# Patient Record
Sex: FEMALE | Race: WHITE | NOT HISPANIC OR LATINO | Employment: OTHER | URBAN - METROPOLITAN AREA
[De-identification: names, ages, dates, MRNs, and addresses within clinical notes are randomized per-mention and may not be internally consistent; named-entity substitution may affect disease eponyms.]

---

## 2019-01-24 ENCOUNTER — TELEPHONE (OUTPATIENT)
Dept: PULMONOLOGY | Facility: CLINIC | Age: 69
End: 2019-01-24

## 2019-01-24 DIAGNOSIS — J44.9 CHRONIC OBSTRUCTIVE PULMONARY DISEASE, UNSPECIFIED COPD TYPE: Primary | ICD-10-CM

## 2019-01-24 NOTE — PROGRESS NOTES
Subjective:      Patient ID: Radha Lamas is a 69 y.o. female.    Patient Active Problem List   Diagnosis    Brow ptosis    Dermatochalasis of right eyelid    Ptosis of both eyelids    Dermatochalasia     she has been referred by Ab Fletcher MD for evaluation and management for   Chief Complaint   Patient presents with    COPD     Chief Complaint: COPD    HPI:  Radha Lamas is a 69 y.o. female presents to pulmonary clinic for evaluation related to diagnosis of COPD.   She was last seen in pulmonary clinic by Dr. Quintero 5/14/2013 when diagnosis of COPD was established with compliant of shortness of breath.   Mild obstructive airflow defect seen on pft in 2013.   She was seen prior by Dr. Agosto 2/22/2013 related to compliant of shortness of breath.  She was found to have mild obstruction on CPFT 2013.   She presents back to pulmonary clinic related to her diagnosis of COPD in 2013 with advice primary care provider, Dr. PURNIMA Fletcher recommended she have a follow up with pulmonary.   No new problems, breathing compliant of shortness of breath with prolonged exertion or working in the yard.   Smoking history former cigarette smoker quit in 2009 with 67.5 pack year smoking history. Smoked 1.5 packs x 45 years.   Current medication Symbicort 160 mcg 2 puffs once daily in morning, she feels she is controlled with only once a day use and cost of inhalers is somewhat factor and use once a day is all she feels she needs. Had albuterol inhaler in past, never used so did not refill.    CAT score: 8    Previous Report Reviewed: historical medical records, office notes and radiology reports     Past Medical History: The following portions of the patient's history were reviewed and updated as appropriate:   She  has a past surgical history that includes Knee cartilage surgery (Right, 01/2011).  Her family history includes Cancer in her brother, father, and sister; Heart failure in her mother; Hypertension in  "her father and mother.  She  reports that she quit smoking about 10 years ago. Her smoking use included cigarettes. She started smoking about 55 years ago. She has a 67.50 pack-year smoking history. she has never used smokeless tobacco. She reports that she does not drink alcohol or use drugs.  She has a current medication list which includes the following prescription(s): alprazolam, aspirin, budesonide-formoterol 160-4.5 mcg, calcium carbonate, levothyroxine, omeprazole, potassium phosphate (monobasic), and tolterodine.  She has No Known Allergies.    Review of Systems   Constitutional: Negative for fever, chills, weight loss, weight gain, activity change, appetite change, fatigue and night sweats.   HENT: Negative for postnasal drip, rhinorrhea, sinus pressure, voice change and congestion.    Eyes: Negative for redness and itching.   Respiratory: Negative for snoring, cough, sputum production, chest tightness, shortness of breath, wheezing, orthopnea, asthma nighttime symptoms, dyspnea on extertion, use of rescue inhaler and somnolence.    Cardiovascular: Negative.  Negative for chest pain, palpitations and leg swelling.   Genitourinary: Negative for difficulty urinating and hematuria.   Endocrine: Negative for cold intolerance and heat intolerance.    Musculoskeletal: Negative for arthralgias, gait problem, joint swelling and myalgias.   Skin: Negative.    Gastrointestinal: Negative for nausea, vomiting, abdominal pain and acid reflux.   Neurological: Negative for dizziness, weakness, light-headedness and headaches.   Hematological: Negative for adenopathy. No excessive bruising.   All other systems reviewed and are negative.     Objective:   /76   Pulse 65   Resp 16   Ht 5' 5" (1.651 m)   Wt 75.5 kg (166 lb 7.2 oz)   SpO2 98%   BMI 27.70 kg/m²   Physical Exam   Constitutional: She is oriented to person, place, and time. She appears well-developed and well-nourished. She is active and cooperative.  " Non-toxic appearance. She does not appear ill. No distress.   HENT:   Head: Normocephalic.   Right Ear: External ear normal.   Left Ear: External ear normal.   Nose: Nose normal.   Mouth/Throat: Oropharynx is clear and moist. No oropharyngeal exudate.   Eyes: Conjunctivae are normal.   Neck: Normal range of motion. Neck supple.   Cardiovascular: Normal rate, regular rhythm, normal heart sounds and intact distal pulses.   Pulmonary/Chest: Effort normal and breath sounds normal. No stridor.   Abdominal: Soft.   Musculoskeletal: Normal range of motion.   Lymphadenopathy:     She has no cervical adenopathy.   Neurological: She is alert and oriented to person, place, and time.   Skin: Skin is warm and dry.   Psychiatric: She has a normal mood and affect. Her behavior is normal. Judgment and thought content normal.   Vitals reviewed.    Personal Diagnostic Review  X-Ray Chest PA And Lateral  Narrative: EXAMINATION:  XR CHEST PA AND LATERAL    CLINICAL HISTORY:  Chronic obstructive pulmonary disease, unspecified    TECHNIQUE:  PA and lateral views of the chest were performed.    COMPARISON:  05/14/2013    FINDINGS:  Cardiac silhouette and mediastinal contours are normal.  Lungs are clear.  Osseous structures are intact.  Impression: No acute cardiopulmonary process.    Electronically signed by: Leo Acevedo MD  Date:    01/25/2019  Time:    11:26    6/24/2013 CPFT  Mild obstruction. Airflow is not clearly improved following bronchodilation. Clinical improvement following bronchodilator  therapy may occur in the absence of spirometric improvement.    Assessment:     1. Chronic obstructive pulmonary disease, unspecified COPD type    2. Screening for cancer    3. Former heavy tobacco smoker      Orders Placed This Encounter   Procedures    CT Chest Lung Screening Low Dose     Standing Status:   Future     Standing Expiration Date:   1/25/2020     Order Specific Question:   Are you a current or former smoker who quit  within the past 15 years?     Answer:   Yes     Order Specific Question:   Are you between age 55-77 years old?     Answer:   Yes     Order Specific Question:   Has the patient ever had lung cancer or an abnormal Chest CT?     Answer:   No     Order Specific Question:   Has the patient smoked 30 or more packs of cigarettes/year?     Answer:   Yes     Order Specific Question:   May the Radiologist modify the order per protocol to meet the clinical needs of the patient?     Answer:   Yes    Complete PFT with bronchodilator     Standing Status:   Future     Standing Expiration Date:   1/25/2020    Stress test, pulmonary     Standing Status:   Future     Standing Expiration Date:   1/25/2020     Plan:   Discussed diagnosis, its evaluation, treatment and usual course. All questions answered.  Problem List Items Addressed This Visit     None      Visit Diagnoses     Chronic obstructive pulmonary disease, unspecified COPD type    -  Primary    Relevant Orders    Complete PFT with bronchodilator    Stress test, pulmonary    CT Chest Lung Screening Low Dose    Screening for cancer        Relevant Orders    CT Chest Lung Screening Low Dose    Former heavy tobacco smoker        Relevant Orders    CT Chest Lung Screening Low Dose      Plan summary  Stable on current Symbicort 160 mcg, encouraged as ordered every 12 hrs, she finds well controlled on 2 puffs in morning only.  Proceed with CPFT to reevaluate lung function, last cpft done 2013.   Continue current regimen, consideration for 24 hr LABA/LAMA depending on cpft results  Advised have rescue Albuterol inhaler on hand. She declines obtaining rescue inhaler, obtained in 2013 never used, does not want added cost of Albuterol inhaler at this time.   Weight loss, regular exercise program.     Follow-up in about 4 weeks (around 2/22/2019) for COPD w/review cpft/pul stress.     Thank you for the opportunity to participate in the care of this patient.

## 2019-01-25 ENCOUNTER — HOSPITAL ENCOUNTER (OUTPATIENT)
Dept: RADIOLOGY | Facility: HOSPITAL | Age: 69
Discharge: HOME | End: 2019-01-25
Attending: NURSE PRACTITIONER
Payer: MEDICARE

## 2019-01-25 ENCOUNTER — OFFICE VISIT (OUTPATIENT)
Dept: PULMONOLOGY | Facility: CLINIC | Age: 69
End: 2019-01-25
Payer: MEDICARE

## 2019-01-25 VITALS
RESPIRATION RATE: 16 BRPM | HEART RATE: 65 BPM | BODY MASS INDEX: 27.73 KG/M2 | OXYGEN SATURATION: 98 % | DIASTOLIC BLOOD PRESSURE: 76 MMHG | WEIGHT: 166.44 LBS | HEIGHT: 65 IN | SYSTOLIC BLOOD PRESSURE: 128 MMHG

## 2019-01-25 DIAGNOSIS — Z87.891 FORMER HEAVY TOBACCO SMOKER: ICD-10-CM

## 2019-01-25 DIAGNOSIS — J44.9 CHRONIC OBSTRUCTIVE PULMONARY DISEASE, UNSPECIFIED COPD TYPE: ICD-10-CM

## 2019-01-25 DIAGNOSIS — J44.9 CHRONIC OBSTRUCTIVE PULMONARY DISEASE, UNSPECIFIED COPD TYPE: Primary | ICD-10-CM

## 2019-01-25 DIAGNOSIS — Z12.9 SCREENING FOR CANCER: ICD-10-CM

## 2019-01-25 PROCEDURE — 71046 XR CHEST PA AND LATERAL: ICD-10-PCS | Mod: 26,,, | Performed by: RADIOLOGY

## 2019-01-25 PROCEDURE — 99213 OFFICE O/P EST LOW 20 MIN: CPT | Mod: PBBFAC,25 | Performed by: NURSE PRACTITIONER

## 2019-01-25 PROCEDURE — 99204 PR OFFICE/OUTPT VISIT, NEW, LEVL IV, 45-59 MIN: ICD-10-PCS | Mod: S$PBB,,, | Performed by: NURSE PRACTITIONER

## 2019-01-25 PROCEDURE — 99999 PR PBB SHADOW E&M-EST. PATIENT-LVL III: CPT | Mod: PBBFAC,,, | Performed by: NURSE PRACTITIONER

## 2019-01-25 PROCEDURE — 71046 X-RAY EXAM CHEST 2 VIEWS: CPT | Mod: 26,,, | Performed by: RADIOLOGY

## 2019-01-25 PROCEDURE — 99204 OFFICE O/P NEW MOD 45 MIN: CPT | Mod: S$PBB,,, | Performed by: NURSE PRACTITIONER

## 2019-01-25 PROCEDURE — 99999 PR PBB SHADOW E&M-EST. PATIENT-LVL III: ICD-10-PCS | Mod: PBBFAC,,, | Performed by: NURSE PRACTITIONER

## 2019-01-25 PROCEDURE — 71046 X-RAY EXAM CHEST 2 VIEWS: CPT | Mod: TC

## 2019-01-25 RX ORDER — ASPIRIN 81 MG/1
81 TABLET ORAL
COMMUNITY
End: 2020-10-25

## 2019-01-25 RX ORDER — CALCIUM CARBONATE 600 MG
600 TABLET ORAL
COMMUNITY

## 2019-01-25 RX ORDER — ALPRAZOLAM 0.25 MG/1
TABLET ORAL
COMMUNITY
Start: 2018-12-05 | End: 2020-10-25

## 2019-01-25 NOTE — LETTER
January 25, 2019      Ab Fletcher MD  [address] [facility] [place] [address]           [facility]  [address] [address] [address]  Phone: [phone]  Fax: [phone]          Patient: Radha Lamas   MR Number: [id number]   YOB: 1950   Date of Visit: 1/25/2019       Dear Dr. Ab Fletcher:    Thank you for referring Radha Lamas to me for evaluation. Attached you will find relevant portions of my assessment and plan of care.    If you have questions, please do not hesitate to call me. I look forward to following Radha Lamas along with you.    Sincerely,    Cornelia Almonte, NP    Enclosure  CC:  No Recipients    If you would like to receive this communication electronically, please contact [email or web address] or [phone] to request more information on [email or web address] Link access.    For providers and/or their staff who would like to refer a patient to [facility], please contact us through our one-stop-shop provider referral line, [facility] , at [phone].    If you feel you have received this communication in error or would no longer like to receive these types of communications, please e-mail [email or web address]

## 2019-01-28 ENCOUNTER — HOSPITAL ENCOUNTER (OUTPATIENT)
Dept: RADIOLOGY | Facility: HOSPITAL | Age: 69
Discharge: HOME | End: 2019-01-28
Attending: NURSE PRACTITIONER
Payer: MEDICARE

## 2019-01-28 DIAGNOSIS — Z12.9 SCREENING FOR CANCER: ICD-10-CM

## 2019-01-28 DIAGNOSIS — Z87.891 FORMER HEAVY TOBACCO SMOKER: ICD-10-CM

## 2019-01-28 DIAGNOSIS — J44.9 CHRONIC OBSTRUCTIVE PULMONARY DISEASE, UNSPECIFIED COPD TYPE: ICD-10-CM

## 2019-01-28 PROCEDURE — G0297 LDCT FOR LUNG CA SCREEN: HCPCS | Mod: 26,,, | Performed by: RADIOLOGY

## 2019-01-28 PROCEDURE — G0297 LDCT FOR LUNG CA SCREEN: HCPCS | Mod: TC

## 2019-01-28 PROCEDURE — G0297 CT CHEST LUNG SCREENING LOW DOSE: ICD-10-PCS | Mod: 26,,, | Performed by: RADIOLOGY

## 2019-02-11 ENCOUNTER — CLINICAL SUPPORT (OUTPATIENT)
Dept: PULMONOLOGY | Facility: CLINIC | Age: 69
End: 2019-02-11
Payer: MEDICARE

## 2019-02-11 VITALS — HEIGHT: 65 IN | WEIGHT: 162.5 LBS | BODY MASS INDEX: 27.07 KG/M2

## 2019-02-11 DIAGNOSIS — J44.9 CHRONIC OBSTRUCTIVE PULMONARY DISEASE, UNSPECIFIED COPD TYPE: ICD-10-CM

## 2019-02-11 LAB
BRPFT: ABNORMAL
DLCO ADJ PRE: 15.35 ML/(MIN*MMHG) (ref 16–27.47)
DLCO SINGLE BREATH LLN: 16
DLCO SINGLE BREATH PRE REF: 70.6 %
DLCO SINGLE BREATH REF: 21.73
DLCOC SBVA LLN: 2.86
DLCOC SBVA PRE REF: 114.9 %
DLCOC SBVA REF: 4.26
DLCOC SINGLE BREATH LLN: 16
DLCOC SINGLE BREATH PRE REF: 70.6 %
DLCOC SINGLE BREATH REF: 21.73
DLCOVA LLN: 2.86
DLCOVA PRE REF: 114.9 %
DLCOVA PRE: 4.9 ML/(MIN*MMHG*L) (ref 2.86–5.66)
DLCOVA REF: 4.26
DLVAADJ PRE: 4.9 ML/(MIN*MMHG*L) (ref 2.86–5.66)
ERV LLN: 0.64
ERV PRE REF: 87.7 %
ERV REF: 0.64
FEF 25 75 CHG: -11.2 %
FEF 25 75 LLN: 0.86
FEF 25 75 POST REF: 30.8 %
FEF 25 75 PRE REF: 34.7 %
FEF 25 75 REF: 1.87
FET100 CHG: -2.2 %
FEV1 CHG: -0.4 %
FEV1 FVC CHG: -7.2 %
FEV1 FVC LLN: 64
FEV1 FVC POST REF: 74.4 %
FEV1 FVC PRE REF: 80.2 %
FEV1 FVC REF: 78
FEV1 LLN: 1.63
FEV1 POST REF: 69.5 %
FEV1 PRE REF: 69.8 %
FEV1 REF: 2.25
FEV6 CHG: 5.6 %
FEV6 LLN: 2.24
FEV6 POST REF: 80.9 %
FEV6 POST: 2.38 L (ref 2.24–3.64)
FEV6 PRE REF: 76.6 %
FEV6 PRE: 2.25 L (ref 2.24–3.64)
FEV6 REF: 2.94
FRCPLETH LLN: 1.94
FRCPLETH PREREF: 161.5 %
FRCPLETH REF: 2.77
FVC CHG: 7.3 %
FVC LLN: 2.12
FVC POST REF: 92.6 %
FVC PRE REF: 86.3 %
FVC REF: 2.91
IVC PRE: 2.24 L (ref 2.12–3.71)
IVC SINGLE BREATH LLN: 2.12
IVC SINGLE BREATH PRE REF: 77 %
IVC SINGLE BREATH REF: 2.91
MVV LLN: 73
MVV PRE REF: 52.5 %
MVV REF: 86
PEF CHG: -0.6 %
PEF LLN: 3.96
PEF POST REF: 70.2 %
PEF PRE REF: 70.6 %
PEF REF: 5.72
POST FEF 25 75: 0.58 L/S (ref 0.86–2.89)
POST FET 100: 12.95 SEC
POST FEV1 FVC: 57.94 % (ref 64.48–91.27)
POST FEV1: 1.56 L (ref 1.63–2.86)
POST FVC: 2.7 L (ref 2.12–3.71)
POST PEF: 4.01 L/S (ref 3.96–7.48)
PRE DLCO: 15.35 ML/(MIN*MMHG) (ref 16–27.47)
PRE ERV: 0.56 L (ref 0.64–0.64)
PRE FEF 25 75: 0.65 L/S (ref 0.86–2.89)
PRE FET 100: 13.24 SEC
PRE FEV1 FVC: 62.42 % (ref 64.48–91.27)
PRE FEV1: 1.57 L (ref 1.63–2.86)
PRE FRC PL: 4.47 L
PRE FVC: 2.51 L (ref 2.12–3.71)
PRE MVV: 45 L/MIN (ref 72.84–98.56)
PRE PEF: 4.04 L/S (ref 3.96–7.48)
PRE RV: 3.61 L (ref 1.55–2.7)
PRE TLC: 6.12 L (ref 4.11–6.09)
RAW LLN: 3.06
RAW PRE REF: 135.2 %
RAW PRE: 4.13 CMH2O*S/L (ref 3.06–3.06)
RAW REF: 3.06
RV LLN: 1.55
RV PRE REF: 170 %
RV REF: 2.12
RVTLC LLN: 34
RVTLC PRE REF: 136.8 %
RVTLC PRE: 58.95 % (ref 33.51–52.69)
RVTLC REF: 43
TLC LLN: 4.11
TLC PRE REF: 120 %
TLC REF: 5.1
VA PRE: 3.14 L (ref 4.95–4.95)
VA SINGLE BREATH LLN: 4.95
VA SINGLE BREATH PRE REF: 63.5 %
VA SINGLE BREATH REF: 4.95
VC LLN: 2.12
VC PRE REF: 86.3 %
VC PRE: 2.51 L (ref 2.12–3.71)
VC REF: 2.91
VTGRAWPRE: 4.57 L

## 2019-02-11 PROCEDURE — 94729 DIFFUSING CAPACITY: CPT | Mod: PBBFAC

## 2019-02-11 PROCEDURE — 94726 PLETHYSMOGRAPHY LUNG VOLUMES: CPT | Mod: 26,S$PBB,, | Performed by: INTERNAL MEDICINE

## 2019-02-11 PROCEDURE — 94726 PULM FUNCT TST PLETHYSMOGRAP: ICD-10-PCS | Mod: 26,S$PBB,, | Performed by: INTERNAL MEDICINE

## 2019-02-11 PROCEDURE — 94729 PR C02/MEMBANE DIFFUSE CAPACITY: ICD-10-PCS | Mod: 26,S$PBB,, | Performed by: INTERNAL MEDICINE

## 2019-02-11 PROCEDURE — 94726 PLETHYSMOGRAPHY LUNG VOLUMES: CPT | Mod: PBBFAC

## 2019-02-11 PROCEDURE — 94618 PULMONARY STRESS TESTING: CPT | Mod: PBBFAC

## 2019-02-11 PROCEDURE — 94060 PR EVAL OF BRONCHOSPASM: ICD-10-PCS | Mod: 26,59,S$PBB, | Performed by: INTERNAL MEDICINE

## 2019-02-11 PROCEDURE — 94729 DIFFUSING CAPACITY: CPT | Mod: 26,S$PBB,, | Performed by: INTERNAL MEDICINE

## 2019-02-11 PROCEDURE — 94060 EVALUATION OF WHEEZING: CPT | Mod: 26,59,S$PBB, | Performed by: INTERNAL MEDICINE

## 2019-02-11 PROCEDURE — 94060 EVALUATION OF WHEEZING: CPT | Mod: PBBFAC

## 2019-02-11 PROCEDURE — 94618 PULMONARY STRESS TESTING: CPT | Mod: 26,S$PBB,, | Performed by: INTERNAL MEDICINE

## 2019-02-11 PROCEDURE — 94618 PULMONARY STRESS TESTING: ICD-10-PCS | Mod: 26,S$PBB,, | Performed by: INTERNAL MEDICINE

## 2019-02-11 NOTE — PROCEDURES
"[facility]  Six Minute Walk     SUMMARY     Ordering Provider: GINNA Almonte   Interpreting Provider: Dr. Ma  Performing nurse/tech/RT: DORY Flores RRT  Diagnosis: COPD  Height: 5' 5" (165.1 cm)  Weight: 73.7 kg (162 lb 7.7 oz)  BMI (Calculated): 27.1   Patient Race:             Phase Oxygen Assessment Supplemental O2 Heart   Rate Blood Pressure Edmundo Dyspnea Scale Rating   Resting 95 % Room Air 60 bpm 109/70 0   Exercise        Minute        1 94 % Room Air 97 bpm     2 96 % Room Air 101 bpm     3 95 % Room Air 103 bpm     4 95 % Room Air 105 bpm     5 95 % Room Air 104 bpm     6  97 % Room Air 106 bpm 138/64 0   Recovery        Minute        1 97 % Room Air 73 bpm     2 98 % Room Air 75 bpm     3 98 % Room Air 74 bpm     4 97 % Room Air 70 bpm 122/73 0     Six Minute Walk Summary  6MWT Status: completed without stopping  Patient Reported: No complaints     Interpretation:  Did the patient stop or pause?: No                Total Time Walked (Calculated): 360 seconds  Final Partial Lap Distance (feet): 35 feet  Total Distance Meters (Calculated): 437.39 meters  Predicted Distance Meters (Calculated): 436.21 meters  Percentage of Predicted (Calculated): 100.27  Peak VO2 (Calculated): 17.1  Mets: 4.89  Has The Patient Had a Previous Six Minute Walk Test?: No       Previous 6MWT Results  Has The Patient Had a Previous Six Minute Walk Test?: No    "

## 2019-02-18 ENCOUNTER — OFFICE VISIT (OUTPATIENT)
Dept: PULMONOLOGY | Facility: CLINIC | Age: 69
End: 2019-02-18
Payer: MEDICARE

## 2019-02-18 VITALS
RESPIRATION RATE: 16 BRPM | OXYGEN SATURATION: 98 % | SYSTOLIC BLOOD PRESSURE: 128 MMHG | HEART RATE: 66 BPM | DIASTOLIC BLOOD PRESSURE: 78 MMHG | WEIGHT: 169.06 LBS | HEIGHT: 65 IN | BODY MASS INDEX: 28.17 KG/M2

## 2019-02-18 DIAGNOSIS — Z87.891 PERSONAL HISTORY OF NICOTINE DEPENDENCE: ICD-10-CM

## 2019-02-18 DIAGNOSIS — F17.201 TOBACCO USE DISORDER, SEVERE, IN SUSTAINED REMISSION: ICD-10-CM

## 2019-02-18 DIAGNOSIS — Z12.9 SCREENING FOR CANCER: ICD-10-CM

## 2019-02-18 DIAGNOSIS — J44.9 COPD, MODERATE: Primary | ICD-10-CM

## 2019-02-18 PROCEDURE — 99999 PR PBB SHADOW E&M-EST. PATIENT-LVL IV: ICD-10-PCS | Mod: PBBFAC,,, | Performed by: NURSE PRACTITIONER

## 2019-02-18 PROCEDURE — 99214 PR OFFICE/OUTPT VISIT, EST, LEVL IV, 30-39 MIN: ICD-10-PCS | Mod: S$PBB,,, | Performed by: NURSE PRACTITIONER

## 2019-02-18 PROCEDURE — 99214 OFFICE O/P EST MOD 30 MIN: CPT | Mod: PBBFAC | Performed by: NURSE PRACTITIONER

## 2019-02-18 PROCEDURE — 99999 PR PBB SHADOW E&M-EST. PATIENT-LVL IV: CPT | Mod: PBBFAC,,, | Performed by: NURSE PRACTITIONER

## 2019-02-18 PROCEDURE — 99214 OFFICE O/P EST MOD 30 MIN: CPT | Mod: S$PBB,,, | Performed by: NURSE PRACTITIONER

## 2019-02-18 NOTE — PROGRESS NOTES
Subjective:      Patient ID: Radha Lamas is a 69 y.o. female.    Patient Active Problem List   Diagnosis    Brow ptosis    Dermatochalasis of right eyelid    Ptosis of both eyelids    Dermatochalasia     she has been referred by No ref. provider found for evaluation and management for   Chief Complaint   Patient presents with    COPD     Chief Complaint: COPD    HPI:  Radha Lamas is a 69 y.o. female presents to pulmonary clinic for re-evaluation related to diagnosis of COPD with review of cpft and CT screening. No opacities seen on 1/28/2019 LDCT.   Mild obstructive airflow defect seen on pft in 2013.   Moderate obstructive airflow defect seen on cpft   She was found to have mild obstruction on CPFT 2013.   No new problems, breathing compliant of shortness of breath with prolonged exertion or working in the yard.   Smoking history former cigarette smoker quit in 2009 with 67.5 pack year smoking history. Smoked 1.5 packs x 45 years.   Current medication Symbicort 160 mcg 2 puffs once daily in morning, she feels she is controlled with only once a day use and cost of inhalers is somewhat factor and use once a day is all she feels she needs. Had albuterol inhaler in past, never used so did not refill.     CAT score: 9    Previous Report Reviewed: historical medical records, office notes and radiology reports      Past Medical History: The following portions of the patient's history were reviewed and updated as appropriate:   She  has a past surgical history that includes Knee cartilage surgery (Right, 01/2011).  Her family history includes Cancer in her brother, father, and sister; Heart failure in her mother; Hypertension in her father and mother.  She  reports that she quit smoking about 10 years ago. Her smoking use included cigarettes. She started smoking about 55 years ago. She has a 67.50 pack-year smoking history. she has never used smokeless tobacco. She reports that she does not drink alcohol  "or use drugs.  She has a current medication list which includes the following prescription(s): alprazolam, aspirin, calcium carbonate, levothyroxine, omeprazole, potassium phosphate (monobasic), tolterodine, and umeclidinium-vilanterol.  She has No Known Allergies.    Review of Systems   Constitutional: Negative for fever, chills, weight loss, weight gain, activity change, appetite change, fatigue and night sweats.   HENT: Negative for postnasal drip, rhinorrhea, sinus pressure, voice change and congestion.    Eyes: Negative for redness and itching.   Respiratory: Negative for snoring, cough, sputum production, chest tightness, shortness of breath, wheezing, orthopnea, asthma nighttime symptoms, dyspnea on extertion, use of rescue inhaler and somnolence.    Cardiovascular: Negative.  Negative for chest pain, palpitations and leg swelling.   Genitourinary: Negative for difficulty urinating and hematuria.   Endocrine: Negative for cold intolerance and heat intolerance.    Musculoskeletal: Negative for arthralgias, gait problem, joint swelling and myalgias.   Skin: Negative.    Gastrointestinal: Negative for nausea, vomiting, abdominal pain and acid reflux.   Neurological: Negative for dizziness, weakness, light-headedness and headaches.   Hematological: Negative for adenopathy. No excessive bruising.   All other systems reviewed and are negative.     Objective:   /78   Pulse 66   Resp 16   Ht 5' 5" (1.651 m)   Wt 76.7 kg (169 lb 1.5 oz)   SpO2 98%   BMI 28.14 kg/m²   Physical Exam   Constitutional: She is oriented to person, place, and time. She appears well-developed and well-nourished. She is active and cooperative.  Non-toxic appearance. She does not appear ill. No distress.   HENT:   Head: Normocephalic.   Right Ear: External ear normal.   Left Ear: External ear normal.   Nose: Nose normal.   Mouth/Throat: Oropharynx is clear and moist. No oropharyngeal exudate.   Eyes: Conjunctivae are normal.   Neck: " Normal range of motion. Neck supple.   Cardiovascular: Normal rate, regular rhythm, normal heart sounds and intact distal pulses.   Pulmonary/Chest: Effort normal and breath sounds normal. No stridor.   Abdominal: Soft.   Musculoskeletal: Normal range of motion.   Lymphadenopathy:     She has no cervical adenopathy.   Neurological: She is alert and oriented to person, place, and time.   Skin: Skin is warm and dry.   Psychiatric: She has a normal mood and affect. Her behavior is normal. Judgment and thought content normal.   Vitals reviewed.    Personal Diagnostic Review  CT Chest Lung Screening Low Dose  Narrative: EXAMINATION:  CT CHEST LUNG SCREENING LOW DOSE    CLINICAL HISTORY:  Lung cancer annual screening, asymptomatic, smoker hx w/in last 15 yrs (min. 30 pack-yrs); Encounter for screening for malignant neoplasm, site unspecified    TECHNIQUE:  CT of the thorax was performed with low dose, lung screening protocol.  No contrast was administered.  Sagittal and coronal reconstructions were obtained.    COMPARISON:  None.    FINDINGS:  Lungs: There are no abnormal opacities that require further evaluation.  Areas of pleuroparenchymal scarring or atelectasis present in the lungs bilaterally.  The lungs show no findings consistent with emphysema.    Pleura:   No effusion..    Heart and pericardium: Normal size without effusion.    Aorta and vasculature: Atherosclerosis including coronary arteries.    Chest wall and skeletal structures: Unremarkable except age-appropriate degenerative changes.    Upper abdomen: Several cysts in the liver measuring up to 2 cm in the posterior right hepatic lobe  Impression: Lung-RADS Category:  1 - Negative - Continue annual screening with LDCT in 12 months.    Clinically or potentially clinically significant non lung cancer finding:  None.    Prior Lung Cancer Modifier:  No history of prior lung cancer.    Electronically signed by: ZURDO Abbott  MD  Date:    01/28/2019  Time:    14:37    2/11/2019   CPFT   Acceptable and reproducible spirometry data.     FEV1/ FVC decreased to 62% indicating obstruction.     FEV1 reduced to 69% indicating moderate obstruction.     FVC normal at 86%.     No significant bronchodilation noted after bronchodilator administration.     FEF 25-75% decreased to 34% indicating small airways flow obstruction.     Flow volume curve shows obstructive pattern.     Spirometry showing moderate obstruction.     Lung volume shows increased TLC to 120% indicating hyper inflation, increased RV and RV/ TLC indicating the presence of air trapping.     DLCO mildly reduced to 70%.     Moderate obstruction, air trapping, mild DLCO reduction.  Clinical correlation recommended.     6/24/2013 CPFT  Mild obstruction. Airflow is not clearly improved following bronchodilation. Clinical improvement following bronchodilator  therapy may occur in the absence of spirometric improvement.    Assessment:     1. COPD, moderate    2. Screening for cancer    3. Tobacco use disorder, severe, in sustained remission    4. Personal history of nicotine dependence       Orders Placed This Encounter   Procedures    CT Chest Lung Screening Low Dose     Standing Status:   Future     Standing Expiration Date:   2/18/2021     Order Specific Question:   Are you a current or former smoker who quit within the past 15 years?     Answer:   Yes     Order Specific Question:   Are you between age 55-77 years old?     Answer:   Yes     Order Specific Question:   Has the patient ever had lung cancer or an abnormal Chest CT?     Answer:   No     Order Specific Question:   Has the patient smoked 30 or more packs of cigarettes/year?     Answer:   Yes     Order Specific Question:   May the Radiologist modify the order per protocol to meet the clinical needs of the patient?     Answer:   Yes     Plan:   Discussed diagnosis, its evaluation, treatment and usual course. All questions  answered.  Problem List Items Addressed This Visit     None      Visit Diagnoses     COPD, moderate    -  Primary    Relevant Medications    umeclidinium-vilanterol (ANORO ELLIPTA) 62.5-25 mcg/actuation DsDv    Other Relevant Orders    CT Chest Lung Screening Low Dose    Screening for cancer        Relevant Orders    CT Chest Lung Screening Low Dose    Tobacco use disorder, severe, in sustained remission        Relevant Orders    CT Chest Lung Screening Low Dose    Personal history of nicotine dependence         Relevant Orders    CT Chest Lung Screening Low Dose      Plan summary  D/C Symbicort 160 mcg, patient only taking once daily, determine if can stop steroid and provide control of shortness of breath with beginning controller Anoro LABA/LAMA, free one month coupon.   CPFT revealed moderate COPD changes, in 2013 pft revealed mild COPD changes.   Recommended rescue Albuterol inhaler on hand. She declines obtaining rescue inhaler, obtained in 2013 never used, does not want added cost of Albuterol inhaler at this time.   67.5 pk/year smoker quit in 2009. No opacity seen on 2019 LDCT. Repeat LDCT in Feb 2020.     Follow-up in about 3 months (around 5/18/2019) for COPD after beginning controller Anoro..

## 2019-03-08 ENCOUNTER — OFFICE VISIT (OUTPATIENT)
Dept: URGENT CARE | Facility: CLINIC | Age: 69
End: 2019-03-08
Payer: MEDICARE

## 2019-03-08 VITALS
DIASTOLIC BLOOD PRESSURE: 76 MMHG | OXYGEN SATURATION: 97 % | RESPIRATION RATE: 20 BRPM | HEART RATE: 79 BPM | BODY MASS INDEX: 28.32 KG/M2 | TEMPERATURE: 98 F | SYSTOLIC BLOOD PRESSURE: 124 MMHG | WEIGHT: 170.19 LBS

## 2019-03-08 DIAGNOSIS — N39.0 URINARY TRACT INFECTION WITH HEMATURIA, SITE UNSPECIFIED: Primary | ICD-10-CM

## 2019-03-08 DIAGNOSIS — R31.9 URINARY TRACT INFECTION WITH HEMATURIA, SITE UNSPECIFIED: Primary | ICD-10-CM

## 2019-03-08 DIAGNOSIS — R39.9 URINARY TRACT INFECTION SYMPTOMS: ICD-10-CM

## 2019-03-08 LAB
BILIRUB SERPL-MCNC: ABNORMAL MG/DL
BLOOD URINE, POC: ABNORMAL
COLOR, POC UA: ABNORMAL
GLUCOSE UR QL STRIP: NORMAL
KETONES UR QL STRIP: ABNORMAL
LEUKOCYTE ESTERASE URINE, POC: ABNORMAL
NITRITE, POC UA: ABNORMAL
PH, POC UA: 8
PROTEIN, POC: ABNORMAL
SPECIFIC GRAVITY, POC UA: 1
UROBILINOGEN, POC UA: ABNORMAL

## 2019-03-08 PROCEDURE — 99213 OFFICE O/P EST LOW 20 MIN: CPT | Mod: PBBFAC,PO | Performed by: NURSE PRACTITIONER

## 2019-03-08 PROCEDURE — 87086 URINE CULTURE/COLONY COUNT: CPT

## 2019-03-08 PROCEDURE — 81001 URINALYSIS AUTO W/SCOPE: CPT | Mod: PBBFAC,PO | Performed by: NURSE PRACTITIONER

## 2019-03-08 PROCEDURE — 99999 PR PBB SHADOW E&M-EST. PATIENT-LVL III: CPT | Mod: PBBFAC,,, | Performed by: NURSE PRACTITIONER

## 2019-03-08 PROCEDURE — 99999 PR PBB SHADOW E&M-EST. PATIENT-LVL III: ICD-10-PCS | Mod: PBBFAC,,, | Performed by: NURSE PRACTITIONER

## 2019-03-08 PROCEDURE — 87088 URINE BACTERIA CULTURE: CPT

## 2019-03-08 PROCEDURE — 99214 OFFICE O/P EST MOD 30 MIN: CPT | Mod: S$PBB,,, | Performed by: NURSE PRACTITIONER

## 2019-03-08 PROCEDURE — 87077 CULTURE AEROBIC IDENTIFY: CPT

## 2019-03-08 PROCEDURE — 99214 PR OFFICE/OUTPT VISIT, EST, LEVL IV, 30-39 MIN: ICD-10-PCS | Mod: S$PBB,,, | Performed by: NURSE PRACTITIONER

## 2019-03-08 PROCEDURE — 87186 SC STD MICRODIL/AGAR DIL: CPT

## 2019-03-08 RX ORDER — NITROFURANTOIN (MACROCRYSTALS) 100 MG/1
100 CAPSULE ORAL EVERY 12 HOURS
Qty: 14 CAPSULE | Refills: 0 | Status: SHIPPED | OUTPATIENT
Start: 2019-03-08 | End: 2019-03-15

## 2019-03-08 NOTE — PROGRESS NOTES
Subjective:      Patient ID: Radha Lamas is a 69 y.o. female.    Chief Complaint: Urinary Tract Infection      Urinary Tract Infection    This is a new problem. The current episode started in the past 7 days. The problem occurs every urination. The problem has been unchanged. The quality of the pain is described as burning (stings). There has been no fever. She is not sexually active. There is no history of pyelonephritis. Associated symptoms include frequency. Pertinent negatives include no behavior changes, chills, discharge, flank pain, hematuria, hesitancy, nausea, possible pregnancy, sweats, urgency, vomiting, weight loss, bubble bath use, constipation, rash or withholding. She has tried nothing for the symptoms. Her past medical history is significant for catheterization and recurrent UTIs. There is no history of diabetes insipidus, diabetes mellitus, genitourinary reflux, hypertension, kidney stones, a single kidney, STD, urinary stasis or a urological procedure.     Review of Systems   Constitutional: Negative for activity change, appetite change, chills, diaphoresis, fatigue, fever, unexpected weight change and weight loss.   HENT: Negative for congestion, ear discharge, ear pain, postnasal drip, rhinorrhea, sinus pressure, sinus pain, sneezing and sore throat.    Eyes: Negative.    Respiratory: Negative for cough, chest tightness, shortness of breath and wheezing.    Cardiovascular: Negative for chest pain and palpitations.   Gastrointestinal: Negative for abdominal pain, constipation, diarrhea, nausea and vomiting.   Endocrine: Negative.  Negative for cold intolerance and heat intolerance.   Genitourinary: Positive for dysuria and frequency. Negative for difficulty urinating, flank pain, hematuria, hesitancy and urgency.   Musculoskeletal: Negative for arthralgias and myalgias.   Skin: Negative for rash.   Allergic/Immunologic: Negative for environmental allergies and food allergies.   Neurological:  Negative for dizziness, weakness, light-headedness and headaches.   Hematological: Negative for adenopathy.   Psychiatric/Behavioral: Negative for agitation.        Objective:     Vitals:    03/08/19 0915   BP: 124/76   Pulse: 79   Resp: 20   Temp: 98.3 °F (36.8 °C)     Physical Exam   Constitutional: She is oriented to person, place, and time. Vital signs are normal. She appears well-developed and well-nourished. She is cooperative. No distress.   HENT:   Head: Normocephalic.   Right Ear: Hearing and external ear normal.   Left Ear: Hearing and external ear normal.   Nose: Nose normal.   Eyes: Conjunctivae, EOM and lids are normal. Pupils are equal, round, and reactive to light. Right eye exhibits no discharge. Left eye exhibits no discharge.   Neck: Normal range of motion and full passive range of motion without pain. Neck supple.   Cardiovascular: Normal rate, regular rhythm and normal heart sounds.   Pulmonary/Chest: Effort normal and breath sounds normal. No accessory muscle usage. No tachypnea and no bradypnea. No respiratory distress.   Abdominal: Soft. Bowel sounds are normal. She exhibits no distension, no ascites and no mass. There is no hepatosplenomegaly. There is no tenderness. There is no rigidity, no rebound, no guarding, no CVA tenderness, no tenderness at McBurney's point and negative Vásquez's sign. No hernia.   Musculoskeletal: Normal range of motion.   Neurological: She is alert and oriented to person, place, and time.   Skin: Skin is warm, dry and intact. Capillary refill takes less than 2 seconds. No bruising, no ecchymosis, no lesion, no petechiae and no rash noted. She is not diaphoretic. No erythema. No pallor.   Nursing note and vitals reviewed.      Assessment:     1. Urinary tract infection with hematuria, site unspecified    2. Urinary tract infection symptoms        Plan:   Radha was seen today for urinary tract infection.    Diagnoses and all orders for this visit:    Urinary tract  infection with hematuria, site unspecified    Urinary tract infection symptoms  -     POCT URINE DIPSTICK WITH MICROSCOPE, AUTOMATED  -     Urine culture    Other orders  -     nitrofurantoin (MACRODANTIN) 100 MG capsule; Take 1 capsule (100 mg total) by mouth every 12 (twelve) hours. for 7 days    · Increase fluids (avoid caffeine or sugary beverages as these will irritate the bladder).   · Take your antibiotics exactly as prescribed and make sure to complete entire course even if you begin to feel better. This will prevent recurrence of infection and antibiotic resistance.   · We have prescribed an antibiotic that covers most bacteria that cause urinary tract infections, however, if your urine culture shows that you have any bacterial resistance to the antibiotic you were prescribed or an unusual bacterial strain, we will notify you and make any necessary changes. Urine cultures usually result in about three days.   · Please follow up with your primary care provider if your symptoms do not improve within 2-3 days or sooner for any new or worsening symptoms.  · For any severe pain, bright red blood in urine, difficulty urinating, fever that does not improve with Tylenol or Ibuprofen, inability to urinate, incontinence of urine or bowels, or for any other urgent concerns, please go to the ER for immediate evaluation.         ELAINE Palafox, FNP-C

## 2019-03-11 LAB — BACTERIA UR CULT: NORMAL

## 2019-03-14 ENCOUNTER — PATIENT MESSAGE (OUTPATIENT)
Dept: PULMONOLOGY | Facility: CLINIC | Age: 69
End: 2019-03-14

## 2019-07-15 ENCOUNTER — PATIENT MESSAGE (OUTPATIENT)
Dept: PULMONOLOGY | Facility: CLINIC | Age: 69
End: 2019-07-15

## 2019-07-15 DIAGNOSIS — J44.9 COPD, MODERATE: ICD-10-CM

## 2020-03-09 ENCOUNTER — TELEPHONE (OUTPATIENT)
Dept: PULMONOLOGY | Facility: CLINIC | Age: 70
End: 2020-03-09

## 2020-03-10 ENCOUNTER — OFFICE VISIT (OUTPATIENT)
Dept: PULMONOLOGY | Facility: CLINIC | Age: 70
End: 2020-03-10
Payer: MEDICARE

## 2020-03-10 ENCOUNTER — HOSPITAL ENCOUNTER (OUTPATIENT)
Dept: RADIOLOGY | Facility: HOSPITAL | Age: 70
Discharge: HOME | End: 2020-03-10
Attending: NURSE PRACTITIONER
Payer: MEDICARE

## 2020-03-10 VITALS
DIASTOLIC BLOOD PRESSURE: 60 MMHG | OXYGEN SATURATION: 97 % | RESPIRATION RATE: 18 BRPM | SYSTOLIC BLOOD PRESSURE: 122 MMHG | HEART RATE: 62 BPM | WEIGHT: 166.44 LBS | BODY MASS INDEX: 27.73 KG/M2 | HEIGHT: 65 IN

## 2020-03-10 DIAGNOSIS — Z12.9 SCREENING FOR CANCER: ICD-10-CM

## 2020-03-10 DIAGNOSIS — Z87.891 FORMER HEAVY CIGARETTE SMOKER (20-39 PER DAY): ICD-10-CM

## 2020-03-10 DIAGNOSIS — Z87.891 PERSONAL HISTORY OF NICOTINE DEPENDENCE: ICD-10-CM

## 2020-03-10 DIAGNOSIS — J44.9 COPD, MODERATE: Primary | ICD-10-CM

## 2020-03-10 DIAGNOSIS — F17.201 TOBACCO USE DISORDER, SEVERE, IN SUSTAINED REMISSION: ICD-10-CM

## 2020-03-10 DIAGNOSIS — J44.9 COPD, MODERATE: ICD-10-CM

## 2020-03-10 PROCEDURE — G0297 LDCT FOR LUNG CA SCREEN: HCPCS | Mod: TC

## 2020-03-10 PROCEDURE — G0297 CT CHEST LUNG SCREENING LOW DOSE: ICD-10-PCS | Mod: 26,,, | Performed by: RADIOLOGY

## 2020-03-10 PROCEDURE — 99999 PR PBB SHADOW E&M-EST. PATIENT-LVL IV: CPT | Mod: PBBFAC,,, | Performed by: NURSE PRACTITIONER

## 2020-03-10 PROCEDURE — 99214 OFFICE O/P EST MOD 30 MIN: CPT | Mod: 25,S$PBB,, | Performed by: NURSE PRACTITIONER

## 2020-03-10 PROCEDURE — 99214 PR OFFICE/OUTPT VISIT, EST, LEVL IV, 30-39 MIN: ICD-10-PCS | Mod: 25,S$PBB,, | Performed by: NURSE PRACTITIONER

## 2020-03-10 PROCEDURE — G0297 LDCT FOR LUNG CA SCREEN: HCPCS | Mod: 26,,, | Performed by: RADIOLOGY

## 2020-03-10 PROCEDURE — 99214 OFFICE O/P EST MOD 30 MIN: CPT | Mod: PBBFAC,25 | Performed by: NURSE PRACTITIONER

## 2020-03-10 PROCEDURE — 99999 PR PBB SHADOW E&M-EST. PATIENT-LVL IV: ICD-10-PCS | Mod: PBBFAC,,, | Performed by: NURSE PRACTITIONER

## 2020-03-10 NOTE — PROGRESS NOTES
Subjective:      Patient ID: Radha Lamas is a 70 y.o. female.    Patient Active Problem List   Diagnosis    Brow ptosis    Dermatochalasis of right eyelid    Ptosis of both eyelids    Dermatochalasia    Pulmonary nodule less than 6 cm determined by computed tomography of lung    Former heavy cigarette smoker (20-39 per day)    COPD, moderate     she has been referred by No ref. provider found for evaluation and management for   Chief Complaint   Patient presents with    COPD     Chief Complaint: COPD    HPI:  Radha Lamas is a 70 y.o. female presents to pulmonary clinic for follow up re-evaluation related to diagnosis of COPD after change to ANORO.  Patient reports her COPD symptom of shortness of breath is significantly improved with Anoro.    1 year follow up CT chest low dose screening 3/10/2020 revealed under 5mm nodule that may even be associated with scarring.   No opacities seen on 1/28/2019 LDCT.   Mild obstructive airflow defect seen on pft in 2013.   Moderate obstructive airflow defect seen on cpft   She was found to have mild obstruction on CPFT 2013.   No new problems, breathing compliant of shortness of breath with prolonged exertion or working in the yard.   Smoking history former cigarette smoker quit in 2009 with 67.5 pack year smoking history. Smoked 1.5 packs x 45 years.   Current medication Symbicort 160 mcg 2 puffs once daily in morning, she feels she is controlled with only once a day use and cost of inhalers is somewhat factor and use once a day is all she feels she needs. Had albuterol inhaler in past, never used so did not refill.     CAT score: 9    Previous Report Reviewed: historical medical records, office notes and radiology reports      Past Medical History: The following portions of the patient's history were reviewed and updated as appropriate:   She  has a past surgical history that includes Knee cartilage surgery (Right, 01/2011).  Her family history includes  "Cancer in her brother, father, and sister; Heart failure in her mother; Hypertension in her father and mother.  She  reports that she quit smoking about 11 years ago. Her smoking use included cigarettes. She started smoking about 56 years ago. She has a 67.50 pack-year smoking history. She has never used smokeless tobacco. She reports that she does not drink alcohol or use drugs.  She has a current medication list which includes the following prescription(s): alprazolam, calcium carbonate, levothyroxine, omeprazole, potassium phosphate (monobasic), tolterodine, umeclidinium-vilanterol, and aspirin.  She has No Known Allergies.    Review of Systems   Constitutional: Negative for fever, chills, weight loss, weight gain, activity change, appetite change, fatigue and night sweats.   HENT: Negative for postnasal drip, rhinorrhea, sinus pressure, voice change and congestion.    Eyes: Negative for redness and itching.   Respiratory: Negative for snoring, cough, sputum production, chest tightness, shortness of breath, wheezing, orthopnea, asthma nighttime symptoms, dyspnea on extertion, use of rescue inhaler and somnolence.    Cardiovascular: Negative.  Negative for chest pain, palpitations and leg swelling.   Genitourinary: Negative for difficulty urinating and hematuria.   Endocrine: Negative for cold intolerance and heat intolerance.    Musculoskeletal: Negative for arthralgias, gait problem, joint swelling and myalgias.   Skin: Negative.    Gastrointestinal: Negative for nausea, vomiting, abdominal pain and acid reflux.   Neurological: Negative for dizziness, weakness, light-headedness and headaches.   Hematological: Negative for adenopathy. No excessive bruising.   All other systems reviewed and are negative.     Objective:   /60   Pulse 62   Resp 18   Ht 5' 5" (1.651 m)   Wt 75.5 kg (166 lb 7.2 oz)   SpO2 97%   BMI 27.70 kg/m²   Physical Exam   Constitutional: She is oriented to person, place, and time. " She appears well-developed and well-nourished. She is active and cooperative.  Non-toxic appearance. She does not appear ill. No distress.   HENT:   Head: Normocephalic.   Right Ear: External ear normal.   Left Ear: External ear normal.   Nose: Nose normal.   Mouth/Throat: Oropharynx is clear and moist. No oropharyngeal exudate.   Eyes: Conjunctivae are normal.   Neck: Normal range of motion. Neck supple.   Cardiovascular: Normal rate, regular rhythm, normal heart sounds and intact distal pulses.   Pulmonary/Chest: Effort normal and breath sounds normal. No stridor.   Abdominal: Soft.   Musculoskeletal: Normal range of motion.   Lymphadenopathy:     She has no cervical adenopathy.   Neurological: She is alert and oriented to person, place, and time.   Skin: Skin is warm and dry.   Psychiatric: She has a normal mood and affect. Her behavior is normal. Judgment and thought content normal.   Vitals reviewed.    Personal Diagnostic Review  CT Chest Lung Screening Low Dose  Narrative: EXAMINATION:  CT CHEST LUNG SCREENING LOW DOSE    CLINICAL HISTORY:  Lung cancer annual screening, asymptomatic, current smoker (min. 30 pack-yrs); Personal history of nicotine dependence    TECHNIQUE:  CT of the thorax was performed with low dose, lung screening protocol.  No contrast was administered.  Sagittal and coronal reconstructions were obtained.    COMPARISON:  None.    FINDINGS:  Lungs: There is a noncalcified pulmonary nodule within the lingula medially series 4, image 239 which measures under 5 mm and may even be associated with some scarring.  There appears to be some chronic appearing atelectasis/scarring within the medial right middle lobe.  The lungs show no findings consistent with emphysema.    Pleura:   No effusion..    Heart and pericardium: Normal size without effusion.    Aorta and vasculature: No significant atherosclerotic vascular disease noted    Chest wall and skeletal structures: Unremarkable except  age-appropriate degenerative changes.    Upper abdomen: Incidental note made of a cyst with noted within the posterior segment of the right hepatic lobe.  Impression: Lung-RADS Category:  2 - Benign Appearance or Behavior - continue annual screening with LDCT in 12 months.    Clinically or potentially clinically significant non lung cancer finding:  None.    Prior Lung Cancer Modifier:  No history of prior lung cancer.    Electronically signed by: Lonnie Robin DO  Date:    03/10/2020  Time:    10:12    2/11/2019   CPFT   Acceptable and reproducible spirometry data.     FEV1/ FVC decreased to 62% indicating obstruction.     FEV1 reduced to 69% indicating moderate obstruction.     FVC normal at 86%.     No significant bronchodilation noted after bronchodilator administration.     FEF 25-75% decreased to 34% indicating small airways flow obstruction.     Flow volume curve shows obstructive pattern.     Spirometry showing moderate obstruction.     Lung volume shows increased TLC to 120% indicating hyper inflation, increased RV and RV/ TLC indicating the presence of air trapping.     DLCO mildly reduced to 70%.     Moderate obstruction, air trapping, mild DLCO reduction.  Clinical correlation recommended.     6/24/2013 CPFT  Mild obstruction. Airflow is not clearly improved following bronchodilation. Clinical improvement following bronchodilator  therapy may occur in the absence of spirometric improvement.    Assessment:     1. COPD, moderate    2. Pulmonary nodule less than 6 cm determined by computed tomography of lung    3. Former heavy cigarette smoker (20-39 per day)      Orders Placed This Encounter   Procedures    CT Chest Without Contrast     Standing Status:   Future     Standing Expiration Date:   3/10/2021     Order Specific Question:   May the Radiologist modify the order per protocol to meet the clinical needs of the patient?     Answer:   Yes     Order Specific Question:   Access port per protocol?      Answer:   No    X-Ray Chest PA And Lateral     Standing Status:   Future     Standing Expiration Date:   3/10/2021     Order Specific Question:   May the Radiologist modify the order per protocol to meet the clinical needs of the patient?     Answer:   Yes    Spirometry with/without bronchodilator     Standing Status:   Future     Standing Expiration Date:   3/10/2021     Plan:   Discussed diagnosis, its evaluation, treatment and usual course. All questions answered.  Problem List Items Addressed This Visit     Pulmonary nodule less than 6 cm determined by computed tomography of lung     1/28/2019 LDCT No opacity seen.  3/10/2020 1 year follow up CT screening: There is a noncalcified pulmonary nodule within the lingula under 5 mm and may even be   associated with some scarring.  There appears to be some chronic appearing atelectasis/scarring within the medial right middle lobe.  The lungs show no findings consistent with emphysema.    Consideration for follow up 1-2 years, then no follow up if stable,  request follow up in 1 year since patient has family history of cancer and patient prior heavy smoker. 67.5 pk/year smoker quit in 2009.  Follow up CT chest without  March 2021                       SOLID PULMONARY NODULES                      SUBSOLID PULMONARY NODULES             Relevant Orders    CT Chest Without Contrast    Former heavy cigarette smoker (20-39 per day)     67.5 pk/year smoker quit in 2009         Relevant Orders    CT Chest Without Contrast    COPD, moderate - Primary     Controlled on Anoro  Declines having rescue on hand significantly improved on Anoro controlled.  2/11/2019 CPFT  Moderate obstruction, air trapping, mild DLCO reduction.  Continue Anoro daily           Relevant Orders    Spirometry with/without bronchodilator    X-Ray Chest PA And Lateral      Plan summary  Controlled on Anoro LABA/LAMA.  CPFT revealed moderate COPD changes, in 2013 pft revealed mild COPD changes.    Recommended rescue Albuterol inhaler on hand. She continues to decline obtaining rescue inhaler, obtained in 2013 never used, does not want added cost of Albuterol inhaler since controlled with anoro.   67.5 pk/year smoker quit in 2009. No opacity seen on 2019 LDCT.  LDCT March 10, 2020 less than 5mm nodule lingula.  Repeat CT chest 1 year March 2021.     Follow up in about 1 year (around 3/10/2021) for COPD review mo/cxr. pul nodule review CT chest .

## 2020-03-10 NOTE — ASSESSMENT & PLAN NOTE
Controlled on Anoro  Declines having rescue on hand significantly improved on Anoro controlled.  2/11/2019 CPFT  Moderate obstruction, air trapping, mild DLCO reduction.  Continue Anoro daily

## 2020-03-10 NOTE — ASSESSMENT & PLAN NOTE
1/28/2019 LDCT No opacity seen.  3/10/2020 1 year follow up CT screening: There is a noncalcified pulmonary nodule within the lingula under 5 mm and may even be   associated with some scarring.  There appears to be some chronic appearing atelectasis/scarring within the medial right middle lobe.  The lungs show no findings consistent with emphysema.    Consideration for follow up 1-2 years, then no follow up if stable,  request follow up in 1 year since patient has family history of cancer and patient prior heavy smoker. 67.5 pk/year smoker quit in 2009.  Follow up CT chest without  March 2021                       SOLID PULMONARY NODULES                      SUBSOLID PULMONARY NODULES

## 2020-08-18 ENCOUNTER — PATIENT MESSAGE (OUTPATIENT)
Dept: PULMONOLOGY | Facility: CLINIC | Age: 70
End: 2020-08-18

## 2020-08-18 DIAGNOSIS — J44.9 COPD, MODERATE: ICD-10-CM

## 2020-08-19 RX ORDER — UMECLIDINIUM BROMIDE AND VILANTEROL TRIFENATATE 62.5; 25 UG/1; UG/1
62.5 POWDER RESPIRATORY (INHALATION) DAILY
Qty: 180 EACH | Refills: 3 | Status: SHIPPED | OUTPATIENT
Start: 2020-08-19 | End: 2021-09-03

## 2020-10-25 ENCOUNTER — OFFICE VISIT (OUTPATIENT)
Dept: URGENT CARE | Facility: CLINIC | Age: 70
End: 2020-10-25
Payer: MEDICARE

## 2020-10-25 VITALS
WEIGHT: 165.69 LBS | OXYGEN SATURATION: 96 % | SYSTOLIC BLOOD PRESSURE: 137 MMHG | DIASTOLIC BLOOD PRESSURE: 83 MMHG | HEART RATE: 65 BPM | TEMPERATURE: 98 F | RESPIRATION RATE: 16 BRPM | BODY MASS INDEX: 27.57 KG/M2

## 2020-10-25 DIAGNOSIS — L25.5 DERMATITIS DUE TO PLANTS, INCLUDING POISON IVY, SUMAC, AND OAK: Primary | ICD-10-CM

## 2020-10-25 PROCEDURE — 99214 OFFICE O/P EST MOD 30 MIN: CPT | Mod: S$PBB,,, | Performed by: PHYSICIAN ASSISTANT

## 2020-10-25 PROCEDURE — 99214 PR OFFICE/OUTPT VISIT, EST, LEVL IV, 30-39 MIN: ICD-10-PCS | Mod: S$PBB,,, | Performed by: PHYSICIAN ASSISTANT

## 2020-10-25 PROCEDURE — 99999 PR PBB SHADOW E&M-EST. PATIENT-LVL IV: ICD-10-PCS | Mod: PBBFAC,,, | Performed by: PHYSICIAN ASSISTANT

## 2020-10-25 PROCEDURE — 99999 PR PBB SHADOW E&M-EST. PATIENT-LVL IV: CPT | Mod: PBBFAC,,, | Performed by: PHYSICIAN ASSISTANT

## 2020-10-25 PROCEDURE — 99214 OFFICE O/P EST MOD 30 MIN: CPT | Mod: PBBFAC,PO | Performed by: PHYSICIAN ASSISTANT

## 2020-10-25 RX ORDER — METHYLPREDNISOLONE 4 MG/1
TABLET ORAL
Qty: 1 PACKAGE | Refills: 0 | Status: SHIPPED | OUTPATIENT
Start: 2020-10-25 | End: 2021-03-10

## 2020-10-25 NOTE — PROGRESS NOTES
Radha Lamas is a 70 year old female who presents today with a rash to her right forearm and left neck for about 1 week now.  She states she was working outside when the rash developed.  She is having mild discomfort and pruritis to the area.  No drainage.  Rash started to right forearm and spread to left neck.  Patient states she sleeps with arm by neck and believes that's how it spread.  She has tried Calamine lotion for the rash with no resolution.    All areas of patients chart reviewed including past medical history, past surgical history, medications, allergies, family history, and social history.    Review of Systems   Constitutional: Negative for fever.   HENT: Negative for sore throat.    Respiratory: Negative for shortness of breath.    Cardiovascular: Negative for chest pain.   Gastrointestinal: Negative for abdominal pain and nausea.   Genitourinary: Negative for dysuria and frequency.   Musculoskeletal: Negative for back pain.   Skin: Positive for itching and rash.   Neurological: Negative for headaches.   All other systems reviewed and are negative.    Physical Exam:  /83 (BP Location: Right arm, Patient Position: Sitting, BP Method: Small (Automatic))   Pulse 65   Temp 98 °F (36.7 °C) (Oral)   Resp 16   Wt 75.1 kg (165 lb 10.8 oz)   SpO2 96%   BMI 27.57 kg/m²   Physical Exam   Constitutional: She is oriented to person, place, and time and well-developed, well-nourished, and in no distress.   HENT:   Head: Normocephalic.   Right Ear: External ear normal.   Left Ear: External ear normal.   Mouth/Throat: No oropharyngeal exudate.   Neck: Normal range of motion.   Cardiovascular: Normal rate and normal heart sounds.   Pulmonary/Chest: Effort normal and breath sounds normal. No respiratory distress.   Neurological: She is alert and oriented to person, place, and time.   Skin: Skin is warm.        Red mildly raised rash to right forearm and left neck.  Consistent with contact dermatitis due  to poison ivy/poison sumac.   Psychiatric: Affect normal.   Vitals reviewed.    Assessment:  1. Dermatitis due to plants, including poison ivy, sumac, and oak  - methylPREDNISolone (MEDROL DOSEPACK) 4 mg tablet; use as directed  Dispense: 1 Package; Refill: 0    Plan:  Medrol dose Evangelista sent to pharmacy.  Antihistamine as needed for itching.  Cool compresses to the area as needed.  Follow up in 1 week if no resolution.  ER with new or worsening symptoms.

## 2020-10-25 NOTE — PATIENT INSTRUCTIONS
Understanding Contact Dermatitis     A cool, moist compress can help reduce itching.     Contact dermatitis is a common type of skin rash. Its caused by something that touches the skin and makes it irritated and inflamed. It can occur on skin on any part of the body, such as the face, neck, hands, arms, and legs. Contact dermatitis is not spread from person to person.  Often, the reaction of contact dermatitis occurs 1 to 2 days after contact with the offending agent.  How to say it  PHILOMENA-tact [id number]   What causes contact dermatitis?  Its caused by something that irritates the skin, or that creates an allergic reaction on the skin. People can get contact dermatitis from many kinds of things. These include:  · Plant oils in poison ivy, oak, and sumac  · Chemicals in household , solvents, and glue  · Chemicals in makeup, soap, laundry detergent, perfume, acne cream, and hair products  · Certain medicines, such as neomycin, bacitracin, benzocaine, and thimerosal  · Metals such as nickel, found in some jewelry and watch bands   · The sticky material on the back of bandages and tape (adhesive)  · Things that can cause tiny breaks in the skin, such as wood, fiberglass, metal tools, and plant thorns  · Rubber latex in surgical gloves and other medical supplies  Dermatitis can also be caused by the skin being damp for long periods of time. This can happen from washing your hands too often, or working with wet materials.  Symptoms of contact dermatitis  Symptoms can include skin that is:  · Blistered  · Burning  · Cracked  · Dry  · Itchy  · Painful  · Red  · Rough, thickened, and leathery  · Swollen  · Warm  The blisters may ooze fluid and form crusts.  Treatment for contact dermatitis  Treatment is done to help relieve itching and reduce inflammation. The rash should go away in a few days to a few weeks. Treatments include:  · Cool, moist compress. Use a clean damp cloth. Put it on the area for 20 to 30  minutes, 5 to 6 times a day for the first 3 days.  · Steroid cream or ointment. You can apply this medicine several times a day on clean skin.  · Oral corticosteroid. Your healthcare provider may prescribe this medicine if you have severe skin symptoms on a large part of your body.  Your healthcare provider may give you a steroid injection instead of pills.  · Oral antihistamine. This medicine can help reduce itching.  · Colloidal oatmeal bath. Soaking in water with colloidal oatmeal can help soothe skin.  · Plain cream, lotion, or ointment. Cream, lotion, or ointment without medicine can help to soothe and protect your skin.  Living with contact dermatitis  Talk with your healthcare provider about what may have caused your contact dermatitis. Patch testing may help you figure out what caused the rash so you can avoid further contact with it. Once you learn what caused your rash, make sure to avoid that substance. If your skin comes into contact with it again, make sure to wash your skin right away. If you cant avoid the substance, wear gloves or other protective clothing before you touch it. Or use a cream, lotion, or ointment to protect your skin.  When to call your healthcare provider  Call your healthcare provider right away if you have any of these:  · Fever of 100.4°F (38°C) or higher, or as directed  · Symptoms that dont get better, or get worse  · New symptoms   Date Last Reviewed: 5/1/2016  © 9768-6395 [employer]. [address] [place] [address], [place] [address]. All rights reserved. This information is not intended as a substitute for professional medical care. Always follow your healthcare professional's instructions.

## 2021-01-06 ENCOUNTER — IMMUNIZATION (OUTPATIENT)
Dept: INTERNAL MEDICINE | Facility: CLINIC | Age: 71
End: 2021-01-06
Payer: MEDICARE

## 2021-01-06 DIAGNOSIS — Z23 NEED FOR VACCINATION: ICD-10-CM

## 2021-01-06 PROCEDURE — 91300 COVID-19, MRNA, LNP-S, PF, 30 MCG/0.3 ML DOSE VACCINE: CPT | Mod: PBBFAC | Performed by: FAMILY MEDICINE

## 2021-01-27 ENCOUNTER — IMMUNIZATION (OUTPATIENT)
Dept: INTERNAL MEDICINE | Facility: CLINIC | Age: 71
End: 2021-01-27
Payer: MEDICARE

## 2021-01-27 DIAGNOSIS — Z23 NEED FOR VACCINATION: Primary | ICD-10-CM

## 2021-01-27 PROCEDURE — 91300 COVID-19, MRNA, LNP-S, PF, 30 MCG/0.3 ML DOSE VACCINE: CPT | Mod: PBBFAC | Performed by: FAMILY MEDICINE

## 2021-01-27 PROCEDURE — 0002A COVID-19, MRNA, LNP-S, PF, 30 MCG/0.3 ML DOSE VACCINE: CPT | Mod: PBBFAC | Performed by: FAMILY MEDICINE

## 2021-02-23 DIAGNOSIS — J44.9 COPD, MODERATE: Primary | ICD-10-CM

## 2021-03-01 DIAGNOSIS — J44.9 COPD, MODERATE: Primary | ICD-10-CM

## 2021-03-02 ENCOUNTER — PATIENT MESSAGE (OUTPATIENT)
Dept: PULMONOLOGY | Facility: CLINIC | Age: 71
End: 2021-03-02

## 2021-03-10 ENCOUNTER — HOSPITAL ENCOUNTER (OUTPATIENT)
Dept: RADIOLOGY | Facility: HOSPITAL | Age: 71
Discharge: HOME | End: 2021-03-10
Attending: NURSE PRACTITIONER
Payer: MEDICARE

## 2021-03-10 ENCOUNTER — OFFICE VISIT (OUTPATIENT)
Dept: PULMONOLOGY | Facility: CLINIC | Age: 71
End: 2021-03-10
Attending: NURSE PRACTITIONER
Payer: MEDICARE

## 2021-03-10 VITALS
DIASTOLIC BLOOD PRESSURE: 78 MMHG | HEART RATE: 70 BPM | WEIGHT: 165.13 LBS | BODY MASS INDEX: 27.51 KG/M2 | SYSTOLIC BLOOD PRESSURE: 122 MMHG | HEIGHT: 65 IN | OXYGEN SATURATION: 97 % | RESPIRATION RATE: 16 BRPM

## 2021-03-10 DIAGNOSIS — Z87.891 FORMER HEAVY CIGARETTE SMOKER (20-39 PER DAY): ICD-10-CM

## 2021-03-10 DIAGNOSIS — J44.9 COPD, MODERATE: Primary | ICD-10-CM

## 2021-03-10 DIAGNOSIS — R91.1 PULMONARY NODULE 1 CM OR GREATER IN DIAMETER: ICD-10-CM

## 2021-03-10 DIAGNOSIS — J44.9 COPD, MODERATE: ICD-10-CM

## 2021-03-10 PROCEDURE — 71250 CT THORAX DX C-: CPT | Mod: TC

## 2021-03-10 PROCEDURE — 99999 PR PBB SHADOW E&M-EST. PATIENT-LVL IV: CPT | Mod: PBBFAC,,, | Performed by: NURSE PRACTITIONER

## 2021-03-10 PROCEDURE — 99214 OFFICE O/P EST MOD 30 MIN: CPT | Mod: S$GLB,,, | Performed by: NURSE PRACTITIONER

## 2021-03-10 PROCEDURE — 99999 PR PBB SHADOW E&M-EST. PATIENT-LVL IV: ICD-10-PCS | Mod: PBBFAC,,, | Performed by: NURSE PRACTITIONER

## 2021-03-10 PROCEDURE — 99214 PR OFFICE/OUTPT VISIT, EST, LEVL IV, 30-39 MIN: ICD-10-PCS | Mod: S$GLB,,, | Performed by: NURSE PRACTITIONER

## 2021-03-10 PROCEDURE — 71046 X-RAY EXAM CHEST 2 VIEWS: CPT | Mod: TC

## 2021-03-10 RX ORDER — ALBUTEROL SULFATE 90 UG/1
2 AEROSOL, METERED RESPIRATORY (INHALATION) EVERY 4 HOURS PRN
Qty: 54 G | Refills: 4 | Status: SHIPPED | OUTPATIENT
Start: 2021-03-10 | End: 2021-03-17 | Stop reason: SDUPTHER

## 2021-03-17 DIAGNOSIS — J44.9 COPD, MODERATE: Primary | ICD-10-CM

## 2021-03-17 RX ORDER — ALBUTEROL SULFATE 90 UG/1
2 AEROSOL, METERED RESPIRATORY (INHALATION) EVERY 4 HOURS PRN
Qty: 54 G | Refills: 4 | Status: SHIPPED | OUTPATIENT
Start: 2021-03-17 | End: 2021-03-17 | Stop reason: CLARIF

## 2021-03-17 RX ORDER — ALBUTEROL SULFATE 90 UG/1
2 AEROSOL, METERED RESPIRATORY (INHALATION) EVERY 4 HOURS PRN
Qty: 54 G | Refills: 4 | Status: SHIPPED | OUTPATIENT
Start: 2021-03-17 | End: 2023-11-08

## 2021-03-26 ENCOUNTER — TELEPHONE (OUTPATIENT)
Dept: RADIOLOGY | Facility: HOSPITAL | Age: 71
End: 2021-03-26

## 2021-03-29 ENCOUNTER — TELEPHONE (OUTPATIENT)
Dept: PULMONOLOGY | Facility: CLINIC | Age: 71
End: 2021-03-29

## 2021-03-29 ENCOUNTER — HOSPITAL ENCOUNTER (OUTPATIENT)
Dept: RADIOLOGY | Facility: HOSPITAL | Age: 71
Discharge: HOME | End: 2021-03-29
Attending: NURSE PRACTITIONER
Payer: MEDICARE

## 2021-03-29 DIAGNOSIS — R91.1 PULMONARY NODULE 1 CM OR GREATER IN DIAMETER: ICD-10-CM

## 2021-03-29 DIAGNOSIS — R94.2 ABNORMAL PET OF LEFT LUNG: Primary | ICD-10-CM

## 2021-03-29 DIAGNOSIS — Z01.812 PRE-PROCEDURE LAB EXAM: ICD-10-CM

## 2021-03-29 PROCEDURE — 78815 NM PET CT ROUTINE: ICD-10-PCS | Mod: 26,PI,, | Performed by: RADIOLOGY

## 2021-03-29 PROCEDURE — 78815 PET IMAGE W/CT SKULL-THIGH: CPT | Mod: TC

## 2021-03-29 PROCEDURE — 78815 PET IMAGE W/CT SKULL-THIGH: CPT | Mod: 26,PI,, | Performed by: RADIOLOGY

## 2021-04-09 ENCOUNTER — TELEPHONE (OUTPATIENT)
Dept: RADIOLOGY | Facility: HOSPITAL | Age: 71
End: 2021-04-09

## 2021-04-09 ENCOUNTER — LAB VISIT (OUTPATIENT)
Dept: LAB | Facility: HOSPITAL | Age: 71
End: 2021-04-09
Attending: NURSE PRACTITIONER
Payer: MEDICARE

## 2021-04-09 DIAGNOSIS — R91.1 PULMONARY NODULE 1 CM OR GREATER IN DIAMETER: ICD-10-CM

## 2021-04-09 DIAGNOSIS — R94.2 ABNORMAL PET OF LEFT LUNG: ICD-10-CM

## 2021-04-09 DIAGNOSIS — H02.839 DERMATOCHALASIS, UNSPECIFIED LATERALITY: ICD-10-CM

## 2021-04-09 DIAGNOSIS — J44.9 COPD, MODERATE: ICD-10-CM

## 2021-04-09 DIAGNOSIS — Z01.812 PRE-PROCEDURE LAB EXAM: ICD-10-CM

## 2021-04-09 DIAGNOSIS — D68.9 COAGULATION DEFECT, UNSPECIFIED: Primary | ICD-10-CM

## 2021-04-09 LAB
CREAT SERPL-MCNC: 0.8 MG/DL (ref 0.5–1.4)
EST. GFR  (AFRICAN AMERICAN): >60 ML/MIN/1.73 M^2
EST. GFR  (NON AFRICAN AMERICAN): >60 ML/MIN/1.73 M^2

## 2021-04-09 PROCEDURE — 82565 ASSAY OF CREATININE: CPT | Performed by: NURSE PRACTITIONER

## 2021-04-09 PROCEDURE — 36415 COLL VENOUS BLD VENIPUNCTURE: CPT | Performed by: NURSE PRACTITIONER

## 2021-04-12 ENCOUNTER — HOSPITAL ENCOUNTER (OUTPATIENT)
Dept: RADIOLOGY | Facility: HOSPITAL | Age: 71
Discharge: HOME | End: 2021-04-12
Attending: RADIOLOGY
Payer: MEDICARE

## 2021-04-12 ENCOUNTER — HOSPITAL ENCOUNTER (OUTPATIENT)
Dept: RADIOLOGY | Facility: HOSPITAL | Age: 71
Discharge: HOME | End: 2021-04-12
Attending: PHYSICIAN ASSISTANT
Payer: MEDICARE

## 2021-04-12 ENCOUNTER — HOSPITAL ENCOUNTER (OUTPATIENT)
Dept: RADIOLOGY | Facility: HOSPITAL | Age: 71
Discharge: HOME | End: 2021-04-12
Attending: NURSE PRACTITIONER
Payer: MEDICARE

## 2021-04-12 DIAGNOSIS — R91.1 PULMONARY NODULE 1 CM OR GREATER IN DIAMETER: ICD-10-CM

## 2021-04-12 DIAGNOSIS — R94.2 ABNORMAL PET OF LEFT LUNG: ICD-10-CM

## 2021-04-12 LAB — SARS-COV-2 RDRP RESP QL NAA+PROBE: NEGATIVE

## 2021-04-12 PROCEDURE — 88342 IMHCHEM/IMCYTCHM 1ST ANTB: CPT | Mod: 59 | Performed by: PATHOLOGY

## 2021-04-12 PROCEDURE — 88305 TISSUE EXAM BY PATHOLOGIST: CPT | Performed by: PATHOLOGY

## 2021-04-12 PROCEDURE — 88360 TUMOR IMMUNOHISTOCHEM/MANUAL: CPT | Performed by: PATHOLOGY

## 2021-04-12 PROCEDURE — 71045 X-RAY EXAM CHEST 1 VIEW: CPT | Mod: TC

## 2021-04-12 PROCEDURE — U0002 COVID-19 LAB TEST NON-CDC: HCPCS | Performed by: RADIOLOGY

## 2021-04-12 PROCEDURE — 88381 MICRODISSECTION MANUAL: CPT | Performed by: PATHOLOGY

## 2021-04-12 PROCEDURE — 88305 TISSUE EXAM BY PATHOLOGIST: ICD-10-PCS | Mod: 26,,, | Performed by: PATHOLOGY

## 2021-04-12 PROCEDURE — 88342 IMHCHEM/IMCYTCHM 1ST ANTB: CPT | Mod: 26,,, | Performed by: PATHOLOGY

## 2021-04-12 PROCEDURE — 88341 IMHCHEM/IMCYTCHM EA ADD ANTB: CPT | Mod: 26,,, | Performed by: PATHOLOGY

## 2021-04-12 PROCEDURE — 88305 TISSUE EXAM BY PATHOLOGIST: CPT | Mod: 26,,, | Performed by: PATHOLOGY

## 2021-04-12 PROCEDURE — 88342 CHG IMMUNOCYTOCHEMISTRY: ICD-10-PCS | Mod: 26,,, | Performed by: PATHOLOGY

## 2021-04-12 PROCEDURE — 63600175 PHARM REV CODE 636 W HCPCS: Performed by: RADIOLOGY

## 2021-04-12 PROCEDURE — 81445 SO NEO GSAP 5-50DNA/DNA&RNA: CPT | Performed by: PATHOLOGY

## 2021-04-12 PROCEDURE — 88341 IMHCHEM/IMCYTCHM EA ADD ANTB: CPT | Mod: 59 | Performed by: PATHOLOGY

## 2021-04-12 PROCEDURE — 88341 PR IHC OR ICC EACH ADD'L SINGLE ANTIBODY  STAINPR: ICD-10-PCS | Mod: 26,,, | Performed by: PATHOLOGY

## 2021-04-12 PROCEDURE — A4215 STERILE NEEDLE: HCPCS

## 2021-04-12 RX ORDER — MIDAZOLAM HYDROCHLORIDE 1 MG/ML
INJECTION INTRAMUSCULAR; INTRAVENOUS CODE/TRAUMA/SEDATION MEDICATION
Status: DISCONTINUED | OUTPATIENT
Start: 2021-04-12 | End: 2021-04-13 | Stop reason: HOSPADM

## 2021-04-12 RX ORDER — FENTANYL CITRATE 50 UG/ML
INJECTION, SOLUTION INTRAMUSCULAR; INTRAVENOUS CODE/TRAUMA/SEDATION MEDICATION
Status: DISCONTINUED | OUTPATIENT
Start: 2021-04-12 | End: 2021-04-13 | Stop reason: HOSPADM

## 2021-04-12 RX ADMIN — MIDAZOLAM HYDROCHLORIDE 0.5 MG: 1 INJECTION INTRAMUSCULAR; INTRAVENOUS at 09:04

## 2021-04-12 RX ADMIN — FENTANYL CITRATE 25 MCG: 50 INJECTION, SOLUTION INTRAMUSCULAR; INTRAVENOUS at 09:04

## 2021-04-14 ENCOUNTER — TELEPHONE (OUTPATIENT)
Dept: PULMONOLOGY | Facility: CLINIC | Age: 71
End: 2021-04-14

## 2021-04-14 ENCOUNTER — DOCUMENTATION ONLY (OUTPATIENT)
Dept: HEMATOLOGY/ONCOLOGY | Facility: CLINIC | Age: 71
End: 2021-04-14

## 2021-04-14 VITALS
HEART RATE: 60 BPM | BODY MASS INDEX: 27.49 KG/M2 | RESPIRATION RATE: 14 BRPM | HEIGHT: 65 IN | WEIGHT: 165 LBS | DIASTOLIC BLOOD PRESSURE: 53 MMHG | SYSTOLIC BLOOD PRESSURE: 114 MMHG | OXYGEN SATURATION: 99 %

## 2021-04-14 DIAGNOSIS — C34.92 SQUAMOUS CELL LUNG CANCER, LEFT: Primary | ICD-10-CM

## 2021-04-14 DIAGNOSIS — Z01.818 PRE-OP TESTING: ICD-10-CM

## 2021-04-15 ENCOUNTER — CLINICAL SUPPORT (OUTPATIENT)
Dept: PULMONOLOGY | Facility: CLINIC | Age: 71
End: 2021-04-15
Payer: MEDICARE

## 2021-04-15 ENCOUNTER — OFFICE VISIT (OUTPATIENT)
Dept: PULMONOLOGY | Facility: CLINIC | Age: 71
End: 2021-04-15
Payer: MEDICARE

## 2021-04-15 VITALS — HEIGHT: 64 IN | BODY MASS INDEX: 28.04 KG/M2 | WEIGHT: 164.25 LBS

## 2021-04-15 VITALS
WEIGHT: 165.81 LBS | SYSTOLIC BLOOD PRESSURE: 126 MMHG | DIASTOLIC BLOOD PRESSURE: 76 MMHG | HEART RATE: 79 BPM | BODY MASS INDEX: 28.31 KG/M2 | OXYGEN SATURATION: 98 % | HEIGHT: 64 IN | RESPIRATION RATE: 17 BRPM

## 2021-04-15 DIAGNOSIS — J44.9 COPD, MODERATE: ICD-10-CM

## 2021-04-15 DIAGNOSIS — Z01.811 PRE-OPERATIVE RESPIRATORY EXAMINATION: ICD-10-CM

## 2021-04-15 DIAGNOSIS — Z01.818 PRE-OP TESTING: Primary | ICD-10-CM

## 2021-04-15 DIAGNOSIS — C34.32 MALIGNANT NEOPLASM OF LOWER LOBE OF LEFT LUNG: Primary | ICD-10-CM

## 2021-04-15 DIAGNOSIS — C34.92 SQUAMOUS CELL LUNG CANCER, LEFT: ICD-10-CM

## 2021-04-15 DIAGNOSIS — R06.02 SHORTNESS OF BREATH: ICD-10-CM

## 2021-04-15 DIAGNOSIS — R09.02 EXERCISE HYPOXEMIA: ICD-10-CM

## 2021-04-15 DIAGNOSIS — Z87.891 FORMER HEAVY CIGARETTE SMOKER (20-39 PER DAY): ICD-10-CM

## 2021-04-15 DIAGNOSIS — R91.1 PULMONARY NODULE 1 CM OR GREATER IN DIAMETER: ICD-10-CM

## 2021-04-15 LAB
ALLENS TEST: ABNORMAL
BRPFT: ABNORMAL
DELSYS: ABNORMAL
DLCO ADJ PRE: 14.35 ML/(MIN*MMHG) (ref 14.97–26.44)
DLCO SINGLE BREATH LLN: 14.97
DLCO SINGLE BREATH PRE REF: 68.9 %
DLCO SINGLE BREATH REF: 20.71
DLCOC SBVA LLN: 2.78
DLCOC SBVA PRE REF: 77.5 %
DLCOC SBVA REF: 4.22
DLCOC SINGLE BREATH LLN: 14.97
DLCOC SINGLE BREATH PRE REF: 69.3 %
DLCOC SINGLE BREATH REF: 20.71
DLCOVA LLN: 2.78
DLCOVA PRE REF: 77 %
DLCOVA PRE: 3.25 ML/(MIN*MMHG*L) (ref 2.78–5.67)
DLCOVA REF: 4.22
DLVAADJ PRE: 3.27 ML/(MIN*MMHG*L) (ref 2.78–5.67)
ERV LLN: -16449.4
ERV PRE REF: 51 %
ERV REF: 0.6
FEF 25 75 LLN: 0.78
FEF 25 75 PRE REF: 32.4 %
FEF 25 75 REF: 1.75
FEV1 FVC LLN: 64
FEV1 FVC PRE REF: 74.4 %
FEV1 FVC REF: 78
FEV1 LLN: 1.51
FEV1 PRE REF: 71.6 %
FEV1 REF: 2.1
FIO2: 0.21
FRCPLETH LLN: 1.88
FRCPLETH PREREF: 132.5 %
FRCPLETH REF: 2.7
FVC LLN: 1.96
FVC PRE REF: 95.4 %
FVC REF: 2.72
HCO3 UR-SCNC: 24.7 MMOL/L (ref 24–28)
IVC PRE: 2.24 L (ref 1.96–3.49)
IVC SINGLE BREATH LLN: 1.96
IVC SINGLE BREATH PRE REF: 82.3 %
IVC SINGLE BREATH REF: 2.72
MODE: ABNORMAL
MVV LLN: 67
MVV PRE REF: 77.3 %
MVV REF: 79
PCO2 BLDA: 34.6 MMHG (ref 35–45)
PEF LLN: 3.68
PEF PRE REF: 64 %
PEF REF: 5.38
PH SMN: 7.46 [PH] (ref 7.35–7.45)
PO2 BLDA: 89 MMHG (ref 80–100)
POC BE: 1 MMOL/L
POC SATURATED O2: 97 % (ref 95–100)
PRE DLCO: 14.26 ML/(MIN*MMHG) (ref 14.97–26.44)
PRE ERV: 0.31 L (ref -16449.4–16450.6)
PRE FEF 25 75: 0.57 L/S (ref 0.78–2.72)
PRE FET 100: 12.06 SEC
PRE FEV1 FVC: 57.85 % (ref 64.05–91.49)
PRE FEV1: 1.5 L (ref 1.51–2.69)
PRE FRC PL: 3.58 L
PRE FVC: 2.6 L (ref 1.96–3.49)
PRE MVV: 61 L/MIN (ref 67.12–90.8)
PRE PEF: 3.44 L/S (ref 3.68–7.08)
PRE RV: 2.88 L (ref 1.52–2.68)
PRE TLC: 5.48 L (ref 3.92–5.89)
RAW LLN: 3.06
RAW PRE REF: 122.1 %
RAW PRE: 3.73 CMH2O*S/L (ref 3.06–3.06)
RAW REF: 3.06
RV LLN: 1.52
RV PRE REF: 137.1 %
RV REF: 2.1
RVTLC LLN: 34
RVTLC PRE REF: 120 %
RVTLC PRE: 52.55 % (ref 34.19–53.37)
RVTLC REF: 44
SAMPLE: ABNORMAL
SITE: ABNORMAL
TLC LLN: 3.92
TLC PRE REF: 111.8 %
TLC REF: 4.9
VA PRE: 4.39 L (ref 4.75–4.75)
VA SINGLE BREATH LLN: 4.75
VA SINGLE BREATH PRE REF: 92.3 %
VA SINGLE BREATH REF: 4.75
VC LLN: 1.96
VC PRE REF: 95.4 %
VC PRE: 2.6 L (ref 1.96–3.49)
VC REF: 2.72
VTGRAWPRE: 3.96 L

## 2021-04-15 PROCEDURE — 94010 BREATHING CAPACITY TEST: CPT | Mod: S$GLB,,, | Performed by: INTERNAL MEDICINE

## 2021-04-15 PROCEDURE — 82803 PR  BLOOD GASES: PH, PO2 & PCO2: ICD-10-PCS | Mod: S$GLB,,, | Performed by: INTERNAL MEDICINE

## 2021-04-15 PROCEDURE — 94010 BREATHING CAPACITY TEST: ICD-10-PCS | Mod: S$GLB,,, | Performed by: INTERNAL MEDICINE

## 2021-04-15 PROCEDURE — 94729 DIFFUSING CAPACITY: CPT | Mod: S$GLB,,, | Performed by: INTERNAL MEDICINE

## 2021-04-15 PROCEDURE — 94726 PLETHYSMOGRAPHY LUNG VOLUMES: CPT | Mod: S$GLB,,, | Performed by: INTERNAL MEDICINE

## 2021-04-15 PROCEDURE — 99999 PR PBB SHADOW E&M-EST. PATIENT-LVL III: CPT | Mod: PBBFAC,,, | Performed by: INTERNAL MEDICINE

## 2021-04-15 PROCEDURE — 99215 PR OFFICE/OUTPT VISIT, EST, LEVL V, 40-54 MIN: ICD-10-PCS | Mod: 25,S$GLB,, | Performed by: INTERNAL MEDICINE

## 2021-04-15 PROCEDURE — 94618 PULMONARY STRESS TESTING: ICD-10-PCS | Mod: S$GLB,,, | Performed by: INTERNAL MEDICINE

## 2021-04-15 PROCEDURE — 94726 PULM FUNCT TST PLETHYSMOGRAP: ICD-10-PCS | Mod: S$GLB,,, | Performed by: INTERNAL MEDICINE

## 2021-04-15 PROCEDURE — 36600 PR WITHDRAWAL OF ARTERIAL BLOOD: ICD-10-PCS | Mod: S$GLB,,, | Performed by: INTERNAL MEDICINE

## 2021-04-15 PROCEDURE — 99999 PR PBB SHADOW E&M-EST. PATIENT-LVL I: CPT | Mod: PBBFAC,,,

## 2021-04-15 PROCEDURE — 36600 WITHDRAWAL OF ARTERIAL BLOOD: CPT | Mod: S$GLB,,, | Performed by: INTERNAL MEDICINE

## 2021-04-15 PROCEDURE — 94618 PULMONARY STRESS TESTING: CPT | Mod: S$GLB,,, | Performed by: INTERNAL MEDICINE

## 2021-04-15 PROCEDURE — 99999 PR PBB SHADOW E&M-EST. PATIENT-LVL III: ICD-10-PCS | Mod: PBBFAC,,, | Performed by: INTERNAL MEDICINE

## 2021-04-15 PROCEDURE — 82803 BLOOD GASES ANY COMBINATION: CPT | Mod: S$GLB,,, | Performed by: INTERNAL MEDICINE

## 2021-04-15 PROCEDURE — 99215 OFFICE O/P EST HI 40 MIN: CPT | Mod: 25,S$GLB,, | Performed by: INTERNAL MEDICINE

## 2021-04-15 PROCEDURE — 94729 PR C02/MEMBANE DIFFUSE CAPACITY: ICD-10-PCS | Mod: S$GLB,,, | Performed by: INTERNAL MEDICINE

## 2021-04-15 PROCEDURE — 99999 PR PBB SHADOW E&M-EST. PATIENT-LVL I: ICD-10-PCS | Mod: PBBFAC,,,

## 2021-04-15 RX ORDER — ALBUTEROL SULFATE 0.83 MG/ML
2.5 SOLUTION RESPIRATORY (INHALATION)
Qty: 270 ML | Refills: 11 | Status: SHIPPED | OUTPATIENT
Start: 2021-04-15 | End: 2022-04-15

## 2021-04-15 RX ORDER — APREMILAST 30 MG/1
1 TABLET, FILM COATED ORAL 2 TIMES DAILY
COMMUNITY
Start: 2021-04-06 | End: 2023-02-08

## 2021-04-20 ENCOUNTER — DOCUMENTATION ONLY (OUTPATIENT)
Dept: HEMATOLOGY/ONCOLOGY | Facility: CLINIC | Age: 71
End: 2021-04-20

## 2021-04-20 ENCOUNTER — OFFICE VISIT (OUTPATIENT)
Dept: CARDIOTHORACIC SURGERY | Facility: CLINIC | Age: 71
End: 2021-04-20
Payer: MEDICARE

## 2021-04-20 VITALS
HEART RATE: 65 BPM | BODY MASS INDEX: 29.23 KG/M2 | TEMPERATURE: 98 F | SYSTOLIC BLOOD PRESSURE: 140 MMHG | DIASTOLIC BLOOD PRESSURE: 83 MMHG | HEIGHT: 63 IN | WEIGHT: 165 LBS

## 2021-04-20 DIAGNOSIS — R06.09 DOE (DYSPNEA ON EXERTION): Primary | ICD-10-CM

## 2021-04-20 DIAGNOSIS — C34.92 SQUAMOUS CELL LUNG CANCER, LEFT: ICD-10-CM

## 2021-04-20 LAB
FINAL PATHOLOGIC DIAGNOSIS: NORMAL
GROSS: NORMAL
Lab: NORMAL
MICROSCOPIC EXAM: NORMAL
SUPPLEMENTAL DIAGNOSIS: NORMAL

## 2021-04-20 PROCEDURE — 99999 PR PBB SHADOW E&M-EST. PATIENT-LVL V: ICD-10-PCS | Mod: PBBFAC,,, | Performed by: THORACIC SURGERY (CARDIOTHORACIC VASCULAR SURGERY)

## 2021-04-20 PROCEDURE — 99999 PR PBB SHADOW E&M-EST. PATIENT-LVL V: CPT | Mod: PBBFAC,,, | Performed by: THORACIC SURGERY (CARDIOTHORACIC VASCULAR SURGERY)

## 2021-04-20 PROCEDURE — 99205 OFFICE O/P NEW HI 60 MIN: CPT | Mod: S$GLB,,, | Performed by: THORACIC SURGERY (CARDIOTHORACIC VASCULAR SURGERY)

## 2021-04-20 PROCEDURE — 99205 PR OFFICE/OUTPT VISIT, NEW, LEVL V, 60-74 MIN: ICD-10-PCS | Mod: S$GLB,,, | Performed by: THORACIC SURGERY (CARDIOTHORACIC VASCULAR SURGERY)

## 2021-04-21 ENCOUNTER — OFFICE VISIT (OUTPATIENT)
Dept: HEMATOLOGY/ONCOLOGY | Facility: CLINIC | Age: 71
End: 2021-04-21
Payer: MEDICARE

## 2021-04-21 ENCOUNTER — TELEPHONE (OUTPATIENT)
Dept: HEMATOLOGY/ONCOLOGY | Facility: CLINIC | Age: 71
End: 2021-04-21

## 2021-04-21 ENCOUNTER — DOCUMENTATION ONLY (OUTPATIENT)
Dept: HEMATOLOGY/ONCOLOGY | Facility: CLINIC | Age: 71
End: 2021-04-21

## 2021-04-21 VITALS
TEMPERATURE: 98 F | DIASTOLIC BLOOD PRESSURE: 69 MMHG | SYSTOLIC BLOOD PRESSURE: 116 MMHG | OXYGEN SATURATION: 98 % | WEIGHT: 165.56 LBS | HEIGHT: 65 IN | HEART RATE: 68 BPM | BODY MASS INDEX: 27.58 KG/M2

## 2021-04-21 DIAGNOSIS — C34.92 SQUAMOUS CELL LUNG CANCER, LEFT: Primary | ICD-10-CM

## 2021-04-21 PROCEDURE — 99204 PR OFFICE/OUTPT VISIT, NEW, LEVL IV, 45-59 MIN: ICD-10-PCS | Mod: S$GLB,,, | Performed by: INTERNAL MEDICINE

## 2021-04-21 PROCEDURE — 99204 OFFICE O/P NEW MOD 45 MIN: CPT | Mod: S$GLB,,, | Performed by: INTERNAL MEDICINE

## 2021-04-21 PROCEDURE — 99999 PR PBB SHADOW E&M-EST. PATIENT-LVL IV: ICD-10-PCS | Mod: PBBFAC,,, | Performed by: INTERNAL MEDICINE

## 2021-04-21 PROCEDURE — 99999 PR PBB SHADOW E&M-EST. PATIENT-LVL IV: CPT | Mod: PBBFAC,,, | Performed by: INTERNAL MEDICINE

## 2021-04-22 ENCOUNTER — ANESTHESIA EVENT (OUTPATIENT)
Dept: SURGERY | Facility: HOSPITAL | Age: 71
DRG: 165 | End: 2021-04-22
Payer: MEDICARE

## 2021-04-23 ENCOUNTER — HOSPITAL ENCOUNTER (OUTPATIENT)
Dept: CARDIOLOGY | Facility: HOSPITAL | Age: 71
Discharge: HOME | End: 2021-04-23
Attending: PHYSICIAN ASSISTANT
Payer: MEDICARE

## 2021-04-23 VITALS
BODY MASS INDEX: 25.48 KG/M2 | WEIGHT: 182 LBS | HEIGHT: 71 IN | SYSTOLIC BLOOD PRESSURE: 116 MMHG | DIASTOLIC BLOOD PRESSURE: 69 MMHG

## 2021-04-23 DIAGNOSIS — C34.92 SQUAMOUS CELL LUNG CANCER, LEFT: ICD-10-CM

## 2021-04-23 DIAGNOSIS — R06.09 DOE (DYSPNEA ON EXERTION): ICD-10-CM

## 2021-04-23 PROCEDURE — 93306 ECHO (CUPID ONLY): ICD-10-PCS | Mod: 26,,, | Performed by: INTERNAL MEDICINE

## 2021-04-23 PROCEDURE — 93306 TTE W/DOPPLER COMPLETE: CPT

## 2021-04-23 PROCEDURE — 93306 TTE W/DOPPLER COMPLETE: CPT | Mod: 26,,, | Performed by: INTERNAL MEDICINE

## 2021-04-26 ENCOUNTER — LAB VISIT (OUTPATIENT)
Dept: OTOLARYNGOLOGY | Facility: CLINIC | Age: 71
End: 2021-04-26
Payer: MEDICARE

## 2021-04-26 DIAGNOSIS — Z01.818 PRE-OP TESTING: ICD-10-CM

## 2021-04-26 LAB
AORTIC ROOT ANNULUS: 3 CM
ASCENDING AORTA: 2.83 CM
AV INDEX (PROSTH): 0.87
AV MEAN GRADIENT: 3 MMHG
AV PEAK GRADIENT: 6 MMHG
AV VALVE AREA: 2.82 CM2
AV VELOCITY RATIO: 0.91
BSA FOR ECHO PROCEDURE: 2.03 M2
CV ECHO LV RWT: 0.42 CM
DOP CALC AO PEAK VEL: 1.25 M/S
DOP CALC AO VTI: 21.29 CM
DOP CALC LVOT AREA: 3.2 CM2
DOP CALC LVOT DIAMETER: 2.03 CM
DOP CALC LVOT PEAK VEL: 1.14 M/S
DOP CALC LVOT STROKE VOLUME: 60.01 CM3
DOP CALCLVOT PEAK VEL VTI: 18.55 CM
ECHO LV POSTERIOR WALL: 0.72 CM (ref 0.6–1.1)
EJECTION FRACTION: 60 %
FRACTIONAL SHORTENING: 43 % (ref 28–44)
INTERVENTRICULAR SEPTUM: 0.69 CM (ref 0.6–1.1)
IVRT: 91.34 MSEC
LA MAJOR: 4.64 CM
LA MINOR: 4.43 CM
LA WIDTH: 3.13 CM
LEFT ATRIUM SIZE: 2.19 CM
LEFT ATRIUM VOLUME INDEX: 13 ML/M2
LEFT ATRIUM VOLUME: 26.41 CM3
LEFT INTERNAL DIMENSION IN SYSTOLE: 1.94 CM (ref 2.1–4)
LEFT VENTRICLE DIASTOLIC VOLUME INDEX: 23.73 ML/M2
LEFT VENTRICLE DIASTOLIC VOLUME: 48.18 ML
LEFT VENTRICLE MASS INDEX: 30 G/M2
LEFT VENTRICLE SYSTOLIC VOLUME INDEX: 5.8 ML/M2
LEFT VENTRICLE SYSTOLIC VOLUME: 11.74 ML
LEFT VENTRICULAR INTERNAL DIMENSION IN DIASTOLE: 3.42 CM (ref 3.5–6)
LEFT VENTRICULAR MASS: 61.07 G
PISA TR MAX VEL: 1.56 M/S
PULM VEIN S/D RATIO: 1.37
PV PEAK D VEL: 0.43 M/S
PV PEAK S VEL: 0.59 M/S
RA MAJOR: 4.37 CM
RA WIDTH: 4.04 CM
RV TISSUE DOPPLER FREE WALL SYSTOLIC VELOCITY 1 (APICAL 4 CHAMBER VIEW): 13.1 CM/S
SINUS: 3.07 CM
STJ: 2.35 CM
TDI LATERAL: 0.08 M/S
TDI SEPTAL: 0.06 M/S
TDI: 0.07 M/S
TR MAX PG: 10 MMHG
TRICUSPID ANNULAR PLANE SYSTOLIC EXCURSION: 1.38 CM

## 2021-04-26 PROCEDURE — U0003 INFECTIOUS AGENT DETECTION BY NUCLEIC ACID (DNA OR RNA); SEVERE ACUTE RESPIRATORY SYNDROME CORONAVIRUS 2 (SARS-COV-2) (CORONAVIRUS DISEASE [COVID-19]), AMPLIFIED PROBE TECHNIQUE, MAKING USE OF HIGH THROUGHPUT TECHNOLOGIES AS DESCRIBED BY CMS-2020-01-R: HCPCS | Performed by: NURSE PRACTITIONER

## 2021-04-26 PROCEDURE — U0005 INFEC AGEN DETEC AMPLI PROBE: HCPCS | Performed by: NURSE PRACTITIONER

## 2021-04-27 ENCOUNTER — PATIENT MESSAGE (OUTPATIENT)
Dept: SURGERY | Facility: HOSPITAL | Age: 71
End: 2021-04-27

## 2021-04-28 ENCOUNTER — ANESTHESIA (OUTPATIENT)
Dept: SURGERY | Facility: HOSPITAL | Age: 71
DRG: 165 | End: 2021-04-28
Payer: MEDICARE

## 2021-04-28 ENCOUNTER — HOSPITAL ENCOUNTER (INPATIENT)
Facility: HOSPITAL | Age: 71
LOS: 1 days | Discharge: HOME | DRG: 165 | End: 2021-04-29
Attending: THORACIC SURGERY (CARDIOTHORACIC VASCULAR SURGERY) | Admitting: THORACIC SURGERY (CARDIOTHORACIC VASCULAR SURGERY)
Payer: MEDICARE

## 2021-04-28 DIAGNOSIS — C34.92 NSCLC OF LEFT LUNG: ICD-10-CM

## 2021-04-28 LAB
ABO + RH BLD: NORMAL
BLD GP AB SCN CELLS X3 SERPL QL: NORMAL
SARS-COV-2 RNA RESP QL NAA+PROBE: NOT DETECTED

## 2021-04-28 PROCEDURE — 25000242 PHARM REV CODE 250 ALT 637 W/ HCPCS: Performed by: SURGERY

## 2021-04-28 PROCEDURE — 86900 BLOOD TYPING SEROLOGIC ABO: CPT | Performed by: PHYSICIAN ASSISTANT

## 2021-04-28 PROCEDURE — 25000003 PHARM REV CODE 250: Performed by: STUDENT IN AN ORGANIZED HEALTH CARE EDUCATION/TRAINING PROGRAM

## 2021-04-28 PROCEDURE — 32666 THORACOSCOPY W/WEDGE RESECT: CPT | Mod: LT,,, | Performed by: THORACIC SURGERY (CARDIOTHORACIC VASCULAR SURGERY)

## 2021-04-28 PROCEDURE — 37000008 HC ANESTHESIA 1ST 15 MINUTES: Performed by: THORACIC SURGERY (CARDIOTHORACIC VASCULAR SURGERY)

## 2021-04-28 PROCEDURE — 94799 UNLISTED PULMONARY SVC/PX: CPT

## 2021-04-28 PROCEDURE — 36000710: Performed by: THORACIC SURGERY (CARDIOTHORACIC VASCULAR SURGERY)

## 2021-04-28 PROCEDURE — 63600175 PHARM REV CODE 636 W HCPCS: Performed by: STUDENT IN AN ORGANIZED HEALTH CARE EDUCATION/TRAINING PROGRAM

## 2021-04-28 PROCEDURE — C1729 CATH, DRAINAGE: HCPCS | Performed by: THORACIC SURGERY (CARDIOTHORACIC VASCULAR SURGERY)

## 2021-04-28 PROCEDURE — 94640 AIRWAY INHALATION TREATMENT: CPT

## 2021-04-28 PROCEDURE — 88305 TISSUE EXAM BY PATHOLOGIST: CPT | Mod: 26,,, | Performed by: PATHOLOGY

## 2021-04-28 PROCEDURE — 88307 TISSUE EXAM BY PATHOLOGIST: CPT | Performed by: PATHOLOGY

## 2021-04-28 PROCEDURE — 71000015 HC POSTOP RECOV 1ST HR: Performed by: THORACIC SURGERY (CARDIOTHORACIC VASCULAR SURGERY)

## 2021-04-28 PROCEDURE — 27000221 HC OXYGEN, UP TO 24 HOURS

## 2021-04-28 PROCEDURE — 63600175 PHARM REV CODE 636 W HCPCS: Performed by: SURGERY

## 2021-04-28 PROCEDURE — 88305 TISSUE EXAM BY PATHOLOGIST: CPT | Mod: 59 | Performed by: PATHOLOGY

## 2021-04-28 PROCEDURE — D9220A PRA ANESTHESIA: Mod: ,,, | Performed by: ANESTHESIOLOGY

## 2021-04-28 PROCEDURE — 32666 PR THORACOSCOPY W/WEDGE RESECT: ICD-10-PCS | Mod: LT,,, | Performed by: THORACIC SURGERY (CARDIOTHORACIC VASCULAR SURGERY)

## 2021-04-28 PROCEDURE — 20600001 HC STEP DOWN PRIVATE ROOM

## 2021-04-28 PROCEDURE — 71000033 HC RECOVERY, INTIAL HOUR: Performed by: THORACIC SURGERY (CARDIOTHORACIC VASCULAR SURGERY)

## 2021-04-28 PROCEDURE — 71000016 HC POSTOP RECOV ADDL HR: Performed by: THORACIC SURGERY (CARDIOTHORACIC VASCULAR SURGERY)

## 2021-04-28 PROCEDURE — 88305 TISSUE EXAM BY PATHOLOGIST: ICD-10-PCS | Mod: 26,,, | Performed by: PATHOLOGY

## 2021-04-28 PROCEDURE — 88307 PR  SURG PATH,LEVEL V: ICD-10-PCS | Mod: 26,,, | Performed by: PATHOLOGY

## 2021-04-28 PROCEDURE — 27201423 OPTIME MED/SURG SUP & DEVICES STERILE SUPPLY: Performed by: THORACIC SURGERY (CARDIOTHORACIC VASCULAR SURGERY)

## 2021-04-28 PROCEDURE — 36000711: Performed by: THORACIC SURGERY (CARDIOTHORACIC VASCULAR SURGERY)

## 2021-04-28 PROCEDURE — 63600175 PHARM REV CODE 636 W HCPCS: Performed by: ANESTHESIOLOGY

## 2021-04-28 PROCEDURE — 36415 COLL VENOUS BLD VENIPUNCTURE: CPT | Performed by: THORACIC SURGERY (CARDIOTHORACIC VASCULAR SURGERY)

## 2021-04-28 PROCEDURE — 25000003 PHARM REV CODE 250: Performed by: SURGERY

## 2021-04-28 PROCEDURE — 37000009 HC ANESTHESIA EA ADD 15 MINS: Performed by: THORACIC SURGERY (CARDIOTHORACIC VASCULAR SURGERY)

## 2021-04-28 PROCEDURE — 32674 PR THORACOSCOPY LYMPH NODE EXC: ICD-10-PCS | Mod: ,,, | Performed by: THORACIC SURGERY (CARDIOTHORACIC VASCULAR SURGERY)

## 2021-04-28 PROCEDURE — 63600175 PHARM REV CODE 636 W HCPCS: Performed by: PHYSICIAN ASSISTANT

## 2021-04-28 PROCEDURE — 88307 TISSUE EXAM BY PATHOLOGIST: CPT | Mod: 26,,, | Performed by: PATHOLOGY

## 2021-04-28 PROCEDURE — D9220A PRA ANESTHESIA: ICD-10-PCS | Mod: ,,, | Performed by: ANESTHESIOLOGY

## 2021-04-28 PROCEDURE — 25000003 PHARM REV CODE 250: Performed by: THORACIC SURGERY (CARDIOTHORACIC VASCULAR SURGERY)

## 2021-04-28 PROCEDURE — 32674 THORACOSCOPY LYMPH NODE EXC: CPT | Mod: ,,, | Performed by: THORACIC SURGERY (CARDIOTHORACIC VASCULAR SURGERY)

## 2021-04-28 PROCEDURE — 99900035 HC TECH TIME PER 15 MIN (STAT)

## 2021-04-28 PROCEDURE — 94761 N-INVAS EAR/PLS OXIMETRY MLT: CPT

## 2021-04-28 RX ORDER — LEVOTHYROXINE SODIUM 75 UG/1
75 TABLET ORAL DAILY
Status: DISCONTINUED | OUTPATIENT
Start: 2021-04-28 | End: 2021-04-29 | Stop reason: HOSPADM

## 2021-04-28 RX ORDER — POLYETHYLENE GLYCOL 3350 17 G/17G
17 POWDER, FOR SOLUTION ORAL DAILY
Status: DISCONTINUED | OUTPATIENT
Start: 2021-04-28 | End: 2021-04-29 | Stop reason: HOSPADM

## 2021-04-28 RX ORDER — OXYBUTYNIN CHLORIDE 5 MG/1
10 TABLET, EXTENDED RELEASE ORAL DAILY
Status: DISCONTINUED | OUTPATIENT
Start: 2021-04-28 | End: 2021-04-29 | Stop reason: HOSPADM

## 2021-04-28 RX ORDER — OXYCODONE HYDROCHLORIDE 10 MG/1
10 TABLET ORAL EVERY 4 HOURS PRN
Status: DISCONTINUED | OUTPATIENT
Start: 2021-04-28 | End: 2021-04-29 | Stop reason: HOSPADM

## 2021-04-28 RX ORDER — ROCURONIUM BROMIDE 10 MG/ML
INJECTION, SOLUTION INTRAVENOUS
Status: DISCONTINUED | OUTPATIENT
Start: 2021-04-28 | End: 2021-04-28

## 2021-04-28 RX ORDER — MIDAZOLAM HYDROCHLORIDE 1 MG/ML
INJECTION, SOLUTION INTRAMUSCULAR; INTRAVENOUS
Status: DISCONTINUED | OUTPATIENT
Start: 2021-04-28 | End: 2021-04-28

## 2021-04-28 RX ORDER — PANTOPRAZOLE SODIUM 40 MG/1
40 TABLET, DELAYED RELEASE ORAL DAILY
Status: DISCONTINUED | OUTPATIENT
Start: 2021-04-29 | End: 2021-04-29 | Stop reason: HOSPADM

## 2021-04-28 RX ORDER — CEFAZOLIN SODIUM 1 G/3ML
2 INJECTION, POWDER, FOR SOLUTION INTRAMUSCULAR; INTRAVENOUS
Status: COMPLETED | OUTPATIENT
Start: 2021-04-28 | End: 2021-04-28

## 2021-04-28 RX ORDER — AMOXICILLIN 250 MG
1 CAPSULE ORAL 2 TIMES DAILY
Status: DISCONTINUED | OUTPATIENT
Start: 2021-04-28 | End: 2021-04-29 | Stop reason: HOSPADM

## 2021-04-28 RX ORDER — IPRATROPIUM BROMIDE AND ALBUTEROL SULFATE 2.5; .5 MG/3ML; MG/3ML
3 SOLUTION RESPIRATORY (INHALATION) EVERY 6 HOURS
Status: DISCONTINUED | OUTPATIENT
Start: 2021-04-28 | End: 2021-04-29 | Stop reason: HOSPADM

## 2021-04-28 RX ORDER — SODIUM CHLORIDE 0.9 % (FLUSH) 0.9 %
10 SYRINGE (ML) INJECTION
Status: DISCONTINUED | OUTPATIENT
Start: 2021-04-28 | End: 2021-04-28 | Stop reason: HOSPADM

## 2021-04-28 RX ORDER — BISACODYL 10 MG
10 SUPPOSITORY, RECTAL RECTAL DAILY PRN
Status: DISCONTINUED | OUTPATIENT
Start: 2021-04-28 | End: 2021-04-29 | Stop reason: HOSPADM

## 2021-04-28 RX ORDER — NEOSTIGMINE METHYLSULFATE 0.5 MG/ML
INJECTION, SOLUTION INTRAVENOUS
Status: DISCONTINUED | OUTPATIENT
Start: 2021-04-28 | End: 2021-04-28

## 2021-04-28 RX ORDER — PHENYLEPHRINE HYDROCHLORIDE 10 MG/ML
INJECTION INTRAVENOUS
Status: DISCONTINUED | OUTPATIENT
Start: 2021-04-28 | End: 2021-04-28

## 2021-04-28 RX ORDER — HALOPERIDOL 5 MG/ML
0.5 INJECTION INTRAMUSCULAR EVERY 10 MIN PRN
Status: DISCONTINUED | OUTPATIENT
Start: 2021-04-28 | End: 2021-04-28 | Stop reason: HOSPADM

## 2021-04-28 RX ORDER — ACETAMINOPHEN 500 MG
1000 TABLET ORAL EVERY 8 HOURS
Status: DISCONTINUED | OUTPATIENT
Start: 2021-04-28 | End: 2021-04-29 | Stop reason: HOSPADM

## 2021-04-28 RX ORDER — FENTANYL CITRATE 50 UG/ML
25 INJECTION, SOLUTION INTRAMUSCULAR; INTRAVENOUS EVERY 5 MIN PRN
Status: DISCONTINUED | OUTPATIENT
Start: 2021-04-28 | End: 2021-04-28 | Stop reason: HOSPADM

## 2021-04-28 RX ORDER — MUPIROCIN 20 MG/G
1 OINTMENT TOPICAL 2 TIMES DAILY
Status: DISCONTINUED | OUTPATIENT
Start: 2021-04-28 | End: 2021-04-29 | Stop reason: HOSPADM

## 2021-04-28 RX ORDER — CEFAZOLIN SODIUM 1 G/3ML
2 INJECTION, POWDER, FOR SOLUTION INTRAMUSCULAR; INTRAVENOUS
Status: COMPLETED | OUTPATIENT
Start: 2021-04-28 | End: 2021-04-29

## 2021-04-28 RX ORDER — POLYETHYLENE GLYCOL 3350 17 G/17G
17 POWDER, FOR SOLUTION ORAL DAILY
Status: DISCONTINUED | OUTPATIENT
Start: 2021-04-28 | End: 2021-04-28

## 2021-04-28 RX ORDER — ONDANSETRON 2 MG/ML
INJECTION INTRAMUSCULAR; INTRAVENOUS
Status: DISCONTINUED | OUTPATIENT
Start: 2021-04-28 | End: 2021-04-28

## 2021-04-28 RX ORDER — FENTANYL CITRATE 50 UG/ML
INJECTION, SOLUTION INTRAMUSCULAR; INTRAVENOUS
Status: DISCONTINUED | OUTPATIENT
Start: 2021-04-28 | End: 2021-04-28

## 2021-04-28 RX ORDER — OXYCODONE HYDROCHLORIDE 5 MG/1
5 TABLET ORAL EVERY 4 HOURS PRN
Status: DISCONTINUED | OUTPATIENT
Start: 2021-04-28 | End: 2021-04-29 | Stop reason: HOSPADM

## 2021-04-28 RX ORDER — DEXAMETHASONE SODIUM PHOSPHATE 4 MG/ML
INJECTION, SOLUTION INTRA-ARTICULAR; INTRALESIONAL; INTRAMUSCULAR; INTRAVENOUS; SOFT TISSUE
Status: DISCONTINUED | OUTPATIENT
Start: 2021-04-28 | End: 2021-04-28

## 2021-04-28 RX ORDER — KETAMINE HCL IN 0.9 % NACL 50 MG/5 ML
SYRINGE (ML) INTRAVENOUS
Status: DISCONTINUED | OUTPATIENT
Start: 2021-04-28 | End: 2021-04-28

## 2021-04-28 RX ORDER — LIDOCAINE HYDROCHLORIDE 20 MG/ML
INJECTION, SOLUTION EPIDURAL; INFILTRATION; INTRACAUDAL; PERINEURAL
Status: DISCONTINUED | OUTPATIENT
Start: 2021-04-28 | End: 2021-04-28

## 2021-04-28 RX ORDER — ONDANSETRON 8 MG/1
8 TABLET, ORALLY DISINTEGRATING ORAL EVERY 8 HOURS PRN
Status: DISCONTINUED | OUTPATIENT
Start: 2021-04-28 | End: 2021-04-29 | Stop reason: HOSPADM

## 2021-04-28 RX ORDER — METOCLOPRAMIDE HYDROCHLORIDE 5 MG/ML
5 INJECTION INTRAMUSCULAR; INTRAVENOUS EVERY 6 HOURS PRN
Status: DISCONTINUED | OUTPATIENT
Start: 2021-04-28 | End: 2021-04-29 | Stop reason: HOSPADM

## 2021-04-28 RX ORDER — HYDROMORPHONE HYDROCHLORIDE 1 MG/ML
0.2 INJECTION, SOLUTION INTRAMUSCULAR; INTRAVENOUS; SUBCUTANEOUS EVERY 5 MIN PRN
Status: DISCONTINUED | OUTPATIENT
Start: 2021-04-28 | End: 2021-04-28 | Stop reason: HOSPADM

## 2021-04-28 RX ORDER — ENOXAPARIN SODIUM 100 MG/ML
40 INJECTION SUBCUTANEOUS EVERY 24 HOURS
Status: DISCONTINUED | OUTPATIENT
Start: 2021-04-28 | End: 2021-04-29 | Stop reason: HOSPADM

## 2021-04-28 RX ORDER — PROPOFOL 10 MG/ML
VIAL (ML) INTRAVENOUS
Status: DISCONTINUED | OUTPATIENT
Start: 2021-04-28 | End: 2021-04-28

## 2021-04-28 RX ORDER — BUPIVACAINE HYDROCHLORIDE 5 MG/ML
INJECTION, SOLUTION EPIDURAL; INTRACAUDAL
Status: DISCONTINUED | OUTPATIENT
Start: 2021-04-28 | End: 2021-04-28 | Stop reason: HOSPADM

## 2021-04-28 RX ADMIN — POLYETHYLENE GLYCOL 3350 17 G: 17 POWDER, FOR SOLUTION ORAL at 01:04

## 2021-04-28 RX ADMIN — DEXAMETHASONE SODIUM PHOSPHATE 8 MG: 4 INJECTION INTRA-ARTICULAR; INTRALESIONAL; INTRAMUSCULAR; INTRAVENOUS; SOFT TISSUE at 11:04

## 2021-04-28 RX ADMIN — GLYCOPYRROLATE 0.6 MG: 0.2 INJECTION, SOLUTION INTRAMUSCULAR; INTRAVITREAL at 12:04

## 2021-04-28 RX ADMIN — CEFAZOLIN 2 G: 330 INJECTION, POWDER, FOR SOLUTION INTRAMUSCULAR; INTRAVENOUS at 06:04

## 2021-04-28 RX ADMIN — MIDAZOLAM 1 MG: 1 INJECTION INTRAMUSCULAR; INTRAVENOUS at 10:04

## 2021-04-28 RX ADMIN — IPRATROPIUM BROMIDE AND ALBUTEROL SULFATE 3 ML: .5; 2.5 SOLUTION RESPIRATORY (INHALATION) at 02:04

## 2021-04-28 RX ADMIN — SODIUM CHLORIDE: 0.9 INJECTION, SOLUTION INTRAVENOUS at 10:04

## 2021-04-28 RX ADMIN — FENTANYL CITRATE 25 MCG: 50 INJECTION INTRAMUSCULAR; INTRAVENOUS at 01:04

## 2021-04-28 RX ADMIN — IPRATROPIUM BROMIDE AND ALBUTEROL SULFATE 3 ML: .5; 2.5 SOLUTION RESPIRATORY (INHALATION) at 08:04

## 2021-04-28 RX ADMIN — METOCLOPRAMIDE 5 MG: 5 INJECTION, SOLUTION INTRAMUSCULAR; INTRAVENOUS at 05:04

## 2021-04-28 RX ADMIN — LIDOCAINE HYDROCHLORIDE 80 MG: 20 INJECTION, SOLUTION EPIDURAL; INFILTRATION; INTRACAUDAL at 10:04

## 2021-04-28 RX ADMIN — FENTANYL CITRATE 25 MCG: 50 INJECTION INTRAMUSCULAR; INTRAVENOUS at 12:04

## 2021-04-28 RX ADMIN — ONDANSETRON 4 MG: 2 INJECTION, SOLUTION INTRAMUSCULAR; INTRAVENOUS at 12:04

## 2021-04-28 RX ADMIN — Medication 20 MG: at 11:04

## 2021-04-28 RX ADMIN — FENTANYL CITRATE 50 MCG: 50 INJECTION INTRAMUSCULAR; INTRAVENOUS at 12:04

## 2021-04-28 RX ADMIN — FENTANYL CITRATE 100 MCG: 50 INJECTION INTRAMUSCULAR; INTRAVENOUS at 10:04

## 2021-04-28 RX ADMIN — MUPIROCIN 1 G: 20 OINTMENT TOPICAL at 01:04

## 2021-04-28 RX ADMIN — CEFAZOLIN 2 G: 330 INJECTION, POWDER, FOR SOLUTION INTRAMUSCULAR; INTRAVENOUS at 10:04

## 2021-04-28 RX ADMIN — ACETAMINOPHEN 1000 MG: 500 TABLET, FILM COATED ORAL at 01:04

## 2021-04-28 RX ADMIN — ENOXAPARIN SODIUM 40 MG: 40 INJECTION SUBCUTANEOUS at 05:04

## 2021-04-28 RX ADMIN — ACETAMINOPHEN 1000 MG: 500 TABLET, FILM COATED ORAL at 08:04

## 2021-04-28 RX ADMIN — NEOSTIGMINE METHYLSULFATE 4 MG: 0.5 INJECTION INTRAVENOUS at 12:04

## 2021-04-28 RX ADMIN — ROCURONIUM BROMIDE 50 MG: 10 INJECTION, SOLUTION INTRAVENOUS at 10:04

## 2021-04-28 RX ADMIN — OXYBUTYNIN CHLORIDE 10 MG: 10 TABLET, EXTENDED RELEASE ORAL at 02:04

## 2021-04-28 RX ADMIN — PHENYLEPHRINE HYDROCHLORIDE 100 MCG: 10 INJECTION INTRAVENOUS at 11:04

## 2021-04-28 RX ADMIN — Medication 10 MG: at 11:04

## 2021-04-28 RX ADMIN — MUPIROCIN 1 G: 20 OINTMENT TOPICAL at 08:04

## 2021-04-28 RX ADMIN — OXYCODONE HYDROCHLORIDE 10 MG: 10 TABLET ORAL at 01:04

## 2021-04-28 RX ADMIN — HALOPERIDOL LACTATE 0.5 MG: 5 INJECTION, SOLUTION INTRAMUSCULAR at 01:04

## 2021-04-28 RX ADMIN — PROPOFOL 130 MG: 10 INJECTION, EMULSION INTRAVENOUS at 10:04

## 2021-04-29 VITALS
DIASTOLIC BLOOD PRESSURE: 61 MMHG | SYSTOLIC BLOOD PRESSURE: 119 MMHG | WEIGHT: 175.94 LBS | OXYGEN SATURATION: 94 % | HEIGHT: 65 IN | HEART RATE: 82 BPM | RESPIRATION RATE: 14 BRPM | BODY MASS INDEX: 29.31 KG/M2 | TEMPERATURE: 99 F

## 2021-04-29 PROCEDURE — 97116 GAIT TRAINING THERAPY: CPT

## 2021-04-29 PROCEDURE — 94640 AIRWAY INHALATION TREATMENT: CPT

## 2021-04-29 PROCEDURE — 25000003 PHARM REV CODE 250: Performed by: SURGERY

## 2021-04-29 PROCEDURE — 97161 PT EVAL LOW COMPLEX 20 MIN: CPT

## 2021-04-29 PROCEDURE — 25000242 PHARM REV CODE 250 ALT 637 W/ HCPCS: Performed by: SURGERY

## 2021-04-29 PROCEDURE — 27000221 HC OXYGEN, UP TO 24 HOURS

## 2021-04-29 PROCEDURE — 63600175 PHARM REV CODE 636 W HCPCS: Performed by: SURGERY

## 2021-04-29 PROCEDURE — 99900035 HC TECH TIME PER 15 MIN (STAT)

## 2021-04-29 PROCEDURE — 94761 N-INVAS EAR/PLS OXIMETRY MLT: CPT

## 2021-04-29 PROCEDURE — 97535 SELF CARE MNGMENT TRAINING: CPT

## 2021-04-29 PROCEDURE — 97165 OT EVAL LOW COMPLEX 30 MIN: CPT

## 2021-04-29 RX ORDER — OXYCODONE HYDROCHLORIDE 5 MG/1
5 TABLET ORAL EVERY 4 HOURS PRN
Qty: 40 TABLET | Refills: 0 | Status: SHIPPED | OUTPATIENT
Start: 2021-04-29 | End: 2021-08-03

## 2021-04-29 RX ADMIN — ACETAMINOPHEN 1000 MG: 500 TABLET, FILM COATED ORAL at 06:04

## 2021-04-29 RX ADMIN — MUPIROCIN 1 G: 20 OINTMENT TOPICAL at 08:04

## 2021-04-29 RX ADMIN — OXYCODONE HYDROCHLORIDE 10 MG: 10 TABLET ORAL at 08:04

## 2021-04-29 RX ADMIN — IPRATROPIUM BROMIDE AND ALBUTEROL SULFATE 3 ML: .5; 2.5 SOLUTION RESPIRATORY (INHALATION) at 12:04

## 2021-04-29 RX ADMIN — CEFAZOLIN 2 G: 330 INJECTION, POWDER, FOR SOLUTION INTRAMUSCULAR; INTRAVENOUS at 03:04

## 2021-04-29 RX ADMIN — IPRATROPIUM BROMIDE AND ALBUTEROL SULFATE 3 ML: .5; 2.5 SOLUTION RESPIRATORY (INHALATION) at 08:04

## 2021-04-29 RX ADMIN — LEVOTHYROXINE SODIUM 75 MCG: 75 TABLET ORAL at 08:04

## 2021-04-29 RX ADMIN — OXYCODONE HYDROCHLORIDE 10 MG: 10 TABLET ORAL at 04:04

## 2021-04-29 RX ADMIN — OXYCODONE 5 MG: 5 TABLET ORAL at 12:04

## 2021-04-29 RX ADMIN — OXYBUTYNIN CHLORIDE 10 MG: 10 TABLET, EXTENDED RELEASE ORAL at 08:04

## 2021-04-29 RX ADMIN — PANTOPRAZOLE SODIUM 40 MG: 40 TABLET, DELAYED RELEASE ORAL at 08:04

## 2021-04-30 ENCOUNTER — PATIENT OUTREACH (OUTPATIENT)
Dept: ADMINISTRATIVE | Facility: CLINIC | Age: 71
End: 2021-04-30

## 2021-05-04 LAB
FINAL PATHOLOGIC DIAGNOSIS: NORMAL
GROSS: NORMAL
Lab: NORMAL
MICROSCOPIC EXAM: NORMAL

## 2021-05-06 ENCOUNTER — PATIENT MESSAGE (OUTPATIENT)
Dept: CARDIOTHORACIC SURGERY | Facility: CLINIC | Age: 71
End: 2021-05-06

## 2021-05-10 ENCOUNTER — NURSE TRIAGE (OUTPATIENT)
Dept: ADMINISTRATIVE | Facility: CLINIC | Age: 71
End: 2021-05-10

## 2021-05-10 ENCOUNTER — PATIENT MESSAGE (OUTPATIENT)
Dept: CARDIOTHORACIC SURGERY | Facility: CLINIC | Age: 71
End: 2021-05-10

## 2021-05-18 ENCOUNTER — OFFICE VISIT (OUTPATIENT)
Dept: CARDIOTHORACIC SURGERY | Facility: CLINIC | Age: 71
End: 2021-05-18
Payer: MEDICARE

## 2021-05-18 VITALS
HEIGHT: 65 IN | BODY MASS INDEX: 29.16 KG/M2 | TEMPERATURE: 98 F | DIASTOLIC BLOOD PRESSURE: 68 MMHG | SYSTOLIC BLOOD PRESSURE: 123 MMHG | HEART RATE: 79 BPM | WEIGHT: 175 LBS

## 2021-05-18 DIAGNOSIS — Z09 S/P LUNG SURGERY, FOLLOW-UP EXAM: ICD-10-CM

## 2021-05-18 DIAGNOSIS — C34.92 NSCLC OF LEFT LUNG: Primary | ICD-10-CM

## 2021-05-18 PROCEDURE — 99024 POSTOP FOLLOW-UP VISIT: CPT | Mod: S$GLB,,, | Performed by: THORACIC SURGERY (CARDIOTHORACIC VASCULAR SURGERY)

## 2021-05-18 PROCEDURE — 99999 PR PBB SHADOW E&M-EST. PATIENT-LVL III: ICD-10-PCS | Mod: PBBFAC,,, | Performed by: THORACIC SURGERY (CARDIOTHORACIC VASCULAR SURGERY)

## 2021-05-18 PROCEDURE — 99024 PR POST-OP FOLLOW-UP VISIT: ICD-10-PCS | Mod: S$GLB,,, | Performed by: THORACIC SURGERY (CARDIOTHORACIC VASCULAR SURGERY)

## 2021-05-18 PROCEDURE — 99999 PR PBB SHADOW E&M-EST. PATIENT-LVL III: CPT | Mod: PBBFAC,,, | Performed by: THORACIC SURGERY (CARDIOTHORACIC VASCULAR SURGERY)

## 2021-05-20 PROBLEM — C34.92 NSCLC OF LEFT LUNG: Status: RESOLVED | Noted: 2021-04-28 | Resolved: 2021-05-20

## 2021-06-10 ENCOUNTER — DOCUMENTATION ONLY (OUTPATIENT)
Dept: HEMATOLOGY/ONCOLOGY | Facility: CLINIC | Age: 71
End: 2021-06-10

## 2021-06-30 ENCOUNTER — PATIENT MESSAGE (OUTPATIENT)
Dept: ADMINISTRATIVE | Facility: OTHER | Age: 71
End: 2021-06-30

## 2021-07-18 ENCOUNTER — PATIENT MESSAGE (OUTPATIENT)
Dept: CARDIOTHORACIC SURGERY | Facility: CLINIC | Age: 71
End: 2021-07-18

## 2021-08-03 ENCOUNTER — HOSPITAL ENCOUNTER (OUTPATIENT)
Dept: RADIOLOGY | Facility: HOSPITAL | Age: 71
Discharge: HOME | End: 2021-08-03
Attending: THORACIC SURGERY (CARDIOTHORACIC VASCULAR SURGERY)
Payer: MEDICARE

## 2021-08-03 ENCOUNTER — OFFICE VISIT (OUTPATIENT)
Dept: CARDIOTHORACIC SURGERY | Facility: CLINIC | Age: 71
End: 2021-08-03
Payer: MEDICARE

## 2021-08-03 VITALS
SYSTOLIC BLOOD PRESSURE: 114 MMHG | WEIGHT: 164.25 LBS | HEART RATE: 70 BPM | BODY MASS INDEX: 27.33 KG/M2 | TEMPERATURE: 97 F | DIASTOLIC BLOOD PRESSURE: 73 MMHG

## 2021-08-03 DIAGNOSIS — Z85.118 HISTORY OF LUNG CANCER: Primary | ICD-10-CM

## 2021-08-03 DIAGNOSIS — C34.92 NSCLC OF LEFT LUNG: ICD-10-CM

## 2021-08-03 PROCEDURE — 99999 PR PBB SHADOW E&M-EST. PATIENT-LVL III: ICD-10-PCS | Mod: PBBFAC,,, | Performed by: THORACIC SURGERY (CARDIOTHORACIC VASCULAR SURGERY)

## 2021-08-03 PROCEDURE — 71250 CT THORAX DX C-: CPT | Mod: TC

## 2021-08-03 PROCEDURE — 99213 OFFICE O/P EST LOW 20 MIN: CPT | Mod: S$GLB,,, | Performed by: THORACIC SURGERY (CARDIOTHORACIC VASCULAR SURGERY)

## 2021-08-03 PROCEDURE — 99999 PR PBB SHADOW E&M-EST. PATIENT-LVL III: CPT | Mod: PBBFAC,,, | Performed by: THORACIC SURGERY (CARDIOTHORACIC VASCULAR SURGERY)

## 2021-08-03 PROCEDURE — 99213 PR OFFICE/OUTPT VISIT, EST, LEVL III, 20-29 MIN: ICD-10-PCS | Mod: S$GLB,,, | Performed by: THORACIC SURGERY (CARDIOTHORACIC VASCULAR SURGERY)

## 2021-08-11 ENCOUNTER — OFFICE VISIT (OUTPATIENT)
Dept: OPHTHALMOLOGY | Facility: CLINIC | Age: 71
End: 2021-08-11
Payer: MEDICARE

## 2021-08-11 ENCOUNTER — TELEPHONE (OUTPATIENT)
Dept: PULMONOLOGY | Facility: CLINIC | Age: 71
End: 2021-08-11

## 2021-08-11 DIAGNOSIS — H52.7 REFRACTION DISORDER: ICD-10-CM

## 2021-08-11 DIAGNOSIS — Z83.511 FAMILY HISTORY OF OPEN-ANGLE GLAUCOMA: ICD-10-CM

## 2021-08-11 DIAGNOSIS — H25.12 SENILE NUCLEAR CATARACT, LEFT: ICD-10-CM

## 2021-08-11 DIAGNOSIS — H25.11 SENILE NUCLEAR CATARACT, RIGHT: Primary | ICD-10-CM

## 2021-08-11 PROCEDURE — 92015 DETERMINE REFRACTIVE STATE: CPT | Mod: S$GLB,,, | Performed by: OPHTHALMOLOGY

## 2021-08-11 PROCEDURE — 99999 PR PBB SHADOW E&M-EST. PATIENT-LVL III: ICD-10-PCS | Mod: PBBFAC,,, | Performed by: OPHTHALMOLOGY

## 2021-08-11 PROCEDURE — 92004 PR EYE EXAM, NEW PATIENT,COMPREHESV: ICD-10-PCS | Mod: S$GLB,,, | Performed by: OPHTHALMOLOGY

## 2021-08-11 PROCEDURE — 92015 PR REFRACTION: ICD-10-PCS | Mod: S$GLB,,, | Performed by: OPHTHALMOLOGY

## 2021-08-11 PROCEDURE — 99999 PR PBB SHADOW E&M-EST. PATIENT-LVL III: CPT | Mod: PBBFAC,,, | Performed by: OPHTHALMOLOGY

## 2021-08-11 PROCEDURE — 92004 COMPRE OPH EXAM NEW PT 1/>: CPT | Mod: S$GLB,,, | Performed by: OPHTHALMOLOGY

## 2021-08-12 ENCOUNTER — OFFICE VISIT (OUTPATIENT)
Dept: PULMONOLOGY | Facility: CLINIC | Age: 71
End: 2021-08-12
Payer: MEDICARE

## 2021-08-12 VITALS
HEART RATE: 76 BPM | HEIGHT: 65 IN | SYSTOLIC BLOOD PRESSURE: 120 MMHG | DIASTOLIC BLOOD PRESSURE: 60 MMHG | RESPIRATION RATE: 20 BRPM | BODY MASS INDEX: 27.27 KG/M2 | WEIGHT: 163.69 LBS | OXYGEN SATURATION: 97 %

## 2021-08-12 DIAGNOSIS — J44.9 COPD, MODERATE: Primary | ICD-10-CM

## 2021-08-12 DIAGNOSIS — C34.32 MALIGNANT NEOPLASM OF LOWER LOBE OF LEFT LUNG: ICD-10-CM

## 2021-08-12 DIAGNOSIS — Z87.891 FORMER HEAVY CIGARETTE SMOKER (20-39 PER DAY): ICD-10-CM

## 2021-08-12 DIAGNOSIS — R91.1 INCIDENTAL PULMONARY NODULE, > 3MM AND < 8MM: ICD-10-CM

## 2021-08-12 PROCEDURE — 99215 PR OFFICE/OUTPT VISIT, EST, LEVL V, 40-54 MIN: ICD-10-PCS | Mod: S$GLB,,, | Performed by: INTERNAL MEDICINE

## 2021-08-12 PROCEDURE — 99215 OFFICE O/P EST HI 40 MIN: CPT | Mod: S$GLB,,, | Performed by: INTERNAL MEDICINE

## 2021-08-12 PROCEDURE — 99999 PR PBB SHADOW E&M-EST. PATIENT-LVL III: CPT | Mod: PBBFAC,,, | Performed by: INTERNAL MEDICINE

## 2021-08-12 PROCEDURE — 99999 PR PBB SHADOW E&M-EST. PATIENT-LVL III: ICD-10-PCS | Mod: PBBFAC,,, | Performed by: INTERNAL MEDICINE

## 2021-08-25 ENCOUNTER — IMMUNIZATION (OUTPATIENT)
Dept: PRIMARY CARE CLINIC | Facility: CLINIC | Age: 71
End: 2021-08-25
Payer: MEDICARE

## 2021-08-25 DIAGNOSIS — Z23 NEED FOR VACCINATION: Primary | ICD-10-CM

## 2021-08-25 PROCEDURE — 91300 COVID-19, MRNA, LNP-S, PF, 30 MCG/0.3 ML DOSE VACCINE: ICD-10-PCS | Mod: S$GLB,,, | Performed by: FAMILY MEDICINE

## 2021-08-25 PROCEDURE — 0003A COVID-19, MRNA, LNP-S, PF, 30 MCG/0.3 ML DOSE VACCINE: ICD-10-PCS | Mod: CV19,S$GLB,, | Performed by: FAMILY MEDICINE

## 2021-08-25 PROCEDURE — 0003A COVID-19, MRNA, LNP-S, PF, 30 MCG/0.3 ML DOSE VACCINE: CPT | Mod: CV19,S$GLB,, | Performed by: FAMILY MEDICINE

## 2021-08-25 PROCEDURE — 91300 COVID-19, MRNA, LNP-S, PF, 30 MCG/0.3 ML DOSE VACCINE: CPT | Mod: S$GLB,,, | Performed by: FAMILY MEDICINE

## 2021-10-13 ENCOUNTER — OFFICE VISIT (OUTPATIENT)
Dept: OBSTETRICS AND GYNECOLOGY | Facility: CLINIC | Age: 71
End: 2021-10-13
Payer: MEDICARE

## 2021-10-13 VITALS
HEIGHT: 65 IN | DIASTOLIC BLOOD PRESSURE: 82 MMHG | SYSTOLIC BLOOD PRESSURE: 138 MMHG | WEIGHT: 164.69 LBS | BODY MASS INDEX: 27.44 KG/M2

## 2021-10-13 DIAGNOSIS — N32.81 OVERACTIVE BLADDER: Primary | ICD-10-CM

## 2021-10-13 DIAGNOSIS — Z12.31 BREAST CANCER SCREENING BY MAMMOGRAM: ICD-10-CM

## 2021-10-13 DIAGNOSIS — N39.490 OVERFLOW INCONTINENCE: ICD-10-CM

## 2021-10-13 PROCEDURE — 99203 PR OFFICE/OUTPT VISIT, NEW, LEVL III, 30-44 MIN: ICD-10-PCS | Mod: S$GLB,,, | Performed by: NURSE PRACTITIONER

## 2021-10-13 PROCEDURE — 99999 PR PBB SHADOW E&M-EST. PATIENT-LVL III: CPT | Mod: PBBFAC,,, | Performed by: NURSE PRACTITIONER

## 2021-10-13 PROCEDURE — 99203 OFFICE O/P NEW LOW 30 MIN: CPT | Mod: S$GLB,,, | Performed by: NURSE PRACTITIONER

## 2021-10-13 PROCEDURE — 99999 PR PBB SHADOW E&M-EST. PATIENT-LVL III: ICD-10-PCS | Mod: PBBFAC,,, | Performed by: NURSE PRACTITIONER

## 2021-10-13 PROCEDURE — 87086 URINE CULTURE/COLONY COUNT: CPT | Performed by: NURSE PRACTITIONER

## 2021-10-13 RX ORDER — TOLTERODINE 4 MG/1
4 CAPSULE, EXTENDED RELEASE ORAL DAILY
Qty: 90 CAPSULE | Refills: 4 | Status: SHIPPED | OUTPATIENT
Start: 2021-10-13 | End: 2023-03-17

## 2021-10-15 LAB — BACTERIA UR CULT: NO GROWTH

## 2021-11-01 ENCOUNTER — OFFICE VISIT (OUTPATIENT)
Dept: UROLOGY | Facility: CLINIC | Age: 71
End: 2021-11-01
Payer: MEDICARE

## 2021-11-01 VITALS
BODY MASS INDEX: 27.33 KG/M2 | SYSTOLIC BLOOD PRESSURE: 110 MMHG | DIASTOLIC BLOOD PRESSURE: 70 MMHG | WEIGHT: 164.25 LBS

## 2021-11-01 DIAGNOSIS — N39.41 URGE INCONTINENCE: ICD-10-CM

## 2021-11-01 DIAGNOSIS — N32.81 OAB (OVERACTIVE BLADDER): Primary | ICD-10-CM

## 2021-11-01 PROCEDURE — 99999 PR PBB SHADOW E&M-EST. PATIENT-LVL III: ICD-10-PCS | Mod: PBBFAC,,, | Performed by: UROLOGY

## 2021-11-01 PROCEDURE — 99204 OFFICE O/P NEW MOD 45 MIN: CPT | Mod: ,,, | Performed by: UROLOGY

## 2021-11-01 PROCEDURE — 51798 PR MEAS,POST-VOID RES,US,NON-IMAGING: ICD-10-PCS | Mod: ,,, | Performed by: UROLOGY

## 2021-11-01 PROCEDURE — 99999 PR PBB SHADOW E&M-EST. PATIENT-LVL III: CPT | Mod: PBBFAC,,, | Performed by: UROLOGY

## 2021-11-01 PROCEDURE — 99204 PR OFFICE/OUTPT VISIT, NEW, LEVL IV, 45-59 MIN: ICD-10-PCS | Mod: ,,, | Performed by: UROLOGY

## 2021-11-01 PROCEDURE — 51798 US URINE CAPACITY MEASURE: CPT | Mod: ,,, | Performed by: UROLOGY

## 2021-11-23 ENCOUNTER — HOSPITAL ENCOUNTER (OUTPATIENT)
Dept: RADIOLOGY | Facility: HOSPITAL | Age: 71
Discharge: HOME | End: 2021-11-23
Attending: NURSE PRACTITIONER
Payer: MEDICARE

## 2021-11-23 DIAGNOSIS — Z12.31 BREAST CANCER SCREENING BY MAMMOGRAM: ICD-10-CM

## 2021-11-23 PROCEDURE — 77063 BREAST TOMOSYNTHESIS BI: CPT | Mod: 26,,, | Performed by: RADIOLOGY

## 2021-11-23 PROCEDURE — 77063 MAMMO DIGITAL SCREENING BILAT WITH TOMO: ICD-10-PCS | Mod: 26,,, | Performed by: RADIOLOGY

## 2021-11-23 PROCEDURE — 77067 SCR MAMMO BI INCL CAD: CPT | Mod: TC

## 2021-11-23 PROCEDURE — 77067 SCR MAMMO BI INCL CAD: CPT | Mod: 26,,, | Performed by: RADIOLOGY

## 2021-11-23 PROCEDURE — 77067 MAMMO DIGITAL SCREENING BILAT WITH TOMO: ICD-10-PCS | Mod: 26,,, | Performed by: RADIOLOGY

## 2021-11-29 ENCOUNTER — PATIENT MESSAGE (OUTPATIENT)
Dept: CARDIOTHORACIC SURGERY | Facility: CLINIC | Age: 71
End: 2021-11-29
Payer: MEDICARE

## 2021-12-02 DIAGNOSIS — C34.92 NSCLC OF LEFT LUNG: Primary | ICD-10-CM

## 2021-12-06 ENCOUNTER — TELEPHONE (OUTPATIENT)
Dept: OBSTETRICS AND GYNECOLOGY | Facility: CLINIC | Age: 71
End: 2021-12-06
Payer: MEDICARE

## 2021-12-26 ENCOUNTER — PATIENT MESSAGE (OUTPATIENT)
Dept: CARDIOTHORACIC SURGERY | Facility: CLINIC | Age: 71
End: 2021-12-26
Payer: MEDICARE

## 2022-01-26 ENCOUNTER — PATIENT MESSAGE (OUTPATIENT)
Dept: CARDIOTHORACIC SURGERY | Facility: CLINIC | Age: 72
End: 2022-01-26
Payer: MEDICARE

## 2022-01-27 ENCOUNTER — HOSPITAL ENCOUNTER (OUTPATIENT)
Dept: RADIOLOGY | Facility: HOSPITAL | Age: 72
Discharge: HOME | End: 2022-01-27
Attending: THORACIC SURGERY (CARDIOTHORACIC VASCULAR SURGERY)
Payer: MEDICARE

## 2022-01-27 DIAGNOSIS — C34.92 NSCLC OF LEFT LUNG: ICD-10-CM

## 2022-01-27 PROCEDURE — 71250 CT THORAX DX C-: CPT | Mod: TC

## 2022-02-02 ENCOUNTER — TELEPHONE (OUTPATIENT)
Dept: CARDIOTHORACIC SURGERY | Facility: CLINIC | Age: 72
End: 2022-02-02
Payer: MEDICARE

## 2022-02-02 DIAGNOSIS — C34.92 NSCLC OF LEFT LUNG: Primary | ICD-10-CM

## 2022-02-02 NOTE — TELEPHONE ENCOUNTER
A telephone call was used instead of a virtual visit due to patient difficulty accessing the virtual visit application.  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  The patient has symptomatic COPD characterized by exertional dyspnea.  She was offered anatomic segmentectomy versus wedge resection and elected the latter approach for management of the early stage lung cancer.    She denies having any exacerbation of her exertional dyspnea and denies having dyspnea at rest.  She denies any loss of appetite or weight loss.  I reviewed the most recent chest CT images which showed no evidence of disease.    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  No evidence of disease  Advised the patient that we will repeat Chest CT in 6 months for lung cancer surveillance

## 2022-02-24 DIAGNOSIS — U07.1 COVID: ICD-10-CM

## 2022-04-25 ENCOUNTER — LAB VISIT (OUTPATIENT)
Dept: PRIMARY CARE CLINIC | Facility: CLINIC | Age: 72
End: 2022-04-25
Payer: MEDICARE

## 2022-04-25 DIAGNOSIS — U07.1 COVID: ICD-10-CM

## 2022-04-25 PROCEDURE — U0005 INFEC AGEN DETEC AMPLI PROBE: HCPCS | Performed by: NURSE PRACTITIONER

## 2022-04-25 PROCEDURE — U0003 INFECTIOUS AGENT DETECTION BY NUCLEIC ACID (DNA OR RNA); SEVERE ACUTE RESPIRATORY SYNDROME CORONAVIRUS 2 (SARS-COV-2) (CORONAVIRUS DISEASE [COVID-19]), AMPLIFIED PROBE TECHNIQUE, MAKING USE OF HIGH THROUGHPUT TECHNOLOGIES AS DESCRIBED BY CMS-2020-01-R: HCPCS | Performed by: NURSE PRACTITIONER

## 2022-04-26 LAB
SARS-COV-2 RNA RESP QL NAA+PROBE: NOT DETECTED
SARS-COV-2- CYCLE NUMBER: NORMAL

## 2022-04-28 ENCOUNTER — OFFICE VISIT (OUTPATIENT)
Dept: PULMONOLOGY | Facility: CLINIC | Age: 72
End: 2022-04-28
Payer: MEDICARE

## 2022-04-28 ENCOUNTER — CLINICAL SUPPORT (OUTPATIENT)
Dept: PULMONOLOGY | Facility: CLINIC | Age: 72
End: 2022-04-28
Payer: MEDICARE

## 2022-04-28 VITALS
OXYGEN SATURATION: 95 % | BODY MASS INDEX: 26.48 KG/M2 | HEIGHT: 65 IN | RESPIRATION RATE: 14 BRPM | DIASTOLIC BLOOD PRESSURE: 61 MMHG | WEIGHT: 158.94 LBS | WEIGHT: 158.94 LBS | SYSTOLIC BLOOD PRESSURE: 115 MMHG | BODY MASS INDEX: 26.48 KG/M2 | HEART RATE: 75 BPM | HEIGHT: 65 IN

## 2022-04-28 DIAGNOSIS — R91.1 PULMONARY NODULE 1 CM OR GREATER IN DIAMETER: ICD-10-CM

## 2022-04-28 DIAGNOSIS — J44.9 COPD, MODERATE: ICD-10-CM

## 2022-04-28 DIAGNOSIS — C34.32 MALIGNANT NEOPLASM OF LOWER LOBE OF LEFT LUNG: ICD-10-CM

## 2022-04-28 DIAGNOSIS — Z87.891 FORMER HEAVY CIGARETTE SMOKER (20-39 PER DAY): ICD-10-CM

## 2022-04-28 DIAGNOSIS — J44.9 COPD, MODERATE: Primary | ICD-10-CM

## 2022-04-28 DIAGNOSIS — Z01.818 PRE-OP TESTING: ICD-10-CM

## 2022-04-28 DIAGNOSIS — R09.02 EXERCISE HYPOXEMIA: ICD-10-CM

## 2022-04-28 LAB
BRPFT: NORMAL
DLCO ADJ PRE: 13.8 ML/(MIN*MMHG)
DLCO SINGLE BREATH LLN: 15.56
DLCO SINGLE BREATH PRE REF: 64.8 %
DLCO SINGLE BREATH REF: 21.29
DLCOC SBVA LLN: 2.79
DLCOC SBVA PRE REF: 81.5 %
DLCOC SBVA REF: 4.18
DLCOC SINGLE BREATH LLN: 15.56
DLCOC SINGLE BREATH PRE REF: 64.8 %
DLCOC SINGLE BREATH REF: 21.29
DLCOVA LLN: 2.79
DLCOVA PRE REF: 81.5 %
DLCOVA PRE: 3.4 ML/(MIN*MMHG*L)
DLCOVA REF: 4.18
DLVAADJ PRE: 3.4 ML/(MIN*MMHG*L)
ERV LLN: -16449.4
ERV PRE REF: 28.4 %
ERV REF: 0.6
FEF 25 75 LLN: 0.78
FEF 25 75 PRE REF: 25 %
FEF 25 75 REF: 1.76
FEV1 FVC LLN: 64
FEV1 FVC PRE REF: 70.6 %
FEV1 FVC REF: 77
FEV1 LLN: 1.54
FEV1 PRE REF: 69.2 %
FEV1 REF: 2.15
FRCPLETH LLN: 1.95
FRCPLETH PREREF: 108.1 %
FRCPLETH REF: 2.77
FVC LLN: 2.01
FVC PRE REF: 97.1 %
FVC REF: 2.81
IVC PRE: 2.22 L
IVC SINGLE BREATH LLN: 2.01
IVC SINGLE BREATH PRE REF: 79.1 %
IVC SINGLE BREATH REF: 2.81
MVV LLN: 70
MVV PRE REF: 54.4 %
MVV REF: 83
PEF LLN: 3.72
PEF PRE REF: 67.7 %
PEF REF: 5.48
PRE DLCO: 13.8 ML/(MIN*MMHG)
PRE ERV: 0.17 L
PRE FEF 25 75: 0.44 L/S
PRE FET 100: 16.7 SEC
PRE FEV1 FVC: 54.74 %
PRE FEV1: 1.49 L
PRE FRC PL: 2.99 L
PRE FVC: 2.72 L
PRE MVV: 45 L/MIN
PRE PEF: 3.71 L/S
PRE RV: 2.22 L
PRE TLC: 4.94 L
RAW LLN: 3.06
RAW PRE REF: 294.2 %
RAW PRE: 9 CMH2O*S/L
RAW REF: 3.06
RV LLN: 1.59
RV PRE REF: 102.2 %
RV REF: 2.17
RVTLC LLN: 35
RVTLC PRE REF: 101.7 %
RVTLC PRE: 44.89 %
RVTLC REF: 44
TLC LLN: 4.11
TLC PRE REF: 96.9 %
TLC REF: 5.1
VA PRE: 4.06 L
VA SINGLE BREATH LLN: 4.95
VA SINGLE BREATH PRE REF: 82.1 %
VA SINGLE BREATH REF: 4.95
VC LLN: 2.01
VC PRE REF: 97.1 %
VC PRE: 2.72 L
VC REF: 2.81
VTGRAWPRE: 3.03 L

## 2022-04-28 PROCEDURE — 94618 PULMONARY STRESS TESTING: CPT | Mod: S$GLB,,, | Performed by: INTERNAL MEDICINE

## 2022-04-28 PROCEDURE — 1159F MED LIST DOCD IN RCRD: CPT | Mod: CPTII,S$GLB,, | Performed by: NURSE PRACTITIONER

## 2022-04-28 PROCEDURE — 1159F PR MEDICATION LIST DOCUMENTED IN MEDICAL RECORD: ICD-10-PCS | Mod: CPTII,S$GLB,, | Performed by: NURSE PRACTITIONER

## 2022-04-28 PROCEDURE — 94010 BREATHING CAPACITY TEST: CPT | Mod: S$GLB,,, | Performed by: INTERNAL MEDICINE

## 2022-04-28 PROCEDURE — 3288F FALL RISK ASSESSMENT DOCD: CPT | Mod: CPTII,S$GLB,, | Performed by: NURSE PRACTITIONER

## 2022-04-28 PROCEDURE — 3078F PR MOST RECENT DIASTOLIC BLOOD PRESSURE < 80 MM HG: ICD-10-PCS | Mod: CPTII,S$GLB,, | Performed by: NURSE PRACTITIONER

## 2022-04-28 PROCEDURE — 3008F PR BODY MASS INDEX (BMI) DOCUMENTED: ICD-10-PCS | Mod: CPTII,S$GLB,, | Performed by: NURSE PRACTITIONER

## 2022-04-28 PROCEDURE — 3008F BODY MASS INDEX DOCD: CPT | Mod: CPTII,S$GLB,, | Performed by: NURSE PRACTITIONER

## 2022-04-28 PROCEDURE — 1160F PR REVIEW ALL MEDS BY PRESCRIBER/CLIN PHARMACIST DOCUMENTED: ICD-10-PCS | Mod: CPTII,S$GLB,, | Performed by: NURSE PRACTITIONER

## 2022-04-28 PROCEDURE — 3074F SYST BP LT 130 MM HG: CPT | Mod: CPTII,S$GLB,, | Performed by: NURSE PRACTITIONER

## 2022-04-28 PROCEDURE — 99214 PR OFFICE/OUTPT VISIT, EST, LEVL IV, 30-39 MIN: ICD-10-PCS | Mod: 25,S$GLB,, | Performed by: NURSE PRACTITIONER

## 2022-04-28 PROCEDURE — 94618 PULMONARY STRESS TESTING: ICD-10-PCS | Mod: S$GLB,,, | Performed by: INTERNAL MEDICINE

## 2022-04-28 PROCEDURE — 94726 PLETHYSMOGRAPHY LUNG VOLUMES: CPT | Mod: S$GLB,,, | Performed by: INTERNAL MEDICINE

## 2022-04-28 PROCEDURE — 99214 OFFICE O/P EST MOD 30 MIN: CPT | Mod: 25,S$GLB,, | Performed by: NURSE PRACTITIONER

## 2022-04-28 PROCEDURE — 1101F PR PT FALLS ASSESS DOC 0-1 FALLS W/OUT INJ PAST YR: ICD-10-PCS | Mod: CPTII,S$GLB,, | Performed by: NURSE PRACTITIONER

## 2022-04-28 PROCEDURE — 3288F PR FALLS RISK ASSESSMENT DOCUMENTED: ICD-10-PCS | Mod: CPTII,S$GLB,, | Performed by: NURSE PRACTITIONER

## 2022-04-28 PROCEDURE — 94010 BREATHING CAPACITY TEST: ICD-10-PCS | Mod: S$GLB,,, | Performed by: INTERNAL MEDICINE

## 2022-04-28 PROCEDURE — 94729 PR C02/MEMBANE DIFFUSE CAPACITY: ICD-10-PCS | Mod: S$GLB,,, | Performed by: INTERNAL MEDICINE

## 2022-04-28 PROCEDURE — 1160F RVW MEDS BY RX/DR IN RCRD: CPT | Mod: CPTII,S$GLB,, | Performed by: NURSE PRACTITIONER

## 2022-04-28 PROCEDURE — 94726 PULM FUNCT TST PLETHYSMOGRAP: ICD-10-PCS | Mod: S$GLB,,, | Performed by: INTERNAL MEDICINE

## 2022-04-28 PROCEDURE — 99999 PR PBB SHADOW E&M-EST. PATIENT-LVL I: ICD-10-PCS | Mod: PBBFAC,,,

## 2022-04-28 PROCEDURE — 94729 DIFFUSING CAPACITY: CPT | Mod: S$GLB,,, | Performed by: INTERNAL MEDICINE

## 2022-04-28 PROCEDURE — 1101F PT FALLS ASSESS-DOCD LE1/YR: CPT | Mod: CPTII,S$GLB,, | Performed by: NURSE PRACTITIONER

## 2022-04-28 PROCEDURE — 99999 PR PBB SHADOW E&M-EST. PATIENT-LVL III: CPT | Mod: PBBFAC,,, | Performed by: NURSE PRACTITIONER

## 2022-04-28 PROCEDURE — 99999 PR PBB SHADOW E&M-EST. PATIENT-LVL III: ICD-10-PCS | Mod: PBBFAC,,, | Performed by: NURSE PRACTITIONER

## 2022-04-28 PROCEDURE — 99999 PR PBB SHADOW E&M-EST. PATIENT-LVL I: CPT | Mod: PBBFAC,,,

## 2022-04-28 PROCEDURE — 3078F DIAST BP <80 MM HG: CPT | Mod: CPTII,S$GLB,, | Performed by: NURSE PRACTITIONER

## 2022-04-28 PROCEDURE — 3074F PR MOST RECENT SYSTOLIC BLOOD PRESSURE < 130 MM HG: ICD-10-PCS | Mod: CPTII,S$GLB,, | Performed by: NURSE PRACTITIONER

## 2022-04-28 RX ORDER — MELOXICAM 15 MG/1
TABLET ORAL
COMMUNITY
Start: 2022-01-03 | End: 2023-02-08

## 2022-04-28 NOTE — PROCEDURES
"[facility] - Pulmonary Function  Six Minute Walk     SUMMARY     Ordering Provider: Dr. Quintero   Interpreting Provider: Dr. Rojas  Performing nurse/tech/RT: ELZA Levi RRT  Diagnosis: COPD (moderate)  Height: 5' 5" (165.1 cm)  Weight: 72.1 kg (158 lb 15.2 oz)  BMI (Calculated): 26.5   Patient Race:             Phase Oxygen Assessment Supplemental O2 Heart   Rate Blood Pressure Edmundo Dyspnea Scale Rating   Resting 95 % Room Air 75 bpm 115/61 0   Exercise        Minute        1 96 % Room Air 105 bpm     2 95 % Room Air 105 bpm     3 95 % Room Air 104 bpm     4 96 % Room Air 104 bpm     5 96 % Room Air 102 bpm     6  96 % Room Air 105 bpm 131/61 2   Recovery        Minute        1 98 % Room Air 78 bpm     2 99 % Room Air 74 bpm     3 98 % Room Air 77 bpm     4 98 % Room Air 77 bpm 121/74 0     Six Minute Walk Summary  6MWT Status: completed without stopping  Patient Reported: Dyspnea     Interpretation:  Did the patient stop or pause?: No         Total Time Walked (Calculated): 360 seconds  Final Partial Lap Distance (feet): 0 feet  Total Distance Meters (Calculated): 365.76 meters  Predicted Distance Meters (Calculated): 422.53 meters  Percentage of Predicted (Calculated): 86.56  Peak VO2 (Calculated): 14.95  Mets: 4.27  Has The Patient Had a Previous Six Minute Walk Test?: Yes       Previous 6MWT Results  Has The Patient Had a Previous Six Minute Walk Test?: Yes  Date of Previous Test: 04/15/21  Total Time Walked: 360 seconds  Total Distance (meters): 441.96  Predicted Distance (meters): 416.28 meters  Percentage of Predicted: 106.17  Percent Change (Calculated): 0.17           CLINICAL INTERPRETATION:  Six minute walk distance is 365.76m (86.56 % predicted) with very light dyspnea.  During exercise, there was no significant desaturation while breathing room air.  Blood pressure remained stable and Heart rate remained stable with walking.  The patient did not report non-pulmonary symptoms during exercise.  The " patient did complete the study, walking 360 seconds of the 360 second test.  Since the previous study in 04/15/2021, exercise capacity may be somewhat worse.  Based upon age and body mass index, exercise capacity is normal.      [] Mild exercise-induced hypoxemia described as an arterial oxygen saturation of 93-95% (or 3-4% less than at rest)  []  Moderate exercise-induced hypoxemia as 89-93%  []  Severe exercise induced hypoxemia as < 89% O2 saturation.  Medicare Criteria for oxygen prescription comments:   []  When arterial oxygen saturation is at or below 88% during exercise (severe exercise induced hypoxemia) then the patient falls under Medicare Group 1 criteria for supplemental oxygen

## 2022-04-28 NOTE — PROGRESS NOTES
Subjective:      Patient ID: Radha Lamas is a 72 y.o. female.    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 provider found for evaluation and management for   Chief Complaint   Patient presents with    COPD     Chief Complaint: COPD    HPI:  Radha Lamas is a 72 y.o. female presents to pulmonary clinic for follow up related to diagnosis of COPD review CPFT, 6MWD.    Patient presents stable. Well controlled on ANORO.      Current treatment regimen:  ANORO. Albuterol inhaler    No new problems, chronic breathing compliant of shortness of breath with prolonged exertion or working in the yard, resolves with albuterol inhaler before exertion.    Smoking history former cigarette smoker quit in  with 68 pack year smoking history. Smoked 1.5 packs x 45 years.     2021 History of resected early stage left lower lobe squamous cell cancer.    2021 CT chest 6 months follow up for lung cancer surveillance. No evidence of disease     Patient has family history of cancer in sister lung cancer diagnosis age 55 years with metastasis brain,  2018.   Father with lung cancer at 67 yrs,  age 68 years. Sister and father both smokers.   Brother 69 year old with new diagnosis of rectal cancer, finished chemo and radiation.    Current medication ANORO once daily in morning. Had declined Albuterol inhaler at previous visit, agreeable now to have on hand to use before gardening and house work.     COPD Questionnaire  How often do you cough?: A little of the time  How often do you have phlegm (mucus) in your chest?: Almost never  How often does your chest feel tight?: Never  When you  walk up a hill or one flight of stairs, how often are you breathless?: A little of the time  How often are you limited doing any activities at home?: Never  How often are you confident leaving the house despite your lung condition?: All of the time  How often do you sleep soundly?: All of the time  How often do you have energy?: All of the time  Total score: 5     Previous Report Reviewed: historical medical records, office notes and radiology reports      Past Medical History: The following portions of the patient's history were reviewed and updated as appropriate:   She  has a past surgical history that includes Knee cartilage surgery (Right, 01/2011); Thoracoscopic wedge resection of lung (Left, 4/28/2021); and Tubal ligation (1976).  Her family history includes Cancer in her brother, father, and sister; Heart failure in her mother; Hypertension in her father and mother.  She  reports that she quit smoking about 13 years ago. Her smoking use included cigarettes. She started smoking about 58 years ago. She has a 67.50 pack-year smoking history. She has never used smokeless tobacco. She reports that she does not drink alcohol and does not use drugs.  She has a current medication list which includes the following prescription(s): anoro ellipta, calcium carbonate, levothyroxine, omeprazole, otezla, tolterodine, albuterol, and meloxicam.  She has No Known Allergies.    Review of Systems   Constitutional: Negative for fever, chills, weight loss, weight gain, activity change, appetite change, fatigue and night sweats.   HENT: Negative for postnasal drip, rhinorrhea, sinus pressure, voice change and congestion.    Eyes: Negative for redness and itching.   Respiratory: Negative for snoring, cough, sputum production, chest tightness, shortness of breath, wheezing, orthopnea, asthma nighttime symptoms, dyspnea on extertion, use of rescue inhaler and somnolence.    Cardiovascular: Negative.  Negative for chest pain,  "palpitations and leg swelling.   Genitourinary: Negative for difficulty urinating and hematuria.   Endocrine: Negative for cold intolerance and heat intolerance.    Musculoskeletal: Negative for arthralgias, gait problem, joint swelling and myalgias.   Skin: Negative.    Gastrointestinal: Negative for nausea, vomiting, abdominal pain and acid reflux.   Neurological: Negative for dizziness, weakness, light-headedness and headaches.   Hematological: Negative for adenopathy. No excessive bruising.   All other systems reviewed and are negative.     Objective:   /61   Pulse 75   Resp 14   Ht 5' 5" (1.651 m)   Wt 72.1 kg (158 lb 15.2 oz)   SpO2 95%   BMI 26.45 kg/m²   Physical Exam  Vitals reviewed.   Constitutional:       General: She is not in acute distress.     Appearance: She is well-developed. She is not ill-appearing or toxic-appearing.   HENT:      Head: Normocephalic.      Right Ear: External ear normal.      Left Ear: External ear normal.      Nose: Nose normal.      Mouth/Throat:      Pharynx: No oropharyngeal exudate.   Eyes:      Conjunctiva/sclera: Conjunctivae normal.   Cardiovascular:      Rate and Rhythm: Normal rate and regular rhythm.      Heart sounds: Normal heart sounds.   Pulmonary:      Effort: Pulmonary effort is normal.      Breath sounds: Normal breath sounds. No stridor.   Abdominal:      Palpations: Abdomen is soft.   Musculoskeletal:         General: Normal range of motion.      Cervical back: Normal range of motion and neck supple.   Lymphadenopathy:      Cervical: No cervical adenopathy.   Skin:     General: Skin is warm and dry.   Neurological:      Mental Status: She is alert and oriented to person, place, and time.   Psychiatric:         Behavior: Behavior normal. Behavior is cooperative.         Thought Content: Thought content normal.         Judgment: Judgment normal.       Personal Diagnostic Review  CT Chest Without Contrast  Narrative: EXAMINATION:  CT CHEST WITHOUT " CONTRAST    CLINICAL HISTORY:  Malignant neoplasm of unspecified part of left bronchus or lungnsclc; evaluate treatment response for subsequent treatment planning.    TECHNIQUE:  Axial CT images performed through the chest without the administration of intravenous contrast.    COMPARISON:  08/03/2021    FINDINGS:  The heart, great vessels, and mediastinal structures are within normal limits. No thoracic adenopathy.    Bandlike parenchymal scarring in the left lower lobe at the site of wedge resection.  No recurrent mass.  No suspicious pulmonary nodules.  Bandlike scarring in the right middle lobe.  No pleural effusions.    The upper abdominal visualized organs are within normal limits.  Scattered benign right hepatic lobe cyst.  Stable right adrenal adenoma.    No suspicious osseous lesions.  Multilevel thoracic spondylosis.  Impression: No CT evidence of recurrent disease.    All CT scans at this facility are performed  using dose modulation techniques as appropriate to performed exam including the following:  automated exposure control; adjustment of mA and/or kV according to the patients size (this includes techniques or standardized protocols for targeted exams where dose is matched to indication/reason for exam: i.e. extremities or head);  iterative reconstruction technique.    Electronically signed by: Genaro Falk MD  Date:    01/27/2022  Time:    11:46      4/28/2022 6MWD No desaturations requiring supplemental oxygen at rest or exertion.  Oxygen Assessment Supplemental O2 Heart   Rate Blood Pressure Edmundo Dyspnea Scale Rating    Resting 95 % Room Air 75 bpm 115/61 0   Exercise             Minute             1 96 % Room Air 105 bpm       2 95 % Room Air 105 bpm       3 95 % Room Air 104 bpm       4 96 % Room Air 104 bpm       5 96 % Room Air 102 bpm       6  96 % Room Air 105 bpm 131/61 2   Recovery             Minute             1 98 % Room Air 78 bpm       2 99 % Room Air 74 bpm       3 98 % Room Air 77 bpm        4 98 % Room Air 77 bpm 121/74 0      Six Minute Walk Summary  6MWT Status: completed without stopping  Patient Reported: Dyspnea         Interpretation:  Did the patient stop or pause?: No  Total Time Walked (Calculated): 360 seconds  Final Partial Lap Distance (feet): 0 feet  Total Distance Meters (Calculated): 365.76 meters  Predicted Distance Meters (Calculated): 422.53 meters  Percentage of Predicted (Calculated): 86.56  Peak VO2 (Calculated): 14.95  Mets: 4.27  Has The Patient Had a Previous Six Minute Walk Test?: Yes     Previous 6MWT Results  Has The Patient Had a Previous Six Minute Walk Test?: Yes  Date of Previous Test: 04/15/21  Total Time Walked: 360 seconds  Total Distance (meters): 441.96  Predicted Distance (meters): 416.28 meters  Percentage of Predicted: 106.17  Percent Change (Calculated): 0.17    4/28/2022  CPFT  Spirometry shows moderate obstruction. Lung volume determination is normal. Airway mechanics are abnormal showing increased airway resistance and decreased conductance. DLCO is mildly decreased. MVV is moderately decreased.     3/10/2021 1 year follow up CT chest low dose screening revealed 4B - Suspicious - Left lower lobe nodule has increased in size from 4 mm to 11 mm when compared to the prior study dated 03/10/2020.  - possible next steps  Chest CT, tissue sampling and-or PET/CT.        2/11/2019   CPFT   Acceptable and reproducible spirometry data.     FEV1/ FVC decreased to 62% indicating obstruction.     FEV1 reduced to 69% indicating moderate obstruction.     FVC normal at 86%.     No significant bronchodilation noted after bronchodilator administration.     FEF 25-75% decreased to 34% indicating small airways flow obstruction.     Flow volume curve shows obstructive pattern.     Spirometry showing moderate obstruction.     Lung volume shows increased TLC to 120% indicating hyper inflation, increased RV and RV/ TLC indicating the presence of air trapping.     DLCO mildly  reduced to 70%.     Moderate obstruction, air trapping, mild DLCO reduction.  Clinical correlation recommended.     6/24/2013 CPFT  Mild obstruction. Airflow is not clearly improved following bronchodilation. Clinical improvement following bronchodilator  therapy may occur in the absence of spirometric improvement.    Assessment:     1. COPD, moderate    2. 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 Former heavy cigarette smoker (20-39 per day)    4. Pulmonary nodule 1 cm or greater in diameter      Orders Placed This Encounter   Procedures    X-Ray Chest PA And Lateral     Standing Status:   Future     Standing Expiration Date:   4/28/2023     Order Specific Question:   May the Radiologist modify the order per protocol to meet the clinical needs of the patient?     Answer:   Yes     Order Specific Question:   Release to patient     Answer:   Immediate    Complete PFT with bronchodilator     Standing Status:   Future     Standing Expiration Date:   4/28/2023     Order Specific Question:   Release to patient     Answer:   Immediate     Plan:   Discussed diagnosis, its evaluation, treatment and usual course. All questions answered.  Problem List Items Addressed This Visit     RESOLVED: Pulmonary nodule 1 cm or greater in diameter     4/28/2021 History of resected early stage left lower lobe squamous cell cancer.    1/22/2021 CT chest 6 months follow up for lung cancer surveillance. 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    1/22/2021 CT chest 6 months follow up for lung cancer surveillance. No evidence of disease   Continue 6 mo follow up as planned with cardiothoracic surgery           Former heavy cigarette smoker (20-39 per day)     67.5 pk/year smoker quit in 2009 4/28/2021 History of  resected early stage left lower lobe squamous cell cancer.    1/22/2021 CT chest 6 months follow up for lung cancer surveillance. No evidence of disease   Continue 6 mo follow up as planned with cardiothoracic surgery               COPD, moderate - Primary     Controlled on Anoro LABA/LAMA.  Continue Anoro daily and  Albuterol inhaler before exertional activity.    Checked with patient pharmacy program [facility] and Anoro same cost.  68 pk/year smoker quit in 2009.              Relevant Orders    Complete PFT with bronchodilator    X-Ray Chest PA And Lateral        I spent a total of 35 minutes on the day of the visit.  This includes face to face time and non-face to face time preparing to see the patient (eg, review of tests), obtaining and/or reviewing separately obtained history, documenting clinical information in the electronic or other health record, independently interpreting results and communicating results to the patient/family/caregiver, or care coordinator.    Follow up in about 1 year (around 4/28/2023) for COPD w/review cpft/cxr.

## 2022-04-28 NOTE — ASSESSMENT & PLAN NOTE
67.5 pk/year smoker quit in 2009 4/28/2021 History of resected early stage left lower lobe squamous cell cancer.    1/22/2021 CT chest 6 months follow up for lung cancer surveillance. No evidence of disease   Continue 6 mo follow up as planned with cardiothoracic surgery

## 2022-04-28 NOTE — PROCEDURES
"  [facility] - Pulmonary Function  Six Minute Walk     SUMMARY     Ordering Provider: Dr. Quintero   Interpreting Provider: ELZA Levi RRT  Performing nurse/tech/RT: ELZA Levi RRT  Diagnosis: COPD (moderate)  Height: 5' 5" (165.1 cm)  Weight: 72.1 kg (158 lb 15.2 oz)  BMI (Calculated): 26.5   Patient Race:             Phase Oxygen Assessment Supplemental O2 Heart   Rate Blood Pressure Edmundo Dyspnea Scale Rating   Resting 95 % Room Air 75 bpm 115/61 0   Exercise        Minute        1 96 % Room Air 105 bpm     2 95 % Room Air 105 bpm     3 95 % Room Air 104 bpm     4 96 % Room Air 104 bpm     5 96 % Room Air 102 bpm     6  96 % Room Air 105 bpm 131/61 2   Recovery        Minute        1 98 % Room Air 78 bpm     2 99 % Room Air 74 bpm     3 98 % Room Air 77 bpm     4 98 % Room Air 77 bpm 121/74 0     Six Minute Walk Summary  6MWT Status: completed without stopping  Patient Reported: Dyspnea     Interpretation:  Did the patient stop or pause?: No                                         Total Time Walked (Calculated): 360 seconds  Final Partial Lap Distance (feet): 0 feet  Total Distance Meters (Calculated): 365.76 meters  Predicted Distance Meters (Calculated): 422.53 meters  Percentage of Predicted (Calculated): 86.56  Peak VO2 (Calculated): 14.95  Mets: 4.27  Has The Patient Had a Previous Six Minute Walk Test?: Yes       Previous 6MWT Results  Has The Patient Had a Previous Six Minute Walk Test?: Yes  Date of Previous Test: 04/15/21  Total Time Walked: 360 seconds  Total Distance (meters): 441.96  Predicted Distance (meters): 416.28 meters  Percentage of Predicted: 106.17  Percent Change (Calculated): 0.17    "

## 2022-04-28 NOTE — ASSESSMENT & PLAN NOTE
4/28/2021 History of resected early stage left lower lobe squamous cell cancer.    1/22/2021 CT chest 6 months follow up for lung cancer surveillance. No evidence of disease   Continue 6 mo follow up as planned with cardiothoracic surgery

## 2022-04-28 NOTE — ASSESSMENT & PLAN NOTE
Controlled on Anoro LABA/LAMA.  Continue Anoro daily and  Albuterol inhaler before exertional activity.    Checked with patient pharmacy program [facility] and Anoro same cost.  68 pk/year smoker quit in 2009.

## 2022-06-24 ENCOUNTER — CLINICAL SUPPORT (OUTPATIENT)
Dept: AUDIOLOGY | Facility: CLINIC | Age: 72
End: 2022-06-24
Payer: MEDICARE

## 2022-06-24 ENCOUNTER — OFFICE VISIT (OUTPATIENT)
Dept: OTOLARYNGOLOGY | Facility: CLINIC | Age: 72
End: 2022-06-24
Payer: MEDICARE

## 2022-06-24 VITALS — HEART RATE: 71 BPM | SYSTOLIC BLOOD PRESSURE: 129 MMHG | DIASTOLIC BLOOD PRESSURE: 77 MMHG | TEMPERATURE: 98 F

## 2022-06-24 DIAGNOSIS — R42 DIZZINESS: Primary | ICD-10-CM

## 2022-06-24 DIAGNOSIS — H93.8X3 EAR PRESSURE, BILATERAL: ICD-10-CM

## 2022-06-24 PROCEDURE — 1159F MED LIST DOCD IN RCRD: CPT | Mod: CPTII,S$GLB,, | Performed by: PHYSICIAN ASSISTANT

## 2022-06-24 PROCEDURE — 1101F PT FALLS ASSESS-DOCD LE1/YR: CPT | Mod: CPTII,S$GLB,, | Performed by: PHYSICIAN ASSISTANT

## 2022-06-24 PROCEDURE — 3288F FALL RISK ASSESSMENT DOCD: CPT | Mod: CPTII,S$GLB,, | Performed by: PHYSICIAN ASSISTANT

## 2022-06-24 PROCEDURE — 3074F PR MOST RECENT SYSTOLIC BLOOD PRESSURE < 130 MM HG: ICD-10-PCS | Mod: CPTII,S$GLB,, | Performed by: PHYSICIAN ASSISTANT

## 2022-06-24 PROCEDURE — 99203 OFFICE O/P NEW LOW 30 MIN: CPT | Mod: S$GLB,,, | Performed by: PHYSICIAN ASSISTANT

## 2022-06-24 PROCEDURE — 99999 PR PBB SHADOW E&M-EST. PATIENT-LVL III: CPT | Mod: PBBFAC,,, | Performed by: PHYSICIAN ASSISTANT

## 2022-06-24 PROCEDURE — 92542 POSITIONAL NYSTAGMUS TEST: CPT | Mod: S$GLB,,, | Performed by: AUDIOLOGIST-HEARING AID FITTER

## 2022-06-24 PROCEDURE — 1126F PR PAIN SEVERITY QUANTIFIED, NO PAIN PRESENT: ICD-10-PCS | Mod: CPTII,S$GLB,, | Performed by: PHYSICIAN ASSISTANT

## 2022-06-24 PROCEDURE — 1159F PR MEDICATION LIST DOCUMENTED IN MEDICAL RECORD: ICD-10-PCS | Mod: CPTII,S$GLB,, | Performed by: PHYSICIAN ASSISTANT

## 2022-06-24 PROCEDURE — 3074F SYST BP LT 130 MM HG: CPT | Mod: CPTII,S$GLB,, | Performed by: PHYSICIAN ASSISTANT

## 2022-06-24 PROCEDURE — 1101F PR PT FALLS ASSESS DOC 0-1 FALLS W/OUT INJ PAST YR: ICD-10-PCS | Mod: CPTII,S$GLB,, | Performed by: PHYSICIAN ASSISTANT

## 2022-06-24 PROCEDURE — 3078F PR MOST RECENT DIASTOLIC BLOOD PRESSURE < 80 MM HG: ICD-10-PCS | Mod: CPTII,S$GLB,, | Performed by: PHYSICIAN ASSISTANT

## 2022-06-24 PROCEDURE — 3288F PR FALLS RISK ASSESSMENT DOCUMENTED: ICD-10-PCS | Mod: CPTII,S$GLB,, | Performed by: PHYSICIAN ASSISTANT

## 2022-06-24 PROCEDURE — 99203 PR OFFICE/OUTPT VISIT, NEW, LEVL III, 30-44 MIN: ICD-10-PCS | Mod: S$GLB,,, | Performed by: PHYSICIAN ASSISTANT

## 2022-06-24 PROCEDURE — 99999 PR PBB SHADOW E&M-EST. PATIENT-LVL III: ICD-10-PCS | Mod: PBBFAC,,, | Performed by: PHYSICIAN ASSISTANT

## 2022-06-24 PROCEDURE — 1126F AMNT PAIN NOTED NONE PRSNT: CPT | Mod: CPTII,S$GLB,, | Performed by: PHYSICIAN ASSISTANT

## 2022-06-24 PROCEDURE — 3078F DIAST BP <80 MM HG: CPT | Mod: CPTII,S$GLB,, | Performed by: PHYSICIAN ASSISTANT

## 2022-06-24 PROCEDURE — 92542 PR POSITIONAL NYSTAGMUS TEST: ICD-10-PCS | Mod: S$GLB,,, | Performed by: AUDIOLOGIST-HEARING AID FITTER

## 2022-06-24 RX ORDER — MECLIZINE HCL 12.5 MG 12.5 MG/1
TABLET ORAL
COMMUNITY
Start: 2022-06-21 | End: 2022-08-03

## 2022-06-24 NOTE — PROGRESS NOTES
Subjective:       Patient ID: Radha Lamas is a 72 y.o. female.    Chief Complaint: Dizziness (Woke up 6/11 and felt dizzy. Never happened before. Tried washing ears out with warm water, made her more dizzy.Head feels full and heavy between her ears)    Patient is a very pleasant 72 y.o. female here to see me today for the first time for evaluation of dizziness.   She reports that the symptoms have been present for the last 2 weeks.  She describes waking with severe dizziness on 6/11/22 that lasted all day.  She had associated nausea and stayed in bed for few days.  It gradually improved after several days.  She mentioned to a family member who suggested she flush her ears in case wax was the cause.  She has been doing that with no relief.  She reports dizziness started again this week.  Feels it when she lays down in bed (right side) or with sudden head movements.   She describes the dizziness as a sensation of unsteadiness and spinning and says that it lasts minutes.  She denies aural pressure, otalgia, otorrhea, tinnitus and hearing loss.  She has started Meclizine for dizziness, but has not had any recent dietary changes.  Denies any head trauma.  She mowed grass for about 2 hours the day prior to symptom onset.      Review of Systems   Constitutional: Negative for activity change, appetite change, fever and unexpected weight change.   HENT: Negative for nasal congestion, ear discharge, ear pain (aural pressure AU - she's unsure), hearing loss, nosebleeds, rhinorrhea, sinus pressure/congestion, sore throat and tinnitus.    Eyes: Negative for discharge and visual disturbance.   Respiratory: Negative for cough and shortness of breath.    Cardiovascular: Negative for chest pain and palpitations.   Gastrointestinal: Positive for nausea. Negative for diarrhea and vomiting.   Musculoskeletal: Negative for neck pain.   Allergic/Immunologic: Negative for food allergies.   Neurological: Positive for dizziness.  Negative for speech difficulty, weakness, light-headedness and headaches.   Hematological: Negative for adenopathy.   Psychiatric/Behavioral: Negative for confusion.         Objective:      Physical Exam  Vitals reviewed.   Constitutional:       General: She is not in acute distress.     Appearance: She is well-developed.   HENT:      Head: Normocephalic and atraumatic.      Right Ear: Tympanic membrane, ear canal and external ear normal. No drainage. No middle ear effusion. There is no impacted cerumen. Tympanic membrane is not injected or erythematous.      Left Ear: Tympanic membrane, ear canal and external ear normal. No drainage.  No middle ear effusion. There is no impacted cerumen. Tympanic membrane is not injected or erythematous.      Nose: Nose normal. No mucosal edema or rhinorrhea.      Right Sinus: No maxillary sinus tenderness or frontal sinus tenderness.      Left Sinus: No maxillary sinus tenderness or frontal sinus tenderness.      Mouth/Throat:      Mouth: Mucous membranes are moist. Mucous membranes are not pale and not dry.      Dentition: Normal dentition.      Pharynx: Uvula midline. No oropharyngeal exudate or posterior oropharyngeal erythema.   Eyes:      General: Lids are normal. No scleral icterus.     Extraocular Movements:      Right eye: Normal extraocular motion and no nystagmus.      Left eye: Normal extraocular motion and no nystagmus.      Conjunctiva/sclera: Conjunctivae normal.      Right eye: Right conjunctiva is not injected. No chemosis.     Left eye: Left conjunctiva is not injected. No chemosis.     Pupils: Pupils are equal, round, and reactive to light.   Neck:      Trachea: Trachea and phonation normal. No tracheal tenderness or tracheal deviation.   Pulmonary:      Effort: Pulmonary effort is normal. No respiratory distress.      Breath sounds: No stridor.   Abdominal:      General: There is no distension.   Lymphadenopathy:      Head:      Right side of head: No submental,  submandibular, preauricular or posterior auricular adenopathy.      Left side of head: No submental, submandibular, preauricular or posterior auricular adenopathy.      Cervical: No cervical adenopathy.   Skin:     General: Skin is warm and dry.      Findings: No erythema or rash.   Neurological:      Mental Status: She is alert and oriented to person, place, and time.      Cranial Nerves: No cranial nerve deficit.   Psychiatric:         Behavior: Behavior normal. Behavior is cooperative.           Sade-Hallpike:  NEGATIVE for dizziness AU    Assessment:       Problem List Items Addressed This Visit    None     Visit Diagnoses     Dizziness    -  Primary          Plan:         She took Meclizine this AM.  Negative for BPPV today; had vestibular screen with [facility] as well.  Recommend formal VNG next week once off of Meclizine.   I had a long discussion with the patient regarding their symptoms.  Dizziness, vertigo and disequilibrium are common symptoms reported by adults during visits to their doctors. They are all symptoms that can result from a peripheral vestibular disorder (a dysfunction of the balance organs of the inner ear) or central vestibular disorder (a dysfunction of one or more parts of the central nervous system that help process balance and spatial information).  There are also non-vestibular causes of dizziness, and dizziness can be linked to a wide array of problems such as blood-flow irregularities from cardiovascular problems and blood pressure fluctuations.  I would recommend a VNG with audiogram for further diagnostic testing, and will contact the patient with further recommendations when that is complete.

## 2022-06-24 NOTE — PROGRESS NOTES
Referring provider: Malia Charles PA-C    Radha Lamas was seen 06/24/2022 for a vestibular screen for suspected BPPV.  Patient complains of vertigo upon arising from bed on 06/11/22. Duration for hours and dizziness persisted for three days. She described bed spinning any time she laid down, spinning and falling when bending forward and dizzy/off-balance with rapid horizontal head movements. Dizziness is now less severe. She has been taking meclizine daily (two in AM) for the past week. She denies aural pressure, otalgia, otorrhea, tinnitus and hearing loss.  No recent dietary changes. No prior URI. Has persisted sinus problems Denies any head trauma.  She mowed grass for about 2 hours the day prior to symptom onset.    VNG Screen:  Spontaneous test: Mild LB nystagmus; normal fixation suppression  Static Positional test: Absent for nystagmus  Sade-Hallpike Right: Absent for nystagmus. Dizziness denied.   Sade-Hallpike Left: 3 d/s left-beating non-rotary nystagmus with normal fixation suppression. Dizziness denied.     Summary: Non-localizing positional nystagmus. Negative for BPPV. Of note, patient took meclizine this morning that may suppress a vestibular response.     Recommendations:  1. Diagnostic A/VNG, suspect for (resolving) vestibular neuritis but will also reassess for BPPV. Patient is advised to only take meclizine for as needed, and to discontinue completely for 48 hours prior to exam.

## 2022-06-29 ENCOUNTER — PATIENT MESSAGE (OUTPATIENT)
Dept: OTOLARYNGOLOGY | Facility: CLINIC | Age: 72
End: 2022-06-29
Payer: MEDICARE

## 2022-06-30 ENCOUNTER — TELEPHONE (OUTPATIENT)
Dept: AUDIOLOGY | Facility: CLINIC | Age: 72
End: 2022-06-30
Payer: MEDICARE

## 2022-06-30 NOTE — TELEPHONE ENCOUNTER
Spoke with Mrs. Lamas about her VNG testing. She was told by kayode that she needed a pre authorization in order to have her testing covered. Therefore, she cancelled her test this am.  I informed her that we do not put in orders for VNG testing. Claude was able to put in the cpt codes to check and received a message stating that those codes did not require pre auth. I relayed that information to Mrs. Lamas as well. I gave patient the 2 cpt codes (70586 and 98322) and she will call [facility] to verify and if a pre auth is actually needed then we can see about Malia putting in an order for it, so that the pre cert department can work with her insurance prior to testing. Patient has [facility] direct number to call us back and let us know. Patient understands and the call ended well.

## 2022-08-02 ENCOUNTER — TELEPHONE (OUTPATIENT)
Dept: CARDIOTHORACIC SURGERY | Facility: CLINIC | Age: 72
End: 2022-08-02
Payer: MEDICARE

## 2022-08-02 ENCOUNTER — HOSPITAL ENCOUNTER (OUTPATIENT)
Dept: RADIOLOGY | Facility: HOSPITAL | Age: 72
Discharge: HOME | End: 2022-08-02
Attending: THORACIC SURGERY (CARDIOTHORACIC VASCULAR SURGERY)
Payer: MEDICARE

## 2022-08-02 DIAGNOSIS — C34.92 NSCLC OF LEFT LUNG: ICD-10-CM

## 2022-08-02 PROCEDURE — 71250 CT THORAX DX C-: CPT | Mod: TC

## 2022-08-03 ENCOUNTER — OFFICE VISIT (OUTPATIENT)
Dept: CARDIOTHORACIC SURGERY | Facility: CLINIC | Age: 72
End: 2022-08-03
Payer: MEDICARE

## 2022-08-03 DIAGNOSIS — C34.92 NSCLC OF LEFT LUNG: Primary | ICD-10-CM

## 2022-08-03 PROCEDURE — 99213 OFFICE O/P EST LOW 20 MIN: CPT | Mod: 95,,, | Performed by: THORACIC SURGERY (CARDIOTHORACIC VASCULAR SURGERY)

## 2022-08-03 PROCEDURE — 99213 PR OFFICE/OUTPT VISIT, EST, LEVL III, 20-29 MIN: ICD-10-PCS | Mod: 95,,, | Performed by: THORACIC SURGERY (CARDIOTHORACIC VASCULAR SURGERY)

## 2022-08-03 NOTE — PROGRESS NOTES
Visit type:  Audiovisual  Patient location:Home  Visit duration: 10 minutes  Total visit time 20 minutes which includes audiovisual component and review of pertinent patient medical records including past medical history and imaging.  The patient was informed of (1) the relationship between the physician and the patient as well as the fact that (2) the patient could choose to cancel  telemedicince treatment at any time.    Past surgery: 04/28/2021: Left VATS Lower Lobe Wedge Resection     Pathology: 9mm non-keratinizing squamous cell carcinoma, no VPI, no LVI, margin at least 8mm.  Levels 9 and 11 = negative.  T1aN0    The patient was initially followed at a 4 month interval due to the sub lobar nature of the surgery and concerns about a higher local regional recurrence rate.  Her follow-up intervals have since then extended to every 6 months.  She has no complaints.    Chest CT 8/2/22:  I reviewed the images    No evidence of recurrent or metastatic disease.    Assessment:    H/o resected lung cancer  No evidence of disease    Plan:    Repeat chest CT in 6 months

## 2022-11-17 ENCOUNTER — OFFICE VISIT (OUTPATIENT)
Dept: OTOLARYNGOLOGY | Facility: CLINIC | Age: 72
End: 2022-11-17
Payer: MEDICARE

## 2022-11-17 ENCOUNTER — TELEPHONE (OUTPATIENT)
Dept: OTOLARYNGOLOGY | Facility: CLINIC | Age: 72
End: 2022-11-17

## 2022-11-17 ENCOUNTER — CLINICAL SUPPORT (OUTPATIENT)
Dept: AUDIOLOGY | Facility: CLINIC | Age: 72
End: 2022-11-17
Payer: MEDICARE

## 2022-11-17 VITALS — TEMPERATURE: 96 F

## 2022-11-17 DIAGNOSIS — H81.11 BENIGN PAROXYSMAL POSITIONAL VERTIGO OF RIGHT EAR: ICD-10-CM

## 2022-11-17 DIAGNOSIS — H81.11 BENIGN PAROXYSMAL POSITIONAL VERTIGO OF RIGHT EAR: Primary | ICD-10-CM

## 2022-11-17 PROCEDURE — 1101F PT FALLS ASSESS-DOCD LE1/YR: CPT | Mod: CPTII,S$GLB,, | Performed by: PHYSICIAN ASSISTANT

## 2022-11-17 PROCEDURE — 3288F FALL RISK ASSESSMENT DOCD: CPT | Mod: CPTII,S$GLB,, | Performed by: PHYSICIAN ASSISTANT

## 2022-11-17 PROCEDURE — 92541 PR SPONTANEOUS NYSTAGMUS TEST: ICD-10-PCS | Mod: XU,S$GLB,,

## 2022-11-17 PROCEDURE — 1101F PR PT FALLS ASSESS DOC 0-1 FALLS W/OUT INJ PAST YR: ICD-10-PCS | Mod: CPTII,S$GLB,, | Performed by: PHYSICIAN ASSISTANT

## 2022-11-17 PROCEDURE — 99213 PR OFFICE/OUTPT VISIT, EST, LEVL III, 20-29 MIN: ICD-10-PCS | Mod: S$GLB,,, | Performed by: PHYSICIAN ASSISTANT

## 2022-11-17 PROCEDURE — 99999 PR PBB SHADOW E&M-EST. PATIENT-LVL I: ICD-10-PCS | Mod: PBBFAC,,,

## 2022-11-17 PROCEDURE — 92542 PR POSITIONAL NYSTAGMUS TEST: ICD-10-PCS | Mod: S$GLB,,,

## 2022-11-17 PROCEDURE — 99213 OFFICE O/P EST LOW 20 MIN: CPT | Mod: S$GLB,,, | Performed by: PHYSICIAN ASSISTANT

## 2022-11-17 PROCEDURE — 99999 PR PBB SHADOW E&M-EST. PATIENT-LVL II: CPT | Mod: PBBFAC,,, | Performed by: PHYSICIAN ASSISTANT

## 2022-11-17 PROCEDURE — 3288F PR FALLS RISK ASSESSMENT DOCUMENTED: ICD-10-PCS | Mod: CPTII,S$GLB,, | Performed by: PHYSICIAN ASSISTANT

## 2022-11-17 PROCEDURE — 99999 PR PBB SHADOW E&M-EST. PATIENT-LVL I: CPT | Mod: PBBFAC,,,

## 2022-11-17 PROCEDURE — 92542 POSITIONAL NYSTAGMUS TEST: CPT | Mod: S$GLB,,,

## 2022-11-17 PROCEDURE — 1159F MED LIST DOCD IN RCRD: CPT | Mod: CPTII,S$GLB,, | Performed by: PHYSICIAN ASSISTANT

## 2022-11-17 PROCEDURE — 1159F PR MEDICATION LIST DOCUMENTED IN MEDICAL RECORD: ICD-10-PCS | Mod: CPTII,S$GLB,, | Performed by: PHYSICIAN ASSISTANT

## 2022-11-17 PROCEDURE — 92541 SPONTANEOUS NYSTAGMUS TEST: CPT | Mod: XU,S$GLB,,

## 2022-11-17 PROCEDURE — 99999 PR PBB SHADOW E&M-EST. PATIENT-LVL II: ICD-10-PCS | Mod: PBBFAC,,, | Performed by: PHYSICIAN ASSISTANT

## 2022-11-17 RX ORDER — DUPILUMAB 300 MG/2ML
INJECTION, SOLUTION SUBCUTANEOUS
COMMUNITY
Start: 2022-11-16

## 2022-11-17 NOTE — PROGRESS NOTES
Subjective:       Patient ID: Radha Lamas is a 72 y.o. female.    Chief Complaint: Dizziness    Patient is a very pleasant 72 y.o. female here to see me today for evaluation of dizziness.   She reports that the symptoms have been present for the last 1 week; has had two distinct episodes of spinning that lasts few minutes.  She has noted that turing head to the right acts as a trigger.  She denies otalgia, otorrhea, tinnitus, and hearing loss.  She has had mild pressure or shifting sensation in her left ear intermittent over past week; like she has water in the ear.  She has not started any new medications, and has not had any recent dietary changes.  She did hit her head on soap dispenser drawer of her washing machine the day prior to onset of dizziness.        Review of Systems   Constitutional: Negative.  Negative for fever.   HENT:  Positive for sinus pressure/congestion. Negative for ear discharge, ear pain, hearing loss and tinnitus.    Eyes: Negative.    Respiratory:  Positive for cough and shortness of breath.    Cardiovascular: Negative.    Endocrine: Negative.    Musculoskeletal: Negative.    Integumentary:  Negative.   Allergic/Immunologic: Negative.    Neurological:  Positive for dizziness.   Hematological:  Bruises/bleeds easily.   Psychiatric/Behavioral: Negative.         Objective:      Physical Exam  Constitutional:       Appearance: Normal appearance.   HENT:      Head: Normocephalic and atraumatic.      Right Ear: Ear canal and external ear normal. No no drainage. No middle ear effusion. There is no impacted cerumen. Tympanic membrane is retracted. Tympanic membrane is not injected or erythematous.      Left Ear: Ear canal and external ear normal. No no drainage.  No middle ear effusion. There is no impacted cerumen. Tympanic membrane is retracted. Tympanic membrane is not injected or erythematous.      Nose: Nose normal.      Mouth/Throat:      Mouth: Mucous membranes are moist.   Pulmonary:       Effort: Pulmonary effort is normal.   Neurological:      General: No focal deficit present.      Mental Status: She is alert.         Sade-Hallpike:  POSITIVE on RIGHT side today        Radha Lamas was seen 11/17/2022 for Epley maneuver for BPPV in the right ear.      A 5-position Epley maneuver was performed for the right.  Patient tolerated the maneuver well and was asymptomatic upon discharge.  No nystagmus seen on post-procedure examination.  Post-Epley home instructions were reviewed and given to the patient.  Understanding was voiced.        Tympanograms today:  AD  0.4 mL @ -120 daPa, ECV 1.8 mL  AS  0.5 mL @ -105 daPa, ECV 1.9 mL      Assessment:       Problem List Items Addressed This Visit    None  Visit Diagnoses       Benign paroxysmal positional vertigo of right ear    -  Primary              Plan:         We had a long discussion regarding the relevant anatomy and pathology relevant to BPPV.  We discussed that in the ear there are three semicircular canals that detect rotational movement.  BPPV occurs as a result of otoconia, tiny crystals of calcium carbonate that are a normal part of the inner ears anatomy, detaching from their normal anatomic position and collecting in one of the semicircular canals.  When the head moves, the otoconia shift. This stimulates the cupula to send false signals to the brain, producing vertigo and triggering nystagmus (involuntary eye movements).  She had RIGHT Epley maneuver performed today with audiologist and she was given the necessary post-procedure instructions at that time.  Plan to recheck in one week.  Reassured patient that I see no cerumen or water in the left ear canal on exam today and she has mild ETD AU on tympanograms but no IRINEO.  We had a long discussion regarding the anatomy and function of the eustachian tube.  We discussed that the eustachian tube acts as a pump to keep the appropriate amount of pressure behind the ear drum.  Discussed that  OTC Flonase can be used daily for ETD.

## 2022-11-17 NOTE — PROGRESS NOTES
Referring provider: FOUZIA Charles    Radha Lamas was seen 11/17/2022 for a vestibular screening. Patient complained of episodes of room-spinning dizziness starting last week. She endorsed nausea associated with episodes. Episodes typically occur when she wakes up in the morning after sleeping on her right side. She recently switched to her left side and no longer experiences the dizziness. Patient also reported it feels like she has had water in her right ear for the past few months. She does not believe the water is affecting her hearing.    Videonystagmography (VNG) Screen:  Spontaneous test was absent for nystagmus.  Gaze test was absent for nystagmus.  Head-shake test was revealed 4 d/s right-beating nystagmus after head-shake.  Static Positional test was absent for nystagmus except for 1-2 d/s right beating nystagmus in head right that suppressed with fixation.  [place]-Hallpike Right was positive for BPPV.  Sade-Hallpike Left was negative for BPPV.    Summary: Abnormal VNG screen- right BPPV. A 5-position Epley maneuver was completed to the right.  Patient tolerated the maneuver well and was asymptomatic upon discharge.  Post Epley instructions were given and reviewed with the patient.  Understanding was voiced.      Patient was counseled on the above findings.    Recommendations:  Follow-up with ENT, as scheduled  Recheck for right BPPV in one week, as scheduled.     Tracings are to be scanned.

## 2022-11-21 ENCOUNTER — PATIENT MESSAGE (OUTPATIENT)
Dept: OPHTHALMOLOGY | Facility: CLINIC | Age: 72
End: 2022-11-21
Payer: MEDICARE

## 2022-11-21 ENCOUNTER — OFFICE VISIT (OUTPATIENT)
Dept: OPHTHALMOLOGY | Facility: CLINIC | Age: 72
End: 2022-11-21
Payer: MEDICARE

## 2022-11-21 DIAGNOSIS — H52.7 REFRACTION DISORDER: ICD-10-CM

## 2022-11-21 DIAGNOSIS — H25.12 SENILE NUCLEAR CATARACT, LEFT: ICD-10-CM

## 2022-11-21 DIAGNOSIS — H25.11 SENILE NUCLEAR CATARACT, RIGHT: Primary | ICD-10-CM

## 2022-11-21 DIAGNOSIS — Z83.511 FAMILY HISTORY OF OPEN-ANGLE GLAUCOMA: ICD-10-CM

## 2022-11-21 PROCEDURE — 92004 PR EYE EXAM, NEW PATIENT,COMPREHESV: ICD-10-PCS | Mod: S$GLB,,, | Performed by: OPTOMETRIST

## 2022-11-21 PROCEDURE — 92004 COMPRE OPH EXAM NEW PT 1/>: CPT | Mod: S$GLB,,, | Performed by: OPTOMETRIST

## 2022-11-21 PROCEDURE — 99999 PR PBB SHADOW E&M-EST. PATIENT-LVL II: CPT | Mod: PBBFAC,,, | Performed by: OPTOMETRIST

## 2022-11-21 PROCEDURE — 1159F PR MEDICATION LIST DOCUMENTED IN MEDICAL RECORD: ICD-10-PCS | Mod: CPTII,S$GLB,, | Performed by: OPTOMETRIST

## 2022-11-21 PROCEDURE — 92015 DETERMINE REFRACTIVE STATE: CPT | Mod: S$GLB,,, | Performed by: OPTOMETRIST

## 2022-11-21 PROCEDURE — 1159F MED LIST DOCD IN RCRD: CPT | Mod: CPTII,S$GLB,, | Performed by: OPTOMETRIST

## 2022-11-21 PROCEDURE — 92015 PR REFRACTION: ICD-10-PCS | Mod: S$GLB,,, | Performed by: OPTOMETRIST

## 2022-11-21 PROCEDURE — 99999 PR PBB SHADOW E&M-EST. PATIENT-LVL II: ICD-10-PCS | Mod: PBBFAC,,, | Performed by: OPTOMETRIST

## 2022-11-21 NOTE — PROGRESS NOTES
HPI    Pt states she sees well with current glasses.  Last edited by Tena Lyles MA on 11/21/2022  8:48 AM.            Assessment /Plan     For exam results, see Encounter Report.    Senile nuclear cataract, right    Senile nuclear cataract, left    Family history of open-angle glaucoma    Refraction disorder      Mild cataracts OU, not surgical.    No evidence of POAG    Dispense Final Rx for glasses.  RTC 1 year  Discussed above and answered questions.

## 2022-11-25 ENCOUNTER — CLINICAL SUPPORT (OUTPATIENT)
Dept: AUDIOLOGY | Facility: CLINIC | Age: 72
End: 2022-11-25
Payer: MEDICARE

## 2022-11-25 ENCOUNTER — HOSPITAL ENCOUNTER (OUTPATIENT)
Dept: RADIOLOGY | Facility: HOSPITAL | Age: 72
Discharge: HOME | End: 2022-11-25
Payer: MEDICARE

## 2022-11-25 DIAGNOSIS — Z12.31 VISIT FOR SCREENING MAMMOGRAM: ICD-10-CM

## 2022-11-25 DIAGNOSIS — H81.11 BPPV (BENIGN PAROXYSMAL POSITIONAL VERTIGO), RIGHT: Primary | ICD-10-CM

## 2022-11-25 PROCEDURE — 77063 BREAST TOMOSYNTHESIS BI: CPT | Mod: 26,,, | Performed by: RADIOLOGY

## 2022-11-25 PROCEDURE — 77063 BREAST TOMOSYNTHESIS BI: CPT | Mod: TC

## 2022-11-25 PROCEDURE — 77067 SCR MAMMO BI INCL CAD: CPT | Mod: 26,,, | Performed by: RADIOLOGY

## 2022-11-25 PROCEDURE — 77067 MAMMO DIGITAL SCREENING BILAT WITH TOMO: ICD-10-PCS | Mod: 26,,, | Performed by: RADIOLOGY

## 2022-11-25 PROCEDURE — 92542 PR POSITIONAL NYSTAGMUS TEST: ICD-10-PCS | Mod: S$GLB,,, | Performed by: AUDIOLOGIST-HEARING AID FITTER

## 2022-11-25 PROCEDURE — 77063 MAMMO DIGITAL SCREENING BILAT WITH TOMO: ICD-10-PCS | Mod: 26,,, | Performed by: RADIOLOGY

## 2022-11-25 PROCEDURE — 92542 POSITIONAL NYSTAGMUS TEST: CPT | Mod: S$GLB,,, | Performed by: AUDIOLOGIST-HEARING AID FITTER

## 2022-11-25 NOTE — PROGRESS NOTES
Referring provider: Malia Charles PA-C    Radha Lamas was seen 11/25/2022 for re-check BPPV testing.  She had Epley maneuver in office on 11/17/22 for BPPV in the RIGHT ear. Patient reports dizziness is resolved.     Spontaneous Test: Absent  Static Positional Test: Absent  [place]-Hallpike Head-hanging Right: Positive for BPPV. Fatiguing 9 d/s upward and rightward nystagmus. Dizziness denied.  Sade-Hallpike Head-hanging Left: Negative for BPPV. There was <clinically insignificant> 2 d/s LB nystagmus to head handing. Dizziness denied.     Summary: BPPV is improved but not fully resolved (subjectively asymptomatic). A 5-position Epley maneuver was performed for the right ear.  Patient tolerated the maneuver well with no nystagmus present at completion of maneuver. Patent is advised to sleep semi-recumbent and restrict head movements for the next two days. Understanding was voiced.    Recommendations: Return to clinic as needed.     Tracings are to be scanned.

## 2023-01-10 ENCOUNTER — CLINICAL SUPPORT (OUTPATIENT)
Dept: AUDIOLOGY | Facility: CLINIC | Age: 73
End: 2023-01-10
Payer: MEDICARE

## 2023-01-10 ENCOUNTER — PATIENT MESSAGE (OUTPATIENT)
Dept: AUDIOLOGY | Facility: CLINIC | Age: 73
End: 2023-01-10

## 2023-01-10 DIAGNOSIS — H81.12 BPPV (BENIGN PAROXYSMAL POSITIONAL VERTIGO), LEFT: Primary | ICD-10-CM

## 2023-01-10 PROCEDURE — 92542 POSITIONAL NYSTAGMUS TEST: CPT | Mod: S$GLB,,, | Performed by: AUDIOLOGIST-HEARING AID FITTER

## 2023-01-10 PROCEDURE — 92542 PR POSITIONAL NYSTAGMUS TEST: ICD-10-PCS | Mod: S$GLB,,, | Performed by: AUDIOLOGIST-HEARING AID FITTER

## 2023-01-10 NOTE — PROGRESS NOTES
Referring provider: Malia Charles PA-C    Radha Lamas was seen 01/10/2023 for a vestibular screen for suspected BPPV.  Patient was last seen in the clinic in November 2022 for office Epley maneuver for BPPV in the right posterior canal. She responded well to canalith repositioning until recently. She complains of return of vertigo this past Saturday (3 days ago). She awoke from sleep at 4:30 AM and experienced sudden intense vertigo upon arising. Everything was spinning with nausea and vomiting for a duration of 5-10 minutes. Every since, any head movement (horizontal or vertical) will elicit dizziness with nausea. She is unable to bend forward or lie back. Symptoms are alleviated by leaving her head straight, upright and steady. She has associated neck pain. No changes in hearing.     VNG Screen:  Spontaneous test: Absent for nystagmus  Static Positional test: Positive for HC-BPPV  Head Center: Absent for nystagmus  Head Right: 17 d/s RB, fatiguing non-rotary nystagmus  Head Left: 28 d/s LB, fatiguing non-rotatory nystagmus  [place]-Hallpike Right: 9 d/s Right-beating fatiguing horizontal nystagmus  [place]-Hallpike Left: 3 d/s Left-beating fatiguing horizontal nystagmus    Lateral Roll test was retested without the video goggles: Positive for Horizontal canal BPPV, demonstrated by geotropic fatiguing non-torsional nystagmus. Greater intensity of vertigo was reported to left positioning.     Summary: BPPV, horizontal canal canalithiasis of at least the left ear.     Recommendations:  1. ENT review  2. Formal VRT for horizontal canal BPPV canalith repositioning.  Patient is advised to restrict rapid head movements and driving until after VRT.

## 2023-01-11 ENCOUNTER — TELEPHONE (OUTPATIENT)
Dept: OTOLARYNGOLOGY | Facility: CLINIC | Age: 73
End: 2023-01-11
Payer: MEDICARE

## 2023-01-11 DIAGNOSIS — H81.12 BPPV (BENIGN PAROXYSMAL POSITIONAL VERTIGO), LEFT: Primary | ICD-10-CM

## 2023-01-11 NOTE — TELEPHONE ENCOUNTER
Referral to VRT placed as requested    ----- Message from Inge Willett, CCC-A sent at 1/10/2023  3:40 PM CST -----  Can you please place referral to [facility] for VRT/Horizontal canal BPPV repositioning? Thanks!

## 2023-02-03 ENCOUNTER — HOSPITAL ENCOUNTER (OUTPATIENT)
Dept: RADIOLOGY | Facility: HOSPITAL | Age: 73
Discharge: HOME | End: 2023-02-03
Attending: THORACIC SURGERY (CARDIOTHORACIC VASCULAR SURGERY)
Payer: MEDICARE

## 2023-02-03 DIAGNOSIS — C34.92 NSCLC OF LEFT LUNG: ICD-10-CM

## 2023-02-03 PROCEDURE — 71250 CT CHEST WITHOUT CONTRAST: ICD-10-PCS | Mod: 26,,, | Performed by: RADIOLOGY

## 2023-02-03 PROCEDURE — 71250 CT THORAX DX C-: CPT | Mod: TC

## 2023-02-03 PROCEDURE — 71250 CT THORAX DX C-: CPT | Mod: 26,,, | Performed by: RADIOLOGY

## 2023-02-03 NOTE — PROGRESS NOTES
Visit type:  Audiovisual  Patient location:Home  Visit duration: 10 minutes  Total visit time 20 minutes which includes audiovisual component and review of pertinent patient medical records including past medical history and imaging.  The patient was informed of (1) the relationship between the physician and the patient as well as the fact that (2) the patient could choose to cancel  telemedicince treatment at any time.    Past surgery: 04/28/2021: Left VATS Lower Lobe Wedge Resection     Pathology: 9mm non-keratinizing squamous cell carcinoma, no VPI, no LVI, margin at least 8mm.  Levels 9 and 11 = negative.  T1aN0    The patient was initially followed at a 4 month interval due to the sub lobar nature of the surgery and concerns about a higher local regional recurrence rate.  Her follow-up intervals have since then extended to every 6 months.  She has no complaints.    Chest CT 8/2/22:  I reviewed the images    No evidence of recurrent or metastatic disease.    Chest CT 02/08/23: I reviewed the images and compared to the 8/2/22 exam:  . There is an irregular pulmonary nodule in the anterior aspect of the left upper lobe.  On the current examination it measures 8 mm in craniocaudal dimension by 8 mm in AP dimension by 8 mm in medial-lateral dimension. On the prior examination it measured 6 mm in craniocaudal dimension by 5 mm in AP dimension by 8 mm in medial-lateral dimension.  This is consistent with the patient's history and characteristic of metastatic disease.  2. There is an ill-defined area of increased density adjacent to the anterior right side of the mediastinum. On the current examination this area measures 27 mm in craniocaudal dimension by 19 mm in AP dimension by 13 mm in medial-lateral dimension.  On the prior examination it measured 28 mm in craniocaudal dimension by 19 mm in AP dimension by 14 mm in medial-lateral dimension.  3. There are hypodense areas scattered throughout the liver. One of the  larger ones measures 16 mm and is located in the left lobe of the liver.  It has a Hounsfield measurement of -5.  This is characteristic of a cyst.  4. There is an 8 mm oval shaped area of hypodensity in the posterolateral aspect of the midpole of the left kidney. This area has a Hounsfield measurement of -1.  This is characteristic of a cyst.  5. There are moderate degenerative changes in the thoracic spine.  All CT scans at this facility use dose modulation, iterative reconstruction, and/or weight base dosing when appropriate to reduce radiation dose when appropriate to reduce radiation dose to as low as reasonably achievable.    Assessment:    H/o resected lung cancer  NAIN nodule shows minimal size increase  RML density like c/w atelectasis    Plan:  IR referral for CT guided biopsy of NANI nodule.  If not technically feasible or non diagnostic, I will perform L VATS wedge resection.  If nodule is negative for malignancy, recommend repeat chest CT in 6 months.  I NANI nodule is positive for malignancy, obtain whole body PET scan

## 2023-02-03 NOTE — H&P (VIEW-ONLY)
Visit type:  Audiovisual  Patient location:Home  Visit duration: 10 minutes  Total visit time 20 minutes which includes audiovisual component and review of pertinent patient medical records including past medical history and imaging.  The patient was informed of (1) the relationship between the physician and the patient as well as the fact that (2) the patient could choose to cancel  telemedicince treatment at any time.    Past surgery: 04/28/2021: Left VATS Lower Lobe Wedge Resection     Pathology: 9mm non-keratinizing squamous cell carcinoma, no VPI, no LVI, margin at least 8mm.  Levels 9 and 11 = negative.  T1aN0    The patient was initially followed at a 4 month interval due to the sub lobar nature of the surgery and concerns about a higher local regional recurrence rate.  Her follow-up intervals have since then extended to every 6 months.  She has no complaints.    Chest CT 8/2/22:  I reviewed the images    No evidence of recurrent or metastatic disease.    Chest CT 02/08/23: I reviewed the images and compared to the 8/2/22 exam:  . There is an irregular pulmonary nodule in the anterior aspect of the left upper lobe.  On the current examination it measures 8 mm in craniocaudal dimension by 8 mm in AP dimension by 8 mm in medial-lateral dimension. On the prior examination it measured 6 mm in craniocaudal dimension by 5 mm in AP dimension by 8 mm in medial-lateral dimension.  This is consistent with the patient's history and characteristic of metastatic disease.  2. There is an ill-defined area of increased density adjacent to the anterior right side of the mediastinum. On the current examination this area measures 27 mm in craniocaudal dimension by 19 mm in AP dimension by 13 mm in medial-lateral dimension.  On the prior examination it measured 28 mm in craniocaudal dimension by 19 mm in AP dimension by 14 mm in medial-lateral dimension.  3. There are hypodense areas scattered throughout the liver. One of the  larger ones measures 16 mm and is located in the left lobe of the liver.  It has a Hounsfield measurement of -5.  This is characteristic of a cyst.  4. There is an 8 mm oval shaped area of hypodensity in the posterolateral aspect of the midpole of the left kidney. This area has a Hounsfield measurement of -1.  This is characteristic of a cyst.  5. There are moderate degenerative changes in the thoracic spine.  All CT scans at this facility use dose modulation, iterative reconstruction, and/or weight base dosing when appropriate to reduce radiation dose when appropriate to reduce radiation dose to as low as reasonably achievable.    Assessment:    H/o resected lung cancer  NANI nodule shows minimal size increase  RML density like c/w atelectasis    Plan:  IR referral for CT guided biopsy of NANI nodule.  If not technically feasible or non diagnostic, I will perform L VATS wedge resection.  If nodule is negative for malignancy, recommend repeat chest CT in 6 months.  I NANI nodule is positive for malignancy, obtain whole body PET scan

## 2023-02-08 ENCOUNTER — OFFICE VISIT (OUTPATIENT)
Dept: CARDIOTHORACIC SURGERY | Facility: CLINIC | Age: 73
End: 2023-02-08
Payer: MEDICARE

## 2023-02-08 DIAGNOSIS — R91.1 PULMONARY NODULE, RIGHT: ICD-10-CM

## 2023-02-08 DIAGNOSIS — C34.92 NSCLC OF LEFT LUNG: Primary | ICD-10-CM

## 2023-02-08 DIAGNOSIS — R91.1 PULMONARY NODULE, LEFT: ICD-10-CM

## 2023-02-08 DIAGNOSIS — R91.1 PULMONARY NODULE, LEFT: Primary | ICD-10-CM

## 2023-02-08 DIAGNOSIS — Z85.118 HISTORY OF LUNG CANCER: ICD-10-CM

## 2023-02-08 PROCEDURE — 99213 OFFICE O/P EST LOW 20 MIN: CPT | Mod: 95,,, | Performed by: THORACIC SURGERY (CARDIOTHORACIC VASCULAR SURGERY)

## 2023-02-08 PROCEDURE — 99213 PR OFFICE/OUTPT VISIT, EST, LEVL III, 20-29 MIN: ICD-10-PCS | Mod: 95,,, | Performed by: THORACIC SURGERY (CARDIOTHORACIC VASCULAR SURGERY)

## 2023-02-09 DIAGNOSIS — R91.1 PULMONARY NODULE, RIGHT: ICD-10-CM

## 2023-02-09 DIAGNOSIS — Z85.118 HISTORY OF LUNG CANCER: Primary | ICD-10-CM

## 2023-02-09 DIAGNOSIS — R91.1 PULMONARY NODULE, LEFT: ICD-10-CM

## 2023-02-17 ENCOUNTER — TELEPHONE (OUTPATIENT)
Dept: RADIOLOGY | Facility: HOSPITAL | Age: 73
End: 2023-02-17
Payer: MEDICARE

## 2023-02-17 DIAGNOSIS — D68.9 COAGULATION DEFECT, UNSPECIFIED: ICD-10-CM

## 2023-02-17 DIAGNOSIS — C34.92 NSCLC OF LEFT LUNG: Primary | ICD-10-CM

## 2023-02-17 NOTE — TELEPHONE ENCOUNTER
Called patient and confirmed radiology procedure appointment for 2/20/23 at 0830. Instructed pt to be NPO after midnight Sunday night, take thyroid medicine Monday morning with a few sips of water only, arrive to [facility] on [address] at 0730, and have a ride home post procedure. Pt verbalized understanding and had all questions answered. Pt confirmed no blood thinners are being taken

## 2023-02-20 ENCOUNTER — HOSPITAL ENCOUNTER (OUTPATIENT)
Dept: RADIOLOGY | Facility: HOSPITAL | Age: 73
Discharge: HOME | End: 2023-02-20
Attending: PHYSICIAN ASSISTANT
Payer: MEDICARE

## 2023-02-20 ENCOUNTER — HOSPITAL ENCOUNTER (OUTPATIENT)
Dept: RADIOLOGY | Facility: HOSPITAL | Age: 73
Discharge: HOME | End: 2023-02-20
Attending: RADIOLOGY
Payer: MEDICARE

## 2023-02-20 VITALS
SYSTOLIC BLOOD PRESSURE: 126 MMHG | OXYGEN SATURATION: 95 % | DIASTOLIC BLOOD PRESSURE: 59 MMHG | HEIGHT: 65 IN | WEIGHT: 163 LBS | HEART RATE: 59 BPM | BODY MASS INDEX: 27.16 KG/M2 | RESPIRATION RATE: 16 BRPM

## 2023-02-20 DIAGNOSIS — R91.1 PULMONARY NODULE, LEFT: ICD-10-CM

## 2023-02-20 DIAGNOSIS — Z85.118 HISTORY OF LUNG CANCER: ICD-10-CM

## 2023-02-20 PROCEDURE — 71045 XR CHEST 1 VIEW: ICD-10-PCS | Mod: 26,76,, | Performed by: RADIOLOGY

## 2023-02-20 PROCEDURE — 63600175 PHARM REV CODE 636 W HCPCS: Performed by: RADIOLOGY

## 2023-02-20 PROCEDURE — 88305 TISSUE EXAM BY PATHOLOGIST: CPT | Performed by: PATHOLOGY

## 2023-02-20 PROCEDURE — 71045 X-RAY EXAM CHEST 1 VIEW: CPT | Mod: TC

## 2023-02-20 PROCEDURE — 88305 TISSUE EXAM BY PATHOLOGIST: ICD-10-PCS | Mod: 26,,, | Performed by: PATHOLOGY

## 2023-02-20 PROCEDURE — 32408 CORE NDL BX LNG/MED PERQ: CPT

## 2023-02-20 PROCEDURE — 32408 CORE NDL BX LNG/MED PERQ: CPT | Mod: ,,, | Performed by: RADIOLOGY

## 2023-02-20 PROCEDURE — 71045 X-RAY EXAM CHEST 1 VIEW: CPT | Mod: 26,76,, | Performed by: STUDENT IN AN ORGANIZED HEALTH CARE EDUCATION/TRAINING PROGRAM

## 2023-02-20 PROCEDURE — 88305 TISSUE EXAM BY PATHOLOGIST: CPT | Mod: 26,,, | Performed by: PATHOLOGY

## 2023-02-20 PROCEDURE — 32408 CT BIOPSY LUNG W/ GUIDANCE: ICD-10-PCS | Mod: ,,, | Performed by: RADIOLOGY

## 2023-02-20 PROCEDURE — 71045 X-RAY EXAM CHEST 1 VIEW: CPT | Mod: 26,,, | Performed by: RADIOLOGY

## 2023-02-20 PROCEDURE — 71045 XR CHEST 1 VIEW: ICD-10-PCS | Mod: 26,76,, | Performed by: STUDENT IN AN ORGANIZED HEALTH CARE EDUCATION/TRAINING PROGRAM

## 2023-02-20 PROCEDURE — 71045 X-RAY EXAM CHEST 1 VIEW: CPT | Mod: 26,76,, | Performed by: RADIOLOGY

## 2023-02-20 PROCEDURE — 71045 XR CHEST 1 VIEW: ICD-10-PCS | Mod: 26,,, | Performed by: RADIOLOGY

## 2023-02-20 RX ORDER — LORATADINE 10 MG/1
10 TABLET ORAL DAILY
COMMUNITY

## 2023-02-20 RX ORDER — FENTANYL CITRATE 50 UG/ML
INJECTION, SOLUTION INTRAMUSCULAR; INTRAVENOUS CODE/TRAUMA/SEDATION MEDICATION
Status: COMPLETED | OUTPATIENT
Start: 2023-02-20 | End: 2023-02-20

## 2023-02-20 RX ORDER — MIDAZOLAM HYDROCHLORIDE 1 MG/ML
INJECTION INTRAMUSCULAR; INTRAVENOUS CODE/TRAUMA/SEDATION MEDICATION
Status: COMPLETED | OUTPATIENT
Start: 2023-02-20 | End: 2023-02-20

## 2023-02-20 RX ADMIN — MIDAZOLAM HYDROCHLORIDE 0.5 MG: 1 INJECTION INTRAMUSCULAR; INTRAVENOUS at 09:02

## 2023-02-20 RX ADMIN — FENTANYL CITRATE 25 MCG: 50 INJECTION, SOLUTION INTRAMUSCULAR; INTRAVENOUS at 09:02

## 2023-02-20 NOTE — DISCHARGE INSTRUCTIONS
Please return to ER if any of these symptoms occur:  Fever over 101 degrees,  Any purulent drainage from site (pus, yellow or has foul odor), or any redness or swelling to site  Bleeding from the puncture site not controlled, If bleeding occurs at site hold pressure for 5 mins.  If bleeding continues go to ER  Pain not controlled with Aleve or Tylenol,    No driving for 24 hours after procedure due to sedation given during procedure.     Do not submerge in standing water for 2 days after biopsy but you may shower.    May change bandage if it becomes soiled and bandage may be removed in 2 days.    Do not lift anything heavier than a gallon and take it easy for next couple of day.    Resume home medications and diet    Biopsy results will be with Dr. Grossman in 5-7 days, please follow up with him for results and any other questions or concerns that you may have.

## 2023-02-20 NOTE — PLAN OF CARE
Band aid to left lateral chest C/D/I with no bleeding/redness/swelling noted. VSS, NADN, and pt meets criteria for discharge. Discharge instructions given to and reviewed with pt, and pt verbalized understanding of all. Pt discharged to home, taken out via wheelchair and driven home by Onel ().

## 2023-02-20 NOTE — DISCHARGE SUMMARY
[facility] (Hospital)  Discharge Note  Short Stay    CT Biopsy Lung w/ guidance      OUTCOME: Patient tolerated treatment/procedure well without complication and is now ready for discharge.    DISPOSITION: Home or Self Care    FINAL DIAGNOSIS:  <principal problem not specified>    FOLLOWUP: In clinic    DISCHARGE INSTRUCTIONS:  No discharge procedures on file.      Clinical Reference Documents Added to Patient Instructions         Document    NEEDLE BIOPSY OF THE LUNG AND PLEURA (ENGLISH)    PROCEDURAL SEDATION, ADULT ED (ENGLISH)            TIME SPENT ON DISCHARGE: 15 minutes    Date:  02/20/2023    Pre Op Diagnosis: NANI lung nodule     Post Op Diagnosis: same     Procedure:  image guided NANI lung nodule biopsy     Procedure performed by: Aquiles ANDRADE, Pallavi FRAGOSO     Written Informed Consent Obtained: Yes     Specimen Removed:  yes     Estimated Blood Loss:  minimal     Findings: Local anesthesia and moderate sedation used     The patient tolerated the procedure well and there were no complications.      Disposition: F/U in clinic or with ordering physician    Discharge instructions: Light activity for 24 hours.  No driving for 24 hours.  Remove bandage in 24 hours.  No baths for 24 hours; showers are appropriate.    Sterile technique was performed in the left thorax, lidocaine was used as a local anesthetic.  Multiple samples taken from NANI lung nodule.  Patient tolerated the procedure well without immediate complications.  Please see radiologist report for details. F/u with PCP and/or ordering physician.     Time spent on discharge:  15 minutes

## 2023-02-20 NOTE — INTERVAL H&P NOTE
The patient has been examined and the H&P has been reviewed:    I concur with the findings and no changes have occurred since H&P was written.    Plan for image guided lung nodule biopsy    There are no hospital problems to display for this patient.

## 2023-02-20 NOTE — PLAN OF CARE
PT ARRIVED TO RECOVERY VIA STRETCHER IN LEFT SIDE LYING POSITION. DRESSING TO LEFT UPPER LATERAL CHEST CDI.  NADN.  VSS.  PT DENIES PAIN, NAUSEA AND HEADACHE.   no

## 2023-02-23 LAB
FINAL PATHOLOGIC DIAGNOSIS: NORMAL
GROSS: NORMAL
Lab: NORMAL
MICROSCOPIC EXAM: NORMAL

## 2023-03-01 ENCOUNTER — TELEPHONE (OUTPATIENT)
Dept: CARDIOTHORACIC SURGERY | Facility: HOSPITAL | Age: 73
End: 2023-03-01
Payer: MEDICARE

## 2023-03-01 ENCOUNTER — PATIENT MESSAGE (OUTPATIENT)
Dept: CARDIOTHORACIC SURGERY | Facility: CLINIC | Age: 73
End: 2023-03-01
Payer: MEDICARE

## 2023-03-01 DIAGNOSIS — C34.92 NSCLC OF LEFT LUNG: Primary | ICD-10-CM

## 2023-03-12 ENCOUNTER — PATIENT MESSAGE (OUTPATIENT)
Dept: CARDIOTHORACIC SURGERY | Facility: CLINIC | Age: 73
End: 2023-03-12
Payer: MEDICARE

## 2023-03-13 ENCOUNTER — TELEPHONE (OUTPATIENT)
Dept: HEMATOLOGY/ONCOLOGY | Facility: CLINIC | Age: 73
End: 2023-03-13
Payer: MEDICARE

## 2023-03-13 ENCOUNTER — TELEPHONE (OUTPATIENT)
Dept: CARDIOLOGY | Facility: HOSPITAL | Age: 73
End: 2023-03-13
Payer: MEDICARE

## 2023-03-13 DIAGNOSIS — Z01.810 PRE-OPERATIVE CARDIOVASCULAR EXAMINATION: Primary | ICD-10-CM

## 2023-03-13 DIAGNOSIS — C34.92 NSCLC OF LEFT LUNG: Primary | ICD-10-CM

## 2023-03-13 NOTE — TELEPHONE ENCOUNTER
----- Message from Karlee Wade LPN sent at 3/13/2023  3:35 PM CDT -----  Contact: kala/ [facility]    ----- Message -----  From: Samaria Angelo  Sent: 3/13/2023   2:02 PM CDT  To: [facility] Cardio Clinical Staff    Kala is calling to speak with the nurse regarding appointment. Reports needing to schedule a stress test before surgery on Monday 03/20. Please give kala a call back at [phone]  Thanks a.aMary Alice

## 2023-03-13 NOTE — NURSING
Patient notified of the scheduled VV with Dr. Medley for 03/15/23 at 2pm.  Agreeableo the date and time.  Oncology Navigation   Intake  Internal / External Referral: Internal  Date Worked: 03/13/23     Treatment  Current Status: Active  Date Presented to Tumor Board: 03/08/23  Presentation Notes: Discuss left VATS anatomic resection for growing NANI nodule    Surgery: Planned  Surgical Oncologist: Jasmyne  Consult Date: 03/15/23                      Support Systems: Spouse/significant other  Barriers of Care: Transportation  Transportation Barriers: Patient lives in [facility]      Follow Up  No follow-ups on file.      t

## 2023-03-14 ENCOUNTER — OFFICE VISIT (OUTPATIENT)
Dept: URGENT CARE | Facility: CLINIC | Age: 73
End: 2023-03-14
Payer: MEDICARE

## 2023-03-14 ENCOUNTER — HOSPITAL ENCOUNTER (OUTPATIENT)
Dept: RADIOLOGY | Facility: CLINIC | Age: 73
Discharge: HOME | End: 2023-03-14
Attending: NURSE PRACTITIONER
Payer: MEDICARE

## 2023-03-14 VITALS
TEMPERATURE: 98 F | DIASTOLIC BLOOD PRESSURE: 67 MMHG | SYSTOLIC BLOOD PRESSURE: 149 MMHG | HEART RATE: 64 BPM | RESPIRATION RATE: 16 BRPM | BODY MASS INDEX: 27.16 KG/M2 | OXYGEN SATURATION: 98 % | HEIGHT: 65 IN | WEIGHT: 163 LBS

## 2023-03-14 DIAGNOSIS — S29.9XXA RIB INJURY: Primary | ICD-10-CM
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
## 2023-03-14 DIAGNOSIS — S29.9XXA RIB INJURY: ICD-10-CM

## 2023-03-14 PROCEDURE — 71101 XR RIBS MIN 3 VIEWS W/ PA CHEST LEFT: ICD-10-PCS | Mod: LT,S$GLB,, | Performed by: RADIOLOGY

## 2023-03-14 PROCEDURE — 99214 OFFICE O/P EST MOD 30 MIN: CPT | Mod: S$GLB,,, | Performed by: NURSE PRACTITIONER

## 2023-03-14 PROCEDURE — 71101 X-RAY EXAM UNILAT RIBS/CHEST: CPT | Mod: LT,S$GLB,, | Performed by: RADIOLOGY

## 2023-03-14 PROCEDURE — 99214 PR OFFICE/OUTPT VISIT, EST, LEVL IV, 30-39 MIN: ICD-10-PCS | Mod: S$GLB,,, | Performed by: NURSE PRACTITIONER

## 2023-03-14 NOTE — PATIENT INSTRUCTIONS
PLAN:  Left ribs with chest x-ray  Advise increase p.o. fluids- water/juice & rest  Cool mist humidifier/vaporizer.  Practice good handwashing.  Tylenol or Ibuprofen for fever, headache and body aches.  Advise follow up with PCP in 2-3 days for recheck  Advise go to ER if nausea, vomiting, fever, increased pain, or fail to improve with treatment.  AVS provided and reviewed with patient including supportive care, follow up, and red flag symptoms.   Patient verbalizes understanding and agrees with treatment plan. Discharged from Urgent Care in stable condition.

## 2023-03-14 NOTE — PROGRESS NOTES
"Subjective:       Patient ID: Radha Lamas is a 73 y.o. female.    Vitals:  height is 5' 5" (1.651 m) and weight is 73.9 kg (163 lb). Her tympanic temperature is 98.1 °F (36.7 °C). Her blood pressure is 149/67 (abnormal) and her pulse is 64. Her respiration is 16 and oxygen saturation is 98%.     Chief Complaint: Rib Injury    Patient presents with left rib pain.  Patient states that she fell forward onto bricks 5 days ago.    Fall  The accident occurred 3 to 5 days ago (5). Impact surface: Brick. There was no blood loss. Pain location: Left ribs. The pain is at a severity of 6/10. Pertinent negatives include no abdominal pain, bowel incontinence, fever, headaches, hearing loss, hematuria, loss of consciousness, nausea, numbness, tingling, visual change or vomiting. She has tried nothing for the symptoms.     Constitution: Negative for fever.   Gastrointestinal:  Negative for abdominal pain, nausea, vomiting and bowel incontinence.   Genitourinary:  Negative for hematuria.   Neurological:  Negative for headaches, loss of consciousness and numbness.       CHIEF COMPLAINT/REASON FOR VISIT:  left rib cage pain, trip/fall    HISTORY OF PRESENT ILLNESS:  73-year-old female complains of trip/fall over garden hose onto left ribcage pain region 1 week ago.  Patient denies seek emergency room treatment. Admits filling in swimming pool, tripped over garden hose.  Patient admits tried over-the-counter medication with the relief.  Patient concerned regarding possible fracture rib and requesting x-rays. Patient denies  shortness of breath, congestion, cough, dizziness, blurred vision, nausea, vomiting, diarrhea, " aching all over", fatigue, loss of appetite & fever.      Past Medical History:   Diagnosis Date    COPD (chronic obstructive pulmonary disease) 2013    Eczema     GERD (gastroesophageal reflux disease)     Hiatal hernia     History of torn meniscus of right knee 01/2011    Hypothyroid     Lung cancer     Urinary " incontinence in female     Mild , only takes mediciane with travel          Social History     Socioeconomic History    Marital status:    Tobacco Use    Smoking status: Former     Packs/day: 1.50     Years: 45.00     Pack years: 67.50     Types: Cigarettes     Start date:      Quit date:      Years since quittin.2    Smokeless tobacco: Never   Substance and Sexual Activity    Alcohol use: No     Alcohol/week: 0.0 standard drinks    Drug use: No    Sexual activity: Not Currently     Partners: Male     Birth control/protection: Post-menopausal          Family History   Problem Relation Age of Onset    Heart failure Mother     Hypertension Mother     Cancer Father     Hypertension Father     Cancer Sister     Cancer Brother        ROS:  GENERAL: trip/ fall  SKIN: No rashes, itching or changes in color or texture of skin.  HEENT: No headaches or recent head trauma. Visual acuity fine. No photophobia, ocular pain or diplopia. Denies ear pain, discharge or vertigo. No loss of smell, no epistaxis or postnasal drip. No hoarseness or change in voice.   NODES: No masses or lesions. Denies swollen glands.  CHEST: Denies cyanosis, wheezing, cough and sputum production.  CARDIOVASCULAR: Denies chest pain, PND, orthopnea or reduced exercise tolerance.  ABDOMEN: Appetite fine. No weight loss. Denies diarrhea, abdominal pain, hematemesis or blood in stool.  MUSCULOSKELETAL: left rib cage pain  NEUROLOGIC: No history of seizures, paralysis, alteration of gait or coordination.  PSYCHIATRIC: Denies mood swings, depression or suicidal thoughts.    PE:   APPEARANCE: Well nourished, well developed, in mild distress.   V/S: Reviewed.  SKIN:  Left elbow, knee with fading ecchymosis, scab lesions, left sided chest, ribcage with no abrasions, lacerations, redness and no ecchymosis  HEENT:  turbinates pink, pink pharynx, TMs clear bilateral.  CHEST: Lungs clear to auscultation. No wheezing  CARDIOVASCULAR: Regular rate  and rhythm   MUSCULOSKELETAL: Motor: 5/5 strength major flexors/extensors.  NEUROLOGIC: No sensory deficits. Gait & Posture: Normal gait and fine motion. No cerebellar signs.  MENTAL STATUS: Patient alert, oriented x 3 & conversant.    PLAN:  Left ribs with chest x-ray  Advise increase p.o. fluids- water/juice & rest  Cool mist humidifier/vaporizer.  Practice good handwashing.  Tylenol or Ibuprofen for fever, headache and body aches.  Advise follow up with PCP in 2-3 days for recheck  Advise go to ER if nausea, vomiting, fever, increased pain, or fail to improve with treatment.  AVS provided and reviewed with patient including supportive care, follow up, and red flag symptoms.   Patient verbalizes understanding and agrees with treatment plan. Discharged from Urgent Care in stable condition.      Advise  [facility] radiologist will officially read x-rays, staff will telephone results    DIAGNOSIS:   Fall/trip  Left rib cage pain

## 2023-03-15 ENCOUNTER — TELEPHONE (OUTPATIENT)
Dept: CARDIOTHORACIC SURGERY | Facility: CLINIC | Age: 73
End: 2023-03-15
Payer: MEDICARE

## 2023-03-15 ENCOUNTER — HOSPITAL ENCOUNTER (OUTPATIENT)
Dept: CARDIOLOGY | Facility: HOSPITAL | Age: 73
Discharge: HOME | End: 2023-03-15
Attending: THORACIC SURGERY (CARDIOTHORACIC VASCULAR SURGERY)
Payer: MEDICARE

## 2023-03-15 VITALS
WEIGHT: 162.94 LBS | DIASTOLIC BLOOD PRESSURE: 85 MMHG | HEIGHT: 65 IN | SYSTOLIC BLOOD PRESSURE: 145 MMHG | BODY MASS INDEX: 27.15 KG/M2

## 2023-03-15 DIAGNOSIS — Z01.810 PRE-OPERATIVE CARDIOVASCULAR EXAMINATION: ICD-10-CM

## 2023-03-15 LAB
AORTIC ROOT ANNULUS: 2.7 CM
ASCENDING AORTA: 2.64 CM
AV INDEX (PROSTH): 0.78
AV MEAN GRADIENT: 3 MMHG
AV PEAK GRADIENT: 5 MMHG
AV VALVE AREA: 2.14 CM2
AV VELOCITY RATIO: 0.82
BSA FOR ECHO PROCEDURE: 1.84 M2
CV ECHO LV RWT: 0.6 CM
CV STRESS BASE HR: 68 BPM
DIASTOLIC BLOOD PRESSURE: 85 MMHG
DOP CALC AO PEAK VEL: 1.16 M/S
DOP CALC AO VTI: 29.8 CM
DOP CALC LVOT AREA: 2.7 CM2
DOP CALC LVOT DIAMETER: 1.87 CM
DOP CALC LVOT PEAK VEL: 0.95 M/S
DOP CALC LVOT STROKE VOLUME: 63.69 CM3
DOP CALC MV VTI: 37.1 CM
DOP CALC RVOT PEAK VEL: 0.56 M/S
DOP CALC RVOT VTI: 16.8 CM
DOP CALCLVOT PEAK VEL VTI: 23.2 CM
E WAVE DECELERATION TIME: 171.77 MSEC
E/A RATIO: 1.12
E/E' RATIO: 8 M/S
ECHO LV POSTERIOR WALL: 1.18 CM (ref 0.6–1.1)
EJECTION FRACTION: 60 %
FRACTIONAL SHORTENING: 33 % (ref 28–44)
INTERVENTRICULAR SEPTUM: 0.97 CM (ref 0.6–1.1)
IVC DIAMETER: 16 CM
IVRT: 77.07 MSEC
LA MAJOR: 4.37 CM
LA MINOR: 4.49 CM
LA WIDTH: 3.1 CM
LEFT ATRIUM SIZE: 3.54 CM
LEFT ATRIUM VOLUME INDEX MOD: 19 ML/M2
LEFT ATRIUM VOLUME INDEX: 22.8 ML/M2
LEFT ATRIUM VOLUME MOD: 34.47 CM3
LEFT ATRIUM VOLUME: 41.32 CM3
LEFT INTERNAL DIMENSION IN SYSTOLE: 2.63 CM (ref 2.1–4)
LEFT VENTRICLE DIASTOLIC VOLUME INDEX: 37.24 ML/M2
LEFT VENTRICLE DIASTOLIC VOLUME: 67.41 ML
LEFT VENTRICLE MASS INDEX: 76 G/M2
LEFT VENTRICLE SYSTOLIC VOLUME INDEX: 13.9 ML/M2
LEFT VENTRICLE SYSTOLIC VOLUME: 25.21 ML
LEFT VENTRICULAR INTERNAL DIMENSION IN DIASTOLE: 3.94 CM (ref 3.5–6)
LEFT VENTRICULAR MASS: 137.63 G
LV LATERAL E/E' RATIO: 7.6 M/S
LV SEPTAL E/E' RATIO: 8.44 M/S
LVOT MG: 1.86 MMHG
LVOT MV: 0.64 CM/S
MV MEAN GRADIENT: 1 MMHG
MV PEAK A VEL: 0.68 M/S
MV PEAK E VEL: 0.76 M/S
MV PEAK GRADIENT: 2 MMHG
MV VALVE AREA BY CONTINUITY EQUATION: 1.72 CM2
OHS CV CPX 1 MINUTE RECOVERY HEART RATE: 93 BPM
OHS CV CPX 85 PERCENT MAX PREDICTED HEART RATE MALE: 119
OHS CV CPX ESTIMATED METS: 1
OHS CV CPX MAX PREDICTED HEART RATE: 140
OHS CV CPX PATIENT IS FEMALE: 1
OHS CV CPX PATIENT IS MALE: 0
OHS CV CPX PEAK DIASTOLIC BLOOD PRESSURE: 72 MMHG
OHS CV CPX PEAK HEAR RATE: 122 BPM
OHS CV CPX PEAK RATE PRESSURE PRODUCT: ABNORMAL
OHS CV CPX PEAK SYSTOLIC BLOOD PRESSURE: 170 MMHG
OHS CV CPX PERCENT MAX PREDICTED HEART RATE ACHIEVED: 87
OHS CV CPX RATE PRESSURE PRODUCT PRESENTING: 9860
PISA TR MAX VEL: 2.71 M/S
PV MEAN GRADIENT: 0.73 MMHG
RA MAJOR: 3.88 CM
RA WIDTH: 2.82 CM
RIGHT VENTRICULAR END-DIASTOLIC DIMENSION: 2.17 CM
SINUS: 2.77 CM
STJ: 2.17 CM
STRESS ECHO POST EXERCISE DUR MIN: 8 MINUTES
STRESS ECHO POST EXERCISE DUR SEC: 44 SECONDS
SYSTOLIC BLOOD PRESSURE: 145 MMHG
TDI LATERAL: 0.1 M/S
TDI SEPTAL: 0.09 M/S
TDI: 0.1 M/S
TR MAX PG: 29 MMHG
TRICUSPID ANNULAR PLANE SYSTOLIC EXCURSION: 2.01 CM

## 2023-03-15 PROCEDURE — 93320 STRESS ECHO (CUPID ONLY): ICD-10-PCS | Mod: 26,,, | Performed by: INTERNAL MEDICINE

## 2023-03-15 PROCEDURE — 93325 DOPPLER ECHO COLOR FLOW MAPG: CPT | Mod: 26,,, | Performed by: INTERNAL MEDICINE

## 2023-03-15 PROCEDURE — 93320 DOPPLER ECHO COMPLETE: CPT | Mod: 26,,, | Performed by: INTERNAL MEDICINE

## 2023-03-15 PROCEDURE — 93325 STRESS ECHO (CUPID ONLY): ICD-10-PCS | Mod: 26,,, | Performed by: INTERNAL MEDICINE

## 2023-03-15 PROCEDURE — 63600175 PHARM REV CODE 636 W HCPCS: Performed by: THORACIC SURGERY (CARDIOTHORACIC VASCULAR SURGERY)

## 2023-03-15 PROCEDURE — 93351 STRESS ECHO (CUPID ONLY): ICD-10-PCS | Mod: 26,,, | Performed by: INTERNAL MEDICINE

## 2023-03-15 PROCEDURE — 93325 DOPPLER ECHO COLOR FLOW MAPG: CPT

## 2023-03-15 PROCEDURE — 93351 STRESS TTE COMPLETE: CPT | Mod: 26,,, | Performed by: INTERNAL MEDICINE

## 2023-03-15 RX ORDER — DOBUTAMINE HYDROCHLORIDE 400 MG/100ML
10 INJECTION INTRAVENOUS CONTINUOUS
Status: DISCONTINUED | OUTPATIENT
Start: 2023-03-15 | End: 2023-03-22 | Stop reason: HOSPADM

## 2023-03-15 RX ADMIN — DOBUTAMINE IN DEXTROSE 10 MCG/KG/MIN: 400 INJECTION, SOLUTION INTRAVENOUS at 01:03

## 2023-03-15 NOTE — TELEPHONE ENCOUNTER
I had a virtual visit scheduled with the patient.  The patient was unable to connect the audio portion.    I called the patient.  I discussed the technical aspects risks and benefits of the impending surgery with the patient.  I informed her that she is scheduled to undergo a robotic left upper lobe wedge resection.  A frozen section analysis will be obtained.  If there is evidence of malignancy, a formal left upper lobe lingulectomy with mediastinal lymph node dissection will be performed.  If there is no evidence of malignancy within the wedge biopsy specimen, the surgery will be concluded.  Regardless of the extent of surgery, a chest drain will be placed and the patient will be admitted to the hospital overnight.  The patient is aware of this plan and is in agreement.  She is ready to proceed as scheduled.

## 2023-03-17 ENCOUNTER — TELEPHONE (OUTPATIENT)
Dept: URGENT CARE | Facility: CLINIC | Age: 73
End: 2023-03-17
Payer: MEDICARE

## 2023-03-17 ENCOUNTER — ANESTHESIA EVENT (OUTPATIENT)
Dept: SURGERY | Facility: HOSPITAL | Age: 73
DRG: 164 | End: 2023-03-17
Payer: MEDICARE

## 2023-03-17 ENCOUNTER — TELEPHONE (OUTPATIENT)
Dept: CARDIOTHORACIC SURGERY | Facility: CLINIC | Age: 73
End: 2023-03-17
Payer: MEDICARE

## 2023-03-17 NOTE — PRE-PROCEDURE INSTRUCTIONS
PRE-OP INSTRUCTIONS:  Instructed to have nothing by mouth past midnight.  It is ok to take AM medications with a few sips of water.  Instructed to shower the night before surgery as well as the morning of surgery with an antibacterial soap like dial or hibiclens from the neck down.  Encouraged to wear loose fitting, comfortable clothing .  Medication instructions for pm prior to and am of surgery reviewed.  Instructed to avoid taking vitamins,supplements,aspirin or ibuprofen the am of surgery.    Patient denies any side effects or issues with anesthesia or sedation.

## 2023-03-17 NOTE — ANESTHESIA PREPROCEDURE EVALUATION
[facility]  Anesthesia Pre-Operative Evaluation         Patient Name: Radha Lamas  YOB: 1950  MRN: [id number]    SUBJECTIVE:     Pre-operative evaluation for Procedure(s) (LRB):  XI ROBOTIC WEDGE RESECTION, LUNG (RATS) w/possible segmentectomy (Left)     03/17/2023    Radha Lamas is a 73 y.o. female w/ a significant PMHx of COPD, GERD, NANI caner who presents for the above procedure.      LDA: None documented.    Prev airway:   Placement Date: 04/28/21; Placement Time: 1048 (created via procedure documentation); Method of Intubation: Fiberoptic Intubation, Video Laryngoscopy; Mask Ventilation: Easy - oral; Intubated: Postinduction; Blade: Thompson #3; Cuff Inflation: Minimal occlusive pressure; Placement Verified By: Capnometry; Complicating Factors: None; Intubation Findings: Bilateral breath sounds, Atraumatic/Condition of teeth unchanged; Complications: None; Removal Date: 04/29/21;  Removal Time: 1232    Drips: None documented.    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     Current Outpatient Medications on File Prior to Encounter   Medication Sig Dispense Refill    albuterol (PROAIR HFA) 90 mcg/actuation inhaler Inhale 2 puffs into the lungs every 4 (four) hours as needed for Wheezing or Shortness of Breath. Rescue 54 g 4    calcium carbonate (OS-CYDNEY) 600 mg calcium (1,500 mg) Tab Take 600 mg by mouth.      DUPIXENT  mg/2 mL PnIj Inject into the skin.      levothyroxine (SYNTHROID) 75 MCG  tablet Take 75 mcg by mouth once daily.      loratadine (CLARITIN) 10 mg tablet Take 10 mg by mouth once daily.      omeprazole (PRILOSEC) 20 MG capsule Take 20 mg by mouth once daily.      tolterodine (DETROL LA) 4 MG 24 hr capsule Take 1 capsule (4 mg total) by mouth once daily. 90 capsule 4    umeclidinium-vilanteroL (ANORO ELLIPTA) 62.5-25 mcg/actuation DsDv USE 1 INHALATION DAILY (CONTROLLER) 180 each 3       Past Surgical History:   Procedure Laterality Date    KNEE CARTILAGE SURGERY Right 2011    THORACOSCOPIC WEDGE RESECTION OF LUNG Left 2021    Procedure: VATS, WITH WEDGE RESECTION, LUNG;  Surgeon: Clarke Sauceda MD;  Location: [facility] OR [address] [place] [address];  Service: Thoracic;  Laterality: Left;  lymph node dissection     TUBAL LIGATION         Social History     Socioeconomic History    Marital status:    Tobacco Use    Smoking status: Former     Packs/day: 1.50     Years: 45.00     Pack years: 67.50     Types: Cigarettes     Start date:      Quit date:      Years since quittin.2    Smokeless tobacco: Never   Substance and Sexual Activity    Alcohol use: No     Alcohol/week: 0.0 standard drinks    Drug use: No    Sexual activity: Not Currently     Partners: Male     Birth control/protection: Post-menopausal       OBJECTIVE:     Vital Signs Range (Last 24H):         CBC:   No results for input(s): WBC, RBC, HGB, HCT, PLT, MCV, MCH, MCHC in the last 72 hours.    CMP: No results for input(s): NA, K, CL, CO2, BUN, CREATININE, GLU, MG, PHOS, CALCIUM, ALBUMIN, PROT, ALKPHOS, ALT, AST, BILITOT in the last 72 hours.    INR:  No results for input(s): PT, INR, PROTIME, APTT in the last 72 hours.    Diagnostic Studies: No relevant studies.    EKG:   No results found for this or any previous visit.     2D ECHO:   Results for orders placed during the hospital encounter of 21    Echo Color Flow Doppler? Yes    Interpretation Summary  · Normal systolic function.  · The estimated  ejection fraction is 60%.  · Normal left ventricular diastolic function.  · With normal right ventricular systolic function.         ASSESSMENT/PLAN:         Pre-op Assessment    I have reviewed the Patient Summary Reports.     I have reviewed the Nursing Notes.    I have reviewed the Medications.     Review of Systems  Anesthesia Hx:  No problems with previous Anesthesia  History of prior surgery of interest to airway management or planning: Denies Family Hx of Anesthesia complications.   Denies Personal Hx of Anesthesia complications.   Social:  Former Smoker    Hematology/Oncology:     Oncology Normal     Cardiovascular:   Denies Hypertension.   Denies Angina. ECG has been reviewed.    Pulmonary:   COPD Denies Shortness of breath.  Denies Recent URI.    Renal/:  Renal/ Normal     Hepatic/GI:   Hiatal Hernia, GERD Denies Liver Disease.    Neurological:   Denies CVA. Denies Seizures.    Endocrine:   Denies Diabetes. Hypothyroidism        Physical Exam  General: Well nourished, Cooperative and Alert    Airway:  Mallampati: II   Mouth Opening: Normal  TM Distance: Normal  Tongue: Normal  Neck ROM: Normal ROM    Dental:  Intact        Anesthesia Plan  Type of Anesthesia, risks & benefits discussed:    Anesthesia Type: Gen ETT  Intra-op Monitoring Plan: Standard ASA Monitors and Art Line  Post Op Pain Control Plan: multimodal analgesia  Induction:  IV  Airway Plan: Direct, Post-Induction  Informed Consent: Informed consent signed with the Patient and all parties understand the risks and agree with anesthesia plan.  All questions answered.   ASA Score: 3  Day of Surgery Review of History & Physical: H&P Update referred to the surgeon/provider.  Anesthesia Plan Notes: Discussed anesthetic plan including one lung ventilation with double lumen ETT or bronchial blocker, intraoperative planning, and possibility of post operative ventilation    Ready For Surgery From Anesthesia Perspective.     .

## 2023-03-20 ENCOUNTER — HOSPITAL ENCOUNTER (INPATIENT)
Facility: HOSPITAL | Age: 73
LOS: 2 days | Discharge: HOME | DRG: 164 | End: 2023-03-22
Attending: THORACIC SURGERY (CARDIOTHORACIC VASCULAR SURGERY) | Admitting: THORACIC SURGERY (CARDIOTHORACIC VASCULAR SURGERY)
Payer: MEDICARE

## 2023-03-20 ENCOUNTER — ANESTHESIA (OUTPATIENT)
Dept: SURGERY | Facility: HOSPITAL | Age: 73
DRG: 164 | End: 2023-03-20
Payer: MEDICARE

## 2023-03-20 DIAGNOSIS — R91.1 LUNG NODULE: ICD-10-CM

## 2023-03-20 DIAGNOSIS — C34.32 MALIGNANT NEOPLASM OF LOWER LOBE OF LEFT LUNG: Primary | ICD-10-CM

## 2023-03-20 DIAGNOSIS — R91.1 INCIDENTAL PULMONARY NODULE, > 3MM AND < 8MM: ICD-10-CM

## 2023-03-20 LAB
ABO + RH BLD: NORMAL
BLD GP AB SCN CELLS X3 SERPL QL: NORMAL
CREAT SERPL-MCNC: 0.8 MG/DL (ref 0.5–1.4)
EST. GFR  (NO RACE VARIABLE): >60 ML/MIN/1.73 M^2

## 2023-03-20 PROCEDURE — 71000033 HC RECOVERY, INTIAL HOUR: Performed by: THORACIC SURGERY (CARDIOTHORACIC VASCULAR SURGERY)

## 2023-03-20 PROCEDURE — 88342 IMHCHEM/IMCYTCHM 1ST ANTB: CPT | Mod: 59 | Performed by: PATHOLOGY

## 2023-03-20 PROCEDURE — 88341 IMHCHEM/IMCYTCHM EA ADD ANTB: CPT | Mod: 59 | Performed by: PATHOLOGY

## 2023-03-20 PROCEDURE — 25000003 PHARM REV CODE 250: Performed by: STUDENT IN AN ORGANIZED HEALTH CARE EDUCATION/TRAINING PROGRAM

## 2023-03-20 PROCEDURE — 88307 TISSUE EXAM BY PATHOLOGIST: CPT | Mod: 26,,, | Performed by: PATHOLOGY

## 2023-03-20 PROCEDURE — 88341 PR IHC OR ICC EACH ADD'L SINGLE ANTIBODY  STAINPR: ICD-10-PCS | Mod: 26,,, | Performed by: PATHOLOGY

## 2023-03-20 PROCEDURE — 82565 ASSAY OF CREATININE: CPT | Performed by: THORACIC SURGERY (CARDIOTHORACIC VASCULAR SURGERY)

## 2023-03-20 PROCEDURE — D9220A PRA ANESTHESIA: ICD-10-PCS | Mod: ,,, | Performed by: ANESTHESIOLOGY

## 2023-03-20 PROCEDURE — 25000003 PHARM REV CODE 250

## 2023-03-20 PROCEDURE — 37000008 HC ANESTHESIA 1ST 15 MINUTES: Performed by: THORACIC SURGERY (CARDIOTHORACIC VASCULAR SURGERY)

## 2023-03-20 PROCEDURE — 88342 IMHCHEM/IMCYTCHM 1ST ANTB: CPT | Mod: 26,,, | Performed by: PATHOLOGY

## 2023-03-20 PROCEDURE — C1729 CATH, DRAINAGE: HCPCS | Performed by: THORACIC SURGERY (CARDIOTHORACIC VASCULAR SURGERY)

## 2023-03-20 PROCEDURE — 25000003 PHARM REV CODE 250: Performed by: THORACIC SURGERY (CARDIOTHORACIC VASCULAR SURGERY)

## 2023-03-20 PROCEDURE — 88331 PR  PATH CONSULT IN SURG,W FRZ SEC: ICD-10-PCS | Mod: 26,,, | Performed by: PATHOLOGY

## 2023-03-20 PROCEDURE — 63600175 PHARM REV CODE 636 W HCPCS: Performed by: THORACIC SURGERY (CARDIOTHORACIC VASCULAR SURGERY)

## 2023-03-20 PROCEDURE — 32674 THORACOSCOPY LYMPH NODE EXC: CPT | Mod: ,,, | Performed by: THORACIC SURGERY (CARDIOTHORACIC VASCULAR SURGERY)

## 2023-03-20 PROCEDURE — 88331 PATH CONSLTJ SURG 1 BLK 1SPC: CPT | Mod: 26,,, | Performed by: PATHOLOGY

## 2023-03-20 PROCEDURE — 88307 TISSUE EXAM BY PATHOLOGIST: CPT | Performed by: PATHOLOGY

## 2023-03-20 PROCEDURE — D9220A PRA ANESTHESIA: Mod: ,,, | Performed by: ANESTHESIOLOGY

## 2023-03-20 PROCEDURE — 88309 PR  SURG PATH,LEVEL VI: ICD-10-PCS | Mod: 26,,, | Performed by: PATHOLOGY

## 2023-03-20 PROCEDURE — 99900035 HC TECH TIME PER 15 MIN (STAT)

## 2023-03-20 PROCEDURE — 32669 THORACOSCOPY REMOVE SEGMENT: CPT | Mod: 22,AS,LT,

## 2023-03-20 PROCEDURE — 88360 TUMOR IMMUNOHISTOCHEM/MANUAL: CPT | Mod: 59 | Performed by: PATHOLOGY

## 2023-03-20 PROCEDURE — 20600001 HC STEP DOWN PRIVATE ROOM

## 2023-03-20 PROCEDURE — 71000016 HC POSTOP RECOV ADDL HR: Performed by: THORACIC SURGERY (CARDIOTHORACIC VASCULAR SURGERY)

## 2023-03-20 PROCEDURE — 88342 CHG IMMUNOCYTOCHEMISTRY: ICD-10-PCS | Mod: 26,,, | Performed by: PATHOLOGY

## 2023-03-20 PROCEDURE — 88331 PATH CONSLTJ SURG 1 BLK 1SPC: CPT | Mod: 59 | Performed by: PATHOLOGY

## 2023-03-20 PROCEDURE — 36000713 HC OR TIME LEV V EA ADD 15 MIN: Performed by: THORACIC SURGERY (CARDIOTHORACIC VASCULAR SURGERY)

## 2023-03-20 PROCEDURE — 63600175 PHARM REV CODE 636 W HCPCS: Performed by: STUDENT IN AN ORGANIZED HEALTH CARE EDUCATION/TRAINING PROGRAM

## 2023-03-20 PROCEDURE — 88381 MICRODISSECTION MANUAL: CPT | Performed by: PATHOLOGY

## 2023-03-20 PROCEDURE — 94761 N-INVAS EAR/PLS OXIMETRY MLT: CPT

## 2023-03-20 PROCEDURE — C9290 INJ, BUPIVACAINE LIPOSOME: HCPCS | Performed by: THORACIC SURGERY (CARDIOTHORACIC VASCULAR SURGERY)

## 2023-03-20 PROCEDURE — 63600175 PHARM REV CODE 636 W HCPCS

## 2023-03-20 PROCEDURE — 27000221 HC OXYGEN, UP TO 24 HOURS

## 2023-03-20 PROCEDURE — 32674 PR THORACOSCOPY LYMPH NODE EXC: ICD-10-PCS | Mod: ,,, | Performed by: THORACIC SURGERY (CARDIOTHORACIC VASCULAR SURGERY)

## 2023-03-20 PROCEDURE — 37000009 HC ANESTHESIA EA ADD 15 MINS: Performed by: THORACIC SURGERY (CARDIOTHORACIC VASCULAR SURGERY)

## 2023-03-20 PROCEDURE — 36620 ARTERIAL: ICD-10-PCS | Mod: 59,,, | Performed by: ANESTHESIOLOGY

## 2023-03-20 PROCEDURE — 36000712 HC OR TIME LEV V 1ST 15 MIN: Performed by: THORACIC SURGERY (CARDIOTHORACIC VASCULAR SURGERY)

## 2023-03-20 PROCEDURE — 71000015 HC POSTOP RECOV 1ST HR: Performed by: THORACIC SURGERY (CARDIOTHORACIC VASCULAR SURGERY)

## 2023-03-20 PROCEDURE — 88309 TISSUE EXAM BY PATHOLOGIST: CPT | Performed by: PATHOLOGY

## 2023-03-20 PROCEDURE — 88307 PR  SURG PATH,LEVEL V: ICD-10-PCS | Mod: 26,,, | Performed by: PATHOLOGY

## 2023-03-20 PROCEDURE — 27201423 OPTIME MED/SURG SUP & DEVICES STERILE SUPPLY: Performed by: THORACIC SURGERY (CARDIOTHORACIC VASCULAR SURGERY)

## 2023-03-20 PROCEDURE — 27201037 HC PRESSURE MONITORING SET UP

## 2023-03-20 PROCEDURE — 32669 PR THORACOSCOPY REMOVE SEGMENT: ICD-10-PCS | Mod: 22,AS,LT,

## 2023-03-20 PROCEDURE — 36620 INSERTION CATHETER ARTERY: CPT | Mod: 59,,, | Performed by: ANESTHESIOLOGY

## 2023-03-20 PROCEDURE — 86900 BLOOD TYPING SEROLOGIC ABO: CPT | Performed by: PHYSICIAN ASSISTANT

## 2023-03-20 PROCEDURE — 88341 IMHCHEM/IMCYTCHM EA ADD ANTB: CPT | Mod: 26,,, | Performed by: PATHOLOGY

## 2023-03-20 PROCEDURE — 32669 THORACOSCOPY REMOVE SEGMENT: CPT | Mod: 22,LT,, | Performed by: THORACIC SURGERY (CARDIOTHORACIC VASCULAR SURGERY)

## 2023-03-20 PROCEDURE — 32669 PR THORACOSCOPY REMOVE SEGMENT: ICD-10-PCS | Mod: 22,LT,, | Performed by: THORACIC SURGERY (CARDIOTHORACIC VASCULAR SURGERY)

## 2023-03-20 PROCEDURE — 32674 THORACOSCOPY LYMPH NODE EXC: CPT | Mod: AS,,,

## 2023-03-20 PROCEDURE — 25000003 PHARM REV CODE 250: Performed by: PHYSICIAN ASSISTANT

## 2023-03-20 PROCEDURE — 81445 SO NEO GSAP 5-50DNA/DNA&RNA: CPT | Performed by: PATHOLOGY

## 2023-03-20 PROCEDURE — 32674 PR THORACOSCOPY LYMPH NODE EXC: ICD-10-PCS | Mod: AS,,,

## 2023-03-20 PROCEDURE — 88309 TISSUE EXAM BY PATHOLOGIST: CPT | Mod: 26,,, | Performed by: PATHOLOGY

## 2023-03-20 PROCEDURE — 36415 COLL VENOUS BLD VENIPUNCTURE: CPT | Performed by: THORACIC SURGERY (CARDIOTHORACIC VASCULAR SURGERY)

## 2023-03-20 RX ORDER — ACETAMINOPHEN 500 MG
1000 TABLET ORAL ONCE
Status: COMPLETED | OUTPATIENT
Start: 2023-03-20 | End: 2023-03-20

## 2023-03-20 RX ORDER — METOCLOPRAMIDE HYDROCHLORIDE 5 MG/ML
5 INJECTION INTRAMUSCULAR; INTRAVENOUS EVERY 6 HOURS PRN
Status: DISCONTINUED | OUTPATIENT
Start: 2023-03-20 | End: 2023-03-22 | Stop reason: HOSPADM

## 2023-03-20 RX ORDER — DEXAMETHASONE SODIUM PHOSPHATE 4 MG/ML
INJECTION, SOLUTION INTRA-ARTICULAR; INTRALESIONAL; INTRAMUSCULAR; INTRAVENOUS; SOFT TISSUE
Status: DISCONTINUED | OUTPATIENT
Start: 2023-03-20 | End: 2023-03-20

## 2023-03-20 RX ORDER — AMOXICILLIN 250 MG
1 CAPSULE ORAL DAILY
Status: DISCONTINUED | OUTPATIENT
Start: 2023-03-20 | End: 2023-03-22 | Stop reason: HOSPADM

## 2023-03-20 RX ORDER — ENOXAPARIN SODIUM 100 MG/ML
40 INJECTION SUBCUTANEOUS EVERY 24 HOURS
Status: DISCONTINUED | OUTPATIENT
Start: 2023-03-21 | End: 2023-03-22 | Stop reason: HOSPADM

## 2023-03-20 RX ORDER — GABAPENTIN 300 MG/1
300 CAPSULE ORAL 3 TIMES DAILY
Status: DISCONTINUED | OUTPATIENT
Start: 2023-03-20 | End: 2023-03-20

## 2023-03-20 RX ORDER — OXYCODONE HYDROCHLORIDE 10 MG/1
10 TABLET ORAL EVERY 4 HOURS PRN
Status: DISCONTINUED | OUTPATIENT
Start: 2023-03-20 | End: 2023-03-22 | Stop reason: HOSPADM

## 2023-03-20 RX ORDER — POLYETHYLENE GLYCOL 3350 17 G/17G
17 POWDER, FOR SOLUTION ORAL DAILY
Status: DISCONTINUED | OUTPATIENT
Start: 2023-03-20 | End: 2023-03-22 | Stop reason: HOSPADM

## 2023-03-20 RX ORDER — OXYCODONE HYDROCHLORIDE 5 MG/1
5 TABLET ORAL EVERY 4 HOURS PRN
Status: DISCONTINUED | OUTPATIENT
Start: 2023-03-20 | End: 2023-03-22 | Stop reason: HOSPADM

## 2023-03-20 RX ORDER — ONDANSETRON 8 MG/1
8 TABLET, ORALLY DISINTEGRATING ORAL EVERY 8 HOURS PRN
Status: DISCONTINUED | OUTPATIENT
Start: 2023-03-20 | End: 2023-03-22 | Stop reason: HOSPADM

## 2023-03-20 RX ORDER — KETAMINE HCL IN 0.9 % NACL 50 MG/5 ML
SYRINGE (ML) INTRAVENOUS
Status: DISCONTINUED | OUTPATIENT
Start: 2023-03-20 | End: 2023-03-20

## 2023-03-20 RX ORDER — ONDANSETRON 2 MG/ML
INJECTION INTRAMUSCULAR; INTRAVENOUS
Status: DISCONTINUED | OUTPATIENT
Start: 2023-03-20 | End: 2023-03-20

## 2023-03-20 RX ORDER — LIDOCAINE HYDROCHLORIDE 20 MG/ML
INJECTION, SOLUTION EPIDURAL; INFILTRATION; INTRACAUDAL; PERINEURAL
Status: DISCONTINUED | OUTPATIENT
Start: 2023-03-20 | End: 2023-03-20

## 2023-03-20 RX ORDER — ROCURONIUM BROMIDE 10 MG/ML
INJECTION, SOLUTION INTRAVENOUS
Status: DISCONTINUED | OUTPATIENT
Start: 2023-03-20 | End: 2023-03-20

## 2023-03-20 RX ORDER — ACETAMINOPHEN 500 MG
1000 TABLET ORAL
Status: DISCONTINUED | OUTPATIENT
Start: 2023-03-20 | End: 2023-03-20 | Stop reason: HOSPADM

## 2023-03-20 RX ORDER — ACETAMINOPHEN 500 MG
1000 TABLET ORAL EVERY 8 HOURS
Status: DISCONTINUED | OUTPATIENT
Start: 2023-03-20 | End: 2023-03-22 | Stop reason: HOSPADM

## 2023-03-20 RX ORDER — FENTANYL CITRATE 50 UG/ML
25 INJECTION, SOLUTION INTRAMUSCULAR; INTRAVENOUS EVERY 5 MIN PRN
Status: DISCONTINUED | OUTPATIENT
Start: 2023-03-20 | End: 2023-03-21

## 2023-03-20 RX ORDER — CELECOXIB 200 MG/1
400 CAPSULE ORAL
Status: COMPLETED | OUTPATIENT
Start: 2023-03-20 | End: 2023-03-20

## 2023-03-20 RX ORDER — PHENYLEPHRINE HCL IN 0.9% NACL 1 MG/10 ML
SYRINGE (ML) INTRAVENOUS
Status: DISCONTINUED | OUTPATIENT
Start: 2023-03-20 | End: 2023-03-20

## 2023-03-20 RX ORDER — INDOCYANINE GREEN AND WATER 25 MG
KIT INJECTION
Status: DISCONTINUED | OUTPATIENT
Start: 2023-03-20 | End: 2023-03-20

## 2023-03-20 RX ORDER — GABAPENTIN 300 MG/1
300 CAPSULE ORAL NIGHTLY
Status: DISCONTINUED | OUTPATIENT
Start: 2023-03-21 | End: 2023-03-22 | Stop reason: HOSPADM

## 2023-03-20 RX ORDER — FENTANYL CITRATE 50 UG/ML
INJECTION, SOLUTION INTRAMUSCULAR; INTRAVENOUS
Status: DISCONTINUED | OUTPATIENT
Start: 2023-03-20 | End: 2023-03-20

## 2023-03-20 RX ORDER — ENOXAPARIN SODIUM 100 MG/ML
40 INJECTION SUBCUTANEOUS EVERY 24 HOURS
Status: DISCONTINUED | OUTPATIENT
Start: 2023-03-20 | End: 2023-03-20

## 2023-03-20 RX ORDER — LEVOTHYROXINE SODIUM 75 UG/1
75 TABLET ORAL DAILY
Status: DISCONTINUED | OUTPATIENT
Start: 2023-03-21 | End: 2023-03-22 | Stop reason: HOSPADM

## 2023-03-20 RX ORDER — BUPIVACAINE HYDROCHLORIDE 5 MG/ML
INJECTION, SOLUTION EPIDURAL; INTRACAUDAL
Status: DISCONTINUED | OUTPATIENT
Start: 2023-03-20 | End: 2023-03-20 | Stop reason: HOSPADM

## 2023-03-20 RX ORDER — DEXMEDETOMIDINE HYDROCHLORIDE 100 UG/ML
INJECTION, SOLUTION INTRAVENOUS
Status: DISCONTINUED | OUTPATIENT
Start: 2023-03-20 | End: 2023-03-20

## 2023-03-20 RX ORDER — BISACODYL 10 MG
10 SUPPOSITORY, RECTAL RECTAL DAILY PRN
Status: DISCONTINUED | OUTPATIENT
Start: 2023-03-20 | End: 2023-03-22 | Stop reason: HOSPADM

## 2023-03-20 RX ORDER — FAMOTIDINE 20 MG/1
20 TABLET, FILM COATED ORAL 2 TIMES DAILY
Status: DISCONTINUED | OUTPATIENT
Start: 2023-03-20 | End: 2023-03-22 | Stop reason: HOSPADM

## 2023-03-20 RX ORDER — PROPOFOL 10 MG/ML
VIAL (ML) INTRAVENOUS
Status: DISCONTINUED | OUTPATIENT
Start: 2023-03-20 | End: 2023-03-20

## 2023-03-20 RX ORDER — CEFAZOLIN SODIUM 1 G/3ML
INJECTION, POWDER, FOR SOLUTION INTRAMUSCULAR; INTRAVENOUS
Status: DISCONTINUED | OUTPATIENT
Start: 2023-03-20 | End: 2023-03-20

## 2023-03-20 RX ORDER — PROCHLORPERAZINE EDISYLATE 5 MG/ML
5 INJECTION INTRAMUSCULAR; INTRAVENOUS EVERY 30 MIN PRN
Status: ACTIVE | OUTPATIENT
Start: 2023-03-20

## 2023-03-20 RX ORDER — METHOCARBAMOL 500 MG/1
500 TABLET, FILM COATED ORAL 4 TIMES DAILY
Status: DISCONTINUED | OUTPATIENT
Start: 2023-03-20 | End: 2023-03-22 | Stop reason: HOSPADM

## 2023-03-20 RX ADMIN — Medication 100 MCG: at 09:03

## 2023-03-20 RX ADMIN — ACETAMINOPHEN 1000 MG: 500 TABLET ORAL at 01:03

## 2023-03-20 RX ADMIN — CEFAZOLIN 2 G: 2 INJECTION, POWDER, FOR SOLUTION INTRAMUSCULAR; INTRAVENOUS at 09:03

## 2023-03-20 RX ADMIN — METHOCARBAMOL 500 MG: 500 TABLET ORAL at 04:03

## 2023-03-20 RX ADMIN — ROCURONIUM BROMIDE 20 MG: 10 INJECTION INTRAVENOUS at 07:03

## 2023-03-20 RX ADMIN — ROCURONIUM BROMIDE 10 MG: 10 INJECTION INTRAVENOUS at 09:03

## 2023-03-20 RX ADMIN — Medication 50 MCG: at 10:03

## 2023-03-20 RX ADMIN — ROCURONIUM BROMIDE 10 MG: 10 INJECTION INTRAVENOUS at 08:03

## 2023-03-20 RX ADMIN — Medication 20 MG: at 07:03

## 2023-03-20 RX ADMIN — Medication 100 MCG: at 08:03

## 2023-03-20 RX ADMIN — GABAPENTIN 300 MG: 300 CAPSULE ORAL at 02:03

## 2023-03-20 RX ADMIN — CELECOXIB 400 MG: 200 CAPSULE ORAL at 06:03

## 2023-03-20 RX ADMIN — SENNOSIDES AND DOCUSATE SODIUM 1 TABLET: 50; 8.6 TABLET ORAL at 01:03

## 2023-03-20 RX ADMIN — DEXMEDETOMIDINE 8 MCG: 100 INJECTION, SOLUTION INTRAVENOUS at 08:03

## 2023-03-20 RX ADMIN — INDOCYANINE GREEN AND WATER 10 MG: KIT at 11:03

## 2023-03-20 RX ADMIN — Medication 10 MG: at 10:03

## 2023-03-20 RX ADMIN — CEFAZOLIN 2 G: 330 INJECTION, POWDER, FOR SOLUTION INTRAMUSCULAR; INTRAVENOUS at 07:03

## 2023-03-20 RX ADMIN — PROPOFOL 100 MG: 10 INJECTION, EMULSION INTRAVENOUS at 07:03

## 2023-03-20 RX ADMIN — Medication 50 MCG: at 09:03

## 2023-03-20 RX ADMIN — PROPOFOL 50 MG: 10 INJECTION, EMULSION INTRAVENOUS at 07:03

## 2023-03-20 RX ADMIN — Medication 100 MCG: at 11:03

## 2023-03-20 RX ADMIN — ROCURONIUM BROMIDE 20 MG: 10 INJECTION INTRAVENOUS at 11:03

## 2023-03-20 RX ADMIN — SUGAMMADEX 200 MG: 100 INJECTION, SOLUTION INTRAVENOUS at 12:03

## 2023-03-20 RX ADMIN — POLYETHYLENE GLYCOL 3350 17 G: 17 POWDER, FOR SOLUTION ORAL at 01:03

## 2023-03-20 RX ADMIN — DEXMEDETOMIDINE 8 MCG: 100 INJECTION, SOLUTION INTRAVENOUS at 09:03

## 2023-03-20 RX ADMIN — DEXAMETHASONE SODIUM PHOSPHATE 4 MG: 4 INJECTION INTRA-ARTICULAR; INTRALESIONAL; INTRAMUSCULAR; INTRAVENOUS; SOFT TISSUE at 07:03

## 2023-03-20 RX ADMIN — GLYCOPYRROLATE 0.2 MG: 0.2 INJECTION, SOLUTION INTRAMUSCULAR; INTRAVENOUS at 07:03

## 2023-03-20 RX ADMIN — FENTANYL CITRATE 25 MCG: 50 INJECTION INTRAMUSCULAR; INTRAVENOUS at 01:03

## 2023-03-20 RX ADMIN — GABAPENTIN 400 MG: 300 CAPSULE ORAL at 06:03

## 2023-03-20 RX ADMIN — SODIUM CHLORIDE: 0.9 INJECTION, SOLUTION INTRAVENOUS at 06:03

## 2023-03-20 RX ADMIN — ACETAMINOPHEN 1000 MG: 500 TABLET ORAL at 09:03

## 2023-03-20 RX ADMIN — ONDANSETRON 4 MG: 2 INJECTION INTRAMUSCULAR; INTRAVENOUS at 12:03

## 2023-03-20 RX ADMIN — OXYCODONE HYDROCHLORIDE 10 MG: 10 TABLET ORAL at 01:03

## 2023-03-20 RX ADMIN — CEFAZOLIN 2 G: 330 INJECTION, POWDER, FOR SOLUTION INTRAMUSCULAR; INTRAVENOUS at 11:03

## 2023-03-20 RX ADMIN — Medication 10 MG: at 09:03

## 2023-03-20 RX ADMIN — ACETAMINOPHEN 1000 MG: 500 TABLET ORAL at 06:03

## 2023-03-20 RX ADMIN — DEXMEDETOMIDINE 8 MCG: 100 INJECTION, SOLUTION INTRAVENOUS at 12:03

## 2023-03-20 RX ADMIN — Medication 10 MG: at 08:03

## 2023-03-20 RX ADMIN — FENTANYL CITRATE 100 MCG: 50 INJECTION INTRAMUSCULAR; INTRAVENOUS at 07:03

## 2023-03-20 RX ADMIN — ROCURONIUM BROMIDE 40 MG: 10 INJECTION INTRAVENOUS at 07:03

## 2023-03-20 RX ADMIN — METHOCARBAMOL 500 MG: 500 TABLET ORAL at 09:03

## 2023-03-20 RX ADMIN — LIDOCAINE HYDROCHLORIDE 100 MG: 20 INJECTION, SOLUTION EPIDURAL; INFILTRATION; INTRACAUDAL at 07:03

## 2023-03-20 RX ADMIN — FAMOTIDINE 20 MG: 20 TABLET ORAL at 09:03

## 2023-03-20 RX ADMIN — ROCURONIUM BROMIDE 10 MG: 10 INJECTION INTRAVENOUS at 10:03

## 2023-03-20 RX ADMIN — PROPOFOL 50 MG: 10 INJECTION, EMULSION INTRAVENOUS at 11:03

## 2023-03-20 NOTE — ANESTHESIA PROCEDURE NOTES
Intubation    Date/Time: 3/20/2023 7:21 AM  Performed by: Adrien Adam MD  Authorized by: Lizandro Degroot MD     Intubation:     Induction:  Intravenous    Intubated:  Postinduction    Mask Ventilation:  Easy mask    Attempts:  1    Attempted By:  Resident anesthesiologist    Method of Intubation:  Direct and video laryngoscopy    Blade:  Thompson 3    Laryngeal View Grade: Grade I - full view of cords      Difficult Airway Encountered?: No      Complications:  None    Airway Device:  Oral endotracheal tube    Airway Device Size:  35F    Style/Cuff Inflation:  Cuffed (inflated to minimal occlusive pressure)    Tube secured:  27    Secured at:  The lips    Placement Verified By:  Capnometry and Fiber optic visualization    Complicating Factors:  None    Findings Post-Intubation:  BS equal bilateral and atraumatic/condition of teeth unchanged

## 2023-03-20 NOTE — ANESTHESIA PROCEDURE NOTES
Arterial    Diagnosis: Lung Cancer    Patient location during procedure: done in OR  Procedure start time: 3/20/2023 7:20 AM  Timeout: 3/20/2023 7:20 AM  Procedure end time: 3/20/2023 7:22 AM    Staffing  Authorizing Provider: Lizandro Degroot MD  Performing Provider: Adrien Adam MD    Anesthesiologist was present at the time of the procedure.    Preanesthetic Checklist  Completed: patient identified, IV checked, site marked, risks and benefits discussed, surgical consent, monitors and equipment checked, pre-op evaluation, timeout performed and anesthesia consent givenArterial  Skin Prep: chlorhexidine gluconate  Local Infiltration: none  Orientation: left  Location: radial    Catheter Size: 20 G  Catheter placement by Anatomical landmarks. Heme positive aspiration all ports. Insertion Attempts: 1  Assessment  Dressing: secured with tape and tegaderm  Patient: Tolerated well

## 2023-03-20 NOTE — OP NOTE
Date of Surgery: 3/20/2023  Preoperative Diagnosis:  Left upper lobe lung nodule, history of left lower lobe NSCLC  Postoperative Diagnosis: Same  Procedure:  Left VATS, left upper lobe wedge, robotic lysis of adhesions, anatomic lingulectomy, mediastinal lymph node dissection, intercostal nerve block 2 or more intercostal levels  Surgeon: Refugio Medley MD  First Assistant: Malia Naranjo MD  Anesthesia: GETA  EBL: 20 mL    Surgery in Detail:     The patient has a history of left lower lobe non-small cell lung cancer, status post left VATS nonanatomic left lower lobe wedge resection.  She now has a spiculated left upper lobe lingular nodule.  Attempted CT-guided biopsy was nondiagnostic.  Excisional biopsy and further indicated therapy is indicated.      The patient was taken to the operating room placed supine and identified.  General anesthesia was established with double-lumen tube placement.  Tube positioning was confirmed fiberoptically.  The patient was positioned in a right lateral decubitus position all pressure points were padded.  A time-out was performed.  The left chest was prepped and draped.  An 8 mm port was placed in the 8th interspace posterior axillary line.  A robotic scope was inserted.  There were moderate adhesions throughout the left chest cavity.  Additional ports were placed along the 8th interspace anteriorly and posteriorly.  The target nodule was identified within the lingular segment and resected using a linear Endo stapler with blue load.  Frozen section analysis was consistent with malignancy.  Additional robotic port sites were placed along the 8th and 10th interspaces and a 7 level intercostal nerve block was performed.  Carbon dioxide insufflation was administered.  The robot was docked.  An extensive adhesiolysis was performed which required an hour tedious dissection.  A bifurcating branch of the lingular artery was mobilized and divided using a robotic white load.  The  inferior aspect of the oblique fissure was completed after harvesting a level 11 yana packet.  The lingular vein was divided after harvesting a firm left level 10 node.  Frozen section analysis of the node was positive for malignancy.  A level 12 node was harvested and the lingular bronchus was mobilized and divided using a robotic green load.  Intravenous ICG was given and the parenchymal margin was identified using the Xi firefly setting.  The parenchyma resection was performed using several fires of robotic blue loads.  The posterior hilar pleura was opened and subcarinal node harvested.  The aortopulmonary window was explored and a large level 5 yana packet harvested.  The operative field was hemostatic.  The lingulectomy specimen was placed in a large endobag and extracted through an enlarged anterior port.  A 24 Pashto chest drain was inserted and secured using silk suture.  All robotic port sites were hemostatic.  Left lung ventilation was restored with complete lung re-expansion.  All port sites and the extraction site were closed in multiple layers using absorbable suture.  A sterile dressing was applied.  The patient was extubated and transported to the PACU in stable condition.      Bedside assistant attestation:  Lizandro Martinez PA-C functioned as a bedside 1st assistant.  His duties included robotic docking, instrument exchange, and specimen retrieval throughout the entire surgery.  There was no qualified resident available to function as a bedside first assistant given the case complexity and extent of duties described above.     Attending attestation: I was present for and either directly assisted with or performed the critical and key portions of the procedure.     Thoracic (Pulmonary) Cancer - Synoptic Operative Report Summary    Side of surgery Left   Surgical approach Robotic   Tumor type Other - NSCLC   Which lymph nodes were submitted as separate specimens? 5 - Subaortic, 7 - Subcarinal, 10L -  Hilar (left), 11L - Interlobar (left), and 12L - Lobar (left)   What pre-operative therapies did the patient receive? None

## 2023-03-20 NOTE — H&P
Patient seen and examined. H&P has been reviewed and is included below.    I agree with the findings and no changes have occurred since H&P was written.    Anesthesia/Surgery risks, benefits, and alternative options discussed and understood by the patient who wishes to proceed.    To OR for planned procedure      Malia Naranjo MD  General Surgery Resident, PGY-IV  Pager: [phone]  3/20/2023 6:35 AM      Past surgery: 04/28/2021: Left VATS Lower Lobe Wedge Resection     Pathology: 9mm non-keratinizing squamous cell carcinoma, no VPI, no LVI, margin at least 8mm.  Levels 9 and 11 = negative.  T1aN0     The patient was initially followed at a 4 month interval due to the sub lobar nature of the surgery and concerns about a higher local regional recurrence rate.  Her follow-up intervals have since then extended to every 6 months.  She has no complaints.     Chest CT 8/2/22:  I reviewed the images     No evidence of recurrent or metastatic disease.     Chest CT 02/08/23: I reviewed the images and compared to the 8/2/22 exam:  . There is an irregular pulmonary nodule in the anterior aspect of the left upper lobe.  On the current examination it measures 8 mm in craniocaudal dimension by 8 mm in AP dimension by 8 mm in medial-lateral dimension. On the prior examination it measured 6 mm in craniocaudal dimension by 5 mm in AP dimension by 8 mm in medial-lateral dimension.  This is consistent with the patient's history and characteristic of metastatic disease.  2. There is an ill-defined area of increased density adjacent to the anterior right side of the mediastinum. On the current examination this area measures 27 mm in craniocaudal dimension by 19 mm in AP dimension by 13 mm in medial-lateral dimension.  On the prior examination it measured 28 mm in craniocaudal dimension by 19 mm in AP dimension by 14 mm in medial-lateral dimension.  3. There are hypodense areas scattered throughout the liver. One of the larger ones  measures 16 mm and is located in the left lobe of the liver.  It has a Hounsfield measurement of -5.  This is characteristic of a cyst.  4. There is an 8 mm oval shaped area of hypodensity in the posterolateral aspect of the midpole of the left kidney. This area has a Hounsfield measurement of -1.  This is characteristic of a cyst.  5. There are moderate degenerative changes in the thoracic spine.  All CT scans at this facility use dose modulation, iterative reconstruction, and/or weight base dosing when appropriate to reduce radiation dose when appropriate to reduce radiation dose to as low as reasonably achievable.     Assessment:     H/o resected lung cancer  NANI nodule shows minimal size increase  RML density like c/w atelectasis     Plan:  73F with history of left sided pulmonary nodule s/p non-diagnostic IR guided biopsy. She presents today for planned RATS with left lung wedge resection, possible segmentectomy.    - To OR for planned procedure.   - Consent obtained, risks and benefits discussed    Malia Naranjo MD  General Surgery, PGY-IV  Pager: [phone]  3/20/2023 6:35 AM

## 2023-03-20 NOTE — TRANSFER OF CARE
"Anesthesia Transfer of Care Note    Patient: Radha Lamas    Procedure(s) Performed: Procedure(s) (LRB):  XI ROBOTIC WEDGE RESECTION, LUNG (RATS); segmentectomy (Left)  LYSIS, ADHESIONS, LAPAROSCOPIC (Left)  XI ROBOTIC LYMPHADENECTOMY (Left)  BLOCK, NERVE, INTERCOSTAL, 2 OR MORE (Left)    Patient location: PACU    Anesthesia Type: general    Transport from OR: Transported from OR on 6-10 L/min O2 by face mask with adequate spontaneous ventilation    Post pain: adequate analgesia    Post assessment: no apparent anesthetic complications and tolerated procedure well    Post vital signs: stable    Level of consciousness: awake    Nausea/Vomiting: no nausea/vomiting    Complications: none    Transfer of care protocol was followed      Last vitals:   Visit Vitals  /69   Pulse 70   Temp 36.2 °C (97.2 °F) (Temporal)   Resp 16   Ht 5' 5" (1.651 m)   Wt 74.8 kg (165 lb)   SpO2 100%   Breastfeeding No   BMI 27.46 kg/m²     "

## 2023-03-21 PROCEDURE — 25000003 PHARM REV CODE 250: Performed by: PHYSICIAN ASSISTANT

## 2023-03-21 PROCEDURE — 63600175 PHARM REV CODE 636 W HCPCS

## 2023-03-21 PROCEDURE — 20600001 HC STEP DOWN PRIVATE ROOM

## 2023-03-21 PROCEDURE — 25000003 PHARM REV CODE 250

## 2023-03-21 PROCEDURE — 63600175 PHARM REV CODE 636 W HCPCS: Performed by: STUDENT IN AN ORGANIZED HEALTH CARE EDUCATION/TRAINING PROGRAM

## 2023-03-21 RX ADMIN — METHOCARBAMOL 500 MG: 500 TABLET ORAL at 01:03

## 2023-03-21 RX ADMIN — CEFAZOLIN 2 G: 2 INJECTION, POWDER, FOR SOLUTION INTRAMUSCULAR; INTRAVENOUS at 04:03

## 2023-03-21 RX ADMIN — GABAPENTIN 300 MG: 300 CAPSULE ORAL at 09:03

## 2023-03-21 RX ADMIN — LEVOTHYROXINE SODIUM 75 MCG: 75 TABLET ORAL at 08:03

## 2023-03-21 RX ADMIN — ACETAMINOPHEN 1000 MG: 500 TABLET ORAL at 09:03

## 2023-03-21 RX ADMIN — SENNOSIDES AND DOCUSATE SODIUM 1 TABLET: 50; 8.6 TABLET ORAL at 08:03

## 2023-03-21 RX ADMIN — ENOXAPARIN SODIUM 40 MG: 40 INJECTION SUBCUTANEOUS at 05:03

## 2023-03-21 RX ADMIN — ACETAMINOPHEN 1000 MG: 500 TABLET ORAL at 01:03

## 2023-03-21 RX ADMIN — ACETAMINOPHEN 1000 MG: 500 TABLET ORAL at 04:03

## 2023-03-21 RX ADMIN — FAMOTIDINE 20 MG: 20 TABLET ORAL at 09:03

## 2023-03-21 RX ADMIN — METHOCARBAMOL 500 MG: 500 TABLET ORAL at 05:03

## 2023-03-21 RX ADMIN — METHOCARBAMOL 500 MG: 500 TABLET ORAL at 08:03

## 2023-03-21 RX ADMIN — FAMOTIDINE 20 MG: 20 TABLET ORAL at 08:03

## 2023-03-21 RX ADMIN — POLYETHYLENE GLYCOL 3350 17 G: 17 POWDER, FOR SOLUTION ORAL at 08:03

## 2023-03-21 NOTE — PROGRESS NOTES
[facility] - [facility]  Thoracic Surgery  Progress Note    Subjective:     History of Present Illness:  73 year old female with hx for left VATS lower lobe wedge resection in April 2021 for T1aN0 Squamous cell carcinoma (atleast 8mm parenchymal margin). Under close surveillance with CT for nonanatomic sublobar resection. Most recent CT showed mild enlargement of NANI nodule. Percutaneous biopsy non- diagnostic.       Post-Op Info:  Procedure(s) (LRB):  XI ROBOTIC WEDGE RESECTION, LUNG (RATS); segmentectomy (Left)  LYSIS, ADHESIONS, LAPAROSCOPIC (Left)  XI ROBOTIC LYMPHADENECTOMY (Left)  BLOCK, NERVE, INTERCOSTAL, 2 OR MORE (Left)   1 Day Post-Op     Interval History: NAEON. AFVSS.  ml output overnight. No air leak present on AM rounds. CT on waterseal. Voiding. Pain controlled.     Medications:  Continuous Infusions:  Scheduled Meds:   acetaminophen  1,000 mg Oral Q8H    enoxaparin  40 mg Subcutaneous Daily    famotidine  20 mg Oral BID    gabapentin  300 mg Oral QHS    levothyroxine  75 mcg Oral Daily    methocarbamoL  500 mg Oral QID    polyethylene glycol  17 g Oral Daily    senna-docusate 8.6-50 mg  1 tablet Oral Daily     PRN Meds:bisacodyL, fentaNYL, metoclopramide HCl, ondansetron, oxyCODONE, oxyCODONE, prochlorperazine     Review of patient's allergies indicates:  No Known Allergies  Objective:     Vital Signs (Most Recent):  Temp: 98 °F (36.7 °C) (03/21/23 0718)  Pulse: 63 (03/21/23 0718)  Resp: 18 (03/21/23 0718)  BP: 130/63 (03/21/23 0718)  SpO2: (!) 94 % (03/21/23 0718)   Vital Signs (24h Range):  Temp:  [97.2 °F (36.2 °C)-98.1 °F (36.7 °C)] 98 °F (36.7 °C)  Pulse:  [63-84] 63  Resp:  [9-23] 18  SpO2:  [90 %-100 %] 94 %  BP: (112-146)/(60-85) 130/63     Intake/Output - Last 3 Shifts         03/19 0700 03/20 0659 03/20 0700 03/21 0659 03/21 0700 03/22 0659    P.O.  400     IV Piggyback  900     Total Intake(mL/kg)  1300 (17.4)     Urine (mL/kg/hr)  650 (0.4)     Chest Tube  220     Total Output   870     Net  +430                    SpO2: (!) 94 %       Physical Exam  Constitutional:       Appearance: Normal appearance.   Eyes:      Extraocular Movements: Extraocular movements intact.      Pupils: Pupils are equal, round, and reactive to light.   Cardiovascular:      Rate and Rhythm: Normal rate and regular rhythm.      Pulses: Normal pulses.   Pulmonary:      Effort: Pulmonary effort is normal.      Breath sounds: Normal breath sounds.      Comments: CT in place on left side.   Abdominal:      General: Abdomen is flat.      Palpations: Abdomen is soft.   Skin:     General: Skin is warm and dry.   Neurological:      General: No focal deficit present.      Mental Status: She is alert and oriented to person, place, and time. Mental status is at baseline.   Psychiatric:         Mood and Affect: Mood normal.         Behavior: Behavior normal.         Thought Content: Thought content normal.       Significant Labs:  All pertinent labs from the last 24 hours have been reviewed.    Significant Diagnostics:  CXR: I have reviewed all pertinent results/findings within the past 24 hours    VTE Risk Mitigation (From admission, onward)           Ordered     enoxaparin injection 40 mg  Daily         03/20/23 1535     IP VTE HIGH RISK PATIENT  Once         03/20/23 1305     Place MARVEL hose  Until discontinued         03/20/23 1305     Place sequential compression device  Until discontinued         03/20/23 1305                  Assessment/Plan:     Malignant neoplasm of lower lobe of left lung - Squamous cell  73 year old female with hx for left VATS lower lobe wedge resection in April 2021 for T1aN0 Squamous cell carcinoma who is POD1 s/p Left VATS with left upper lobe wedge and robotic lysis of adhesions with anatomic lingulectomy.     - CT clamp trial this AM. CXR @11. Pending result, pull CT versus unclamp and leave in place.   - Possible discharge home today versus tomorrow.   - Multimodal pain management  - Daily  CXR  - OOB, ambulate as tolerated  - Encourage IS use  - DVT ppx    Dispo: possible home today        Lizandro Martinez, PAJamesC  Thoracic Surgery  [facility]

## 2023-03-21 NOTE — HPI
73 year old female with hx for left VATS lower lobe wedge resection in April 2021 for T1aN0 Squamous cell carcinoma (atleast 8mm parenchymal margin). Under close surveillance with CT for nonanatomic sublobar resection. Most recent CT showed mild enlargement of NANI nodule. Percutaneous biopsy non- diagnostic.

## 2023-03-21 NOTE — PLAN OF CARE
[facility]  Initial Discharge Assessment       Primary Care Provider: Ab Fletcher MD    Admission Diagnosis: NSCLC of left lung [C34.92]  Lung nodule [R91.1]    Admission Date: 3/20/2023  Expected Discharge Date:     Discharge Barriers Identified: None    Payor: [employer] MANAGED MEDICARE / Plan: [employer] MEDICARE PLAN PPO / Product Type: Medicare Advantage /     Extended Emergency Contact Information  Primary Emergency Contact: Onel Lamas  Address: [address] [place] [address] [place]  Mobile Phone: [phone]  Relation: Spouse    Discharge Plan A: Home with family         [facility] - [place], [place] - [address] [facility]  [address] [facility] [address]  Phone: [phone] Fax: [phone]    [facility] [address] [place] [address] [address] [facility]  [address] [facility] [address]  Phone: [phone] Fax: [phone]      Initial Assessment (most recent)       Adult Discharge Assessment - 03/21/23 1257          Discharge Assessment    Assessment Type Discharge Planning Brief Assessment     Confirmed/corrected address, phone number and insurance No     Reason No family to provide information/patient unable to answer     Source of Information patient     When was your last doctors appointment? 02/28/23     Does patient/caregiver understand observation status Yes     Communicated PJ with patient/caregiver Yes     Reason For Admission NSCLC     People in Home spouse     Facility Arrived From: Home     Do you expect to return to your current living situation? Yes     Do you have help at home or someone to help you manage your care at home? Yes     Who are your caregiver(s) and their phone number(s)? Onel-[phone]     Prior to hospitilization cognitive status: Alert/Oriented     Current cognitive status: Alert/Oriented     Home Layout Able to live on 1st floor     Equipment Currently Used at  Home none     Readmission within 30 days? No     Patient currently being followed by outpatient case management? No     Do you currently have service(s) that help you manage your care at home? No     Do you take prescription medications? Yes     Do you have prescription coverage? Yes     Coverage [employer]     Do you have any problems affording any of your prescribed medications? No     Is the patient taking medications as prescribed? yes     Who is going to help you get home at discharge? Onel-[phone]     How do you get to doctors appointments? car, drives self     Are you on dialysis? No     Do you take coumadin? No     Discharge Plan A Home with family     DME Needed Upon Discharge  none     Discharge Plan discussed with: Patient     Discharge Barriers Identified None                          Spoke to pt. Pt lives at home with Onel-[phone]. Post hospital  stay  will be pt support person and pt. has transportation at d/c with . There have been no hospitalizations within the last 30 days per pt. Verified pt PCP and preferred pharmacy. Pt stated not on Coumadin and is not receiving dialysis. All questions answered regarding case management/ discharge planning , pt verbalized understanding. Discharge booklet with SW contact information given to pt.     Pina Resendiz LCSW  Case Management/[facility]  [phone]

## 2023-03-21 NOTE — ASSESSMENT & PLAN NOTE
73 year old female with hx for left VATS lower lobe wedge resection in April 2021 for T1aN0 Squamous cell carcinoma who is POD1 s/p Left VATS with left upper lobe wedge and robotic lysis of adhesions with anatomic lingulectomy.     - CT clamp trial this AM. CXR @11. Pending result, pull CT versus unclamp and leave in place.   - Possible discharge home today versus tomorrow.   - Multimodal pain management  - Daily CXR  - OOB, ambulate as tolerated  - Encourage IS use  - DVT ppx    Dispo: possible home today

## 2023-03-21 NOTE — NURSING
Patient A/Ox4, on room air, VSS, ambulated in posadas x4 with 1 person assist. Chest tube clamp trial today but returned to water seal. Patient has new subcutaneous emphysema causing minimal discomfort, thoracic surgery is aware. Patient did have facial flushing this shift possibly due to Robaxin, thoracic surgery also aware.  If any worsening of symptoms occur please call for benadryl. Patient is currently resting in chair with  at bedside. Safety measures in place.

## 2023-03-21 NOTE — ANESTHESIA POSTPROCEDURE EVALUATION
Anesthesia Post Evaluation    Patient: Radha Lamas    Procedure(s) Performed: Procedure(s) (LRB):  XI ROBOTIC WEDGE RESECTION, LUNG (RATS); segmentectomy (Left)  LYSIS, ADHESIONS, LAPAROSCOPIC (Left)  XI ROBOTIC LYMPHADENECTOMY (Left)  BLOCK, NERVE, INTERCOSTAL, 2 OR MORE (Left)    Final Anesthesia Type: general      Patient location during evaluation: PACU  Patient participation: Yes- Able to Participate  Level of consciousness: awake and alert  Post-procedure vital signs: reviewed and stable  Pain management: adequate  Airway patency: patent    PONV status at discharge: No PONV  Anesthetic complications: no      Cardiovascular status: blood pressure returned to baseline  Respiratory status: unassisted  Hydration status: euvolemic  Follow-up not needed.          Vitals Value Taken Time   /70 03/21/23 1108   Temp 36.3 °C (97.4 °F) 03/21/23 1108   Pulse 81 03/21/23 1221   Resp 20 03/21/23 1108   SpO2 95 % 03/21/23 1108         Event Time   Out of Recovery 13:35:00         Pain/Skye Score: Pain Rating Prior to Med Admin: 4 (3/21/2023  1:10 PM)  Pain Rating Post Med Admin: 0 (3/21/2023  5:24 AM)  Skye Score: 10 (3/20/2023  3:45 PM)

## 2023-03-21 NOTE — NURSING
Patient to floor from PACU. A/Ox4, VSS, on room air. Chest tube to suction and intermittent air leak noted, dressing C/D/I. Patient due to void by 2100.  updated at bedside with patient. Safety measures in place.

## 2023-03-21 NOTE — PLAN OF CARE
Problem: Adult Inpatient Plan of Care  Goal: Absence of Hospital-Acquired Illness or Injury  Outcome: Ongoing, Progressing  Goal: Optimal Comfort and Wellbeing  Outcome: Ongoing, Progressing  Goal: Readiness for Transition of Care  Outcome: Ongoing, Progressing     Problem: Infection  Goal: Absence of Infection Signs and Symptoms  Outcome: Ongoing, Progressing     Problem: Fall Injury Risk  Goal: Absence of Fall and Fall-Related Injury  Outcome: Ongoing, Progressing

## 2023-03-21 NOTE — SUBJECTIVE & OBJECTIVE
Interval History: NAEON. AFVSS.  ml output overnight. No air leak present on AM rounds. CT on waterseal. Voiding. Pain controlled.     Medications:  Continuous Infusions:  Scheduled Meds:   acetaminophen  1,000 mg Oral Q8H    enoxaparin  40 mg Subcutaneous Daily    famotidine  20 mg Oral BID    gabapentin  300 mg Oral QHS    levothyroxine  75 mcg Oral Daily    methocarbamoL  500 mg Oral QID    polyethylene glycol  17 g Oral Daily    senna-docusate 8.6-50 mg  1 tablet Oral Daily     PRN Meds:bisacodyL, fentaNYL, metoclopramide HCl, ondansetron, oxyCODONE, oxyCODONE, prochlorperazine     Review of patient's allergies indicates:  No Known Allergies  Objective:     Vital Signs (Most Recent):  Temp: 98 °F (36.7 °C) (03/21/23 0718)  Pulse: 63 (03/21/23 0718)  Resp: 18 (03/21/23 0718)  BP: 130/63 (03/21/23 0718)  SpO2: (!) 94 % (03/21/23 0718)   Vital Signs (24h Range):  Temp:  [97.2 °F (36.2 °C)-98.1 °F (36.7 °C)] 98 °F (36.7 °C)  Pulse:  [63-84] 63  Resp:  [9-23] 18  SpO2:  [90 %-100 %] 94 %  BP: (112-146)/(60-85) 130/63     Intake/Output - Last 3 Shifts         03/19 0700  03/20 0659 03/20 0700  03/21 0659 03/21 0700  03/22 0659    P.O.  400     IV Piggyback  900     Total Intake(mL/kg)  1300 (17.4)     Urine (mL/kg/hr)  650 (0.4)     Chest Tube  220     Total Output  870     Net  +430                    SpO2: (!) 94 %       Physical Exam  Constitutional:       Appearance: Normal appearance.   Eyes:      Extraocular Movements: Extraocular movements intact.      Pupils: Pupils are equal, round, and reactive to light.   Cardiovascular:      Rate and Rhythm: Normal rate and regular rhythm.      Pulses: Normal pulses.   Pulmonary:      Effort: Pulmonary effort is normal.      Breath sounds: Normal breath sounds.      Comments: CT in place on left side.   Abdominal:      General: Abdomen is flat.      Palpations: Abdomen is soft.   Skin:     General: Skin is warm and dry.   Neurological:      General: No focal deficit  present.      Mental Status: She is alert and oriented to person, place, and time. Mental status is at baseline.   Psychiatric:         Mood and Affect: Mood normal.         Behavior: Behavior normal.         Thought Content: Thought content normal.       Significant Labs:  All pertinent labs from the last 24 hours have been reviewed.    Significant Diagnostics:  CXR: I have reviewed all pertinent results/findings within the past 24 hours    VTE Risk Mitigation (From admission, onward)           Ordered     enoxaparin injection 40 mg  Daily         03/20/23 1535     IP VTE HIGH RISK PATIENT  Once         03/20/23 1305     Place MARVEL hose  Until discontinued         03/20/23 1305     Place sequential compression device  Until discontinued         03/20/23 1305

## 2023-03-22 VITALS
HEART RATE: 76 BPM | DIASTOLIC BLOOD PRESSURE: 65 MMHG | SYSTOLIC BLOOD PRESSURE: 132 MMHG | RESPIRATION RATE: 20 BRPM | BODY MASS INDEX: 27.49 KG/M2 | OXYGEN SATURATION: 98 % | HEIGHT: 65 IN | TEMPERATURE: 98 F | WEIGHT: 165 LBS

## 2023-03-22 PROCEDURE — 94799 UNLISTED PULMONARY SVC/PX: CPT

## 2023-03-22 PROCEDURE — 25000003 PHARM REV CODE 250

## 2023-03-22 PROCEDURE — 99900035 HC TECH TIME PER 15 MIN (STAT)

## 2023-03-22 PROCEDURE — 94761 N-INVAS EAR/PLS OXIMETRY MLT: CPT

## 2023-03-22 RX ORDER — METHOCARBAMOL 500 MG/1
500 TABLET, FILM COATED ORAL 4 TIMES DAILY
Qty: 40 TABLET | Refills: 0 | Status: SHIPPED | OUTPATIENT
Start: 2023-03-22 | End: 2023-04-01

## 2023-03-22 RX ORDER — OXYCODONE HYDROCHLORIDE 5 MG/1
5 TABLET ORAL EVERY 4 HOURS PRN
Qty: 41 TABLET | Refills: 0 | Status: SHIPPED | OUTPATIENT
Start: 2023-03-22

## 2023-03-22 RX ORDER — GABAPENTIN 300 MG/1
300 CAPSULE ORAL NIGHTLY
Qty: 30 CAPSULE | Refills: 11 | Status: SHIPPED | OUTPATIENT
Start: 2023-03-22 | End: 2024-03-21

## 2023-03-22 RX ADMIN — LEVOTHYROXINE SODIUM 75 MCG: 75 TABLET ORAL at 10:03

## 2023-03-22 RX ADMIN — POLYETHYLENE GLYCOL 3350 17 G: 17 POWDER, FOR SOLUTION ORAL at 10:03

## 2023-03-22 RX ADMIN — ACETAMINOPHEN 1000 MG: 500 TABLET ORAL at 05:03

## 2023-03-22 RX ADMIN — SENNOSIDES AND DOCUSATE SODIUM 1 TABLET: 50; 8.6 TABLET ORAL at 10:03

## 2023-03-22 RX ADMIN — FAMOTIDINE 20 MG: 20 TABLET ORAL at 10:03

## 2023-03-22 NOTE — PLAN OF CARE
Problem: Adult Inpatient Plan of Care  Goal: Plan of Care Review  Outcome: Ongoing, Progressing  Goal: Patient-Specific Goal (Individualized)  Outcome: Ongoing, Progressing  Goal: Absence of Hospital-Acquired Illness or Injury  Outcome: Ongoing, Progressing  Goal: Optimal Comfort and Wellbeing  Outcome: Ongoing, Progressing  Goal: Readiness for Transition of Care  Outcome: Ongoing, Progressing     Problem: Infection  Goal: Absence of Infection Signs and Symptoms  Outcome: Ongoing, Progressing     Problem: Fall Injury Risk  Goal: Absence of Fall and Fall-Related Injury  Outcome: Ongoing, Progressing     Pt. refused evening dose of Robaxin with concern that it is what was causing facial redness/flushing. Pt. Face is still red this AM but pt. says it is not as hot. Pain has been controlled with scheduled Tylenol. Chest tube had 80 ml serosanguinous output at the beginning of the shift before CT trial. Left shoulder still feels puffy and tender but no crepitus detected at this time. Awaiting AM CXR. Will continue to monitor.

## 2023-03-22 NOTE — PROGRESS NOTES
[facility] - [facility]  Thoracic Surgery  Progress Note    Subjective:     History of Present Illness:  73 year old female with hx for left VATS lower lobe wedge resection in April 2021 for T1aN0 Squamous cell carcinoma (atleast 8mm parenchymal margin). Under close surveillance with CT for nonanatomic sublobar resection. Most recent CT showed mild enlargement of NANI nodule. Percutaneous biopsy non- diagnostic.       Post-Op Info:  Procedure(s) (LRB):  XI ROBOTIC WEDGE RESECTION, LUNG (RATS); segmentectomy (Left)  LYSIS, ADHESIONS, LAPAROSCOPIC (Left)  XI ROBOTIC LYMPHADENECTOMY (Left)  BLOCK, NERVE, INTERCOSTAL, 2 OR MORE (Left)   2 Days Post-Op     Interval History: NAEON.  ml output overnight. No air leak. O2: 99% on RA. Voiding. Pain controlled.     Medications:  Continuous Infusions:  Scheduled Meds:   acetaminophen  1,000 mg Oral Q8H    enoxaparin  40 mg Subcutaneous Daily    famotidine  20 mg Oral BID    gabapentin  300 mg Oral QHS    levothyroxine  75 mcg Oral Daily    methocarbamoL  500 mg Oral QID    polyethylene glycol  17 g Oral Daily    senna-docusate 8.6-50 mg  1 tablet Oral Daily     PRN Meds:bisacodyL, metoclopramide HCl, ondansetron, oxyCODONE, oxyCODONE, prochlorperazine     Review of patient's allergies indicates:  No Known Allergies  Objective:     Vital Signs (Most Recent):  Temp: 98.2 °F (36.8 °C) (03/22/23 1116)  Pulse: 76 (03/22/23 1116)  Resp: 20 (03/22/23 1116)  BP: 132/65 (03/22/23 1116)  SpO2: 98 % (03/22/23 1116) Vital Signs (24h Range):  Temp:  [96.4 °F (35.8 °C)-98.2 °F (36.8 °C)] 98.2 °F (36.8 °C)  Pulse:  [] 76  Resp:  [16-20] 20  SpO2:  [94 %-99 %] 98 %  BP: (125-149)/(58-70) 132/65     Intake/Output - Last 3 Shifts         03/20 0700  03/21 0659 03/21 0700  03/22 0659 03/22 0700  03/23 0659    P.O. 400 720     IV Piggyback 900      Total Intake(mL/kg) 1300 (17.4) 720 (9.6)     Urine (mL/kg/hr) 650 (0.4) 2400 (1.3)     Chest Tube 220 130     Total Output 870 1930      Net +430 -1810                    SpO2: 98 %       Physical Exam  Constitutional:       Appearance: Normal appearance.   Eyes:      Extraocular Movements: Extraocular movements intact.      Pupils: Pupils are equal, round, and reactive to light.   Cardiovascular:      Rate and Rhythm: Normal rate and regular rhythm.      Pulses: Normal pulses.   Pulmonary:      Effort: Pulmonary effort is normal.      Breath sounds: Normal breath sounds.      Comments: CT in place on left side.   Abdominal:      General: Abdomen is flat.      Palpations: Abdomen is soft.   Skin:     General: Skin is warm and dry.   Neurological:      General: No focal deficit present.      Mental Status: She is alert and oriented to person, place, and time. Mental status is at baseline.   Psychiatric:         Mood and Affect: Mood normal.         Behavior: Behavior normal.         Thought Content: Thought content normal.       Significant Labs:  All pertinent labs from the last 24 hours have been reviewed.    Significant Diagnostics:  CXR: I have reviewed all pertinent results/findings within the past 24 hours    VTE Risk Mitigation (From admission, onward)           Ordered     enoxaparin injection 40 mg  Daily         03/20/23 1535     IP VTE HIGH RISK PATIENT  Once         03/20/23 1305     Place MARVEL hose  Until discontinued         03/20/23 1305     Place sequential compression device  Until discontinued         03/20/23 1305                  Assessment/Plan:     Malignant neoplasm of lower lobe of left lung - Squamous cell  73 year old female with hx for left VATS lower lobe wedge resection in April 2021 for T1aN0 Squamous cell carcinoma who is POD1 s/p Left VATS with left upper lobe wedge and robotic lysis of adhesions with anatomic lingulectomy.     - Pull CT today. Possible home today versus tomorrow.   - Multimodal pain management  - Daily CXR  - OOB, ambulate as tolerated  - Encourage IS use  - DVT ppx    Dispo: possible home  today        Lizandro Martinez PA-C  Thoracic Surgery  [facility]  [facility]

## 2023-03-22 NOTE — ASSESSMENT & PLAN NOTE
73 year old female with hx for left VATS lower lobe wedge resection in April 2021 for T1aN0 Squamous cell carcinoma who is POD1 s/p Left VATS with left upper lobe wedge and robotic lysis of adhesions with anatomic lingulectomy.     - Pull CT today. Possible home today versus tomorrow.   - Multimodal pain management  - Daily CXR  - OOB, ambulate as tolerated  - Encourage IS use  - DVT ppx    Dispo: possible home today

## 2023-03-22 NOTE — SUBJECTIVE & OBJECTIVE
Interval History: NAEON.  ml output overnight. No air leak. O2: 99% on RA. Voiding. Pain controlled.     Medications:  Continuous Infusions:  Scheduled Meds:   acetaminophen  1,000 mg Oral Q8H    enoxaparin  40 mg Subcutaneous Daily    famotidine  20 mg Oral BID    gabapentin  300 mg Oral QHS    levothyroxine  75 mcg Oral Daily    methocarbamoL  500 mg Oral QID    polyethylene glycol  17 g Oral Daily    senna-docusate 8.6-50 mg  1 tablet Oral Daily     PRN Meds:bisacodyL, metoclopramide HCl, ondansetron, oxyCODONE, oxyCODONE, prochlorperazine     Review of patient's allergies indicates:  No Known Allergies  Objective:     Vital Signs (Most Recent):  Temp: 98.2 °F (36.8 °C) (03/22/23 1116)  Pulse: 76 (03/22/23 1116)  Resp: 20 (03/22/23 1116)  BP: 132/65 (03/22/23 1116)  SpO2: 98 % (03/22/23 1116) Vital Signs (24h Range):  Temp:  [96.4 °F (35.8 °C)-98.2 °F (36.8 °C)] 98.2 °F (36.8 °C)  Pulse:  [] 76  Resp:  [16-20] 20  SpO2:  [94 %-99 %] 98 %  BP: (125-149)/(58-70) 132/65     Intake/Output - Last 3 Shifts         03/20 0700  03/21 0659 03/21 0700  03/22 0659 03/22 0700  03/23 0659    P.O. 400 720     IV Piggyback 900      Total Intake(mL/kg) 1300 (17.4) 720 (9.6)     Urine (mL/kg/hr) 650 (0.4) 2400 (1.3)     Chest Tube 220 130     Total Output 870 2530     Net +430 -1810                    SpO2: 98 %       Physical Exam  Constitutional:       Appearance: Normal appearance.   Eyes:      Extraocular Movements: Extraocular movements intact.      Pupils: Pupils are equal, round, and reactive to light.   Cardiovascular:      Rate and Rhythm: Normal rate and regular rhythm.      Pulses: Normal pulses.   Pulmonary:      Effort: Pulmonary effort is normal.      Breath sounds: Normal breath sounds.      Comments: CT in place on left side.   Abdominal:      General: Abdomen is flat.      Palpations: Abdomen is soft.   Skin:     General: Skin is warm and dry.   Neurological:      General: No focal deficit present.       Mental Status: She is alert and oriented to person, place, and time. Mental status is at baseline.   Psychiatric:         Mood and Affect: Mood normal.         Behavior: Behavior normal.         Thought Content: Thought content normal.       Significant Labs:  All pertinent labs from the last 24 hours have been reviewed.    Significant Diagnostics:  CXR: I have reviewed all pertinent results/findings within the past 24 hours    VTE Risk Mitigation (From admission, onward)           Ordered     enoxaparin injection 40 mg  Daily         03/20/23 1535     IP VTE HIGH RISK PATIENT  Once         03/20/23 1305     Place MARVEL hose  Until discontinued         03/20/23 1305     Place sequential compression device  Until discontinued         03/20/23 1305

## 2023-03-22 NOTE — BRIEF OP NOTE
[facility]  Brief Operative Note    SUMMARY     Surgery Date: 3/20/2023     Surgeon(s) and Role:     * Jose Medley MD - Primary     * Malia Naranjo MD - Resident - Assisting        Pre-op Diagnosis:  NSCLC of left lung [C34.92]    Post-op Diagnosis:  Post-Op Diagnosis Codes:     * NSCLC of left lung [C34.92]    Procedure(s) (LRB):  XI ROBOTIC WEDGE RESECTION, LUNG (RATS); segmentectomy (Left)  LYSIS, ADHESIONS, LAPAROSCOPIC (Left)  XI ROBOTIC LYMPHADENECTOMY (Left)  BLOCK, NERVE, INTERCOSTAL, 2 OR MORE (Left)    Anesthesia: General    Operative Findings: Extensive adhesions throughout chest from prior VATS resection. Robotic assisted segmentectomy/lingulectomy. Frozen specimen positive for malignancy. Frozen lymph node biopsy suspicious for malignancy.     See op note for full details    Estimated Blood Loss: <50 cc    Estimated Blood Loss has been documented.         Specimens:   Specimen (24h ago, onward)       Start     Ordered    Pending  Specimen to Pathology, Surgery Pulmonary and Thoracic  Once        Comments: Pre-op Diagnosis: NSCLC of left lung [C34.92]Procedure(s):XI ROBOTIC WEDGE RESECTION, LUNG (RATS) w/possible segmentectomy Number of specimens: 1Name of specimens: 1) Lingular Wedge Resection - Left lung (stitch marks nodule) (frozen)     References:    Click here for ordering Quick Tip   Question Answer Comment   Procedure Type: Pulmonary and Thoracic    Specimen Class: Known or suspected malignancy    Which provider would you like to cc? JOSE MEDLEY    Release to patient Immediate        Pending                    [id number]

## 2023-03-23 NOTE — DISCHARGE SUMMARY
Dave stanislav - [facility]  General Surgery  Discharge Summary      Patient Name: Radha Lamas  MRN: [id number]  Admission Date: 3/20/2023  Hospital Length of Stay: 2 days  Discharge Date and Time:  03/23/2023 7:46 AM  Attending Physician: No att. providers found   Discharging Provider: Cornelia Grossman PA-C  Primary Care Provider: Ab Fletcher MD     HPI:   73 year old female with hx for left VATS lower lobe wedge resection in April 2021 for T1aN0 Squamous cell carcinoma (atleast 8mm parenchymal margin). Under close surveillance with CT for nonanatomic sublobar resection. Most recent CT showed mild enlargement of NANI nodule. Percutaneous biopsy non- diagnostic.        Procedure(s) (LRB):  XI ROBOTIC WEDGE RESECTION, LUNG (RATS); segmentectomy (Left)  LYSIS, ADHESIONS, LAPAROSCOPIC (Left)  XI ROBOTIC LYMPHADENECTOMY (Left)  BLOCK, NERVE, INTERCOSTAL, 2 OR MORE (Left)     Hospital Course: Patient admitted following the above mentioned procedure which she tolerated well. POD 1 clamp trial but increase in subq emphysema. RA. Ambulated and voided. Overnight clamp trial. CXR stable thus chest tube removed. Pain controlled. Stable for discharge.     Consults:     Significant Diagnostic Studies: Labs: CBC No results for input(s): WBC, HGB, HCT, PLT in the last 48 hours.  Radiology: X-Ray: CXR: X-Ray Chest 1 View (CXR):   Results for orders placed or performed during the hospital encounter of 03/20/23   X-Ray Chest AP Single View    Narrative    EXAMINATION:  XR CHEST 1 VIEW    CLINICAL HISTORY:  Post-Op;    FINDINGS:  Chest one view: There is a left-sided chest tube.  Heart size is normal.  There is a small left pneumothorax and left subcutaneous emphysema.  There is left subsegmental atelectasis.  There is no change from the prior study.      Electronically signed by: Cam Jenkins MD  Date:    03/22/2023  Time:    08:15       Pending Diagnostic Studies:       Procedure Component Value Units Date/Time    Specimen to  Pathology, Surgery Pulmonary and Thoracic [[id number]] Collected: 03/20/23 1220    Order Status: Sent Lab Status: In process Updated: 03/21/23 1434    Specimen: Tissue           Final Active Diagnoses:    Diagnosis Date Noted POA    PRINCIPAL PROBLEM:  Malignant neoplasm of lower lobe of left lung - Squamous cell [C34.32] 04/15/2021 Yes      Problems Resolved During this Admission:      Discharged Condition: good    Disposition: Home or Self Care    Follow Up:   Follow-up Information       Refugio Medley MD Follow up in 2 week(s).    Specialty: Cardiothoracic Surgery  Why: can do virtual appt if easier  Contact information:  [address]  [facility] [address]  [phone]                           Patient Instructions:      Diet Adult Regular     Remove dressing in 48 hours     Notify your health care provider if you experience any of the following:  increased confusion or weakness     Notify your health care provider if you experience any of the following:  persistent dizziness, light-headedness, or visual disturbances     Notify your health care provider if you experience any of the following:  worsening rash     Notify your health care provider if you experience any of the following:  severe persistent headache     Notify your health care provider if you experience any of the following:  difficulty breathing or increased cough     Notify your health care provider if you experience any of the following:  redness, tenderness, or signs of infection (pain, swelling, redness, odor or green/yellow discharge around incision site)     Notify your health care provider if you experience any of the following:  severe uncontrolled pain     Notify your health care provider if you experience any of the following:  persistent nausea and vomiting or diarrhea     Notify your health care provider if you experience any of the following:  temperature >100.4     Shower on day dressing removed (No bath)     Activity as  tolerated     Medications:  Reconciled Home Medications:      Medication List        START taking these medications      gabapentin 300 MG capsule  Commonly known as: NEURONTIN  Take 1 capsule (300 mg total) by mouth every evening.     methocarbamoL 500 MG Tab  Commonly known as: ROBAXIN  Take 1 tablet (500 mg total) by mouth 4 (four) times daily. for 10 days     oxyCODONE 5 MG immediate release tablet  Commonly known as: ROXICODONE  Take 1 tablet (5 mg total) by mouth every 4 (four) hours as needed for Pain.            CHANGE how you take these medications      tolterodine 4 MG 24 hr capsule  Commonly known as: DETROL LA  Take 1 capsule (4 mg total) by mouth once daily.  What changed:   when to take this  reasons to take this            CONTINUE taking these medications      albuterol 90 mcg/actuation inhaler  Commonly known as: PROAIR HFA  Inhale 2 puffs into the lungs every 4 (four) hours as needed for Wheezing or Shortness of Breath. Rescue     ANORO ELLIPTA 62.5-25 mcg/actuation Dsdv  Generic drug: umeclidinium-vilanteroL  USE 1 INHALATION DAILY (CONTROLLER)     calcium carbonate 600 mg calcium (1,500 mg) Tab  Commonly known as: OS-CYDNEY  Take 600 mg by mouth.     DUPIXENT  mg/2 mL Pnij  Generic drug: dupilumab  Inject into the skin every 14 (fourteen) days.     levothyroxine 75 MCG tablet  Commonly known as: SYNTHROID  Take 75 mcg by mouth once daily.     loratadine 10 mg tablet  Commonly known as: CLARITIN  Take 10 mg by mouth once daily.     omeprazole 20 MG capsule  Commonly known as: PRILOSEC  Take 20 mg by mouth once daily.              Cornelia Grossman PA-C  General Surgery  [facility]

## 2023-03-24 DIAGNOSIS — C34.92 NSCLC OF LEFT LUNG: Primary | ICD-10-CM

## 2023-03-25 ENCOUNTER — NURSE TRIAGE (OUTPATIENT)
Dept: ADMINISTRATIVE | Facility: CLINIC | Age: 73
End: 2023-03-25
Payer: MEDICARE

## 2023-03-25 NOTE — TELEPHONE ENCOUNTER
Pt calling to clarify that dressing can be removed from procedure on 3/20/23. AVS reviewed; verified that dressing can be removed after 48 hours. Verbalized understanding. Encounter routed to provider.       Reason for Disposition   [1] Follow-up call to recent contact AND [2] information only call, no triage required    Protocols used: Information Only Call - No Triage-A-

## 2023-03-31 NOTE — PHYSICIAN QUERY
PT Name: Radha Lamas  MR #: [id number]    DOCUMENTATION CLARIFICATION     CDS/: Martha Moody RN              Contact information:[email or web address]  This form is a permanent document in the medical record.     Query Date: March 31, 2023    By submitting this query, we are merely seeking further clarification of documentation.  Please utilize your independent clinical judgment when addressing the question(s) below.    The medical record contains the following:  Pathology Findings Location in Medical Record   Final Pathologic Diagnosis    Margin status for noninvasive tumor:  All margins negative for noninvasive   tumor.   Regional lymph nodes:   Lymph nodes from prior procedures:  No known prior lymph node sampling   performed.   Regional lymph node status:  Regional lymph nodes are present and tumor is   present in regional lymph node.   Number examined:  6   Number involved:  1   ; lymph nodes site of   positive node left level 10.      Pathology 3-20       Please clarify the pathology findings.  [  x] Pathology findings noted above are ruled in/confirmed as diagnoses   [  ] Pathology findings noted above are not confirmed as diagnoses   [  ] Other diagnosis (please specify): ___________     Please document in your progress notes daily for the duration of treatment until resolved and include in your discharge summary.    Form No. [id number]

## 2023-04-04 NOTE — PROGRESS NOTES
"Subjective     Patient ID: Radha Lamas is a 73 y.o. female.    Chief Complaint: Post-op Evaluation    Diagnosis:  NANI lung nodule, previous history or LLL NSCLC    Pre-operative therapy: 04/28/2021: Left VATS Lower Lobe Wedge Resection    Procedure(s) and date(s): 03/20/23 - Left VATS, left upper lobe wedge, robotic lysis of adhesions, anatomic lingulectomy, mediastinal lymph node dissection, intercostal nerve block 2 or more intercostal levels     Pathology:  04/28/21 - 9mm non-keratinizing squamous cell carcinoma, no VPI, no LVI, margin at least 8mm.  Levels 9 and 11 = negative.  T1aN0  03/20/23 - 0.9cm invasive moderately-differentiated adenocarcinoma. 90% solid, 10% acinar. Margins negatice. positive VPI. LN stations 5,7,9,11,12 negative. LN station 10 positive. [id number], Stage IIb.  PDL1 95%     Post-operative therapy:      HPI  73 year old female with h/o left VATS lower lobe wedge resection in April 2021 for T1aN0 Squamous cell carcinoma (atleast 8mm parenchymal margin) who is here for 2 week follow up s/p L VATS with NANI wedge, robotic lysis of adhesions, anatomic lingulectomy, and MLND for NANI lung nodule. Uncomplicated post-operative course, discharged POD2 following overnight clamp trial. Today patient reports     Review of Systems   Constitutional:  Negative for fatigue and fever.   Respiratory:  Negative for chest tightness and shortness of breath.    Cardiovascular:  Negative for chest pain.   Neurological:  Negative for syncope.   Psychiatric/Behavioral:  Negative for agitation.         Objective     Vitals:    04/05/23 1031   BP: 112/72   Pulse: 76   SpO2: 97%   Weight: 72.3 kg (159 lb 6.3 oz)   Height: 5' 5" (1.651 m)   PainSc: 0-No pain         Physical Exam  Constitutional:       Appearance: Normal appearance.   HENT:      Head: Atraumatic.   Eyes:      Extraocular Movements: Extraocular movements intact.   Cardiovascular:      Rate and Rhythm: Normal rate.      Pulses: Normal pulses.      " Heart sounds: Normal heart sounds.   Pulmonary:      Effort: Pulmonary effort is normal.      Breath sounds: Normal breath sounds.      Comments: Robotic incisions healing well. No drainage or erythema.    Posterior incision with skin separation and fibrinous exudate.   Musculoskeletal:         General: Normal range of motion.      Cervical back: Normal range of motion and neck supple.      Right lower leg: No edema.      Left lower leg: No edema.   Skin:     General: Skin is warm and dry.   Neurological:      General: No focal deficit present.      Mental Status: She is alert and oriented to person, place, and time.   Psychiatric:         Mood and Affect: Mood normal.         Behavior: Behavior normal.       Diagnostics:     CXR - 04/05/23:      Assessment and Plan     73 year old female with h/o left VATS lower lobe wedge resection in April 2021 for T1aN0 Squamous cell carcinoma (atleast 8mm parenchymal margin) who is here for 2 week follow up s/p L VATS with NANI wedge, robotic lysis of adhesions, anatomic lingulectomy, and MLND for NANI lung nodule.       Recovering well. Increase activity as tolerated. Discussed dose increase in gabapentin if nerve pain worsens or becomes more bothersome.   Referral placed to oncology in [place].

## 2023-04-05 ENCOUNTER — TELEPHONE (OUTPATIENT)
Dept: HEMATOLOGY/ONCOLOGY | Facility: CLINIC | Age: 73
End: 2023-04-05
Payer: MEDICARE

## 2023-04-05 ENCOUNTER — HOSPITAL ENCOUNTER (OUTPATIENT)
Dept: RADIOLOGY | Facility: HOSPITAL | Age: 73
Discharge: HOME | End: 2023-04-05
Attending: PHYSICIAN ASSISTANT
Payer: MEDICARE

## 2023-04-05 ENCOUNTER — OFFICE VISIT (OUTPATIENT)
Dept: CARDIOTHORACIC SURGERY | Facility: CLINIC | Age: 73
End: 2023-04-05
Payer: MEDICARE

## 2023-04-05 VITALS
DIASTOLIC BLOOD PRESSURE: 72 MMHG | WEIGHT: 159.38 LBS | OXYGEN SATURATION: 97 % | SYSTOLIC BLOOD PRESSURE: 112 MMHG | BODY MASS INDEX: 26.55 KG/M2 | HEART RATE: 76 BPM | HEIGHT: 65 IN

## 2023-04-05 DIAGNOSIS — C34.92 NSCLC OF LEFT LUNG: ICD-10-CM

## 2023-04-05 DIAGNOSIS — C34.12 MALIGNANT NEOPLASM OF UPPER LOBE OF LEFT LUNG: Primary | ICD-10-CM

## 2023-04-05 PROCEDURE — 3288F FALL RISK ASSESSMENT DOCD: CPT | Mod: CPTII,S$GLB,, | Performed by: THORACIC SURGERY (CARDIOTHORACIC VASCULAR SURGERY)

## 2023-04-05 PROCEDURE — 1159F PR MEDICATION LIST DOCUMENTED IN MEDICAL RECORD: ICD-10-PCS | Mod: CPTII,S$GLB,, | Performed by: THORACIC SURGERY (CARDIOTHORACIC VASCULAR SURGERY)

## 2023-04-05 PROCEDURE — 1101F PR PT FALLS ASSESS DOC 0-1 FALLS W/OUT INJ PAST YR: ICD-10-PCS | Mod: CPTII,S$GLB,, | Performed by: THORACIC SURGERY (CARDIOTHORACIC VASCULAR SURGERY)

## 2023-04-05 PROCEDURE — 99999 PR PBB SHADOW E&M-EST. PATIENT-LVL IV: CPT | Mod: PBBFAC,,, | Performed by: THORACIC SURGERY (CARDIOTHORACIC VASCULAR SURGERY)

## 2023-04-05 PROCEDURE — 3074F SYST BP LT 130 MM HG: CPT | Mod: CPTII,S$GLB,, | Performed by: THORACIC SURGERY (CARDIOTHORACIC VASCULAR SURGERY)

## 2023-04-05 PROCEDURE — 3288F PR FALLS RISK ASSESSMENT DOCUMENTED: ICD-10-PCS | Mod: CPTII,S$GLB,, | Performed by: THORACIC SURGERY (CARDIOTHORACIC VASCULAR SURGERY)

## 2023-04-05 PROCEDURE — 3074F PR MOST RECENT SYSTOLIC BLOOD PRESSURE < 130 MM HG: ICD-10-PCS | Mod: CPTII,S$GLB,, | Performed by: THORACIC SURGERY (CARDIOTHORACIC VASCULAR SURGERY)

## 2023-04-05 PROCEDURE — 1126F AMNT PAIN NOTED NONE PRSNT: CPT | Mod: CPTII,S$GLB,, | Performed by: THORACIC SURGERY (CARDIOTHORACIC VASCULAR SURGERY)

## 2023-04-05 PROCEDURE — 71046 X-RAY EXAM CHEST 2 VIEWS: CPT | Mod: 26,,, | Performed by: RADIOLOGY

## 2023-04-05 PROCEDURE — 99024 POSTOP FOLLOW-UP VISIT: CPT | Mod: S$GLB,,, | Performed by: THORACIC SURGERY (CARDIOTHORACIC VASCULAR SURGERY)

## 2023-04-05 PROCEDURE — 3078F DIAST BP <80 MM HG: CPT | Mod: CPTII,S$GLB,, | Performed by: THORACIC SURGERY (CARDIOTHORACIC VASCULAR SURGERY)

## 2023-04-05 PROCEDURE — 71046 X-RAY EXAM CHEST 2 VIEWS: CPT | Mod: TC

## 2023-04-05 PROCEDURE — 71046 XR CHEST PA AND LATERAL: ICD-10-PCS | Mod: 26,,, | Performed by: RADIOLOGY

## 2023-04-05 PROCEDURE — 1159F MED LIST DOCD IN RCRD: CPT | Mod: CPTII,S$GLB,, | Performed by: THORACIC SURGERY (CARDIOTHORACIC VASCULAR SURGERY)

## 2023-04-05 PROCEDURE — 3078F PR MOST RECENT DIASTOLIC BLOOD PRESSURE < 80 MM HG: ICD-10-PCS | Mod: CPTII,S$GLB,, | Performed by: THORACIC SURGERY (CARDIOTHORACIC VASCULAR SURGERY)

## 2023-04-05 PROCEDURE — 1126F PR PAIN SEVERITY QUANTIFIED, NO PAIN PRESENT: ICD-10-PCS | Mod: CPTII,S$GLB,, | Performed by: THORACIC SURGERY (CARDIOTHORACIC VASCULAR SURGERY)

## 2023-04-05 PROCEDURE — 1101F PT FALLS ASSESS-DOCD LE1/YR: CPT | Mod: CPTII,S$GLB,, | Performed by: THORACIC SURGERY (CARDIOTHORACIC VASCULAR SURGERY)

## 2023-04-05 PROCEDURE — 99024 PR POST-OP FOLLOW-UP VISIT: ICD-10-PCS | Mod: S$GLB,,, | Performed by: THORACIC SURGERY (CARDIOTHORACIC VASCULAR SURGERY)

## 2023-04-05 PROCEDURE — 99999 PR PBB SHADOW E&M-EST. PATIENT-LVL IV: ICD-10-PCS | Mod: PBBFAC,,, | Performed by: THORACIC SURGERY (CARDIOTHORACIC VASCULAR SURGERY)

## 2023-04-05 NOTE — NURSING
1155am: Outgoing call to pt regarding in-basket msg request from Thoracic navigatorKate, for adjuvant chemo need w/med onc in [place]. Pt saw Feliberto in 2021, but requesting to see another Oncologist instead. Pt scheduled with Leonarda on 4/13 at 340 pm at . Explained to pt that Leonarda only sees pts at [facility]. Pt verbalized understanding. Pt plan to report to appt as scheduled.   Oncology Navigation   Intake  Internal / External Referral: Internal (Dr. Refugio Medley)  Initial Nurse Navigator Contact: 04/05/23  Date Worked: 04/05/23  Multiple appointments: No     Treatment  Current Status: Active  Date Presented to Tumor Board: 03/08/23  Presentation Notes: Discuss left VATS anatomic resection for growing NANI nodule    Surgery: Planned  Surgical Oncologist: Jasmyne  Consult Date: 03/15/23    Medical Oncologist: Dr. Marissa Brower  Consult Date: 04/13/23                   Support Systems: Spouse/significant other  Barriers of Care: Transportation  Transportation Barriers: Patient lives in [facility]      Follow Up  No follow-ups on file.

## 2023-04-06 ENCOUNTER — PATIENT MESSAGE (OUTPATIENT)
Dept: PULMONOLOGY | Facility: CLINIC | Age: 73
End: 2023-04-06
Payer: MEDICARE

## 2023-04-11 LAB
FINAL PATHOLOGIC DIAGNOSIS: NORMAL
FROZEN SECTION DIAGNOSIS: NORMAL
GROSS: NORMAL
Lab: NORMAL
SUPPLEMENTAL DIAGNOSIS: NORMAL

## 2023-04-13 ENCOUNTER — OFFICE VISIT (OUTPATIENT)
Dept: HEMATOLOGY/ONCOLOGY | Facility: CLINIC | Age: 73
End: 2023-04-13
Payer: MEDICARE

## 2023-04-13 VITALS
DIASTOLIC BLOOD PRESSURE: 62 MMHG | BODY MASS INDEX: 27.07 KG/M2 | HEIGHT: 65 IN | SYSTOLIC BLOOD PRESSURE: 106 MMHG | WEIGHT: 162.5 LBS | TEMPERATURE: 98 F | RESPIRATION RATE: 97 BRPM | HEART RATE: 80 BPM

## 2023-04-13 DIAGNOSIS — C34.90 MALIGNANT NEOPLASM OF UNSPECIFIED PART OF UNSPECIFIED BRONCHUS OR LUNG: Primary | ICD-10-CM

## 2023-04-13 DIAGNOSIS — C34.32 MALIGNANT NEOPLASM OF LOWER LOBE OF LEFT LUNG: ICD-10-CM

## 2023-04-13 PROCEDURE — 99999 PR PBB SHADOW E&M-EST. PATIENT-LVL III: CPT | Mod: PBBFAC,,, | Performed by: INTERNAL MEDICINE

## 2023-04-13 PROCEDURE — 99999 PR PBB SHADOW E&M-EST. PATIENT-LVL III: ICD-10-PCS | Mod: PBBFAC,,, | Performed by: INTERNAL MEDICINE

## 2023-04-13 PROCEDURE — 3078F DIAST BP <80 MM HG: CPT | Mod: CPTII,S$GLB,, | Performed by: INTERNAL MEDICINE

## 2023-04-13 PROCEDURE — 3288F PR FALLS RISK ASSESSMENT DOCUMENTED: ICD-10-PCS | Mod: CPTII,S$GLB,, | Performed by: INTERNAL MEDICINE

## 2023-04-13 PROCEDURE — 3288F FALL RISK ASSESSMENT DOCD: CPT | Mod: CPTII,S$GLB,, | Performed by: INTERNAL MEDICINE

## 2023-04-13 PROCEDURE — 1111F PR DISCHARGE MEDS RECONCILED W/ CURRENT OUTPATIENT MED LIST: ICD-10-PCS | Mod: CPTII,S$GLB,, | Performed by: INTERNAL MEDICINE

## 2023-04-13 PROCEDURE — 1100F PTFALLS ASSESS-DOCD GE2>/YR: CPT | Mod: CPTII,S$GLB,, | Performed by: INTERNAL MEDICINE

## 2023-04-13 PROCEDURE — 3074F PR MOST RECENT SYSTOLIC BLOOD PRESSURE < 130 MM HG: ICD-10-PCS | Mod: CPTII,S$GLB,, | Performed by: INTERNAL MEDICINE

## 2023-04-13 PROCEDURE — 1125F PR PAIN SEVERITY QUANTIFIED, PAIN PRESENT: ICD-10-PCS | Mod: CPTII,S$GLB,, | Performed by: INTERNAL MEDICINE

## 2023-04-13 PROCEDURE — 99215 OFFICE O/P EST HI 40 MIN: CPT | Mod: S$GLB,,, | Performed by: INTERNAL MEDICINE

## 2023-04-13 PROCEDURE — 1100F PR PT FALLS ASSESS DOC 2+ FALLS/FALL W/INJURY/YR: ICD-10-PCS | Mod: CPTII,S$GLB,, | Performed by: INTERNAL MEDICINE

## 2023-04-13 PROCEDURE — 99215 PR OFFICE/OUTPT VISIT, EST, LEVL V, 40-54 MIN: ICD-10-PCS | Mod: S$GLB,,, | Performed by: INTERNAL MEDICINE

## 2023-04-13 PROCEDURE — 1111F DSCHRG MED/CURRENT MED MERGE: CPT | Mod: CPTII,S$GLB,, | Performed by: INTERNAL MEDICINE

## 2023-04-13 PROCEDURE — 3074F SYST BP LT 130 MM HG: CPT | Mod: CPTII,S$GLB,, | Performed by: INTERNAL MEDICINE

## 2023-04-13 PROCEDURE — 1125F AMNT PAIN NOTED PAIN PRSNT: CPT | Mod: CPTII,S$GLB,, | Performed by: INTERNAL MEDICINE

## 2023-04-13 PROCEDURE — 3078F PR MOST RECENT DIASTOLIC BLOOD PRESSURE < 80 MM HG: ICD-10-PCS | Mod: CPTII,S$GLB,, | Performed by: INTERNAL MEDICINE

## 2023-04-13 NOTE — H&P (VIEW-ONLY)
Subjective:      DATE OF VISIT: 4/13/23     ?  Patient ID:?Radha Lamas is a 73 y.o. female.?? MR#: [id number]     PRIMARY ONCOLOGIST: Dr. Brower    ? Primary Care Providers:  Ab Fletcher MD, MD (General)     CHIEF COMPLAINT: ??Establish care with Medical Oncology for newly diagnosed invasive adenocarcinoma left lower lung??   ?   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   CURRENT TREATMENT:  TBD    PAST TREATMENT:  Wedge resection  ?   ONCOLOGIC HISTORY:   ?   Oncology History   Malignant neoplasm of lower lobe of left lung - Squamous cell   4/15/2021 Initial Diagnosis    Malignant neoplasm of lower lobe of left lung - Squamous cell       5/25/2021 Cancer Staged    Staging form: Lung, AJCC 8th Edition  - Clinical: Stage IA1 (cT1a, cN0, cM0)        Cancer Staged    9mm non-keratinizing squamous cell carcinoma, no VPI, no LVI, margin at least 8mm.  Levels 9 and 11 = negative.  T1aN0     4/5/2023 Cancer Staged    Staging form: Lung, AJCC 8th Edition  - Pathologic stage from 4/5/2023: Stage IIB (pT1a, pN1, cM0)       4/27/2023 -  Chemotherapy    Treatment Summary   Plan Name: OP NSCLC PEMETREXED + CISPLATIN Q3W  Treatment Goal: Supportive  Status: Active  Start Date: 4/27/2023 (Planned)  End Date: 8/11/2023 (Planned)  Provider: Marissa Brower MD  Chemotherapy: CISplatin (Platinol) 75 mg/m2 = 138 mg in sodium chloride 0.9% 638 mL chemo infusion, 75 mg/m2, Intravenous, Clinic/HOD 1 time, 0 of 6 cycles  PEMEtrexed disodium (ALIMTA) 925 mg in sodium chloride 0.9% SolP 100 mL chemo infusion, 500 mg/m2, Intravenous, Clinic/HOD 1 time, 0 of 6 cycles       NSCLC of left lung (Resolved)   4/28/2021 Initial Diagnosis    NSCLC of left lung (Resolved)      Cancer Staged    9mm non-keratinizing squamous cell carcinoma, no VPI, no LVI, margin at  least 8mm.  Levels 9 and 11 = negative.  T1aN0       Cancer Staging   Malignant neoplasm of lower lobe of left lung - Squamous cell  - Pathologic stage from 4/5/2023: Stage IIB (pT1a, pN1, cM0) - Signed by Refugio Medley MD on 4/5/2023         HPI    Postoperative tenderness persistent gradually improving since her surgery on 03/20/2023.  She is using inhaler and occasional gabapentin and pain medication.  History of COPD    Review of Systems    ?   A comprehensive 14-point review of systems was reviewed with patient and was negative other than as specified above.   ?   PAST MEDICAL HISTORY:   Past Medical History:   Diagnosis Date    COPD (chronic obstructive pulmonary disease) 2013    Eczema     GERD (gastroesophageal reflux disease)     Hiatal hernia     History of torn meniscus of right knee 01/2011    Hypothyroid     Lung cancer     Urinary incontinence in female     Mild , only takes mediciane with travel    ?     PAST SURGICAL HISTORY:   Past Surgical History:   Procedure Laterality Date    INJECTION OF ANESTHETIC AGENT AROUND MULTIPLE INTERCOSTAL NERVES Left 3/20/2023    Procedure: BLOCK, NERVE, INTERCOSTAL, 2 OR MORE;  Surgeon: Refugio Medley MD;  Location: [facility] OR [address] [place] [address];  Service: Thoracic;  Laterality: Left;    KNEE CARTILAGE SURGERY Right 01/2011    LAPAROSCOPIC LYSIS OF ADHESIONS Left 3/20/2023    Procedure: LYSIS, ADHESIONS, LAPAROSCOPIC;  Surgeon: Refugio Medley MD;  Location: [facility] OR [address] [place] [address];  Service: Thoracic;  Laterality: Left;    ROBOT-ASSISTED LAPAROSCOPIC LYMPHADENECTOMY USING DA ROSEMARY XI Left 3/20/2023    Procedure: XI ROBOTIC LYMPHADENECTOMY;  Surgeon: Refugio Medley MD;  Location: [facility] OR [address] [place] [address];  Service: Thoracic;  Laterality: Left;    THORACOSCOPIC WEDGE RESECTION OF LUNG Left 04/28/2021    Procedure: VATS, WITH WEDGE RESECTION, LUNG;  Surgeon: Clarke Sauceda MD;  Location: [facility] OR [address] [place] [address];  Service: Thoracic;  Laterality: Left;  lymph node dissection     TUBAL  LIGATION      WEDGE RESECTION, LUNG, ROBOT-ASSISTED, USING VATS, USING DA ROSEMARY XI Left 3/20/2023    Procedure: XI ROBOTIC WEDGE RESECTION, LUNG (RATS); segmentectomy;  Surgeon: Refugio Medley MD;  Location: [facility] OR [address] [place] [address];  Service: Thoracic;  Laterality: Left;      ?   ALLERGIES:   Allergies as of 2023    (No Known Allergies)      ?   MEDICATIONS:?   Outpatient Medications Marked as Taking for the 23 encounter (Office Visit) with Marissa Brower MD   Medication Sig Dispense Refill    albuterol (PROAIR HFA) 90 mcg/actuation inhaler Inhale 2 puffs into the lungs every 4 (four) hours as needed for Wheezing or Shortness of Breath. Rescue 54 g 4    calcium carbonate (OS-CYDNEY) 600 mg calcium (1,500 mg) Tab Take 600 mg by mouth.      DUPIXENT  mg/2 mL PnIj Inject into the skin every 14 (fourteen) days.      gabapentin (NEURONTIN) 300 MG capsule Take 1 capsule (300 mg total) by mouth every evening. 30 capsule 11    levothyroxine (SYNTHROID) 75 MCG tablet Take 75 mcg by mouth once daily.      loratadine (CLARITIN) 10 mg tablet Take 10 mg by mouth once daily.      omeprazole (PRILOSEC) 20 MG capsule Take 20 mg by mouth once daily.      oxyCODONE (ROXICODONE) 5 MG immediate release tablet Take 1 tablet (5 mg total) by mouth every 4 (four) hours as needed for Pain. 41 tablet 0    umeclidinium-vilanteroL (ANORO ELLIPTA) 62.5-25 mcg/actuation DsDv USE 1 INHALATION DAILY (CONTROLLER) 180 each 3      ?   SOCIAL HISTORY:?   Social History     Tobacco Use    Smoking status: Former     Packs/day: 1.50     Years: 45.00     Pack years: 67.50     Types: Cigarettes     Start date:      Quit date: 2009     Years since quittin.2    Smokeless tobacco: Never   Substance Use Topics    Alcohol use: No     Alcohol/week: 0.0 standard drinks      ?      ?   FAMILY HISTORY:   family history includes Cancer in her brother, father, and sister; Heart failure in her mother; Hypertension in her father and  mother.   ?        Objective:      Physical Exam      ?   Vitals:    04/13/23 1530   BP: 106/62   Pulse: 80   Resp: (!) 97   Temp: 97.6 °F (36.4 °C)      ?   ECOG:?0   General appearance: Generally well appearing, in no acute distress.   Head, eyes, ears, nose, and throat: Pupils round and equally reactive to light. Oropharynx clear with moist mucous membranes.   Cardiovascular: Regular rate and rhythm, S1, S2, no audible murmurs.   Respiratory: Lungs clear to auscultation bilaterally.   Abdomen: Bowel sounds present, soft, nontender, nondistended.   Extremities: Warm, without edema.   Neurologic: Alert and oriented. Grossly normal strength, coordination, and gait.   Skin: No rashes, ecchymoses or petechial lesion.   ?      ?   Laboratory:  ?   No visits with results within 1 Day(s) from this visit.   Latest known visit with results is:   Admission on 03/20/2023, Discharged on 03/22/2023   Component Date Value Ref Range Status    Group & Rh 03/20/2023 O POS   Final    Indirect Gareth 03/20/2023 NEG   Final    Final Pathologic Diagnosis 03/20/2023    Corrected                    Value:Revised Final Diagnosis:    Note:  This case is corrected to reflect the correct pT staging, which was  initially reported as pT1, but wiht viceral pleural        invasion present should be listed as a pT2.  No other changes have been  made.  This was escalated by  Cornelia Grossman PA-C for Dr. Medley and Dr. Horton has notified the clinical team  of the correction on 4/6/2023 at 8:45am.  1. Left lung, wedge resection: Invasive adenocarcinoma, predominantly solid  pattern, measuring 9 mm (0.9 cm) in greatest dimension.         Tumor is located 3 mm (0.3 cm) from the blue inked/stapled parenchymal  surgical margin.         Tumor extends into the elastic layer of the visceral pleura.         See synoptic report in comment section for further details.  2. Lymph node, left level 11, excision: 1 benign fragmented lymph node  with  sinus histiocytosis and anthracotic pigment, negative for metastatic  carcinoma (0/1).  3. Lymph node, left level 10, excision: 1 lymph node, posit                          deon for metastatic  carcinoma with crush artifact dense fibrosis and focal necrosis (1/1).         Confirmed with positive immunohistochemical stain with cytokeratin  AE1/AE3 with satisfactory positive and negative controls.  4. Lymph node, left level 12, excision: 1 benign lymph node with sinus  histiocytosis, negative for metastatic carcinoma (0/1).  5. Lymph node, left level 9, excision: 1 benign fragmented lymph node with  sinus histiocytosis and anthracotic pigment, negative for metastatic  carcinoma (0/1).  6. Lymph node, level 7, excision: 1 benign lymph node with sinus  histiocytosis, negative for metastatic carcinoma (0/1).  7.  Lymph node, level 5, excision: 1 benign fragmented lymph node with sinus  histiocytosis and anthracotic pigment, negative for metastatic carcinoma  (0/1).  8. Lung, lingula, anatomic lingulectomy: Lung with congested vasculature.         No residual carcinoma is identified.         Bronchial and vascular margin is negative for malignancy.         See s                          ynoptic report in comment section for further details.    Comment:  Synoptic report  Specimen:  Synchronous tumors:  Not applicable  Procedure: Wedge resection with completion anatomic lingulectomy  Specimen laterality:  Left  Tumor:  Tumor focality:  Single focus  Tumor site:  Left lung  Tumor size:  Greatest dimension:  9 mm (0.9 cm)  Histologic type:  Invasive adenocarcinoma, predominantly solid pattern.  Histologic patterns present:         Solid - 90%         Acinar - 10%  Histologic grade:  G2, moderately differentiated  Visceral pleural invasion:  Present  Direct invasion of adjacent structures:  Not applicable (no adjacent  structures present)  Treatment effect:  No known presurgical therapy.  Lymphovascular invasion:  Not  identified  Margins:  All margins negative for invasive carcinoma. Closest margin to  invasive carcinoma:  Bronchial/vascular surgical margin is greater than 10 mm  (greater than 1 cm) from         tumor.  Margin status for noninvasive tumor:  All margins neg                          ative for noninvasive  tumor.  Regional lymph nodes:  Lymph nodes from prior procedures:  No known prior lymph node sampling  performed.  Regional lymph node status:  Regional lymph nodes are present and tumor is  present in regional lymph node.                            Number examined:  6                            Number involved:  1   ; lymph nodes site of  positive node left level 10.  Distant metastasis:  Not applicable  Pathologic stage classification:  TNM descriptors:  Not applicable  Primary tumor:  Regional lymph nodes:       pN1:  Metastasis in ipsilateral peribronchial and/or ipsilateral hilar  lymph nodes and intrapulmonary nodes.  Distant metastasis:       pMX:  Cannot be assessed.  Additional findings:  Congested vasculature  Special studies:  Immunohistochemical stains within tumor cells:  p40:  Negative in tumor cells.  p63:  Only weak positive staining and rare tumor cells.  TTF-1:  Positive in tumor cells.  CK 7:  Positive in tumor cells.  All stains have satisfact                          ory positive and negative controls.  This staining  profile supports the above diagnosis of an adenocarcinoma.  Additional molecular studies and PD L1 staining will be completed on the  tumor material and results will follow in supplemental report.  Tumor Blocks for possible Future Studies:  1A, 1B  Comment:  Note:  This case is corrected to reflect the correct pT staging, which was  initially reported as pT1, but wiht viceral pleural invasion present should  be listed as a pT2.  No other changes have been made.  This was escalated by  Cornelia Grossman PA-C for Dr. Medley and Dr. Horton has notified the  clinical team of  "the correction on 4/6/2023 at 8:45am.  For reference only,  the prior report read as follows:  "1. Left lung, wedge resection: Invasive adenocarcinoma, predominantly solid  pattern, measuring 9 mm (0.9 cm) in greatest dimension.         Tumor is located 3 mm (0.3 cm) from the blue inked/stapled parenchymal  surgical margin.         Tumor extends into the elast                          ic layer of the visceral pleura.         See synoptic report in comment section for further details.  2. Lymph node, left level 11, excision: 1 benign fragmented lymph node with  sinus histiocytosis and anthracotic pigment, negative for metastatic  carcinoma (0/1).  3. Lymph node, left level 10, excision: 1 lymph node, positive for metastatic  carcinoma with crush artifact dense fibrosis and focal necrosis (1/1).         Confirmed with positive immunohistochemical stain with cytokeratin  AE1/AE3 with satisfactory positive and negative controls.  4. Lymph node, left level 12, excision: 1 benign lymph node with sinus  histiocytosis, negative for metastatic carcinoma (0/1).  5. Lymph node, left level 9, excision: 1 benign fragmented lymph node with  sinus histiocytosis and anthracotic pigment, negative for metastatic  carcinoma (0/1).  6. Lymph node, level 7, excision: 1 benign lymph node with sinus  histiocytosis, negative for metastatic carcinoma (0/1).  7.  Lymph node, level 5, excisio                          n: 1 benign fragmented lymph node with sinus  histiocytosis and anthracotic pigment, negative for metastatic carcinoma  (0/1).  8. Lung, lingula, anatomic lingulectomy: Lung with congested vasculature.         No residual carcinoma is identified.         Bronchial and vascular margin is negative for malignancy.         See synoptic report in comment section for further details.  Comment:  Synoptic report  Specimen:  Synchronous tumors:  Not applicable  Procedure: Wedge resection with completion anatomic lingulectomy  Specimen " laterality:  Left  Tumor:  Tumor focality:  Single focus  Tumor site:  Left lung  Tumor size:  Greatest dimension:  9 mm (0.9 cm)  Histologic type:  Invasive adenocarcinoma, predominantly solid pattern.  Histologic patterns present:         Solid - 90%         Acinar - 10%  Histologic grade:  G2, moderately differentiated  Visceral pleural invasion:  Present  Direct invasion of adjacent structures:  Not applicable (no adjacent  structures present)  Treatment effec                          t:  No known presurgical therapy.  Lymphovascular invasion:  Not identified  Margins:  All margins negative for invasive carcinoma. Closest margin to  invasive carcinoma:  Bronchial/vascular surgical margin is greater than 10 mm  (greater than 1 cm) from         tumor.  Margin status for noninvasive tumor:  All margins negative for noninvasive  tumor.  Regional lymph nodes:  Lymph nodes from prior procedures:  No known prior lymph node sampling  performed.  Regional lymph node status:  Regional lymph nodes are present and tumor is  present in regional lymph node.                            Number examined:  6                            Number involved:  1   ; lymph nodes site of  positive node left level 10.  Distant metastasis:  Not applicable  Pathologic stage classification:  TNM descriptors:  Not applicable  Primary tumor:       pT1a:  Tumor less than or equal to 1 cm in greatest dimension.  Regional lymph nodes:       pN1:  Metastasis in ipsilateral peribronchial and/or ipsilatera                          l hilar  lymph nodes and intrapulmonary nodes.  Distant metastasis:       pMX:  Cannot be assessed.  Additional findings:  Congested vasculature  Special studies:  Immunohistochemical stains within tumor cells:  p40:  Negative in tumor cells.  p63:  Only weak positive staining and rare tumor cells.  TTF-1:  Positive in tumor cells.  CK 7:  Positive in tumor cells.  All stains have satisfactory positive and negative controls.   "This staining  profile supports the above diagnosis of an adenocarcinoma.  Additional molecular studies and PD L1 staining will be completed on the  tumor material and results will follow in supplemental report.  Tumor Blocks for possible Future Studies:  1A, 1B"      Supplemental Diagnosis 03/20/2023    Corrected                    Value:PD-L1 (22C3) SemiQuant IHC, Manual  Interpretation MCR Left lung , specimen for PD-L1 immunohistochemistry  studies (clone 22C3, [employer], [place], [place]; using a proprietary  detection system) ([id number]):  95% tumor cells are positive for PD-L1 (membranous positivity).  Ekaterina Muhammad M.D., Ph.D.  Report attached  Performing location:  [place] [address]  "Disclaimer:  This case diagnosis was rendered completely by the outside  consultation pathologist and the case is electronically signed by an [facility]  pathologist listed below solely to release the report into the medical  record."  SUPPLEMEMTAL #2  [facility] Lung Cancer Panel  Result  Provided diagnosis: lung adenocarcinoma  The following CLINICALLY RELEVANT VARIANT was detected:  Gene: BRAF  DNA Change: c.1799T>A (Exon 15)  Amino Acid Change: p.V600E (Laq074Aec)  Variant Allele Frequency: 17.3%  Microsatellite Instability (MSI) status: Stable (                          KRIS)  No other reportable sequence variants or rearrangements were detected within  the analyzed regions of the tested genes listed in the method description.  Roger Venegas M.D.  Report attached  Performing location:  [place] [address]  "Disclaimer:  This case diagnosis was rendered completely by the outside  consultation pathologist and the case is electronically signed by an [facility]  pathologist listed below solely to release the report into the medical  record."      Frozen Section Diagnosis " "03/20/2023    Corrected                    Value:Spec #1- Non-small cell lung carcinoma  Spec 3#- Suspicious for carcinoma seen in consultation w/ Dr. Fernandes      Gross 03/20/2023    Corrected                    Value:Eight parts  Part 1  Patient ID/MRN:  [id number]  Pathology ID/MRN: [id number]  Received fresh and subsequently fixed in formalin, labeled "lingular wedge  resection, left lung, is a malignant??", is a 7.5 g, 8.2 x 3.5 x 1.4 cm lung  wedge with a double staple line that measures up to 5 cm in aggregate.  The  staple line is inked blue.  The specimen is oriented by stitch indicating  location of nodule (inked black).  On cut sections the lung parenchyma is  spongy, pink hemorrhagic and correlating with the sutures has a 0.8 x 0.6 x  0.4 cm pink nodule located at 0.8 cm from the staple line.  No additional  lesions identified.  The specimen is serially sectioned and representative  sections, including the entire nodule, are submitted in cassettes  WWQ--1-A-FS-E after a frozen section is done (cassette AFS).  Part 2  Patient ID/MRN:  [id number]  Pathology ID/MRN: [id number]  Received fresh and subsequently fixed in formalin, labeled "lymph node-left  level 11 ", is a 2.2 x 2.0 cm aggregate of                           rubbery soft, dark gray pink  tissue.  Entirely submitted in cassette ZYZ--2-A  Part 3  Patient ID/MRN:  [id number]  Pathology ID/MRN: [id number]  Received fresh and subsequently fixed in formalin, labeled "left level 10  lymph node ", is a 1.5 x 0.7 cm rubbery soft, gray-pink tissue fragment.  Entirely submitted in cassette LQT--3-A-FS after a frozen section is  done.  Part 4  Patient ID/MRN:  [id number]  Pathology ID/MRN: [id number]  Received fresh and subsequently fixed in formalin, labeled "left level 12  lymph node ", is a 0.5 x 0.3 x 0.3 cm tan-pink tissue fragment.  Entirely  submitted in cassette XVN--4-A  Part 5  Patient ID/MRN:  [id number]  Pathology ID/MRN: [id number]  Received " "fresh and subsequently fixed in formalin, labeled "left level 9  lymph node ", is a 1.5 x 1.0 x 0.5 cm rubbery, dark gray pink-yellow tissue  fragment.  Entirely submitted in cassette TJB--5-A  Part 6  Patient ID/MRN:  [id number]  Pathology ID/MRN: [id number]  Received fresh and subsequently fixed                           in formalin, labeled "level 7 lymph  node ", is a 0.4 x 0.4 x 0.2 cm dark gray, hemorrhagic tissue fragment.  Entirely submitted in cassette KBJ--6-A  Part 7  Patient ID/MRN:  [id number]  Pathology ID/MRN: [id number]  Received fresh and subsequently fixed in formalin, labeled "level 5 lymph  node ", is a 0.7 x 0.6 x 0.3 cm dark gray pink-yellow tissue fragment.  Entirely submitted in cassette UQI--7-A  Part 8  Patient ID/MRN:  [id number]  Pathology ID/MRN: [id number]  Received fresh and subsequently fixed in formalin, labeled "lingula ", is a  72 g portion of lung.  After perfusion the specimen measures 11.5 x 10.5 by  4.0 cm.  The specimen has multiple staple lines (inked green).  The pleura is  dull, pink purple with multiple shaggy, irregular areas resembling areas of  adhesion (inked blue).  On cut sections the lung parenchyma is spongy, pink  hemorrhagic.  Grossly no lesions are identified.  The specimen is sectioned  and representative sections are submitted in cassettes S-2                          [id number]:  A: Bronchial margin  B-D:  On parenchyma including shaggy irregular areas and pleura surface  (inked blue)  E-G:  Random sections of lung parenchyma  Johann JOHNSON ([facility]) cm      Disclaimer 03/20/2023    Corrected                    Value:Unless the case is a 'gross only' or additional testing only, the final  diagnosis for each specimen is based on a microscopic examination of  appropriate tissue sections.      Creatinine 03/20/2023 0.8  0.5 - 1.4 mg/dL Final    eGFR 03/20/2023 >60.0  >60 mL/min/1.73 m^2 Final      ?   Tumor markers   ?   ?   Imaging:  ?    Results for orders " placed or performed during the hospital encounter of 02/20/23 (from the past 2160 hour(s))   CT Biopsy Lung w/ guidance    Narrative    EXAMINATION:  CT BIOPSY LUNG W/ GUIDANCE    CLINICAL HISTORY:  Personal history of other malignant neoplasm of bronchus and lung    TECHNIQUE:  The CT exam was performed using one or more of the following dose reduction techniques- Automated exposure control, adjustment of the mA and/or kV according to patient size, and/or use of iterative reconstructed technique.    All CT scans at this facility use dose modulation, iterative reconstruction, and/or weight based dosing when appropriate to reduce radiation dose to as low as reasonable achievable.    Medications:    Moderate sedation using versed and fentanyl was administered by a trained observer monitoring the patient's vital signs and level of consciousness. The total time of sedation was 30 minutes.    1% lidocaine locally    : VANDANA Kwan    Complications: None immediate.    COMPARISON:  None    FINDINGS:  Procedure: The risks and benefits of this procedure were discussed, all questions were answered and informed consent was obtained.  The patient was placed supine on the CT gantry.  Preprocedural imaging was performed.  A suitable skin site for biopsy was selected.  IV fentanyl and Versed was administered for conscious sedation by a trained observer.  The skin site was prepped and draped in sterile fashion.  The skin was anesthetized with 1% lidocaine.    Using CT guidance a 19 gauge introducer needle was guided into the left pulmonary nodule.  Prior to biopsy appropriate needle positioning was confirmed with CT.  Two 20 gauge samples were acquired.  The stylet was replaced and the needle was removed.    Postprocedural imaging was acquired. The site was bandaged sterilely. The patient left the room in stable condition.    Findings:    The needle is at the nodule.      Impression    Technically successful CT guided  biopsy of the left pulmonary nodule..      Electronically signed by: Pk Nathan  Date:    02/20/2023  Time:    11:28   Results for orders placed or performed during the hospital encounter of 02/03/23 (from the past 2160 hour(s))   CT Chest Without Contrast    Narrative    EXAMINATION:  CT CHEST WITHOUT CONTRAST    CLINICAL HISTORY:  lung cancer;    TECHNIQUE:  Standard chest CT protocol was performed without IV contrast.    COMPARISON:  A CT examination of the chest performed on 08/02/2022.    FINDINGS:  The size of heart is within normal limits.  There is an ill-defined area of increased density adjacent to the anterior right side of the mediastinum.  On the current examination this area measures 27 mm in craniocaudal dimension by 19 mm in AP dimension by 13 mm in medial-lateral dimension.  On the prior examination it measured 28 mm in craniocaudal dimension by 19 mm in AP dimension by 14 mm in medial-lateral dimension.  There is a mild amount of atherosclerosis in the left anterior descending artery.  There is an irregular pulmonary nodule in the anterior aspect of the left upper lobe.  On the current examination it measures 8 mm in craniocaudal dimension by 8 mm in AP dimension by 8 mm in medial-lateral dimension.  On the prior examination it measured 6 mm in craniocaudal dimension by 5 mm in AP dimension by 8 mm in medial-lateral dimension.  There is a mild amount of scarring in both lungs.  There is no pneumothorax or pleural effusion.  There are hypodense areas scattered throughout the liver.  One of the larger ones measures 16 mm and is located in the left lobe of the liver.  It has a Hounsfield measurement of -5.  There is an 8 mm oval shaped area of hypodensity in the posterolateral aspect of the midpole of the left kidney.  This area has a Hounsfield measurement of -1.  There are moderate degenerative changes in the thoracic spine.      Impression    1. There is an irregular pulmonary nodule in the  anterior aspect of the left upper lobe.  On the current examination it measures 8 mm in craniocaudal dimension by 8 mm in AP dimension by 8 mm in medial-lateral dimension. On the prior examination it measured 6 mm in craniocaudal dimension by 5 mm in AP dimension by 8 mm in medial-lateral dimension.  This is consistent with the patient's history and characteristic of metastatic disease.  2. There is an ill-defined area of increased density adjacent to the anterior right side of the mediastinum. On the current examination this area measures 27 mm in craniocaudal dimension by 19 mm in AP dimension by 13 mm in medial-lateral dimension.  On the prior examination it measured 28 mm in craniocaudal dimension by 19 mm in AP dimension by 14 mm in medial-lateral dimension.  3. There are hypodense areas scattered throughout the liver. One of the larger ones measures 16 mm and is located in the left lobe of the liver.  It has a Hounsfield measurement of -5.  This is characteristic of a cyst.  4. There is an 8 mm oval shaped area of hypodensity in the posterolateral aspect of the midpole of the left kidney. This area has a Hounsfield measurement of -1.  This is characteristic of a cyst.  5. There are moderate degenerative changes in the thoracic spine.  All CT scans at this facility use dose modulation, iterative reconstruction, and/or weight base dosing when appropriate to reduce radiation dose when appropriate to reduce radiation dose to as low as reasonably achievable.      Electronically signed by: Tristan Childress MD  Date:    02/03/2023  Time:    12:39     No results found for this or any previous visit (from the past 2160 hour(s)).  No results found for this or any previous visit (from the past 2160 hour(s)).      Pathology:    Reviewed in epic     ?   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 Malignant neoplasm of unspecified part of unspecified bronchus or lung    2. 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  Initial biopsy February 20, 2023 without neoplasm identified who after multiple disciplinary discussion proceeded with left lower lobe wedge resection, final pathology positive for 03/28/2022 for invasive moderately differentiated adenocarcinoma station 10 lymph node positive, pleural invasion noted, margins negative.    Recommend staging with MRI brain and PET (now about 1 month out from her surgery and will need to interpret accordingly).  We discussed indication for adjuvant therapy including chemotherapy and immunotherapy per [facility] 010 showed benefit to adjuvant atezolizumab following chemotherapy given PDL1 positive disease 95%.  Mutational analysis negative for EGFR mutation.  Will arrange for formal chemotherapy/immunotherapy teaching cisplatin and pemetrexed with adjuvant supplemental folic acid and vitamin B12.  Port recommended given history of difficult venous access.  Will get baseline audiogram.    Advance Care Planning   Stage II malignancy  asymptomatic will defer palliative care consultation at this time.       Follow-Up:   There are no Patient Instructions on file for this visit.    70 minutes of total time spent on the encounter, which includes face to face time and non-face to face time preparing to see the patient (eg, review of tests), Obtaining and/or reviewing separately obtained history, Documenting clinical information in the electronic or other health record, Independently interpreting results (not separately reported) and communicating results to the patient/family/caregiver, or Care coordination (not separately reported).

## 2023-04-13 NOTE — PROGRESS NOTES
Subjective:      DATE OF VISIT: 4/13/23     ?  Patient ID:?Radha Lamas is a 73 y.o. female.?? MR#: [id number]     PRIMARY ONCOLOGIST: Dr. Brower    ? Primary Care Providers:  Ab Fletcher MD, MD (General)     CHIEF COMPLAINT: ??Establish care with Medical Oncology for newly diagnosed invasive adenocarcinoma left lower lung??   ?   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   CURRENT TREATMENT:  TBD    PAST TREATMENT:  Wedge resection  ?   ONCOLOGIC HISTORY:   ?   Oncology History   Malignant neoplasm of lower lobe of left lung - Squamous cell   4/15/2021 Initial Diagnosis    Malignant neoplasm of lower lobe of left lung - Squamous cell       5/25/2021 Cancer Staged    Staging form: Lung, AJCC 8th Edition  - Clinical: Stage IA1 (cT1a, cN0, cM0)        Cancer Staged    9mm non-keratinizing squamous cell carcinoma, no VPI, no LVI, margin at least 8mm.  Levels 9 and 11 = negative.  T1aN0     4/5/2023 Cancer Staged    Staging form: Lung, AJCC 8th Edition  - Pathologic stage from 4/5/2023: Stage IIB (pT1a, pN1, cM0)       4/27/2023 -  Chemotherapy    Treatment Summary   Plan Name: OP NSCLC PEMETREXED + CISPLATIN Q3W  Treatment Goal: Supportive  Status: Active  Start Date: 4/27/2023 (Planned)  End Date: 8/11/2023 (Planned)  Provider: Marissa Brower MD  Chemotherapy: CISplatin (Platinol) 75 mg/m2 = 138 mg in sodium chloride 0.9% 638 mL chemo infusion, 75 mg/m2, Intravenous, Clinic/HOD 1 time, 0 of 6 cycles  PEMEtrexed disodium (ALIMTA) 925 mg in sodium chloride 0.9% SolP 100 mL chemo infusion, 500 mg/m2, Intravenous, Clinic/HOD 1 time, 0 of 6 cycles       NSCLC of left lung (Resolved)   4/28/2021 Initial Diagnosis    NSCLC of left lung (Resolved)      Cancer Staged    9mm non-keratinizing squamous cell carcinoma, no VPI, no LVI, margin at  least 8mm.  Levels 9 and 11 = negative.  T1aN0       Cancer Staging   Malignant neoplasm of lower lobe of left lung - Squamous cell  - Pathologic stage from 4/5/2023: Stage IIB (pT1a, pN1, cM0) - Signed by Refugio Medley MD on 4/5/2023         HPI    Postoperative tenderness persistent gradually improving since her surgery on 03/20/2023.  She is using inhaler and occasional gabapentin and pain medication.  History of COPD    Review of Systems    ?   A comprehensive 14-point review of systems was reviewed with patient and was negative other than as specified above.   ?   PAST MEDICAL HISTORY:   Past Medical History:   Diagnosis Date    COPD (chronic obstructive pulmonary disease) 2013    Eczema     GERD (gastroesophageal reflux disease)     Hiatal hernia     History of torn meniscus of right knee 01/2011    Hypothyroid     Lung cancer     Urinary incontinence in female     Mild , only takes mediciane with travel    ?     PAST SURGICAL HISTORY:   Past Surgical History:   Procedure Laterality Date    INJECTION OF ANESTHETIC AGENT AROUND MULTIPLE INTERCOSTAL NERVES Left 3/20/2023    Procedure: BLOCK, NERVE, INTERCOSTAL, 2 OR MORE;  Surgeon: Refugio Medley MD;  Location: [facility] OR [address] [place] [address];  Service: Thoracic;  Laterality: Left;    KNEE CARTILAGE SURGERY Right 01/2011    LAPAROSCOPIC LYSIS OF ADHESIONS Left 3/20/2023    Procedure: LYSIS, ADHESIONS, LAPAROSCOPIC;  Surgeon: Refguio Medley MD;  Location: [facility] OR [address] [place] [address];  Service: Thoracic;  Laterality: Left;    ROBOT-ASSISTED LAPAROSCOPIC LYMPHADENECTOMY USING DA ROSEMARY XI Left 3/20/2023    Procedure: XI ROBOTIC LYMPHADENECTOMY;  Surgeon: Refugio Medley MD;  Location: [facility] OR [address] [place] [address];  Service: Thoracic;  Laterality: Left;    THORACOSCOPIC WEDGE RESECTION OF LUNG Left 04/28/2021    Procedure: VATS, WITH WEDGE RESECTION, LUNG;  Surgeon: Clarke Sauceda MD;  Location: [facility] OR [address] [place] [address];  Service: Thoracic;  Laterality: Left;  lymph node dissection     TUBAL  LIGATION      WEDGE RESECTION, LUNG, ROBOT-ASSISTED, USING VATS, USING DA ROSEMARY XI Left 3/20/2023    Procedure: XI ROBOTIC WEDGE RESECTION, LUNG (RATS); segmentectomy;  Surgeon: Refugio Medley MD;  Location: [facility] OR [address] [place] [address];  Service: Thoracic;  Laterality: Left;      ?   ALLERGIES:   Allergies as of 2023    (No Known Allergies)      ?   MEDICATIONS:?   Outpatient Medications Marked as Taking for the 23 encounter (Office Visit) with Marissa Brower MD   Medication Sig Dispense Refill    albuterol (PROAIR HFA) 90 mcg/actuation inhaler Inhale 2 puffs into the lungs every 4 (four) hours as needed for Wheezing or Shortness of Breath. Rescue 54 g 4    calcium carbonate (OS-CYDNEY) 600 mg calcium (1,500 mg) Tab Take 600 mg by mouth.      DUPIXENT  mg/2 mL PnIj Inject into the skin every 14 (fourteen) days.      gabapentin (NEURONTIN) 300 MG capsule Take 1 capsule (300 mg total) by mouth every evening. 30 capsule 11    levothyroxine (SYNTHROID) 75 MCG tablet Take 75 mcg by mouth once daily.      loratadine (CLARITIN) 10 mg tablet Take 10 mg by mouth once daily.      omeprazole (PRILOSEC) 20 MG capsule Take 20 mg by mouth once daily.      oxyCODONE (ROXICODONE) 5 MG immediate release tablet Take 1 tablet (5 mg total) by mouth every 4 (four) hours as needed for Pain. 41 tablet 0    umeclidinium-vilanteroL (ANORO ELLIPTA) 62.5-25 mcg/actuation DsDv USE 1 INHALATION DAILY (CONTROLLER) 180 each 3      ?   SOCIAL HISTORY:?   Social History     Tobacco Use    Smoking status: Former     Packs/day: 1.50     Years: 45.00     Pack years: 67.50     Types: Cigarettes     Start date:      Quit date: 2009     Years since quittin.2    Smokeless tobacco: Never   Substance Use Topics    Alcohol use: No     Alcohol/week: 0.0 standard drinks      ?      ?   FAMILY HISTORY:   family history includes Cancer in her brother, father, and sister; Heart failure in her mother; Hypertension in her father and  mother.   ?        Objective:      Physical Exam      ?   Vitals:    04/13/23 1530   BP: 106/62   Pulse: 80   Resp: (!) 97   Temp: 97.6 °F (36.4 °C)      ?   ECOG:?0   General appearance: Generally well appearing, in no acute distress.   Head, eyes, ears, nose, and throat: Pupils round and equally reactive to light. Oropharynx clear with moist mucous membranes.   Cardiovascular: Regular rate and rhythm, S1, S2, no audible murmurs.   Respiratory: Lungs clear to auscultation bilaterally.   Abdomen: Bowel sounds present, soft, nontender, nondistended.   Extremities: Warm, without edema.   Neurologic: Alert and oriented. Grossly normal strength, coordination, and gait.   Skin: No rashes, ecchymoses or petechial lesion.   ?      ?   Laboratory:  ?   No visits with results within 1 Day(s) from this visit.   Latest known visit with results is:   Admission on 03/20/2023, Discharged on 03/22/2023   Component Date Value Ref Range Status    Group & Rh 03/20/2023 O POS   Final    Indirect Gareth 03/20/2023 NEG   Final    Final Pathologic Diagnosis 03/20/2023    Corrected                    Value:Revised Final Diagnosis:    Note:  This case is corrected to reflect the correct pT staging, which was  initially reported as pT1, but wiht viceral pleural        invasion present should be listed as a pT2.  No other changes have been  made.  This was escalated by  Cornelia Grossman PA-C for Dr. Medley and Dr. Horton has notified the clinical team  of the correction on 4/6/2023 at 8:45am.  1. Left lung, wedge resection: Invasive adenocarcinoma, predominantly solid  pattern, measuring 9 mm (0.9 cm) in greatest dimension.         Tumor is located 3 mm (0.3 cm) from the blue inked/stapled parenchymal  surgical margin.         Tumor extends into the elastic layer of the visceral pleura.         See synoptic report in comment section for further details.  2. Lymph node, left level 11, excision: 1 benign fragmented lymph node  with  sinus histiocytosis and anthracotic pigment, negative for metastatic  carcinoma (0/1).  3. Lymph node, left level 10, excision: 1 lymph node, posit                          deon for metastatic  carcinoma with crush artifact dense fibrosis and focal necrosis (1/1).         Confirmed with positive immunohistochemical stain with cytokeratin  AE1/AE3 with satisfactory positive and negative controls.  4. Lymph node, left level 12, excision: 1 benign lymph node with sinus  histiocytosis, negative for metastatic carcinoma (0/1).  5. Lymph node, left level 9, excision: 1 benign fragmented lymph node with  sinus histiocytosis and anthracotic pigment, negative for metastatic  carcinoma (0/1).  6. Lymph node, level 7, excision: 1 benign lymph node with sinus  histiocytosis, negative for metastatic carcinoma (0/1).  7.  Lymph node, level 5, excision: 1 benign fragmented lymph node with sinus  histiocytosis and anthracotic pigment, negative for metastatic carcinoma  (0/1).  8. Lung, lingula, anatomic lingulectomy: Lung with congested vasculature.         No residual carcinoma is identified.         Bronchial and vascular margin is negative for malignancy.         See s                          ynoptic report in comment section for further details.    Comment:  Synoptic report  Specimen:  Synchronous tumors:  Not applicable  Procedure: Wedge resection with completion anatomic lingulectomy  Specimen laterality:  Left  Tumor:  Tumor focality:  Single focus  Tumor site:  Left lung  Tumor size:  Greatest dimension:  9 mm (0.9 cm)  Histologic type:  Invasive adenocarcinoma, predominantly solid pattern.  Histologic patterns present:         Solid - 90%         Acinar - 10%  Histologic grade:  G2, moderately differentiated  Visceral pleural invasion:  Present  Direct invasion of adjacent structures:  Not applicable (no adjacent  structures present)  Treatment effect:  No known presurgical therapy.  Lymphovascular invasion:  Not  identified  Margins:  All margins negative for invasive carcinoma. Closest margin to  invasive carcinoma:  Bronchial/vascular surgical margin is greater than 10 mm  (greater than 1 cm) from         tumor.  Margin status for noninvasive tumor:  All margins neg                          ative for noninvasive  tumor.  Regional lymph nodes:  Lymph nodes from prior procedures:  No known prior lymph node sampling  performed.  Regional lymph node status:  Regional lymph nodes are present and tumor is  present in regional lymph node.                            Number examined:  6                            Number involved:  1   ; lymph nodes site of  positive node left level 10.  Distant metastasis:  Not applicable  Pathologic stage classification:  TNM descriptors:  Not applicable  Primary tumor:  Regional lymph nodes:       pN1:  Metastasis in ipsilateral peribronchial and/or ipsilateral hilar  lymph nodes and intrapulmonary nodes.  Distant metastasis:       pMX:  Cannot be assessed.  Additional findings:  Congested vasculature  Special studies:  Immunohistochemical stains within tumor cells:  p40:  Negative in tumor cells.  p63:  Only weak positive staining and rare tumor cells.  TTF-1:  Positive in tumor cells.  CK 7:  Positive in tumor cells.  All stains have satisfact                          ory positive and negative controls.  This staining  profile supports the above diagnosis of an adenocarcinoma.  Additional molecular studies and PD L1 staining will be completed on the  tumor material and results will follow in supplemental report.  Tumor Blocks for possible Future Studies:  1A, 1B  Comment:  Note:  This case is corrected to reflect the correct pT staging, which was  initially reported as pT1, but wiht viceral pleural invasion present should  be listed as a pT2.  No other changes have been made.  This was escalated by  Cornelia Grossman PA-C for Dr. Medley and Dr. Horton has notified the  clinical team of  "the correction on 4/6/2023 at 8:45am.  For reference only,  the prior report read as follows:  "1. Left lung, wedge resection: Invasive adenocarcinoma, predominantly solid  pattern, measuring 9 mm (0.9 cm) in greatest dimension.         Tumor is located 3 mm (0.3 cm) from the blue inked/stapled parenchymal  surgical margin.         Tumor extends into the elast                          ic layer of the visceral pleura.         See synoptic report in comment section for further details.  2. Lymph node, left level 11, excision: 1 benign fragmented lymph node with  sinus histiocytosis and anthracotic pigment, negative for metastatic  carcinoma (0/1).  3. Lymph node, left level 10, excision: 1 lymph node, positive for metastatic  carcinoma with crush artifact dense fibrosis and focal necrosis (1/1).         Confirmed with positive immunohistochemical stain with cytokeratin  AE1/AE3 with satisfactory positive and negative controls.  4. Lymph node, left level 12, excision: 1 benign lymph node with sinus  histiocytosis, negative for metastatic carcinoma (0/1).  5. Lymph node, left level 9, excision: 1 benign fragmented lymph node with  sinus histiocytosis and anthracotic pigment, negative for metastatic  carcinoma (0/1).  6. Lymph node, level 7, excision: 1 benign lymph node with sinus  histiocytosis, negative for metastatic carcinoma (0/1).  7.  Lymph node, level 5, excisio                          n: 1 benign fragmented lymph node with sinus  histiocytosis and anthracotic pigment, negative for metastatic carcinoma  (0/1).  8. Lung, lingula, anatomic lingulectomy: Lung with congested vasculature.         No residual carcinoma is identified.         Bronchial and vascular margin is negative for malignancy.         See synoptic report in comment section for further details.  Comment:  Synoptic report  Specimen:  Synchronous tumors:  Not applicable  Procedure: Wedge resection with completion anatomic lingulectomy  Specimen " laterality:  Left  Tumor:  Tumor focality:  Single focus  Tumor site:  Left lung  Tumor size:  Greatest dimension:  9 mm (0.9 cm)  Histologic type:  Invasive adenocarcinoma, predominantly solid pattern.  Histologic patterns present:         Solid - 90%         Acinar - 10%  Histologic grade:  G2, moderately differentiated  Visceral pleural invasion:  Present  Direct invasion of adjacent structures:  Not applicable (no adjacent  structures present)  Treatment effec                          t:  No known presurgical therapy.  Lymphovascular invasion:  Not identified  Margins:  All margins negative for invasive carcinoma. Closest margin to  invasive carcinoma:  Bronchial/vascular surgical margin is greater than 10 mm  (greater than 1 cm) from         tumor.  Margin status for noninvasive tumor:  All margins negative for noninvasive  tumor.  Regional lymph nodes:  Lymph nodes from prior procedures:  No known prior lymph node sampling  performed.  Regional lymph node status:  Regional lymph nodes are present and tumor is  present in regional lymph node.                            Number examined:  6                            Number involved:  1   ; lymph nodes site of  positive node left level 10.  Distant metastasis:  Not applicable  Pathologic stage classification:  TNM descriptors:  Not applicable  Primary tumor:       pT1a:  Tumor less than or equal to 1 cm in greatest dimension.  Regional lymph nodes:       pN1:  Metastasis in ipsilateral peribronchial and/or ipsilatera                          l hilar  lymph nodes and intrapulmonary nodes.  Distant metastasis:       pMX:  Cannot be assessed.  Additional findings:  Congested vasculature  Special studies:  Immunohistochemical stains within tumor cells:  p40:  Negative in tumor cells.  p63:  Only weak positive staining and rare tumor cells.  TTF-1:  Positive in tumor cells.  CK 7:  Positive in tumor cells.  All stains have satisfactory positive and negative controls.   "This staining  profile supports the above diagnosis of an adenocarcinoma.  Additional molecular studies and PD L1 staining will be completed on the  tumor material and results will follow in supplemental report.  Tumor Blocks for possible Future Studies:  1A, 1B"      Supplemental Diagnosis 03/20/2023    Corrected                    Value:PD-L1 (22C3) SemiQuant IHC, Manual  Interpretation MCR Left lung , specimen for PD-L1 immunohistochemistry  studies (clone 22C3, [employer], [place], [place]; using a proprietary  detection system) ([id number]):  95% tumor cells are positive for PD-L1 (membranous positivity).  Ekaterina Muhammad M.D., Ph.D.  Report attached  Performing location:  [place] [address]  "Disclaimer:  This case diagnosis was rendered completely by the outside  consultation pathologist and the case is electronically signed by an [facility]  pathologist listed below solely to release the report into the medical  record."  SUPPLEMEMTAL #2  [facility] Lung Cancer Panel  Result  Provided diagnosis: lung adenocarcinoma  The following CLINICALLY RELEVANT VARIANT was detected:  Gene: BRAF  DNA Change: c.1799T>A (Exon 15)  Amino Acid Change: p.V600E ([id number])  Variant Allele Frequency: 17.3%  Microsatellite Instability (MSI) status: Stable (                          KRIS)  No other reportable sequence variants or rearrangements were detected within  the analyzed regions of the tested genes listed in the method description.  Roger Venegas M.D.  Report attached  Performing location:  [place] [address]  "Disclaimer:  This case diagnosis was rendered completely by the outside  consultation pathologist and the case is electronically signed by an [facility]  pathologist listed below solely to release the report into the medical  record."      Frozen Section Diagnosis " "03/20/2023    Corrected                    Value:Spec #1- Non-small cell lung carcinoma  Spec 3#- Suspicious for carcinoma seen in consultation w/ Dr. Fernandes      Gross 03/20/2023    Corrected                    Value:Eight parts  Part 1  Patient ID/MRN:  [id number]  Pathology ID/MRN: [id number]  Received fresh and subsequently fixed in formalin, labeled "lingular wedge  resection, left lung, is a malignant??", is a 7.5 g, 8.2 x 3.5 x 1.4 cm lung  wedge with a double staple line that measures up to 5 cm in aggregate.  The  staple line is inked blue.  The specimen is oriented by stitch indicating  location of nodule (inked black).  On cut sections the lung parenchyma is  spongy, pink hemorrhagic and correlating with the sutures has a 0.8 x 0.6 x  0.4 cm pink nodule located at 0.8 cm from the staple line.  No additional  lesions identified.  The specimen is serially sectioned and representative  sections, including the entire nodule, are submitted in cassettes  GIF--1-A-FS-E after a frozen section is done (cassette AFS).  Part 2  Patient ID/MRN:  [id number]  Pathology ID/MRN: [id number]  Received fresh and subsequently fixed in formalin, labeled "lymph node-left  level 11 ", is a 2.2 x 2.0 cm aggregate of                           rubbery soft, dark gray pink  tissue.  Entirely submitted in cassette ATN--2-A  Part 3  Patient ID/MRN:  [id number]  Pathology ID/MRN: [id number]  Received fresh and subsequently fixed in formalin, labeled "left level 10  lymph node ", is a 1.5 x 0.7 cm rubbery soft, gray-pink tissue fragment.  Entirely submitted in cassette VYC--3-A-FS after a frozen section is  done.  Part 4  Patient ID/MRN:  [id number]  Pathology ID/MRN: [id number]  Received fresh and subsequently fixed in formalin, labeled "left level 12  lymph node ", is a 0.5 x 0.3 x 0.3 cm tan-pink tissue fragment.  Entirely  submitted in cassette SBY--4-A  Part 5  Patient ID/MRN:  [id number]  Pathology ID/MRN: [id number]  Received " "fresh and subsequently fixed in formalin, labeled "left level 9  lymph node ", is a 1.5 x 1.0 x 0.5 cm rubbery, dark gray pink-yellow tissue  fragment.  Entirely submitted in cassette QXN--5-A  Part 6  Patient ID/MRN:  [id number]  Pathology ID/MRN: [id number]  Received fresh and subsequently fixed                           in formalin, labeled "level 7 lymph  node ", is a 0.4 x 0.4 x 0.2 cm dark gray, hemorrhagic tissue fragment.  Entirely submitted in cassette URK--6-A  Part 7  Patient ID/MRN:  [id number]  Pathology ID/MRN: [id number]  Received fresh and subsequently fixed in formalin, labeled "level 5 lymph  node ", is a 0.7 x 0.6 x 0.3 cm dark gray pink-yellow tissue fragment.  Entirely submitted in cassette ORA--7-A  Part 8  Patient ID/MRN:  [id number]  Pathology ID/MRN: [id number]  Received fresh and subsequently fixed in formalin, labeled "lingula ", is a  72 g portion of lung.  After perfusion the specimen measures 11.5 x 10.5 by  4.0 cm.  The specimen has multiple staple lines (inked green).  The pleura is  dull, pink purple with multiple shaggy, irregular areas resembling areas of  adhesion (inked blue).  On cut sections the lung parenchyma is spongy, pink  hemorrhagic.  Grossly no lesions are identified.  The specimen is sectioned  and representative sections are submitted in cassettes S-2                          [id number]:  A: Bronchial margin  B-D:  On parenchyma including shaggy irregular areas and pleura surface  (inked blue)  E-G:  Random sections of lung parenchyma  Johann JOHNSON ([facility]) cm      Disclaimer 03/20/2023    Corrected                    Value:Unless the case is a 'gross only' or additional testing only, the final  diagnosis for each specimen is based on a microscopic examination of  appropriate tissue sections.      Creatinine 03/20/2023 0.8  0.5 - 1.4 mg/dL Final    eGFR 03/20/2023 >60.0  >60 mL/min/1.73 m^2 Final      ?   Tumor markers   ?   ?   Imaging:  ?    Results for orders " placed or performed during the hospital encounter of 02/20/23 (from the past 2160 hour(s))   CT Biopsy Lung w/ guidance    Narrative    EXAMINATION:  CT BIOPSY LUNG W/ GUIDANCE    CLINICAL HISTORY:  Personal history of other malignant neoplasm of bronchus and lung    TECHNIQUE:  The CT exam was performed using one or more of the following dose reduction techniques- Automated exposure control, adjustment of the mA and/or kV according to patient size, and/or use of iterative reconstructed technique.    All CT scans at this facility use dose modulation, iterative reconstruction, and/or weight based dosing when appropriate to reduce radiation dose to as low as reasonable achievable.    Medications:    Moderate sedation using versed and fentanyl was administered by a trained observer monitoring the patient's vital signs and level of consciousness. The total time of sedation was 30 minutes.    1% lidocaine locally    : VANDANA Kwan    Complications: None immediate.    COMPARISON:  None    FINDINGS:  Procedure: The risks and benefits of this procedure were discussed, all questions were answered and informed consent was obtained.  The patient was placed supine on the CT gantry.  Preprocedural imaging was performed.  A suitable skin site for biopsy was selected.  IV fentanyl and Versed was administered for conscious sedation by a trained observer.  The skin site was prepped and draped in sterile fashion.  The skin was anesthetized with 1% lidocaine.    Using CT guidance a 19 gauge introducer needle was guided into the left pulmonary nodule.  Prior to biopsy appropriate needle positioning was confirmed with CT.  Two 20 gauge samples were acquired.  The stylet was replaced and the needle was removed.    Postprocedural imaging was acquired. The site was bandaged sterilely. The patient left the room in stable condition.    Findings:    The needle is at the nodule.      Impression    Technically successful CT guided  biopsy of the left pulmonary nodule..      Electronically signed by: Pk Nathan  Date:    02/20/2023  Time:    11:28   Results for orders placed or performed during the hospital encounter of 02/03/23 (from the past 2160 hour(s))   CT Chest Without Contrast    Narrative    EXAMINATION:  CT CHEST WITHOUT CONTRAST    CLINICAL HISTORY:  lung cancer;    TECHNIQUE:  Standard chest CT protocol was performed without IV contrast.    COMPARISON:  A CT examination of the chest performed on 08/02/2022.    FINDINGS:  The size of heart is within normal limits.  There is an ill-defined area of increased density adjacent to the anterior right side of the mediastinum.  On the current examination this area measures 27 mm in craniocaudal dimension by 19 mm in AP dimension by 13 mm in medial-lateral dimension.  On the prior examination it measured 28 mm in craniocaudal dimension by 19 mm in AP dimension by 14 mm in medial-lateral dimension.  There is a mild amount of atherosclerosis in the left anterior descending artery.  There is an irregular pulmonary nodule in the anterior aspect of the left upper lobe.  On the current examination it measures 8 mm in craniocaudal dimension by 8 mm in AP dimension by 8 mm in medial-lateral dimension.  On the prior examination it measured 6 mm in craniocaudal dimension by 5 mm in AP dimension by 8 mm in medial-lateral dimension.  There is a mild amount of scarring in both lungs.  There is no pneumothorax or pleural effusion.  There are hypodense areas scattered throughout the liver.  One of the larger ones measures 16 mm and is located in the left lobe of the liver.  It has a Hounsfield measurement of -5.  There is an 8 mm oval shaped area of hypodensity in the posterolateral aspect of the midpole of the left kidney.  This area has a Hounsfield measurement of -1.  There are moderate degenerative changes in the thoracic spine.      Impression    1. There is an irregular pulmonary nodule in the  anterior aspect of the left upper lobe.  On the current examination it measures 8 mm in craniocaudal dimension by 8 mm in AP dimension by 8 mm in medial-lateral dimension. On the prior examination it measured 6 mm in craniocaudal dimension by 5 mm in AP dimension by 8 mm in medial-lateral dimension.  This is consistent with the patient's history and characteristic of metastatic disease.  2. There is an ill-defined area of increased density adjacent to the anterior right side of the mediastinum. On the current examination this area measures 27 mm in craniocaudal dimension by 19 mm in AP dimension by 13 mm in medial-lateral dimension.  On the prior examination it measured 28 mm in craniocaudal dimension by 19 mm in AP dimension by 14 mm in medial-lateral dimension.  3. There are hypodense areas scattered throughout the liver. One of the larger ones measures 16 mm and is located in the left lobe of the liver.  It has a Hounsfield measurement of -5.  This is characteristic of a cyst.  4. There is an 8 mm oval shaped area of hypodensity in the posterolateral aspect of the midpole of the left kidney. This area has a Hounsfield measurement of -1.  This is characteristic of a cyst.  5. There are moderate degenerative changes in the thoracic spine.  All CT scans at this facility use dose modulation, iterative reconstruction, and/or weight base dosing when appropriate to reduce radiation dose when appropriate to reduce radiation dose to as low as reasonably achievable.      Electronically signed by: Tristan Childress MD  Date:    02/03/2023  Time:    12:39     No results found for this or any previous visit (from the past 2160 hour(s)).  No results found for this or any previous visit (from the past 2160 hour(s)).      Pathology:    Reviewed in epic     ?   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 Malignant neoplasm of unspecified part of unspecified bronchus or lung    2. 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  Initial biopsy February 20, 2023 without neoplasm identified who after multiple disciplinary discussion proceeded with left lower lobe wedge resection, final pathology positive for 03/28/2022 for invasive moderately differentiated adenocarcinoma station 10 lymph node positive, pleural invasion noted, margins negative.    Recommend staging with MRI brain and PET (now about 1 month out from her surgery and will need to interpret accordingly).  We discussed indication for adjuvant therapy including chemotherapy and immunotherapy per IMPOWER 010 showed benefit to adjuvant atezolizumab following chemotherapy given PDL1 positive disease 95%.  Mutational analysis negative for EGFR mutation.  Will arrange for formal chemotherapy/immunotherapy teaching cisplatin and pemetrexed with adjuvant supplemental folic acid and vitamin B12.  Port recommended given history of difficult venous access.  Will get baseline audiogram.    Advance Care Planning   Stage II malignancy  asymptomatic will defer palliative care consultation at this time.       Follow-Up:   There are no Patient Instructions on file for this visit.    70 minutes of total time spent on the encounter, which includes face to face time and non-face to face time preparing to see the patient (eg, review of tests), Obtaining and/or reviewing separately obtained history, Documenting clinical information in the electronic or other health record, Independently interpreting results (not separately reported) and communicating results to the patient/family/caregiver, or Care coordination (not separately reported).

## 2023-04-13 NOTE — PLAN OF CARE
START ON PATHWAY REGIMEN - Non-Small Cell Lung    [id number]        Pemetrexed (Alimta)       Cisplatin           Additional Orders: Patient to receive the following prior to the   initiation of therapy:  1) Dexamethasone 4 mg orally twice daily x 6 doses.    First dose 24 hours before chemotherapy.  2) Folic acid greater than or equal to   400 mcg orally daily.  First dose at least 5 days prior to the first dose of   pemetrexed.  3) Vitamin B12 1,000 mcg intramuscularly every 9 weeks.  First dose   at least 5 days prior to the first dose of pemetrexed.    **Always confirm dose/schedule in your pharmacy ordering system**    Patient Characteristics:  Postoperative without Neoadjuvant Therapy (Pathologic Staging), Stage IIB,   Adjuvant Chemotherapy, Nonsquamous Cell  Therapeutic Status: Postoperative without Neoadjuvant Therapy (Pathologic   Staging)  AJCC T Category: pT1a  AJCC N Category: pN1  AJCC M Category: cM0  AJCC 8 Stage Grouping: IIB  Histology: Nonsquamous Cell  Intent of Therapy:  Curative Intent, Discussed with Patient

## 2023-04-17 ENCOUNTER — TELEPHONE (OUTPATIENT)
Dept: HEMATOLOGY/ONCOLOGY | Facility: CLINIC | Age: 73
End: 2023-04-17
Payer: MEDICARE

## 2023-04-17 NOTE — NURSING
1508pm: Outgoing call to pt regarding new pt chemo set-up. Spoke with pt. Gave pt my direct contact info. Explained process and needed appts to pt. Pt scheduled for MRI brain on 5/3 at 315 pm at \[facility]", for Audiogram on 5/3 at 430 pm at [facility], for PET scan on  at 1030 am at , and for chemo education on  at 1 pm at . Pt given fasting instructions for PET scan. Pt informed that chemo auth is still pending and I'll call pt once approved to schedule pt. I sent msg to IR nurse, Jennifer requesting medi-port placement. Pt informed that Jennifer will call pt to schedule. Pt encouraged to call me directly should any needs arise. Pt verbalized understanding. Pt plan to report to appts as scheduled.   Oncology Navigation   Intake  Internal / External Referral: Internal (Dr. Refugio Medley)  Initial Nurse Navigator Contact: 23  Date Worked: 23  Multiple appointments: No     Treatment  Current Status: Active  Date Presented to Tumor Board: 23  Presentation Notes: Discuss left VATS anatomic resection for growing NANI nodule    Surgery: Planned  Surgical Oncologist: Jasmyne  Consult Date: 03/15/23    Medical Oncologist: Dr. Marissa Brower  Consult Date: 23  Chemotherapy: Planned  Chemotherapy Regimen: Alimta Cisplatin  Immunotherapy: Planned  Immunotherapy Name: Tecentriq       Procedures: MRI; PET scan; Other  MRI Schedule Date: 23 (brain w wo contrast)  PET Scan Schedule Date: 23  Other Schedule Date: 23 (audiogram)             Support Systems: Spouse/significant other  Barriers of Care: Transportation  Transportation Barriers: Patient lives in [facility]  Stage: 1  Systemic Treatment - predicted or initiated: Chemotherapy Regimen with Multiple drugs (+1)  Treatment Tolerability: Has not started treatment yet/treatment fully completed and side effects resolved  ECO  Comorbidities in Medical History: 2  Hospitalization Within the Past Month:  0   Needed: 0  Support: 0  Transportation: 0  History of noncompliance/frequent no shows and cancellations: 0  Verbalizes the need for more education: 1  Navigation Acuity: 4     Follow Up  No follow-ups on file.

## 2023-04-18 ENCOUNTER — TELEPHONE (OUTPATIENT)
Dept: HEMATOLOGY/ONCOLOGY | Facility: CLINIC | Age: 73
End: 2023-04-18
Payer: MEDICARE

## 2023-04-18 NOTE — TELEPHONE ENCOUNTER
Jorger called pt. ([phone]) to discuss most recent distress score of 8 from most recent appt time with Dr. Brower on 4/13/2023. Pt. reports she felt uneasy not knowing what was ahead, but after speaking to Dr. Brower she felt better knowing what is to come. Pt. reports she is aware of the next appts as she was provided this information on yesterdays date. Swer briefed pt. on sw department and will f/u with pt. at the start of tx. Pt. reports no needs for sw today and will call if any needs arise. Swer will remain available.     DISTRESS SCREENING 4/13/2023 4/20/2021   Distress Score 8 1   Emotional Problems Worry -   Spiritual / Lutheran Concerns - No

## 2023-04-21 ENCOUNTER — TELEPHONE (OUTPATIENT)
Dept: RADIOLOGY | Facility: HOSPITAL | Age: 73
End: 2023-04-21
Payer: MEDICARE

## 2023-04-21 NOTE — TELEPHONE ENCOUNTER
Interventional Radiology:    Spoke to pt and got her scheduled for mediport placement on 4/27 @ 1pm.     Informed pt to be NPO after 4am, show up to [facility] on [address] at 11:30am, either have a ride with them or have a phone number for the person driving them home so that we can get in contact with them to keep them updated. Pt denies the use of any blood thinners, aspirin, or fish oil. Answered all questions that the pt had and pt verbalized understanding of all discussed.

## 2023-04-24 ENCOUNTER — PATIENT MESSAGE (OUTPATIENT)
Dept: HEMATOLOGY/ONCOLOGY | Facility: CLINIC | Age: 73
End: 2023-04-24
Payer: MEDICARE

## 2023-04-25 ENCOUNTER — HOSPITAL ENCOUNTER (OUTPATIENT)
Dept: RADIOLOGY | Facility: HOSPITAL | Age: 73
Discharge: HOME | End: 2023-04-25
Attending: NURSE PRACTITIONER
Payer: MEDICARE

## 2023-04-25 ENCOUNTER — TELEPHONE (OUTPATIENT)
Dept: HEMATOLOGY/ONCOLOGY | Facility: CLINIC | Age: 73
End: 2023-04-25
Payer: MEDICARE

## 2023-04-25 ENCOUNTER — PATIENT MESSAGE (OUTPATIENT)
Dept: HEMATOLOGY/ONCOLOGY | Facility: CLINIC | Age: 73
End: 2023-04-25
Payer: MEDICARE

## 2023-04-25 DIAGNOSIS — J44.9 COPD, MODERATE: ICD-10-CM

## 2023-04-25 PROCEDURE — 71046 X-RAY EXAM CHEST 2 VIEWS: CPT | Mod: 26,,, | Performed by: RADIOLOGY

## 2023-04-25 PROCEDURE — 71046 XR CHEST PA AND LATERAL: ICD-10-PCS | Mod: 26,,, | Performed by: RADIOLOGY

## 2023-04-25 PROCEDURE — 71046 X-RAY EXAM CHEST 2 VIEWS: CPT | Mod: TC,PO

## 2023-04-25 RX ORDER — DEXAMETHASONE 4 MG/1
8 TABLET ORAL DAILY
Qty: 24 TABLET | Refills: 3 | Status: SHIPPED | OUTPATIENT
Start: 2023-04-25

## 2023-04-25 RX ORDER — ONDANSETRON 8 MG/1
8 TABLET, ORALLY DISINTEGRATING ORAL EVERY 8 HOURS PRN
Qty: 60 TABLET | Refills: 5 | Status: SHIPPED | OUTPATIENT
Start: 2023-04-25 | End: 2023-04-25

## 2023-04-25 RX ORDER — FOLIC ACID 1 MG/1
1 TABLET ORAL DAILY
Qty: 30 TABLET | Refills: 5 | Status: SHIPPED | OUTPATIENT
Start: 2023-04-25 | End: 2024-04-24

## 2023-04-25 RX ORDER — FOLIC ACID 1 MG/1
1 TABLET ORAL DAILY
Qty: 30 TABLET | Refills: 5 | Status: SHIPPED | OUTPATIENT
Start: 2023-04-25 | End: 2023-04-25

## 2023-04-25 RX ORDER — CYANOCOBALAMIN 1000 UG/ML
1000 INJECTION, SOLUTION INTRAMUSCULAR; SUBCUTANEOUS
Status: CANCELLED
Start: 2023-04-27

## 2023-04-25 RX ORDER — OLANZAPINE 5 MG/1
5 TABLET ORAL NIGHTLY
Qty: 16 TABLET | Refills: 0 | Status: SHIPPED | OUTPATIENT
Start: 2023-04-25 | End: 2023-04-25

## 2023-04-25 RX ORDER — ONDANSETRON 8 MG/1
8 TABLET, ORALLY DISINTEGRATING ORAL EVERY 8 HOURS PRN
Qty: 60 TABLET | Refills: 5 | Status: SHIPPED | OUTPATIENT
Start: 2023-04-25

## 2023-04-25 RX ORDER — DEXAMETHASONE 4 MG/1
8 TABLET ORAL DAILY
Qty: 24 TABLET | Refills: 3 | Status: SHIPPED | OUTPATIENT
Start: 2023-04-25 | End: 2023-04-25

## 2023-04-25 RX ORDER — OLANZAPINE 5 MG/1
5 TABLET ORAL NIGHTLY
Qty: 16 TABLET | Refills: 0 | Status: SHIPPED | OUTPATIENT
Start: 2023-04-25

## 2023-04-25 NOTE — NURSING
1339pm: Outgoing call to pt regarding chemo set-up. Spoke with pt. Informed pt that chemo is now approved and although pt still has to be staged will move forward with scheduling chemo appts. Reviewed at home medications with pt and how to take. Advised pt to call pharmacy since it's not local today to ensure she has meds delivered for next Mon 5/1. Pt scheduled for B12 injection on 5/1 at 1130 am at , for labs on 5/8 at 8 am at , for Dr. Brower on 5/8 at 840 am at , and for chemo on 5/8 at 9 am at . Pt also informed that her insurance overage is out of network and that she may receive bills for whatever portion insurance doesn't cover. Pt is aware, understands, and still wishes to proceed with care here. Pt verbalized understanding. Pt plan to report to appts as scheduled. SW and FC notified of pt chemo start date.    Oncology Navigation   Intake  Internal / External Referral: Internal (Dr. Refugio Medley)  Initial Nurse Navigator Contact: 04/05/23  Date Worked: 04/25/23  Multiple appointments: No  Start of Treatment: 05/08/23     Treatment  Current Status: Active  Date Presented to Tumor Board: 03/08/23  Presentation Notes: Discuss left VATS anatomic resection for growing NANI nodule    Surgery: Planned  Surgical Oncologist: Jasmyne  Consult Date: 03/15/23    Medical Oncologist: Dr. Marissa Brower  Consult Date: 04/13/23  Chemotherapy: Planned  Chemotherapy Regimen: Alimta Cisplatin  Immunotherapy: Planned  Immunotherapy Name: Tecentriq       Procedures: Port / PICC  MRI Schedule Date: 05/03/23 (brain w wo contrast)  PET Scan Schedule Date: 05/04/23  Port / PICC Schedule Date: 04/27/23  Other Schedule Date: 05/03/23 (audiogram)    General Referrals: Social Work  Social Work: notified via in-basket msg  Social Work Referral Date: 04/25/23          Support Systems: Spouse/significant other  Barriers of Care: Transportation  Transportation Barriers: Patient lives in [facility]  Stage:  1  Systemic Treatment - predicted or initiated: Chemotherapy Regimen with Multiple drugs (+1)  Treatment Tolerability: Has not started treatment yet/treatment fully completed and side effects resolved  ECO  Comorbidities in Medical History: 2  Hospitalization Within the Past Month: 0   Needed: 0  Support: 0  Transportation: 0  History of noncompliance/frequent no shows and cancellations: 0  Verbalizes the need for more education: 1  Navigation Acuity: 4     Follow Up  No follow-ups on file.

## 2023-04-27 ENCOUNTER — HOSPITAL ENCOUNTER (OUTPATIENT)
Dept: RADIOLOGY | Facility: HOSPITAL | Age: 73
Discharge: HOME | End: 2023-04-27
Attending: INTERNAL MEDICINE
Payer: MEDICARE

## 2023-04-27 ENCOUNTER — HOSPITAL ENCOUNTER (OUTPATIENT)
Facility: HOSPITAL | Age: 73
Discharge: HOME | End: 2023-04-27
Attending: RADIOLOGY | Admitting: RADIOLOGY
Payer: MEDICARE

## 2023-04-27 VITALS
OXYGEN SATURATION: 96 % | RESPIRATION RATE: 17 BRPM | WEIGHT: 162.5 LBS | BODY MASS INDEX: 27.07 KG/M2 | HEART RATE: 68 BPM | HEIGHT: 65 IN | TEMPERATURE: 98 F | DIASTOLIC BLOOD PRESSURE: 59 MMHG | SYSTOLIC BLOOD PRESSURE: 114 MMHG

## 2023-04-27 DIAGNOSIS — C34.32 MALIGNANT NEOPLASM OF LOWER LOBE OF LEFT LUNG: Primary | ICD-10-CM

## 2023-04-27 DIAGNOSIS — C34.90 MALIGNANT NEOPLASM OF UNSPECIFIED PART OF UNSPECIFIED BRONCHUS OR LUNG: ICD-10-CM

## 2023-04-27 LAB
ANION GAP SERPL CALC-SCNC: 11 MMOL/L (ref 8–16)
BASOPHILS # BLD AUTO: 0.03 K/UL (ref 0–0.2)
BASOPHILS NFR BLD: 0.5 % (ref 0–1.9)
BUN SERPL-MCNC: 12 MG/DL (ref 8–23)
CALCIUM SERPL-MCNC: 9.7 MG/DL (ref 8.7–10.5)
CHLORIDE SERPL-SCNC: 106 MMOL/L (ref 95–110)
CO2 SERPL-SCNC: 26 MMOL/L (ref 23–29)
CREAT SERPL-MCNC: 0.8 MG/DL (ref 0.5–1.4)
DIFFERENTIAL METHOD: ABNORMAL
EOSINOPHIL # BLD AUTO: 0.3 K/UL (ref 0–0.5)
EOSINOPHIL NFR BLD: 4 % (ref 0–8)
ERYTHROCYTE [DISTWIDTH] IN BLOOD BY AUTOMATED COUNT: 13.4 % (ref 11.5–14.5)
EST. GFR  (NO RACE VARIABLE): >60 ML/MIN/1.73 M^2
GLUCOSE SERPL-MCNC: 89 MG/DL (ref 70–110)
HCT VFR BLD AUTO: 39.6 % (ref 37–48.5)
HGB BLD-MCNC: 12.5 G/DL (ref 12–16)
IMM GRANULOCYTES # BLD AUTO: 0.02 K/UL (ref 0–0.04)
IMM GRANULOCYTES NFR BLD AUTO: 0.3 % (ref 0–0.5)
LYMPHOCYTES # BLD AUTO: 1.8 K/UL (ref 1–4.8)
LYMPHOCYTES NFR BLD: 29.1 % (ref 18–48)
MCH RBC QN AUTO: 28.4 PG (ref 27–31)
MCHC RBC AUTO-ENTMCNC: 31.6 G/DL (ref 32–36)
MCV RBC AUTO: 90 FL (ref 82–98)
MONOCYTES # BLD AUTO: 0.5 K/UL (ref 0.3–1)
MONOCYTES NFR BLD: 8.4 % (ref 4–15)
NEUTROPHILS # BLD AUTO: 3.7 K/UL (ref 1.8–7.7)
NEUTROPHILS NFR BLD: 57.7 % (ref 38–73)
NRBC BLD-RTO: 0 /100 WBC
PLATELET # BLD AUTO: 193 K/UL (ref 150–450)
PMV BLD AUTO: 10.4 FL (ref 9.2–12.9)
POTASSIUM SERPL-SCNC: 3.7 MMOL/L (ref 3.5–5.1)
RBC # BLD AUTO: 4.4 M/UL (ref 4–5.4)
SODIUM SERPL-SCNC: 143 MMOL/L (ref 136–145)
WBC # BLD AUTO: 6.32 K/UL (ref 3.9–12.7)

## 2023-04-27 PROCEDURE — 77001 FLUOROGUIDE FOR VEIN DEVICE: CPT | Mod: TC | Performed by: RADIOLOGY

## 2023-04-27 PROCEDURE — 99152 MOD SED SAME PHYS/QHP 5/>YRS: CPT | Performed by: RADIOLOGY

## 2023-04-27 PROCEDURE — 25000003 PHARM REV CODE 250: Performed by: RADIOLOGY

## 2023-04-27 PROCEDURE — 36561 INSERT TUNNELED CV CATH: CPT | Mod: RT,,, | Performed by: RADIOLOGY

## 2023-04-27 PROCEDURE — 77001 FLUOROGUIDE FOR VEIN DEVICE: CPT | Mod: 26,,, | Performed by: RADIOLOGY

## 2023-04-27 PROCEDURE — 77001 CHG FLUOROGUIDE CNTRL VEN ACCESS,PLACE,REPLACE,REMOVE: ICD-10-PCS | Mod: 26,,, | Performed by: RADIOLOGY

## 2023-04-27 PROCEDURE — 63600175 PHARM REV CODE 636 W HCPCS: Performed by: RADIOLOGY

## 2023-04-27 PROCEDURE — C1788 PORT, INDWELLING, IMP: HCPCS | Performed by: RADIOLOGY

## 2023-04-27 PROCEDURE — 85025 COMPLETE CBC W/AUTO DIFF WBC: CPT | Performed by: RADIOLOGY

## 2023-04-27 PROCEDURE — 80048 BASIC METABOLIC PNL TOTAL CA: CPT | Performed by: RADIOLOGY

## 2023-04-27 PROCEDURE — 36561 IR TUNNELED CATH PLACEMENT WITH PORT: ICD-10-PCS | Mod: RT,,, | Performed by: RADIOLOGY

## 2023-04-27 PROCEDURE — 76937 US GUIDE VASCULAR ACCESS: CPT | Mod: TC | Performed by: RADIOLOGY

## 2023-04-27 PROCEDURE — 76937 PR  US GUIDE, VASCULAR ACCESS: ICD-10-PCS | Mod: 26,,, | Performed by: RADIOLOGY

## 2023-04-27 PROCEDURE — 99152 PR MOD CONSCIOUS SEDATION, SAME PHYS, 5+ YRS, FIRST 15 MIN: ICD-10-PCS | Mod: ,,, | Performed by: RADIOLOGY

## 2023-04-27 PROCEDURE — 36561 INSERT TUNNELED CV CATH: CPT | Mod: RT | Performed by: RADIOLOGY

## 2023-04-27 PROCEDURE — 99152 MOD SED SAME PHYS/QHP 5/>YRS: CPT | Mod: ,,, | Performed by: RADIOLOGY

## 2023-04-27 PROCEDURE — 76937 US GUIDE VASCULAR ACCESS: CPT | Mod: 26,,, | Performed by: RADIOLOGY

## 2023-04-27 DEVICE — POWERPORT CLEARVUE IMPLANTABLE PORT WITH ATTACHABLE 8F POLYURETHANE OPEN-ENDED SINGLE-LUMEN VENOUS CATHETER INTERMEDIATE KIT
Type: IMPLANTABLE DEVICE | Site: CHEST | Status: FUNCTIONAL
Brand: POWERPORT CLEARVUE

## 2023-04-27 RX ORDER — HEPARIN SODIUM 1000 [USP'U]/ML
INJECTION, SOLUTION INTRAVENOUS; SUBCUTANEOUS
Status: DISCONTINUED | OUTPATIENT
Start: 2023-04-27 | End: 2023-04-27 | Stop reason: HOSPADM

## 2023-04-27 RX ORDER — CEFAZOLIN SODIUM 1 G/3ML
INJECTION, POWDER, FOR SOLUTION INTRAMUSCULAR; INTRAVENOUS
Status: DISCONTINUED | OUTPATIENT
Start: 2023-04-27 | End: 2023-04-27 | Stop reason: HOSPADM

## 2023-04-27 RX ORDER — LIDOCAINE HYDROCHLORIDE 20 MG/ML
INJECTION, SOLUTION EPIDURAL; INFILTRATION; INTRACAUDAL; PERINEURAL
Status: DISCONTINUED | OUTPATIENT
Start: 2023-04-27 | End: 2023-04-27 | Stop reason: HOSPADM

## 2023-04-27 RX ORDER — FENTANYL CITRATE 50 UG/ML
INJECTION, SOLUTION INTRAMUSCULAR; INTRAVENOUS
Status: DISCONTINUED | OUTPATIENT
Start: 2023-04-27 | End: 2023-04-27 | Stop reason: HOSPADM

## 2023-04-27 RX ORDER — MIDAZOLAM HYDROCHLORIDE 1 MG/ML
INJECTION, SOLUTION INTRAMUSCULAR; INTRAVENOUS
Status: DISCONTINUED | OUTPATIENT
Start: 2023-04-27 | End: 2023-04-27 | Stop reason: HOSPADM

## 2023-04-27 RX ORDER — ONDANSETRON 2 MG/ML
4 INJECTION INTRAMUSCULAR; INTRAVENOUS EVERY 6 HOURS PRN
Status: DISCONTINUED | OUTPATIENT
Start: 2023-04-27 | End: 2023-04-28 | Stop reason: HOSPADM

## 2023-04-27 NOTE — NURSING
Discharged to home in care of  via wc to main entrance  NAD  A & O X 3  Cooperative/communicative.

## 2023-04-27 NOTE — NURSING
Rec'd to RR NAD  Answers when name called.  Drifts off to sleep    at bedside.  Dr Retana at bedsideICE bag placed ro R CW dsg site

## 2023-04-27 NOTE — NURSING
"1445  DC instructions reviewed w/pt and .  Verbalized understanding.  Whil assisting patient to get addressed I noted former surgical site from March (per pt report(    Reddened around 1/4" circular wd  Yellow scab.  Appears to be sunken.  No induration and pt denies pain when area around site palpated.  I asked her to show when she has next MD appt to check.  Verbalized understanding.    .  "

## 2023-04-27 NOTE — DISCHARGE SUMMARY
[facility]  Discharge Note  Short Stay    IR Tunneled Cath Placement With Port    FLUORO TIME 1.1 MIN  SEDATION TIME 22 MIN   3mg versed  100 mcg fentantyl  IRA 4.58 mGy    Right chest IJ 8F mediport ready for immediate use     OUTCOME: Patient tolerated treatment/procedure well without complication and is now ready for discharge.    DISPOSITION: Home or Self Care    FINAL DIAGNOSIS:  LUNG CANCER    FOLLOWUP: None    DISCHARGE INSTRUCTIONS:  No discharge procedures on file.     TIME SPENT ON DISCHARGE: 10 minutes

## 2023-04-27 NOTE — Clinical Note
The right neck was prepped. The site was prepped with ChloraPrep. The site was clipped. The patient was positioned supine.

## 2023-04-28 ENCOUNTER — NURSE TRIAGE (OUTPATIENT)
Dept: ADMINISTRATIVE | Facility: CLINIC | Age: 73
End: 2023-04-28
Payer: MEDICARE

## 2023-04-28 NOTE — TELEPHONE ENCOUNTER
Reason for Disposition   Caller has NON-URGENT question and triager unable to answer question    Additional Information   Negative: Major abdominal surgical incision and wound gaping open with visible internal organs   Negative: Sounds like a life-threatening emergency to the triager   Negative: Bleeding from incision and won't stop after 10 minutes of direct pressure   Negative: Bleeding (more than a few drops) from incision and after blood vessel surgery (e.g., carotidendarterectomy, femoral bypass graft, kidney dialysis fistula, tracheostomy)   Negative: Bright red, wide-spread, sunburn-like rash   Negative: SEVERE pain in the incision   Negative: Incision gaping open and < 2 days (48 hours) since wound re-opened   Negative: Incision gaping open and length of opening > 2 inches (5 cm)   Negative: Patient sounds very sick or weak to the triager   Negative: Sounds like a serious complication to the triager   Negative: Fever > 100.4 F (38.0 C)   Negative: Incision looks infected (spreading redness, pain)   Negative: Red streak runs from the incision and longer than 1 inch (2.5 cm)   Negative: Pus or bad-smelling fluid draining from incision   Negative: Dressing soaked with blood or body fluid (e.g., drainage)   Negative: Raised bruise and size > 2 inches (5 cm) and expanding   Negative: Scant bleeding (e.g., few drops) from incision and after blood vessel surgery (e.g., carotid endarterectomy, femoral bypass graft, kidney dialysis fistula   Negative: Caller has URGENT question and triager unable to answer question   Negative: Incision gaping open and length of opening > 1/4 inch (6 mm) and on the face and over 2 days since wound re-opened   Negative: Incision gaping open and length of opening > 1/2 inch (1 cm) and over 2 days since wound re-opened   Negative: Clear or blood-tinged fluid draining from wound and no fever   Negative: Suture or staple removal is overdue   Negative: Patient wants to be seen   Negative:  INCREASING pain in incision and over 2 days (48 hours) since surgery   Negative: Suture or staple came out early and caller wants wound checked    Protocols used: Post-Op Incision Symptoms and [id number]  Pt had procedure yest and asking about showering and her dressing. Pt denies pain. Spoke with dr Retana via secure chat. MD states to have pt put the tegaderm or square bandage on site and take a shower. MD states to take big dressing off Monday and change it to a tegaderm or square bandage. Pt notified.

## 2023-05-01 ENCOUNTER — INFUSION (OUTPATIENT)
Dept: INFUSION THERAPY | Facility: HOSPITAL | Age: 73
End: 2023-05-01
Attending: INTERNAL MEDICINE
Payer: MEDICARE

## 2023-05-01 ENCOUNTER — TELEPHONE (OUTPATIENT)
Dept: HEMATOLOGY/ONCOLOGY | Facility: CLINIC | Age: 73
End: 2023-05-01
Payer: MEDICARE

## 2023-05-01 VITALS
DIASTOLIC BLOOD PRESSURE: 61 MMHG | TEMPERATURE: 99 F | HEART RATE: 72 BPM | OXYGEN SATURATION: 98 % | SYSTOLIC BLOOD PRESSURE: 127 MMHG | RESPIRATION RATE: 16 BRPM

## 2023-05-01 DIAGNOSIS — C34.32 MALIGNANT NEOPLASM OF LOWER LOBE OF LEFT LUNG: Primary | ICD-10-CM

## 2023-05-01 PROCEDURE — 63600175 PHARM REV CODE 636 W HCPCS: Performed by: INTERNAL MEDICINE

## 2023-05-01 PROCEDURE — 96372 THER/PROPH/DIAG INJ SC/IM: CPT

## 2023-05-01 RX ORDER — CYANOCOBALAMIN 1000 UG/ML
1000 INJECTION, SOLUTION INTRAMUSCULAR; SUBCUTANEOUS
Status: COMPLETED | OUTPATIENT
Start: 2023-05-01 | End: 2023-05-01

## 2023-05-01 RX ADMIN — CYANOCOBALAMIN 1000 MCG: 1000 INJECTION, SOLUTION INTRAMUSCULAR at 11:05

## 2023-05-01 NOTE — TELEPHONE ENCOUNTER
Nurse called patient to give update on PET scan scheduled. Informed patient that insurance denied scan and requested for a peer to peer/appeal to be done. Informed that expedited appeal was performed today via phone and that we will get a response back in up to 72 hours. Patient verbalized understanding and requested that scan not be canceled until we hear back from insurance.

## 2023-05-03 ENCOUNTER — HOSPITAL ENCOUNTER (OUTPATIENT)
Dept: RADIOLOGY | Facility: HOSPITAL | Age: 73
Discharge: HOME | End: 2023-05-03
Attending: INTERNAL MEDICINE
Payer: MEDICARE

## 2023-05-03 ENCOUNTER — CLINICAL SUPPORT (OUTPATIENT)
Dept: AUDIOLOGY | Facility: CLINIC | Age: 73
End: 2023-05-03
Payer: MEDICARE

## 2023-05-03 DIAGNOSIS — Z79.899 ENCOUNTER FOR MONITORING OTOTOXIC DRUG THERAPY: ICD-10-CM

## 2023-05-03 DIAGNOSIS — Z51.81 ENCOUNTER FOR MONITORING OTOTOXIC DRUG THERAPY: ICD-10-CM

## 2023-05-03 DIAGNOSIS — C34.90 MALIGNANT NEOPLASM OF UNSPECIFIED PART OF UNSPECIFIED BRONCHUS OR LUNG: ICD-10-CM

## 2023-05-03 DIAGNOSIS — H90.3 SENSORINEURAL HEARING LOSS, BILATERAL: Primary | ICD-10-CM

## 2023-05-03 DIAGNOSIS — C34.32 MALIGNANT NEOPLASM OF LOWER LOBE OF LEFT LUNG: ICD-10-CM

## 2023-05-03 PROCEDURE — 92557 PR COMPREHENSIVE HEARING TEST: ICD-10-PCS | Mod: S$GLB,,, | Performed by: AUDIOLOGIST-HEARING AID FITTER

## 2023-05-03 PROCEDURE — 70553 MRI BRAIN W WO CONTRAST: ICD-10-PCS | Mod: 26,,, | Performed by: STUDENT IN AN ORGANIZED HEALTH CARE EDUCATION/TRAINING PROGRAM

## 2023-05-03 PROCEDURE — 99999 PR PBB SHADOW E&M-EST. PATIENT-LVL I: ICD-10-PCS | Mod: PBBFAC,,, | Performed by: AUDIOLOGIST-HEARING AID FITTER

## 2023-05-03 PROCEDURE — 70553 MRI BRAIN STEM W/O & W/DYE: CPT | Mod: TC

## 2023-05-03 PROCEDURE — 70553 MRI BRAIN STEM W/O & W/DYE: CPT | Mod: 26,,, | Performed by: STUDENT IN AN ORGANIZED HEALTH CARE EDUCATION/TRAINING PROGRAM

## 2023-05-03 PROCEDURE — 92567 PR TYMPA2METRY: ICD-10-PCS | Mod: S$GLB,,, | Performed by: AUDIOLOGIST-HEARING AID FITTER

## 2023-05-03 PROCEDURE — A9585 GADOBUTROL INJECTION: HCPCS | Performed by: INTERNAL MEDICINE

## 2023-05-03 PROCEDURE — 92557 COMPREHENSIVE HEARING TEST: CPT | Mod: S$GLB,,, | Performed by: AUDIOLOGIST-HEARING AID FITTER

## 2023-05-03 PROCEDURE — 99999 PR PBB SHADOW E&M-EST. PATIENT-LVL I: CPT | Mod: PBBFAC,,, | Performed by: AUDIOLOGIST-HEARING AID FITTER

## 2023-05-03 PROCEDURE — 25500020 PHARM REV CODE 255: Performed by: INTERNAL MEDICINE

## 2023-05-03 PROCEDURE — 92567 TYMPANOMETRY: CPT | Mod: S$GLB,,, | Performed by: AUDIOLOGIST-HEARING AID FITTER

## 2023-05-03 RX ORDER — GADOBUTROL 604.72 MG/ML
10 INJECTION INTRAVENOUS
Status: COMPLETED | OUTPATIENT
Start: 2023-05-03 | End: 2023-05-03

## 2023-05-03 RX ADMIN — GADOBUTROL 7 ML: 604.72 INJECTION INTRAVENOUS at 03:05

## 2023-05-03 NOTE — PROGRESS NOTES
Referring provider: Dr. Leonarda Lamas was seen 05/03/2023 for an audiological evaluation.  Patient is here for baseline audiogram prior to Cisplatin therapy; diagnosis of adenocarcinoma left lower lung.   She denies hearing loss, reporting equally sided hearing. No tinnitus. Has past history of BPPV and was having some dizziness after recent hospital stay. She was feeling whirling when lying with her head back on pillow, but now when on her sides. The dizziness has gradually improved and is nearly resolved. 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       Results reveal normal hearing 250-2000 Hz sloping to a moderate-to-severe sensorineural hearing loss 3814-2877 Hz for the right ear, and normal hearing 250-2000 Hz sloping to a moderate sensorineural hearing loss 9759-8281 Hz for the left ear.   Speech Reception Thresholds were  15 dBHL for the right ear and 20 dBHL for the left ear.   Word recognition scores were excellent for the right ear and excellent for the left ear.    Tympanograms were Type A for the right ear and Type A for the left ear.      Patient declined Sade-Hallpike testing.      Patient was counseled on the above findings and recommendations.  Advised to notify her healthcare providers of any perceived changes in hearing.      Recommendations:  1. Audiologic monitoring per ototoxicity protocol, during and following completion of therapy. The recommended protocol is within 48 hours prior to administration of a single high dose (>100 mg/square meter) infusion; or prior to initiation and mid-therapy and post-cessation of therapy.    2. Loud noise precaution, including avoidance when possible and/or hearing protection during chemotherapy administration and following; discussed with patient loud noise exposure can exacerbate the ototoxicity of cisplatin.

## 2023-05-04 ENCOUNTER — HOSPITAL ENCOUNTER (OUTPATIENT)
Dept: RADIOLOGY | Facility: HOSPITAL | Age: 73
Discharge: HOME | End: 2023-05-04
Attending: INTERNAL MEDICINE
Payer: MEDICARE

## 2023-05-04 ENCOUNTER — CLINICAL SUPPORT (OUTPATIENT)
Dept: HEMATOLOGY/ONCOLOGY | Facility: CLINIC | Age: 73
End: 2023-05-04
Payer: MEDICARE

## 2023-05-04 DIAGNOSIS — C34.90 MALIGNANT NEOPLASM OF UNSPECIFIED PART OF UNSPECIFIED BRONCHUS OR LUNG: ICD-10-CM

## 2023-05-04 PROCEDURE — 78815 PET IMAGE W/CT SKULL-THIGH: CPT | Mod: 26,PS,, | Performed by: RADIOLOGY

## 2023-05-04 PROCEDURE — A9552 F18 FDG: HCPCS

## 2023-05-04 PROCEDURE — 78815 NM PET CT ROUTINE: ICD-10-PCS | Mod: 26,PS,, | Performed by: RADIOLOGY

## 2023-05-05 NOTE — PROGRESS NOTES
Met with patient and  in person____) to discuss upcoming systemic therapy initiation. Discussed patient diagnosis including staging information. Also discussed that therapy regimen prescribed involves the following drugs: _Alimta, Cisplatin, and Tecentriq_, and timeline of therapy administration. Went over what to expect on first day of treatment, including what to bring with you, visitor policy, and infusion suite guidelines. Also discussed supportive and shared services available to patient, including social work, financial counseling, oncology nutrition, and oncology psychology.      Covered with patient and  potential side effects and symptom management. Reviewed supportive medications that will be given before, during, and after treatment and provided written instructions for all.  Also stressed that other side effects are possible outside of those listed. Spent additional time on signs of infection, infection prevention, and proper nutrition/hydration.    Education provided to patient and  via [facility] specific drug information and chemotherapy education binder___). Also provided contact information for clinic and information related to [facility] communication. Discussed communication process for after-hours needs and some common scenarios in which patient and  should call provider for guidance vs. immediately report to the emergency room.     Finally, patient and  were given my contact information. Encouraged patient and  to call with any questions, concerns, or needs. Patient and  verbalized understanding of all above information.

## 2023-05-08 ENCOUNTER — LAB VISIT (OUTPATIENT)
Dept: LAB | Facility: HOSPITAL | Age: 73
End: 2023-05-08
Attending: INTERNAL MEDICINE
Payer: MEDICARE

## 2023-05-08 ENCOUNTER — OFFICE VISIT (OUTPATIENT)
Dept: HEMATOLOGY/ONCOLOGY | Facility: CLINIC | Age: 73
End: 2023-05-08
Payer: MEDICARE

## 2023-05-08 VITALS
DIASTOLIC BLOOD PRESSURE: 63 MMHG | WEIGHT: 162.94 LBS | SYSTOLIC BLOOD PRESSURE: 113 MMHG | BODY MASS INDEX: 27.11 KG/M2 | HEART RATE: 78 BPM | RESPIRATION RATE: 18 BRPM | OXYGEN SATURATION: 96 % | TEMPERATURE: 98 F

## 2023-05-08 DIAGNOSIS — C34.90 MALIGNANT NEOPLASM OF UNSPECIFIED PART OF UNSPECIFIED BRONCHUS OR LUNG: Primary | ICD-10-CM

## 2023-05-08 DIAGNOSIS — C34.90 MALIGNANT NEOPLASM OF UNSPECIFIED PART OF UNSPECIFIED BRONCHUS OR LUNG: ICD-10-CM

## 2023-05-08 LAB
ALBUMIN SERPL BCP-MCNC: 3.8 G/DL (ref 3.5–5.2)
ALP SERPL-CCNC: 84 U/L (ref 55–135)
ALT SERPL W/O P-5'-P-CCNC: 7 U/L (ref 10–44)
ANION GAP SERPL CALC-SCNC: 14 MMOL/L (ref 8–16)
AST SERPL-CCNC: 17 U/L (ref 10–40)
BASOPHILS # BLD AUTO: 0.01 K/UL (ref 0–0.2)
BASOPHILS NFR BLD: 0.1 % (ref 0–1.9)
BILIRUB SERPL-MCNC: 0.5 MG/DL (ref 0.1–1)
BUN SERPL-MCNC: 13 MG/DL (ref 8–23)
CALCIUM SERPL-MCNC: 9.9 MG/DL (ref 8.7–10.5)
CHLORIDE SERPL-SCNC: 106 MMOL/L (ref 95–110)
CO2 SERPL-SCNC: 24 MMOL/L (ref 23–29)
CREAT SERPL-MCNC: 0.8 MG/DL (ref 0.5–1.4)
DIFFERENTIAL METHOD: ABNORMAL
EOSINOPHIL # BLD AUTO: 0 K/UL (ref 0–0.5)
EOSINOPHIL NFR BLD: 0 % (ref 0–8)
ERYTHROCYTE [DISTWIDTH] IN BLOOD BY AUTOMATED COUNT: 13.3 % (ref 11.5–14.5)
EST. GFR  (NO RACE VARIABLE): >60 ML/MIN/1.73 M^2
GLUCOSE SERPL-MCNC: 159 MG/DL (ref 70–110)
HCT VFR BLD AUTO: 37.5 % (ref 37–48.5)
HGB BLD-MCNC: 11.8 G/DL (ref 12–16)
IMM GRANULOCYTES # BLD AUTO: 0.05 K/UL (ref 0–0.04)
IMM GRANULOCYTES NFR BLD AUTO: 0.3 % (ref 0–0.5)
LYMPHOCYTES # BLD AUTO: 1 K/UL (ref 1–4.8)
LYMPHOCYTES NFR BLD: 6.5 % (ref 18–48)
MCH RBC QN AUTO: 28.2 PG (ref 27–31)
MCHC RBC AUTO-ENTMCNC: 31.5 G/DL (ref 32–36)
MCV RBC AUTO: 90 FL (ref 82–98)
MONOCYTES # BLD AUTO: 0.5 K/UL (ref 0.3–1)
MONOCYTES NFR BLD: 3.2 % (ref 4–15)
NEUTROPHILS # BLD AUTO: 13 K/UL (ref 1.8–7.7)
NEUTROPHILS NFR BLD: 89.9 % (ref 38–73)
NRBC BLD-RTO: 0 /100 WBC
PLATELET # BLD AUTO: 206 K/UL (ref 150–450)
PMV BLD AUTO: 10.3 FL (ref 9.2–12.9)
POTASSIUM SERPL-SCNC: 3.8 MMOL/L (ref 3.5–5.1)
PROT SERPL-MCNC: 6.9 G/DL (ref 6–8.4)
RBC # BLD AUTO: 4.19 M/UL (ref 4–5.4)
SODIUM SERPL-SCNC: 144 MMOL/L (ref 136–145)
WBC # BLD AUTO: 14.51 K/UL (ref 3.9–12.7)

## 2023-05-08 PROCEDURE — 3288F PR FALLS RISK ASSESSMENT DOCUMENTED: ICD-10-PCS | Mod: CPTII,S$GLB,, | Performed by: INTERNAL MEDICINE

## 2023-05-08 PROCEDURE — 1126F AMNT PAIN NOTED NONE PRSNT: CPT | Mod: CPTII,S$GLB,, | Performed by: INTERNAL MEDICINE

## 2023-05-08 PROCEDURE — 99999 PR PBB SHADOW E&M-EST. PATIENT-LVL III: CPT | Mod: PBBFAC,,, | Performed by: INTERNAL MEDICINE

## 2023-05-08 PROCEDURE — 99999 PR PBB SHADOW E&M-EST. PATIENT-LVL III: ICD-10-PCS | Mod: PBBFAC,,, | Performed by: INTERNAL MEDICINE

## 2023-05-08 PROCEDURE — 80053 COMPREHEN METABOLIC PANEL: CPT | Performed by: INTERNAL MEDICINE

## 2023-05-08 PROCEDURE — 3078F PR MOST RECENT DIASTOLIC BLOOD PRESSURE < 80 MM HG: ICD-10-PCS | Mod: CPTII,S$GLB,, | Performed by: INTERNAL MEDICINE

## 2023-05-08 PROCEDURE — 1126F PR PAIN SEVERITY QUANTIFIED, NO PAIN PRESENT: ICD-10-PCS | Mod: CPTII,S$GLB,, | Performed by: INTERNAL MEDICINE

## 2023-05-08 PROCEDURE — 1101F PR PT FALLS ASSESS DOC 0-1 FALLS W/OUT INJ PAST YR: ICD-10-PCS | Mod: CPTII,S$GLB,, | Performed by: INTERNAL MEDICINE

## 2023-05-08 PROCEDURE — 3074F SYST BP LT 130 MM HG: CPT | Mod: CPTII,S$GLB,, | Performed by: INTERNAL MEDICINE

## 2023-05-08 PROCEDURE — 36415 COLL VENOUS BLD VENIPUNCTURE: CPT | Performed by: INTERNAL MEDICINE

## 2023-05-08 PROCEDURE — 1101F PT FALLS ASSESS-DOCD LE1/YR: CPT | Mod: CPTII,S$GLB,, | Performed by: INTERNAL MEDICINE

## 2023-05-08 PROCEDURE — 99215 PR OFFICE/OUTPT VISIT, EST, LEVL V, 40-54 MIN: ICD-10-PCS | Mod: S$GLB,,, | Performed by: INTERNAL MEDICINE

## 2023-05-08 PROCEDURE — 3288F FALL RISK ASSESSMENT DOCD: CPT | Mod: CPTII,S$GLB,, | Performed by: INTERNAL MEDICINE

## 2023-05-08 PROCEDURE — 1159F MED LIST DOCD IN RCRD: CPT | Mod: CPTII,S$GLB,, | Performed by: INTERNAL MEDICINE

## 2023-05-08 PROCEDURE — 85025 COMPLETE CBC W/AUTO DIFF WBC: CPT | Performed by: INTERNAL MEDICINE

## 2023-05-08 PROCEDURE — 3074F PR MOST RECENT SYSTOLIC BLOOD PRESSURE < 130 MM HG: ICD-10-PCS | Mod: CPTII,S$GLB,, | Performed by: INTERNAL MEDICINE

## 2023-05-08 PROCEDURE — 3078F DIAST BP <80 MM HG: CPT | Mod: CPTII,S$GLB,, | Performed by: INTERNAL MEDICINE

## 2023-05-08 PROCEDURE — 99215 OFFICE O/P EST HI 40 MIN: CPT | Mod: S$GLB,,, | Performed by: INTERNAL MEDICINE

## 2023-05-08 PROCEDURE — 99417 PROLNG OP E/M EACH 15 MIN: CPT | Mod: S$GLB,,, | Performed by: INTERNAL MEDICINE

## 2023-05-08 PROCEDURE — 99417 PR PROLONGED SVC, OUTPT, W/WO DIRECT PT CONTACT,  EA ADDTL 15 MIN: ICD-10-PCS | Mod: S$GLB,,, | Performed by: INTERNAL MEDICINE

## 2023-05-08 PROCEDURE — 1159F PR MEDICATION LIST DOCUMENTED IN MEDICAL RECORD: ICD-10-PCS | Mod: CPTII,S$GLB,, | Performed by: INTERNAL MEDICINE

## 2023-05-08 NOTE — PROGRESS NOTES
Subjective:      DATE OF VISIT: 5/8/23     ?  Patient ID:?Radha Lamas is a 73 y.o. female.?? MR#: [id number]     PRIMARY ONCOLOGIST: Dr. Brower    ? Primary Care Providers:  Ab Fletcher MD, MD (General)     CHIEF COMPLAINT: ??Establish care with Medical Oncology for newly diagnosed invasive adenocarcinoma left lower lung??   ?   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   CURRENT TREATMENT:  Plan for adjuvant cisplatin and pemetrexed q. 3 weeks followed by atezolizumab    PAST TREATMENT:  Wedge resection  ?   ONCOLOGIC HISTORY:   ?   Oncology History   Malignant neoplasm of lower lobe of left lung - Squamous cell   4/15/2021 Initial Diagnosis    Malignant neoplasm of lower lobe of left lung - Squamous cell       5/25/2021 Cancer Staged    Staging form: Lung, AJCC 8th Edition  - Clinical: Stage IA1 (cT1a, cN0, cM0)        Cancer Staged    9mm non-keratinizing squamous cell carcinoma, no VPI, no LVI, margin at least 8mm.  Levels 9 and 11 = negative.  T1aN0     4/5/2023 Cancer Staged    Staging form: Lung, AJCC 8th Edition  - Pathologic stage from 4/5/2023: Stage IIB (pT1a, pN1, cM0)       5/1/2023 -  Chemotherapy    Treatment Summary   Plan Name: OP NSCLC PEMETREXED + CISPLATIN Q3W  Treatment Goal: Supportive  Status: Active  Start Date: 5/1/2023  End Date: 7/5/2023 (Planned)  Provider: Marissa Brower MD  Chemotherapy: CISplatin (Platinol) 75 mg/m2 = 138 mg in sodium chloride 0.9% 638 mL chemo infusion, 75 mg/m2 = 138 mg, Intravenous, Clinic/HOD 1 time, 1 of 4 cycles  PEMEtrexed disodium (ALIMTA) 925 mg in sodium chloride 0.9% SolP 100 mL chemo infusion, 500 mg/m2 = 925 mg, Intravenous, Clinic/HOD 1 time, 1 of 4 cycles       7/20/2023 -  Chemotherapy    Treatment Summary   Plan Name: OP ATEZOLIZUMAB Q3W  Treatment Goal:  Supportive  Status: Future  Start Date: 7/20/2023 (Planned)  End Date: 6/20/2024 (Planned)  Provider: Marissa Brower MD  Chemotherapy: [No matching medication found in this treatment plan]       NSCLC of left lung (Resolved)   4/28/2021 Initial Diagnosis    NSCLC of left lung (Resolved)      Cancer Staged    9mm non-keratinizing squamous cell carcinoma, no VPI, no LVI, margin at least 8mm.  Levels 9 and 11 = negative.  T1aN0        Cancer Staging   Malignant neoplasm of lower lobe of left lung - Squamous cell  - Pathologic stage from 4/5/2023: Stage IIB (pT1a, pN1, cM0) - Signed by Refugio Medley MD on 4/5/2023         HPI    Since our last visit had staging evaluation with PET and MRI brain.  She is feeling well aside from some dyspnea on heavier exertion since surgery.    Review of Systems    ?   A comprehensive 14-point review of systems was reviewed with patient and was negative other than as specified above.   ?   PAST MEDICAL HISTORY:   Past Medical History:   Diagnosis Date    COPD (chronic obstructive pulmonary disease) 2013    Eczema     GERD (gastroesophageal reflux disease)     Hiatal hernia     History of torn meniscus of right knee 01/2011    Hypothyroid     Lung cancer     Urinary incontinence in female     Mild , only takes mediciane with travel    ?     PAST SURGICAL HISTORY:   Past Surgical History:   Procedure Laterality Date    FLUOROSCOPY N/A 4/27/2023    Procedure: Fluoroscopy/Mediport placement;  Surgeon: Kodak Retana MD;  Location: [facility] CATH LAB;  Service: General;  Laterality: N/A;    INJECTION OF ANESTHETIC AGENT AROUND MULTIPLE INTERCOSTAL NERVES Left 3/20/2023    Procedure: BLOCK, NERVE, INTERCOSTAL, 2 OR MORE;  Surgeon: Refugio Medley MD;  Location: [facility] OR [address] [place] [address];  Service: Thoracic;  Laterality: Left;    KNEE CARTILAGE SURGERY Right 01/2011    LAPAROSCOPIC LYSIS OF ADHESIONS Left 3/20/2023    Procedure: LYSIS, ADHESIONS, LAPAROSCOPIC;  Surgeon: Refugio Medlye MD;   Location: [facility] OR 2ND FLR;  Service: Thoracic;  Laterality: Left;    ROBOT-ASSISTED LAPAROSCOPIC LYMPHADENECTOMY USING DA ROSEMARY XI Left 3/20/2023    Procedure: XI ROBOTIC LYMPHADENECTOMY;  Surgeon: Refugio Medley MD;  Location: [facility] OR 2ND FLR;  Service: Thoracic;  Laterality: Left;    THORACOSCOPIC WEDGE RESECTION OF LUNG Left 04/28/2021    Procedure: VATS, WITH WEDGE RESECTION, LUNG;  Surgeon: Clarke Sauceda MD;  Location: [facility] OR 2ND FLR;  Service: Thoracic;  Laterality: Left;  lymph node dissection     TUBAL LIGATION  1976    WEDGE RESECTION, LUNG, ROBOT-ASSISTED, USING VATS, USING DA ROSEMARY XI Left 3/20/2023    Procedure: XI ROBOTIC WEDGE RESECTION, LUNG (RATS); segmentectomy;  Surgeon: Refugio Medley MD;  Location: [facility] OR 2ND FLR;  Service: Thoracic;  Laterality: Left;      ?   ALLERGIES:   Allergies as of 05/08/2023    (No Known Allergies)      ?   MEDICATIONS:?   Outpatient Medications Marked as Taking for the 5/8/23 encounter (Office Visit) with Marissa Brower MD   Medication Sig Dispense Refill    albuterol (PROAIR HFA) 90 mcg/actuation inhaler Inhale 2 puffs into the lungs every 4 (four) hours as needed for Wheezing or Shortness of Breath. Rescue 54 g 4    calcium carbonate (OS-CYDNEY) 600 mg calcium (1,500 mg) Tab Take 600 mg by mouth.      dexAMETHasone (DECADRON) 4 MG Tab Take 2 tablets (8 mg total) by mouth once daily. on the day prior to chemotherapy then daily on days 2,3,4 of each chemotherapy cycle. 24 tablet 3    DUPIXENT  mg/2 mL PnIj Inject into the skin every 14 (fourteen) days.      folic acid (FOLVITE) 1 MG tablet Take 1 tablet (1 mg total) by mouth once daily. starting 1 week prior to pemetrexed and continuing for 21 days after stopping pemetrexed 30 tablet 5    levothyroxine (SYNTHROID) 75 MCG tablet Take 75 mcg by mouth once daily.      loratadine (CLARITIN) 10 mg tablet Take 10 mg by mouth once daily.      OLANZapine (ZYPREXA) 5 MG tablet Take 1 tablet (5 mg  total) by mouth every evening. Take as directed on days 1-4 of your chemotherapy cycle. 16 tablet 0    omeprazole (PRILOSEC) 20 MG capsule Take 20 mg by mouth once daily.      ondansetron (ZOFRAN-ODT) 8 MG TbDL Take 1 tablet (8 mg total) by mouth every 8 (eight) hours as needed (nausea). 60 tablet 5    umeclidinium-vilanteroL (ANORO ELLIPTA) 62.5-25 mcg/actuation DsDv USE 1 INHALATION DAILY (CONTROLLER) 180 each 3      ?   SOCIAL HISTORY:?   Social History     Tobacco Use    Smoking status: Former     Packs/day: 1.50     Years: 45.00     Pack years: 67.50     Types: Cigarettes     Start date:      Quit date:      Years since quittin.3    Smokeless tobacco: Never   Substance Use Topics    Alcohol use: No     Alcohol/week: 0.0 standard drinks      ?      ?   FAMILY HISTORY:   family history includes Cancer in her brother, father, and sister; Heart failure in her mother; Hypertension in her father and mother.   ?        Objective:      Physical Exam      ?   Vitals:    23 0803   BP: 113/63   Pulse: 78   Resp: 18   Temp: 97.7 °F (36.5 °C)      ?   ECOG:?0   General appearance: Generally well appearing, in no acute distress.   Head, eyes, ears, nose, and throat: moist mucous membranes.   Respiratory:  Normal work of breathing  Abdomen: nontender, nondistended.   Extremities: Warm, without edema.   Neurologic: Alert and oriented. Grossly normal strength, coordination, and gait.   Skin: No rashes, ecchymoses or petechial lesion.   Psychiatric:  Normal mood and affect.    ?   Laboratory:  ?   Lab Visit on 2023   Component Date Value Ref Range Status    WBC 2023 14.51 (H)  3.90 - 12.70 K/uL Final    RBC 2023 4.19  4.00 - 5.40 M/uL Final    Hemoglobin 2023 11.8 (L)  12.0 - 16.0 g/dL Final    Hematocrit 2023 37.5  37.0 - 48.5 % Final    MCV 2023 90  82 - 98 fL Final    MCH 2023 28.2  27.0 - 31.0 pg Final    MCHC 2023 31.5 (L)  32.0 - 36.0 g/dL Final    RDW  05/08/2023 13.3  11.5 - 14.5 % Final    Platelets 05/08/2023 206  150 - 450 K/uL Final    MPV 05/08/2023 10.3  9.2 - 12.9 fL Final    Immature Granulocytes 05/08/2023 0.3  0.0 - 0.5 % Final    Gran # (ANC) 05/08/2023 13.0 (H)  1.8 - 7.7 K/uL Final    Immature Grans (Abs) 05/08/2023 0.05 (H)  0.00 - 0.04 K/uL Final    Lymph # 05/08/2023 1.0  1.0 - 4.8 K/uL Final    Mono # 05/08/2023 0.5  0.3 - 1.0 K/uL Final    Eos # 05/08/2023 0.0  0.0 - 0.5 K/uL Final    Baso # 05/08/2023 0.01  0.00 - 0.20 K/uL Final    nRBC 05/08/2023 0  0 /100 WBC Final    Gran % 05/08/2023 89.9 (H)  38.0 - 73.0 % Final    Lymph % 05/08/2023 6.5 (L)  18.0 - 48.0 % Final    Mono % 05/08/2023 3.2 (L)  4.0 - 15.0 % Final    Eosinophil % 05/08/2023 0.0  0.0 - 8.0 % Final    Basophil % 05/08/2023 0.1  0.0 - 1.9 % Final    Differential Method 05/08/2023 Automated   Final    Sodium 05/08/2023 144  136 - 145 mmol/L Final    Potassium 05/08/2023 3.8  3.5 - 5.1 mmol/L Final    Chloride 05/08/2023 106  95 - 110 mmol/L Final    CO2 05/08/2023 24  23 - 29 mmol/L Final    Glucose 05/08/2023 159 (H)  70 - 110 mg/dL Final    BUN 05/08/2023 13  8 - 23 mg/dL Final    Creatinine 05/08/2023 0.8  0.5 - 1.4 mg/dL Final    Calcium 05/08/2023 9.9  8.7 - 10.5 mg/dL Final    Total Protein 05/08/2023 6.9  6.0 - 8.4 g/dL Final    Albumin 05/08/2023 3.8  3.5 - 5.2 g/dL Final    Total Bilirubin 05/08/2023 0.5  0.1 - 1.0 mg/dL Final    Alkaline Phosphatase 05/08/2023 84  55 - 135 U/L Final    AST 05/08/2023 17  10 - 40 U/L Final    ALT 05/08/2023 7 (L)  10 - 44 U/L Final    Anion Gap 05/08/2023 14  8 - 16 mmol/L Final    eGFR 05/08/2023 >60  >60 mL/min/1.73 m^2 Final      ?     ?   Imaging:  ?    Results for orders placed or performed during the hospital encounter of 02/20/23 (from the past 2160 hour(s))   CT Biopsy Lung w/ guidance    Narrative    EXAMINATION:  CT BIOPSY LUNG W/ GUIDANCE    CLINICAL HISTORY:  Personal history of other malignant neoplasm of bronchus and  lung    TECHNIQUE:  The CT exam was performed using one or more of the following dose reduction techniques- Automated exposure control, adjustment of the mA and/or kV according to patient size, and/or use of iterative reconstructed technique.    All CT scans at this facility use dose modulation, iterative reconstruction, and/or weight based dosing when appropriate to reduce radiation dose to as low as reasonable achievable.    Medications:    Moderate sedation using versed and fentanyl was administered by a trained observer monitoring the patient's vital signs and level of consciousness. The total time of sedation was 30 minutes.    1% lidocaine locally    : VANDANA Kwan    Complications: None immediate.    COMPARISON:  None    FINDINGS:  Procedure: The risks and benefits of this procedure were discussed, all questions were answered and informed consent was obtained.  The patient was placed supine on the CT gantry.  Preprocedural imaging was performed.  A suitable skin site for biopsy was selected.  IV fentanyl and Versed was administered for conscious sedation by a trained observer.  The skin site was prepped and draped in sterile fashion.  The skin was anesthetized with 1% lidocaine.    Using CT guidance a 19 gauge introducer needle was guided into the left pulmonary nodule.  Prior to biopsy appropriate needle positioning was confirmed with CT.  Two 20 gauge samples were acquired.  The stylet was replaced and the needle was removed.    Postprocedural imaging was acquired. The site was bandaged sterilely. The patient left the room in stable condition.    Findings:    The needle is at the nodule.      Impression    Technically successful CT guided biopsy of the left pulmonary nodule..      Electronically signed by: Pk Kwan  Date:    02/20/2023  Time:    11:28     Results for orders placed or performed during the hospital encounter of 05/03/23 (from the past 2160 hour(s))   MRI Brain W WO Contrast     Impression    No evidence of intracranial metastatic disease.  No acute findings or abnormal enhancement.      Electronically signed by: Humphrey Martin  Date:    05/03/2023  Time:    15:51     Results for orders placed or performed during the hospital encounter of 05/04/23 (from the past 2160 hour(s))   NM PET CT Routine FDG    Impression    Postoperative changes left lung with a very small loculated hydropneumothorax in the upper left hemithorax (mainly fluid with minimal air).    -Small approximate 1 cm hypermetabolic node at the left hilum adjacent to staple line.  Subcentimeter minimally hypermetabolic nearby AP window lymph node, increased from prior.  These could be inflammatory in nature though close follow-up is advised particularly for the left hilar hypermetabolic focus.    The examination elsewhere is unremarkable with no additional foci of abnormal uptake.      Electronically signed by: Onel Valdez MD  Date:    05/04/2023  Time:    14:29         Pathology:    Reviewed in epic     ?   Assessment/Plan:   There are no diagnoses linked to this encounter.     No diagnosis found.        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 Initial biopsy February 20, 2023 without neoplasm identified who after multiple disciplinary discussion proceeded with left lower lobe wedge resection, final pathology positive for 03/28/2022 for invasive moderately differentiated adenocarcinoma station 10 lymph node positive, pleural invasion noted, margins negative.    Recommended staging with MRI brain and PET now 1 month out from surgery MRI brain negative PET scan with avidity left hilar SUV 4 and chest wall.  Given recent surgery potential  inflammation however given her history high risk in lymph node recommend discussion at multidisciplinary tumor board thoracic on Thursday if biopsy for shorter interval follow-up recommended.  She is planned for adjuvant chemotherapy as previously discussed and had chemotherapy immunotherapy teaching and consent. We discussed indication for adjuvant therapy including chemotherapy and immunotherapy per IMPOWER 010 showed benefit to adjuvant atezolizumab following chemotherapy given PDL1 positive disease 95%.  Mutational analysis negative for EGFR mutation.  Will reschedule if appropriate cisplatin and pemetrexed with adjuvant supplemental folic acid and vitamin B12.  Port recommended given history of difficult venous access.     Addendum 3:15 p.m. 05/10/2023 consensus of tumor board to proceed with adjuvant therapy     Advance Care Planning   Stage II malignancy asymptomatic will defer palliative care consultation at this time.       Follow-Up:   Route Chart for Scheduling    Med Onc Chart Routing      Follow up with physician 3 weeks and 1 week. C1D8 and C2D1   Follow up with [facility]    Infusion scheduling note Reschedule chemo Friday, no additional lab or MD visit needed   Injection scheduling note    Labs CBC, CMP and magnesium   Scheduling:  Preferred lab:  Lab interval:  prior to every visit   Imaging Other   Tumor board review   Pharmacy appointment    Other referrals         Treatment Plan Information   OP NSCLC PEMETREXED + CISPLATIN Q3W   Marissa Brower MD   Upcoming Treatment Dates - OP NSCLC PEMETREXED + CISPLATIN Q3W    5/2/2023       Chemotherapy       PEMEtrexed disodium (ALIMTA) 925 mg in sodium chloride 0.9% SolP 100 mL chemo infusion       CISplatin (Platinol) 75 mg/m2 = 138 mg in sodium chloride 0.9% 638 mL chemo infusion       Pre-Hydration       sodium chloride 0.9% bolus 1,000 mL 1,000 mL       Post-Hydration       sodium chloride 0.9% 1,000 mL with magnesium sulfate 1 g, potassium chloride 20  mEq infusion       Antiemetics       aprepitant (CINVANTI) injection 130 mg       palonosetron (ALOXI) 0.25 mg with Dexamethasone (DECADRON) 12 mg in NS 50 mL IVPB  5/23/2023       Chemotherapy       PEMEtrexed disodium (ALIMTA) 925 mg in sodium chloride 0.9% SolP 100 mL chemo infusion       CISplatin (Platinol) 75 mg/m2 = 138 mg in sodium chloride 0.9% 638 mL chemo infusion       Pre-Hydration       sodium chloride 0.9% bolus 1,000 mL 1,000 mL       Post-Hydration       sodium chloride 0.9% 1,000 mL with magnesium sulfate 1 g, potassium chloride 20 mEq infusion       Antiemetics       aprepitant (CINVANTI) injection 130 mg       palonosetron (ALOXI) 0.25 mg with Dexamethasone (DECADRON) 12 mg in NS 50 mL IVPB  6/13/2023       Chemotherapy       PEMEtrexed disodium (ALIMTA) 925 mg in sodium chloride 0.9% SolP 100 mL chemo infusion       CISplatin (Platinol) 75 mg/m2 = 138 mg in sodium chloride 0.9% 638 mL chemo infusion       Pre-Hydration       sodium chloride 0.9% bolus 1,000 mL 1,000 mL       Post-Hydration       sodium chloride 0.9% 1,000 mL with magnesium sulfate 1 g, potassium chloride 20 mEq infusion       Antiemetics       aprepitant (CINVANTI) injection 130 mg       palonosetron (ALOXI) 0.25 mg with Dexamethasone (DECADRON) 12 mg in NS 50 mL IVPB  7/4/2023       Chemotherapy       PEMEtrexed disodium (ALIMTA) 925 mg in sodium chloride 0.9% SolP 100 mL chemo infusion       CISplatin (Platinol) 75 mg/m2 = 138 mg in sodium chloride 0.9% 638 mL chemo infusion       Supportive Care       cyanocobalamin injection 1,000 mcg       Pre-Hydration       sodium chloride 0.9% bolus 1,000 mL 1,000 mL       Post-Hydration       sodium chloride 0.9% 1,000 mL with magnesium sulfate 1 g, potassium chloride 20 mEq infusion       Antiemetics       aprepitant (CINVANTI) injection 130 mg       palonosetron (ALOXI) 0.25 mg with Dexamethasone (DECADRON) 12 mg in NS 50 mL IVPB    70 minutes of total time spent on the encounter,  which includes face to face time and non-face to face time preparing to see the patient (eg, review of tests), Obtaining and/or reviewing separately obtained history, Documenting clinical information in the electronic or other health record, Independently interpreting results (not separately reported) and communicating results to the patient/family/caregiver, or Care coordination (not separately reported).

## 2023-05-10 ENCOUNTER — PATIENT MESSAGE (OUTPATIENT)
Dept: HEMATOLOGY/ONCOLOGY | Facility: CLINIC | Age: 73
End: 2023-05-10
Payer: MEDICARE

## 2023-05-10 RX ORDER — HEPARIN 100 UNIT/ML
500 SYRINGE INTRAVENOUS
Status: CANCELLED | OUTPATIENT
Start: 2023-05-10

## 2023-05-10 RX ORDER — SODIUM CHLORIDE 0.9 % (FLUSH) 0.9 %
10 SYRINGE (ML) INJECTION
Status: CANCELLED | OUTPATIENT
Start: 2023-05-10

## 2023-05-11 ENCOUNTER — CLINICAL SUPPORT (OUTPATIENT)
Dept: PULMONOLOGY | Facility: CLINIC | Age: 73
End: 2023-05-11
Payer: MEDICARE

## 2023-05-11 ENCOUNTER — OFFICE VISIT (OUTPATIENT)
Dept: PULMONOLOGY | Facility: CLINIC | Age: 73
End: 2023-05-11
Payer: MEDICARE

## 2023-05-11 VITALS
BODY MASS INDEX: 27.47 KG/M2 | DIASTOLIC BLOOD PRESSURE: 72 MMHG | WEIGHT: 164.88 LBS | SYSTOLIC BLOOD PRESSURE: 118 MMHG | HEIGHT: 65 IN | OXYGEN SATURATION: 97 % | HEART RATE: 82 BPM | RESPIRATION RATE: 18 BRPM

## 2023-05-11 DIAGNOSIS — Z87.891 FORMER HEAVY CIGARETTE SMOKER (20-39 PER DAY): ICD-10-CM

## 2023-05-11 DIAGNOSIS — J44.9 COPD, MODERATE: ICD-10-CM

## 2023-05-11 DIAGNOSIS — J44.9 COPD, MODERATE: Primary | ICD-10-CM

## 2023-05-11 PROBLEM — R91.1 INCIDENTAL PULMONARY NODULE, > 3MM AND < 8MM: Status: RESOLVED | Noted: 2021-08-12 | Resolved: 2023-05-11

## 2023-05-11 PROCEDURE — 1100F PR PT FALLS ASSESS DOC 2+ FALLS/FALL W/INJURY/YR: ICD-10-PCS | Mod: CPTII,S$GLB,, | Performed by: NURSE PRACTITIONER

## 2023-05-11 PROCEDURE — 3078F DIAST BP <80 MM HG: CPT | Mod: CPTII,S$GLB,, | Performed by: NURSE PRACTITIONER

## 2023-05-11 PROCEDURE — 1126F PR PAIN SEVERITY QUANTIFIED, NO PAIN PRESENT: ICD-10-PCS | Mod: CPTII,S$GLB,, | Performed by: NURSE PRACTITIONER

## 2023-05-11 PROCEDURE — 3074F PR MOST RECENT SYSTOLIC BLOOD PRESSURE < 130 MM HG: ICD-10-PCS | Mod: CPTII,S$GLB,, | Performed by: NURSE PRACTITIONER

## 2023-05-11 PROCEDURE — 94726 PLETHYSMOGRAPHY LUNG VOLUMES: CPT | Mod: S$GLB,,, | Performed by: INTERNAL MEDICINE

## 2023-05-11 PROCEDURE — 94729 DIFFUSING CAPACITY: CPT | Mod: S$GLB,,, | Performed by: INTERNAL MEDICINE

## 2023-05-11 PROCEDURE — 3288F PR FALLS RISK ASSESSMENT DOCUMENTED: ICD-10-PCS | Mod: CPTII,S$GLB,, | Performed by: NURSE PRACTITIONER

## 2023-05-11 PROCEDURE — 94060 PR EVAL OF BRONCHOSPASM: ICD-10-PCS | Mod: S$GLB,,, | Performed by: INTERNAL MEDICINE

## 2023-05-11 PROCEDURE — 99999 PR PBB SHADOW E&M-EST. PATIENT-LVL IV: ICD-10-PCS | Mod: PBBFAC,,, | Performed by: NURSE PRACTITIONER

## 2023-05-11 PROCEDURE — 3078F PR MOST RECENT DIASTOLIC BLOOD PRESSURE < 80 MM HG: ICD-10-PCS | Mod: CPTII,S$GLB,, | Performed by: NURSE PRACTITIONER

## 2023-05-11 PROCEDURE — 1159F MED LIST DOCD IN RCRD: CPT | Mod: CPTII,S$GLB,, | Performed by: NURSE PRACTITIONER

## 2023-05-11 PROCEDURE — 94729 PR C02/MEMBANE DIFFUSE CAPACITY: ICD-10-PCS | Mod: S$GLB,,, | Performed by: INTERNAL MEDICINE

## 2023-05-11 PROCEDURE — 99214 OFFICE O/P EST MOD 30 MIN: CPT | Mod: 25,S$GLB,, | Performed by: NURSE PRACTITIONER

## 2023-05-11 PROCEDURE — 94726 PULM FUNCT TST PLETHYSMOGRAP: ICD-10-PCS | Mod: S$GLB,,, | Performed by: INTERNAL MEDICINE

## 2023-05-11 PROCEDURE — 99999 PR PBB SHADOW E&M-EST. PATIENT-LVL IV: CPT | Mod: PBBFAC,,, | Performed by: NURSE PRACTITIONER

## 2023-05-11 PROCEDURE — 1159F PR MEDICATION LIST DOCUMENTED IN MEDICAL RECORD: ICD-10-PCS | Mod: CPTII,S$GLB,, | Performed by: NURSE PRACTITIONER

## 2023-05-11 PROCEDURE — 94060 EVALUATION OF WHEEZING: CPT | Mod: S$GLB,,, | Performed by: INTERNAL MEDICINE

## 2023-05-11 PROCEDURE — 1126F AMNT PAIN NOTED NONE PRSNT: CPT | Mod: CPTII,S$GLB,, | Performed by: NURSE PRACTITIONER

## 2023-05-11 PROCEDURE — 3288F FALL RISK ASSESSMENT DOCD: CPT | Mod: CPTII,S$GLB,, | Performed by: NURSE PRACTITIONER

## 2023-05-11 PROCEDURE — 1100F PTFALLS ASSESS-DOCD GE2>/YR: CPT | Mod: CPTII,S$GLB,, | Performed by: NURSE PRACTITIONER

## 2023-05-11 PROCEDURE — 1160F PR REVIEW ALL MEDS BY PRESCRIBER/CLIN PHARMACIST DOCUMENTED: ICD-10-PCS | Mod: CPTII,S$GLB,, | Performed by: NURSE PRACTITIONER

## 2023-05-11 PROCEDURE — 3074F SYST BP LT 130 MM HG: CPT | Mod: CPTII,S$GLB,, | Performed by: NURSE PRACTITIONER

## 2023-05-11 PROCEDURE — 1160F RVW MEDS BY RX/DR IN RCRD: CPT | Mod: CPTII,S$GLB,, | Performed by: NURSE PRACTITIONER

## 2023-05-11 PROCEDURE — 99214 PR OFFICE/OUTPT VISIT, EST, LEVL IV, 30-39 MIN: ICD-10-PCS | Mod: 25,S$GLB,, | Performed by: NURSE PRACTITIONER

## 2023-05-11 NOTE — ASSESSMENT & PLAN NOTE
67.5 pk/year smoker quit in 2009  ONCOLOGIC DIAGNOSIS: Stage IIB, pT1a, pN1a cMx lung adenocarcinoma left lower lung, Dr. Medley. 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

## 2023-05-11 NOTE — ASSESSMENT & PLAN NOTE
Controlled on Anoro LABA/LAMA. Albuterol inhaler  68 pk/year smoker quit in 2009.   5/11/2023 spirometry stable moderate obstruction.  Continue Anoro daily and  Albuterol inhaler before exertional activity.    Follow up 1 year review cpft/xray   Continued follow up with oncology for lung cancer

## 2023-05-11 NOTE — PROGRESS NOTES
Subjective:      Patient ID: Radha Lamas is a 73 y.o. female.    Patient Active Problem List   Diagnosis    Brow ptosis    Dermatochalasis of right eyelid    Ptosis of both eyelids    Dermatochalasia    Former heavy cigarette smoker (20-39 per day)    COPD, moderate    Malignant neoplasm of lower lobe of left lung - Squamous cell     she has been referred by No ref. provider found for evaluation and management for   Chief Complaint   Patient presents with    COPD     Chief Complaint: COPD    HPI:  Radha Lamas is a 73 y.o. female presents with her  to pulmonary clinic for 1 year follow up related to diagnosis of COPD review chest xray/CPFT.     Patient presents stable. Well controlled on ANORO.      Current treatment regimen:  ANORO. Albuterol inhaler. Use of albuterol before house work or yard work.     Smoking history former cigarette smoker quit in 2009 with 68 pack year smoking history. Smoked 1.5 packs x 45 years.     4/28/2021 History of resected early stage left lower lobe squamous cell cancer. Continues management with Oncology.     Adjunctive therapy with chemo therapy with [facility] oncology.   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 Medley  History of stage I squamous cell carcinoma moderately differentiated left lower lung status post wedge resection 04/28/2021 5/4/2023 PET-CT Postoperative changes left lung with a very small loculated hydropneumothorax in the upper left hemithorax (mainly fluid with minimal air). Small approximate 1 cm hypermetabolic node at the left hilum adjacent to staple line.  Subcentimeter minimally hypermetabolic nearby AP window lymph node, increased from prior.  These could be inflammatory in nature though close follow-up is advised particularly for the left hilar hypermetabolic focus.  The examination elsewhere is unremarkable with no additional foci of abnormal uptake.    Patient has family history of  cancer in sister lung cancer diagnosis age 55 years with metastasis brain,  2018.   Father with lung cancer at 67 yrs,  age 68 years. Sister and father both smokers.   Brother 69 year old with new diagnosis of rectal cancer, finished chemo and radiation.    COPD Questionnaire  How often do you cough?: A little of the time  How often do you have phlegm (mucus) in your chest?: Some of the time  How often does your chest feel tight?: Almost never  When you walk up a hill or one flight of stairs, how often are you breathless?: All of the time  How often are you limited doing any activities at home?: A little of the time  How often are you confident leaving the house despite your lung condition?: All of the time  How often do you sleep soundly?: All of the time  How often do you have energy?: Some of the time  Total score: 15     Previous Report Reviewed: historical medical records, office notes and radiology reports      Past Medical History: The following portions of the patient's history were reviewed and updated as appropriate:   She  has a past surgical history that includes Knee cartilage surgery (Right, 2011); Thoracoscopic wedge resection of lung (Left, 2021); Tubal ligation (); Wedge resection, lung, robot-assisted, using VATS, using da Kaci Xi (Left, 3/20/2023); Laparoscopic lysis of adhesions (Left, 3/20/2023); Robot-assisted laparoscopic lymphadenectomy using da Kaci Xi (Left, 3/20/2023); Injection of anesthetic agent around multiple intercostal nerves (Left, 3/20/2023); and Fluoroscopy (N/A, 2023).  Her family history includes Cancer in her brother, father, and sister; Heart failure in her mother; Hypertension in her father and mother.  She  reports that she quit smoking about 14 years ago. Her smoking use included cigarettes. She started smoking about 59 years ago. She has a 67.50 pack-year smoking history. She has never used smokeless tobacco. She reports that she does not drink  "alcohol and does not use drugs.  She has a current medication list which includes the following prescription(s): albuterol, calcium carbonate, dexamethasone, dupixent pen, folic acid, levothyroxine, loratadine, olanzapine, omeprazole, ondansetron, anoro ellipta, gabapentin, oxycodone, and tolterodine, and the following Facility-Administered Medications: prochlorperazine.  She has No Known Allergies.    Review of Systems   Constitutional:  Negative for fever, chills, weight loss, weight gain, activity change, appetite change, fatigue and night sweats.   HENT:  Negative for postnasal drip, rhinorrhea, sinus pressure, voice change and congestion.    Eyes:  Negative for redness and itching.   Respiratory:  Negative for snoring, cough, sputum production, chest tightness, shortness of breath, wheezing, orthopnea, asthma nighttime symptoms, dyspnea on extertion, use of rescue inhaler and somnolence.    Cardiovascular: Negative.  Negative for chest pain, palpitations and leg swelling.   Genitourinary:  Negative for difficulty urinating and hematuria.   Endocrine:  Negative for cold intolerance and heat intolerance.    Musculoskeletal:  Negative for arthralgias, gait problem, joint swelling and myalgias.   Skin: Negative.    Gastrointestinal:  Negative for nausea, vomiting, abdominal pain and acid reflux.   Neurological:  Negative for dizziness, weakness, light-headedness and headaches.   Hematological:  Negative for adenopathy. No excessive bruising.   All other systems reviewed and are negative.   Objective:   /72   Pulse 82   Resp 18   Ht 5' 5" (1.651 m)   Wt 74.8 kg (164 lb 14.5 oz)   SpO2 97%   BMI 27.44 kg/m²   Physical Exam  Vitals reviewed.   Constitutional:       General: She is not in acute distress.     Appearance: She is well-developed. She is not ill-appearing or toxic-appearing.   HENT:      Head: Normocephalic.      Right Ear: External ear normal.      Left Ear: External ear normal.      Nose: Nose " normal.      Mouth/Throat:      Pharynx: No oropharyngeal exudate.   Eyes:      Conjunctiva/sclera: Conjunctivae normal.   Cardiovascular:      Rate and Rhythm: Normal rate and regular rhythm.      Heart sounds: Normal heart sounds.   Pulmonary:      Effort: Pulmonary effort is normal.      Breath sounds: Normal breath sounds. No stridor.   Abdominal:      Palpations: Abdomen is soft.   Musculoskeletal:         General: Normal range of motion.      Cervical back: Normal range of motion and neck supple.   Lymphadenopathy:      Cervical: No cervical adenopathy.   Skin:     General: Skin is warm and dry.   Neurological:      Mental Status: She is alert and oriented to person, place, and time.   Psychiatric:         Behavior: Behavior normal. Behavior is cooperative.         Thought Content: Thought content normal.         Judgment: Judgment normal.     Personal Diagnostic Review    NM PET CT Routine FDG  Narrative: EXAMINATION:  NM PET CT ROUTINE    CLINICAL HISTORY:  Non-small cell lung cancer (NSCLC), staging; Malignant neoplasm of unspecified part of unspecified bronchus or lung    TECHNIQUE:  12.40 mCi of F18-FDG was administered intravenously in the left hand.  After an approximately 60 min distribution time, PET/CT images were acquired from the skull base to the mid thigh. Transmission images were acquired to correct for attenuation using a whole body low-dose CT scan without contrast with the arms positioned above the head. Glycemia at the time of injection was 89 mg/dL.    COMPARISON:  CT-PET 03/29/2021.  CT chest 02/03/2023    FINDINGS:  Quality of the study: Adequate.    Head and neck:    No abnormal uptake.    Chest:    Postoperative changes are noted again within the left lower lobe.  There has also been interval resection of a left upper lobe pulmonary nodule.  There is a small loculated pleural effusion within the upper left hemithorax with minimal pneumothorax component.  right lung clear.  There is a  small hypermetabolic focus on the anterior aspect of the left hilum measuring approximately 1 cm with a 4.5 SUV max which is adjacent to staple line.  Small aortopulmonary window lymph node measuring 9 x 7 mm by 2.9 SUV max compared to a background mediastinal uptake of 2.5.  This node was only tiny 3 mm  on the comparison exam.    Abdomen and pelvis:    No abnormal uptake.    Musculoskeletal:    No suspicious areas of abnormal uptake.  No suspicious osseous findings.  Large amounts of scattered postsurgical uptake left chest wall.    Physiologic uptake of the tracer is present within the brain, salivary glands, myocardium, GI and  tracts.    Incidental CT findings: Right chest port present with catheter tip at the distal SVC.  Impression: Postoperative changes left lung with a very small loculated hydropneumothorax in the upper left hemithorax (mainly fluid with minimal air).    -Small approximate 1 cm hypermetabolic node at the left hilum adjacent to staple line.  Subcentimeter minimally hypermetabolic nearby AP window lymph node, increased from prior.  These could be inflammatory in nature though close follow-up is advised particularly for the left hilar hypermetabolic focus.    The examination elsewhere is unremarkable with no additional foci of abnormal uptake.    Electronically signed by: Onel Valdez MD  Date:    05/04/2023  Time:    14:29     Results for orders placed during the hospital encounter of 04/25/23    X-Ray Chest PA And Lateral    Narrative  EXAM: XR CHEST PA AND LATERAL    CLINICAL HISTORY:  COPD.    TECHNIQUE: 2 views of the chest.    COMPARISON: 04/05/2023 x-ray    FINDINGS:  There is an air-fluid level of the left lung apex consistent with a loculated hydropneumothorax.  The overall size has decreased since the previous exam.    There is volume loss in the left chest with shift of the mediastinal contents to the left.  The perihilar and infrahilar lung markings appear stable.    The right  lung is clear.    Thoracic kyphosis and spondylosis.    Impression  See above.    Finalized on: 4/25/2023 9:17 AM By:  Warren Jensen MD  BRRG# [id number]      2023-04-25 09:19:[id number]    LETYRG      5/11/2023 CPFT spirometry reveals moderate obstruction.  Lung volume determination is normal.  Diffusing capacity is moderately decreased.    4/28/2022  CPFT Spirometry shows moderate obstruction. Lung volume determination is normal. Airway mechanics are abnormal showing increased airway resistance and decreased conductance. DLCO is mildly decreased. MVV is moderately decreased.     3/10/2021 1 year follow up CT chest low dose screening revealed 4B - Suspicious - Left lower lobe nodule has increased in size from 4 mm to 11 mm when compared to the prior study dated 03/10/2020.  - possible next steps  Chest CT, tissue sampling and-or PET/CT.      2/11/2019 CPFT   Acceptable and reproducible spirometry data.     FEV1/ FVC decreased to 62% indicating obstruction.     FEV1 reduced to 69% indicating moderate obstruction.     FVC normal at 86%.     No significant bronchodilation noted after bronchodilator administration.     FEF 25-75% decreased to 34% indicating small airways flow obstruction.     Flow volume curve shows obstructive pattern.     Spirometry showing moderate obstruction.     Lung volume shows increased TLC to 120% indicating hyper inflation, increased RV and RV/ TLC indicating the presence of air trapping.     DLCO mildly reduced to 70%.     Moderate obstruction, air trapping, mild DLCO reduction.  Clinical correlation recommended.     6/24/2013 CPFT  Mild obstruction. Airflow is not clearly improved following bronchodilation. Clinical improvement following bronchodilator  therapy may occur in the absence of spirometric improvement.    4/28/2022 6MWD No desaturations requiring supplemental oxygen at rest or exertion.  Oxygen Assessment Supplemental O2 Heart   Rate Blood Pressure Edmundo Dyspnea Scale Rating    Resting  95 % Room Air 75 bpm 115/61 0   Exercise             Minute             1 96 % Room Air 105 bpm       2 95 % Room Air 105 bpm       3 95 % Room Air 104 bpm       4 96 % Room Air 104 bpm       5 96 % Room Air 102 bpm       6  96 % Room Air 105 bpm 131/61 2   Recovery             Minute             1 98 % Room Air 78 bpm       2 99 % Room Air 74 bpm       3 98 % Room Air 77 bpm       4 98 % Room Air 77 bpm 121/74 0      Six Minute Walk Summary  6MWT Status: completed without stopping  Patient Reported: Dyspnea         Interpretation:  Did the patient stop or pause?: No  Total Time Walked (Calculated): 360 seconds  Final Partial Lap Distance (feet): 0 feet  Total Distance Meters (Calculated): 365.76 meters  Predicted Distance Meters (Calculated): 422.53 meters  Percentage of Predicted (Calculated): 86.56  Peak VO2 (Calculated): 14.95  Mets: 4.27  Has The Patient Had a Previous Six Minute Walk Test?: Yes     Previous 6MWT Results  Has The Patient Had a Previous Six Minute Walk Test?: Yes  Date of Previous Test: 04/15/21  Total Time Walked: 360 seconds  Total Distance (meters): 441.96  Predicted Distance (meters): 416.28 meters  Percentage of Predicted: 106.17  Percent Change (Calculated): 0.17    Assessment:     1. COPD, moderate    2. Former heavy cigarette smoker (20-39 per day)        Orders Placed This Encounter   Procedures    Complete PFT with bronchodilator     Standing Status:   Future     Number of Occurrences:   1     Standing Expiration Date:   5/11/2024     Order Specific Question:   Release to patient     Answer:   Immediate    Complete PFT with bronchodilator     Standing Status:   Future     Standing Expiration Date:   5/11/2024     Order Specific Question:   Release to patient     Answer:   Immediate     Plan:   Discussed diagnosis, its evaluation, treatment and usual course. All questions answered.  Problem List Items Addressed This Visit       Former heavy cigarette smoker (20-39 per day)     67.5  pk/year smoker quit in 2009  ONCOLOGIC DIAGNOSIS: Stage IIB, pT1a, pN1a cMx lung adenocarcinoma left lower lung, Dr. Medley. History of stage I squamous cell carcinoma moderately differentiated left lower lung status post wedge resection 04/28/2021           COPD, moderate - Primary     Controlled on Anoro LABA/LAMA. Albuterol inhaler  68 pk/year smoker quit in 2009.   5/11/2023 spirometry stable moderate obstruction.  Continue Anoro daily and  Albuterol inhaler before exertional activity.    Follow up 1 year review cpft/xray   Continued follow up with oncology for lung cancer           Relevant Orders    Complete PFT with bronchodilator (Completed)    Complete PFT with bronchodilator       I spent a total of 36 minutes on the day of the visit.  This includes face to face time and non-face to face time preparing to see the patient (eg, review of tests), obtaining and/or reviewing separately obtained history, documenting clinical information in the electronic or other health record, independently interpreting results and communicating results to the patient/family/caregiver, or care coordinator.    Follow up in about 1 year (around 5/11/2024) for COPD w/review cpft.

## 2023-05-12 ENCOUNTER — INFUSION (OUTPATIENT)
Dept: INFUSION THERAPY | Facility: HOSPITAL | Age: 73
End: 2023-05-12
Attending: INTERNAL MEDICINE
Payer: MEDICARE

## 2023-05-12 ENCOUNTER — DOCUMENTATION ONLY (OUTPATIENT)
Dept: NUTRITION | Facility: CLINIC | Age: 73
End: 2023-05-12
Payer: MEDICARE

## 2023-05-12 ENCOUNTER — DOCUMENTATION ONLY (OUTPATIENT)
Dept: HEMATOLOGY/ONCOLOGY | Facility: CLINIC | Age: 73
End: 2023-05-12
Payer: MEDICARE

## 2023-05-12 VITALS
OXYGEN SATURATION: 97 % | RESPIRATION RATE: 16 BRPM | BODY MASS INDEX: 27.44 KG/M2 | WEIGHT: 164.88 LBS | DIASTOLIC BLOOD PRESSURE: 78 MMHG | SYSTOLIC BLOOD PRESSURE: 125 MMHG | HEART RATE: 69 BPM | TEMPERATURE: 97 F

## 2023-05-12 DIAGNOSIS — C34.32 MALIGNANT NEOPLASM OF LOWER LOBE OF LEFT LUNG: Primary | ICD-10-CM

## 2023-05-12 LAB
BRPFT: NORMAL
DLCO ADJ PRE: 11.11 ML/(MIN*MMHG)
DLCO SINGLE BREATH LLN: 15.41
DLCO SINGLE BREATH PRE REF: 49.7 %
DLCO SINGLE BREATH REF: 21.15
DLCOC SBVA LLN: 2.76
DLCOC SBVA PRE REF: 86.8 %
DLCOC SBVA REF: 4.15
DLCOC SINGLE BREATH LLN: 15.41
DLCOC SINGLE BREATH PRE REF: 52.5 %
DLCOC SINGLE BREATH REF: 21.15
DLCOVA LLN: 2.76
DLCOVA PRE REF: 82.2 %
DLCOVA PRE: 3.41 ML/(MIN*MMHG*L)
DLCOVA REF: 4.15
DLVAADJ PRE: 3.6 ML/(MIN*MMHG*L)
ERV LLN: -16449.42
ERV PRE REF: 58.2 %
ERV REF: 0.58
FEF 25 75 CHG: -5.8 %
FEF 25 75 LLN: 0.76
FEF 25 75 POST REF: 23.4 %
FEF 25 75 PRE REF: 24.9 %
FEF 25 75 REF: 1.73
FET100 CHG: 11.8 %
FEV1 CHG: 0.9 %
FEV1 FVC CHG: -2.3 %
FEV1 FVC LLN: 63
FEV1 FVC POST REF: 68.8 %
FEV1 FVC PRE REF: 70.4 %
FEV1 FVC REF: 77
FEV1 LLN: 1.51
FEV1 POST REF: 60.8 %
FEV1 PRE REF: 60.2 %
FEV1 REF: 2.12
FRCPLETH LLN: 1.95
FRCPLETH PREREF: 89.2 %
FRCPLETH REF: 2.77
FVC CHG: 3.3 %
FVC LLN: 1.98
FVC POST REF: 87.4 %
FVC PRE REF: 84.5 %
FVC REF: 2.77
IVC PRE: 1.96 L
IVC SINGLE BREATH LLN: 1.98
IVC SINGLE BREATH PRE REF: 70.6 %
IVC SINGLE BREATH REF: 2.77
MVV LLN: 69
MVV PRE REF: 62.4 %
MVV REF: 82
PEF CHG: 21.1 %
PEF LLN: 3.63
PEF POST REF: 82.1 %
PEF PRE REF: 67.8 %
PEF REF: 5.39
POST FEF 25 75: 0.41 L/S
POST FET 100: 13.92 SEC
POST FEV1 FVC: 53.24 %
POST FEV1: 1.29 L
POST FVC: 2.42 L
POST PEF: 4.43 L/S
PRE DLCO: 10.52 ML/(MIN*MMHG)
PRE ERV: 0.34 L
PRE FEF 25 75: 0.43 L/S
PRE FET 100: 12.46 SEC
PRE FEV1 FVC: 54.5 %
PRE FEV1: 1.28 L
PRE FRC PL: 2.47 L
PRE FVC: 2.35 L
PRE MVV: 51 L/MIN
PRE PEF: 3.66 L/S
PRE RV: 1.98 L
PRE TLC: 4.32 L
RAW LLN: 3.06
RAW PRE REF: 173.6 %
RAW PRE: 5.31 CMH2O*S/L
RAW REF: 3.06
RV LLN: 1.61
RV PRE REF: 90.4 %
RV REF: 2.19
RVTLC LLN: 35
RVTLC PRE REF: 102.9 %
RVTLC PRE: 45.74 %
RVTLC REF: 44
TLC LLN: 4.11
TLC PRE REF: 84.7 %
TLC REF: 5.1
VA PRE: 3.09 L
VA SINGLE BREATH LLN: 4.95
VA SINGLE BREATH PRE REF: 62.3 %
VA SINGLE BREATH REF: 4.95
VC LLN: 1.98
VC PRE REF: 84.5 %
VC PRE: 2.35 L
VC REF: 2.77
VTGRAWPRE: 3.19 L

## 2023-05-12 PROCEDURE — 96366 THER/PROPH/DIAG IV INF ADDON: CPT

## 2023-05-12 PROCEDURE — 96411 CHEMO IV PUSH ADDL DRUG: CPT

## 2023-05-12 PROCEDURE — 96361 HYDRATE IV INFUSION ADD-ON: CPT

## 2023-05-12 PROCEDURE — 96375 TX/PRO/DX INJ NEW DRUG ADDON: CPT

## 2023-05-12 PROCEDURE — 96413 CHEMO IV INFUSION 1 HR: CPT

## 2023-05-12 PROCEDURE — 96367 TX/PROPH/DG ADDL SEQ IV INF: CPT

## 2023-05-12 PROCEDURE — 63600175 PHARM REV CODE 636 W HCPCS: Performed by: INTERNAL MEDICINE

## 2023-05-12 PROCEDURE — 25000003 PHARM REV CODE 250: Performed by: INTERNAL MEDICINE

## 2023-05-12 RX ORDER — HEPARIN 100 UNIT/ML
500 SYRINGE INTRAVENOUS
Status: DISCONTINUED | OUTPATIENT
Start: 2023-05-12 | End: 2023-05-12 | Stop reason: HOSPADM

## 2023-05-12 RX ADMIN — APREPITANT 130 MG: 130 INJECTION, EMULSION INTRAVENOUS at 08:05

## 2023-05-12 RX ADMIN — SODIUM CHLORIDE 1000 ML: 9 INJECTION, SOLUTION INTRAVENOUS at 08:05

## 2023-05-12 RX ADMIN — CISPLATIN 138 MG: 50 INJECTION, SOLUTION INTRAVENOUS at 11:05

## 2023-05-12 RX ADMIN — SODIUM CHLORIDE 900 MG: 9 INJECTION, SOLUTION INTRAVENOUS at 10:05

## 2023-05-12 RX ADMIN — HEPARIN 500 UNITS: 100 SYRINGE at 02:05

## 2023-05-12 RX ADMIN — DEXAMETHASONE SODIUM PHOSPHATE 0.25 MG: 4 INJECTION, SOLUTION INTRA-ARTICULAR; INTRALESIONAL; INTRAMUSCULAR; INTRAVENOUS; SOFT TISSUE at 09:05

## 2023-05-12 RX ADMIN — MAGNESIUM SULFATE HEPTAHYDRATE 506 ML/HR: 500 INJECTION, SOLUTION INTRAMUSCULAR; INTRAVENOUS at 12:05

## 2023-05-12 NOTE — PROGRESS NOTES
Introduced myself to new oncology patient and/or family member(s) in infusion. Gave my business card with dietitian contact information along with guidance on when to contact me about an appointment, for concerns like significant loss of appetite and weight loss.   Signature: Nanci Graf, MPH, RD, LDN

## 2023-05-12 NOTE — NURSING
Patient completed the alimta infusion and began seeing black flying things above her and in the bay across from her. Dr Brower brought to chairside at this time, hallucination resolved after approximately 60 seconds. Dr Brower okay to proceed with the remainder of the treatment today. Vital signs remained stable, patient is in no acute distress. Will continue to monitor closely during the remainder of her time in infusion.

## 2023-05-12 NOTE — NURSING
1022am: Met with pt today for 1st time in-person in chemo infusion at chairside while receiving 1st Alimta Cisplatin treatment. Pt  present. Pt doing well. Pt had no specific questions, concerns, needs, and/or complaints noted at this time. Pt encouraged to contact me directly for any issues that may arise. Pt informed that I'll fup w/pt in 3 wks during cycle 2. Pt verbalized understanding.   Oncology Navigation   Intake  Internal / External Referral: Internal (Dr. Refugio Medley)  Initial Nurse Navigator Contact: 23  Date Worked: 23  Multiple appointments: No  Start of Treatment: 23     Treatment  Current Status: Active  Date Presented to Tumor Board: 23  Presentation Notes: Discuss left VATS anatomic resection for growing NANI nodule    Surgery: Planned  Surgical Oncologist: Jasmyne  Consult Date: 03/15/23    Medical Oncologist: Dr. Marissa Brower  Consult Date: 23  Chemotherapy: Planned  Chemotherapy Regimen: Alimta Cisplatin  Immunotherapy: Planned  Immunotherapy Name: Tecentriq       Procedures: Port / PICC  MRI Schedule Date: 23 (brain w wo contrast)  PET Scan Schedule Date: 23  Port / PICC Schedule Date: 23  Other Schedule Date: 23 (audiogram)    General Referrals: Social Work  Social Work: notified via in-basket [facility] [place]  Social Work Referral Date: 23          Support Systems: Spouse/significant other  Barriers of Care: Transportation  Transportation Barriers: Patient lives in [facility]  Stage: 1  Systemic Treatment - predicted or initiated: Chemotherapy Regimen with Multiple drugs (+1)  Treatment Tolerability: Has not started treatment yet/treatment fully completed and side effects resolved  ECO  Comorbidities in Medical History: 2  Hospitalization Within the Past Month: 0   Needed: 0  Support: 0  Transportation: 0  History of noncompliance/frequent no shows and cancellations: 0  Verbalizes the need for more  education: 1  Navigation Acuity: 4     Follow Up  No follow-ups on file.

## 2023-05-12 NOTE — PLAN OF CARE
Problem: Adult Inpatient Plan of Care  Goal: Plan of Care Review  Outcome: Ongoing, Progressing  Flowsheets (Taken 5/12/2023 0828)  Plan of Care Reviewed With:   patient   spouse  Goal: Optimal Comfort and Wellbeing  Outcome: Ongoing, Progressing  Intervention: Provide Person-Centered Care  Flowsheets (Taken 5/12/2023 0828)  Trust Relationship/Rapport:   care explained   choices provided   emotional support provided   empathic listening provided   questions answered   questions encouraged   reassurance provided   thoughts/feelings acknowledged     Problem: Neutropenia (Chemotherapy Effects)  Goal: Absence of Infection  Outcome: Ongoing, Progressing  Intervention: Prevent Infection and Maximize Resistance  Flowsheets (Taken 5/12/2023 0828)  Infection Prevention:   personal protective equipment utilized   rest/sleep promoted   hand hygiene promoted   equipment surfaces disinfected

## 2023-05-13 RX ORDER — HEPARIN 100 UNIT/ML
500 SYRINGE INTRAVENOUS
Status: CANCELLED | OUTPATIENT
Start: 2023-05-13

## 2023-05-13 RX ORDER — SODIUM CHLORIDE 0.9 % (FLUSH) 0.9 %
10 SYRINGE (ML) INJECTION
Status: CANCELLED | OUTPATIENT
Start: 2023-05-13

## 2023-05-15 ENCOUNTER — INFUSION (OUTPATIENT)
Dept: INFUSION THERAPY | Facility: HOSPITAL | Age: 73
End: 2023-05-15
Attending: INTERNAL MEDICINE
Payer: MEDICARE

## 2023-05-15 VITALS
TEMPERATURE: 97 F | DIASTOLIC BLOOD PRESSURE: 64 MMHG | HEART RATE: 59 BPM | OXYGEN SATURATION: 96 % | RESPIRATION RATE: 18 BRPM | SYSTOLIC BLOOD PRESSURE: 130 MMHG

## 2023-05-15 DIAGNOSIS — C34.32 MALIGNANT NEOPLASM OF LOWER LOBE OF LEFT LUNG: Primary | ICD-10-CM

## 2023-05-15 PROCEDURE — 96361 HYDRATE IV INFUSION ADD-ON: CPT

## 2023-05-15 PROCEDURE — 96360 HYDRATION IV INFUSION INIT: CPT

## 2023-05-15 PROCEDURE — 25000003 PHARM REV CODE 250: Performed by: INTERNAL MEDICINE

## 2023-05-15 PROCEDURE — 63600175 PHARM REV CODE 636 W HCPCS: Performed by: INTERNAL MEDICINE

## 2023-05-15 RX ORDER — HEPARIN 100 UNIT/ML
500 SYRINGE INTRAVENOUS
Status: DISCONTINUED | OUTPATIENT
Start: 2023-05-15 | End: 2023-05-15 | Stop reason: HOSPADM

## 2023-05-15 RX ADMIN — SODIUM CHLORIDE 1000 ML: 9 INJECTION, SOLUTION INTRAVENOUS at 07:05

## 2023-05-15 RX ADMIN — HEPARIN 500 UNITS: 100 SYRINGE at 09:05

## 2023-05-15 NOTE — PLAN OF CARE
Problem: Adult Inpatient Plan of Care  Goal: Plan of Care Review  Outcome: Ongoing, Progressing  Flowsheets (Taken 5/15/2023 0653)  Plan of Care Reviewed With:   patient   spouse  Goal: Optimal Comfort and Wellbeing  Outcome: Ongoing, Progressing  Intervention: Provide Person-Centered Care  Flowsheets (Taken 5/15/2023 0653)  Trust Relationship/Rapport:   care explained   choices provided   emotional support provided   empathic listening provided   questions answered   questions encouraged   reassurance provided   thoughts/feelings acknowledged

## 2023-05-15 NOTE — NURSING
Patient reports having another episode of black spots Saturday afternoon. Reports it did not last as long and there were not as many spots. No other symptoms involved. MD notified. No other orders given at this time.

## 2023-05-17 ENCOUNTER — TELEPHONE (OUTPATIENT)
Dept: HEMATOLOGY/ONCOLOGY | Facility: CLINIC | Age: 73
End: 2023-05-17
Payer: MEDICARE

## 2023-05-17 ENCOUNTER — PATIENT MESSAGE (OUTPATIENT)
Dept: HEMATOLOGY/ONCOLOGY | Facility: CLINIC | Age: 73
End: 2023-05-17
Payer: MEDICARE

## 2023-05-17 DIAGNOSIS — K12.31 MUCOSITIS DUE TO CHEMOTHERAPY: ICD-10-CM

## 2023-05-17 DIAGNOSIS — C34.90 MALIGNANT NEOPLASM OF UNSPECIFIED PART OF UNSPECIFIED BRONCHUS OR LUNG: Primary | ICD-10-CM

## 2023-05-17 NOTE — TELEPHONE ENCOUNTER
Nurse placed call to patient to provide update on prescription for mouthwash. Informed patient that I spoke with pharmacist and was informed that an ingredient in solution was on back order. Informed patient that Dr. Brower was notified, and that I will call her back once an update is received on recommendations. All understanding verbalized.

## 2023-05-17 NOTE — NURSING
1016am: Incoming call from pt regarding roof of mouth numb, tongue tingling, and sore throat starting. Spoke with pt. Informed pt those are side effects of the Alimta chemo drug pt is on. Pt states that she's still able to drink and eat fine. Pt offered to move Fri appt up to Thurs. Pt will contact me later today if side effects worsen and are intolerable to get appt for tomorrow. Otherwise, pt wants to wait until Fri appt to come. Pt verbalized understanding.Dr. Brower notified via in-basket msg.   Oncology Navigation   Intake  Internal / External Referral: Internal (Dr. Refugio Medley)  Initial Nurse Navigator Contact: 23  Date Worked: 23  Multiple appointments: No  Start of Treatment: 23     Treatment  Current Status: Active  Date Presented to Tumor Board: 23  Presentation Notes: Discuss left VATS anatomic resection for growing NANI nodule    Surgery: Planned  Surgical Oncologist: Jasmyne  Consult Date: 03/15/23    Medical Oncologist: Dr. Marissa Brower  Consult Date: 23  Chemotherapy: Planned  Chemotherapy Regimen: Alimta Cisplatin  Immunotherapy: Planned  Immunotherapy Name: Tecentriq       Procedures: Port / PICC  MRI Schedule Date: 23 (brain w wo contrast)  PET Scan Schedule Date: 23  Port / PICC Schedule Date: 23  Other Schedule Date: 23 (audiogram)    General Referrals: Social Work  Social Work: notified via in-basket msg  Social Work Referral Date: 23          Support Systems: Spouse/significant other  Barriers of Care: Transportation  Transportation Barriers: Patient lives in [facility]  Stage: 1  Systemic Treatment - predicted or initiated: Chemotherapy Regimen with Multiple drugs (+1)  Treatment Tolerability: Has not started treatment yet/treatment fully completed and side effects resolved  ECO  Comorbidities in Medical History: 2  Hospitalization Within the Past Month: 0   Needed: 0  Support: 0  Transportation:  [facility]  Hematology  Bone Marrow Transplant  Progress Note    Patient Name: Arik Bobo  Admission Date: 9/25/2017  Hospital Length of Stay: 6 days  Code Status: Full Code    Subjective:     Interval History: Denies any new issues.    Objective:     Vital Signs (Most Recent):  Temp: 98.8 °F (37.1 °C) (10/01/17 1139)  Pulse: 62 (10/01/17 1139)  Resp: 16 (10/01/17 1139)  BP: (!) 144/80 (10/01/17 1139)  SpO2: 97 % (10/01/17 1139) Vital Signs (24h Range):  Temp:  [98.4 °F (36.9 °C)-99.2 °F (37.3 °C)] 98.8 °F (37.1 °C)  Pulse:  [62-72] 62  Resp:  [16-19] 16  SpO2:  [95 %-99 %] 97 %  BP: (137-157)/(79-85) 144/80     Weight: 116 kg (255 lb 11.7 oz)  Body mass index is 38.88 kg/m².  Body surface area is 2.36 meters squared.    ECOG SCORE         [unfilled]    Intake/Output - Last 3 Shifts       09/29 0700 - 09/30 0659 09/30 0700 - 10/01 0659 10/01 0700 - 10/02 0659    P.O. 1680 1560 120    I.V. (mL/kg) 1507.5 (13) 2507.4 (21.6) 260.3 (2.2)    IV Piggyback   500    Total Intake(mL/kg) 3187.5 (27.5) 4067.4 (35.1) 880.3 (7.6)    Urine (mL/kg/hr) 2875 (1) 3600 (1.3) 1200 (1.6)    Stool 0 (0) 0 (0) 0 (0)    Total Output 2875 3600 1200    Net +312.5 +467.4 -319.7           Urine Occurrence 4 x      Stool Occurrence 7 x 3 x 1 x          Physical Exam   Constitutional: He is oriented to person, place, and time. He appears well-developed and well-nourished.   HENT:   Head: Normocephalic and atraumatic.   Eyes: EOM are normal. Pupils are equal, round, and reactive to light.   Neck: Normal range of motion.   Cardiovascular: Intact distal pulses.    Pulmonary/Chest: Breath sounds normal.   Abdominal: Soft. Bowel sounds are normal.   Musculoskeletal: Normal range of motion.   Neurological: He is alert and oriented to person, place, and time.   Skin: Skin is warm and dry.       Significant Labs:   CBC:     Recent Labs  Lab 09/30/17  0400 10/01/17  0359   WBC 1.84* 6.88   HGB 10.4* 10.4*   HCT 30.8* 31.1*    * 74*    and CMP:     Recent Labs  Lab 09/30/17  0400 10/01/17  0359    138   K 3.6 3.8    109   CO2 24 24   GLU 91 92   BUN 10 7   CREATININE 0.7 0.7   CALCIUM 7.7* 7.5*   PROT 5.5* 5.5*   ALBUMIN 2.9* 2.9*   BILITOT 0.3 0.4   ALKPHOS 102 105   AST 24 33   ALT 14 27   ANIONGAP 7* 5*   EGFRNONAA >60.0 >60.0     Assessment/Plan:     History of auto stem cell transplant    - Today is day +4  - Melphalan given on 9/26/17 without complications  - Auto SCT day 0 on 9/27/17; received 5 bags with a CD 34 count of 4.71 x 10^6/kg - tolerated well   - ppx antimicrobials to start per protocol  - Neupogen to start day + 3         Pancytopenia due to chemotherapy    - WBC 6.88 k/uL, ANC 6728  - Hemoglobin 10.4 grams  - Plts 74k  - Transfuse for hemoglobin <7 and platelets <10,000         Multiple myeloma in remission    - Completed 4 cycles of CyBorD   - Was treated with lenalidomide and dex  - Restaging marrow 8/21/17 showed a 10-50% cellular marrow with trilineage hematopoiesis and 5% residual  kappa-restricted plasma cells (6% by morphology).  Cytogenetics 46,XY[20].  Hence, he has achieved a partial remission (PR1) and is ready to proceed with transplant.    65% EF on 8/2/17   - Possible need to consider an allogeneic transplant as a cure in future as pt is young. This is not an immediate consideration but since he has 4 siblings there is a 68% chance of finding a sibling match.         Chemotherapy induced diarrhea    - Cdiff negative  - Improved with imodium  - Will monitor.        History of gout    Pt suffers from gout and is not currently on uric acid suppression medication.  This may have contributed to his hip degeneration.        Depression    - Continue home Celexa         Hyperlipemia    - Will hold statin with admission and may resume at discharge        Hypertension, essential    - Continue home amlodipine, bystolic and lisinopril           Stem cell transplant candidate    - Melphalan  0  History of noncompliance/frequent no shows and cancellations: 0  Verbalizes the need for more education: 1  Navigation Acuity: 4     Follow Up  No follow-ups on file.        given on 9/26/17 without complications  - Auto SCT day 0 on 9/27/17; to receive 5 bags with a CD 34 count of 4.71 x 10^6/kg   - ppx antimicrobials to start per protocol  - Neupogen to start day + 3               VTE Risk Mitigation         Ordered     heparin (porcine) injection 1,600 Units  As needed (PRN)     Route:  Intravenous        09/25/17 1733     enoxaparin injection 40 mg  Daily     Route:  Subcutaneous        09/25/17 1257     High Risk of VTE  Once      09/25/17 1257          Disposition: Engraftment    Liban Keene, PGY-VI on 10/1/2017 1:39 PM  [facility] Hematology/Oncology Fellow  Pager: [phone]  [email or web address]

## 2023-05-18 ENCOUNTER — TELEPHONE (OUTPATIENT)
Dept: HEMATOLOGY/ONCOLOGY | Facility: CLINIC | Age: 73
End: 2023-05-18
Payer: MEDICARE

## 2023-05-18 NOTE — TELEPHONE ENCOUNTER
----- Message from Marissa Brower MD sent at 5/17/2023  5:21 PM CDT -----  Can use Maalox for now over-the-counter   ----- Message -----  From: Evelyne Dao LPN  Sent: 5/17/2023   5:05 PM CDT  To: Marissa Brower MD    Spoke with pharmacist regarding prescription for mouthwash and was informed that ingredient Lidocaine is not available. It was recalled and is now on backorder. Please advise on alternative medication. Thanks

## 2023-05-19 ENCOUNTER — OFFICE VISIT (OUTPATIENT)
Dept: HEMATOLOGY/ONCOLOGY | Facility: CLINIC | Age: 73
End: 2023-05-19
Payer: MEDICARE

## 2023-05-19 ENCOUNTER — LAB VISIT (OUTPATIENT)
Dept: LAB | Facility: HOSPITAL | Age: 73
End: 2023-05-19
Attending: INTERNAL MEDICINE
Payer: MEDICARE

## 2023-05-19 VITALS
BODY MASS INDEX: 26.45 KG/M2 | WEIGHT: 158.75 LBS | DIASTOLIC BLOOD PRESSURE: 67 MMHG | HEART RATE: 76 BPM | TEMPERATURE: 98 F | HEIGHT: 65 IN | SYSTOLIC BLOOD PRESSURE: 103 MMHG | OXYGEN SATURATION: 98 %

## 2023-05-19 DIAGNOSIS — C34.90 MALIGNANT NEOPLASM OF UNSPECIFIED PART OF UNSPECIFIED BRONCHUS OR LUNG: ICD-10-CM

## 2023-05-19 DIAGNOSIS — C34.90 MALIGNANT NEOPLASM OF UNSPECIFIED PART OF UNSPECIFIED BRONCHUS OR LUNG: Primary | ICD-10-CM

## 2023-05-19 DIAGNOSIS — K12.31 MUCOSITIS DUE TO CHEMOTHERAPY: ICD-10-CM

## 2023-05-19 LAB
ALBUMIN SERPL BCP-MCNC: 3.4 G/DL (ref 3.5–5.2)
ALP SERPL-CCNC: 83 U/L (ref 55–135)
ALT SERPL W/O P-5'-P-CCNC: 9 U/L (ref 10–44)
ANION GAP SERPL CALC-SCNC: 10 MMOL/L (ref 8–16)
AST SERPL-CCNC: 19 U/L (ref 10–40)
BASOPHILS # BLD AUTO: 0.02 K/UL (ref 0–0.2)
BASOPHILS NFR BLD: 0.3 % (ref 0–1.9)
BILIRUB SERPL-MCNC: 0.6 MG/DL (ref 0.1–1)
BUN SERPL-MCNC: 17 MG/DL (ref 8–23)
CALCIUM SERPL-MCNC: 8.9 MG/DL (ref 8.7–10.5)
CHLORIDE SERPL-SCNC: 100 MMOL/L (ref 95–110)
CO2 SERPL-SCNC: 30 MMOL/L (ref 23–29)
CREAT SERPL-MCNC: 0.9 MG/DL (ref 0.5–1.4)
DIFFERENTIAL METHOD: ABNORMAL
EOSINOPHIL # BLD AUTO: 0.2 K/UL (ref 0–0.5)
EOSINOPHIL NFR BLD: 2.4 % (ref 0–8)
ERYTHROCYTE [DISTWIDTH] IN BLOOD BY AUTOMATED COUNT: 13.2 % (ref 11.5–14.5)
EST. GFR  (NO RACE VARIABLE): >60 ML/MIN/1.73 M^2
GLUCOSE SERPL-MCNC: 91 MG/DL (ref 70–110)
HCT VFR BLD AUTO: 41.8 % (ref 37–48.5)
HGB BLD-MCNC: 13.4 G/DL (ref 12–16)
IMM GRANULOCYTES # BLD AUTO: 0.06 K/UL (ref 0–0.04)
IMM GRANULOCYTES NFR BLD AUTO: 0.8 % (ref 0–0.5)
LYMPHOCYTES # BLD AUTO: 1.6 K/UL (ref 1–4.8)
LYMPHOCYTES NFR BLD: 20.9 % (ref 18–48)
MAGNESIUM SERPL-MCNC: 2 MG/DL (ref 1.6–2.6)
MCH RBC QN AUTO: 28.3 PG (ref 27–31)
MCHC RBC AUTO-ENTMCNC: 32.1 G/DL (ref 32–36)
MCV RBC AUTO: 88 FL (ref 82–98)
MONOCYTES # BLD AUTO: 0.1 K/UL (ref 0.3–1)
MONOCYTES NFR BLD: 1.9 % (ref 4–15)
NEUTROPHILS # BLD AUTO: 5.5 K/UL (ref 1.8–7.7)
NEUTROPHILS NFR BLD: 73.7 % (ref 38–73)
NRBC BLD-RTO: 0 /100 WBC
PLATELET # BLD AUTO: 158 K/UL (ref 150–450)
PMV BLD AUTO: 10.5 FL (ref 9.2–12.9)
POTASSIUM SERPL-SCNC: 3.5 MMOL/L (ref 3.5–5.1)
PROT SERPL-MCNC: 6 G/DL (ref 6–8.4)
RBC # BLD AUTO: 4.74 M/UL (ref 4–5.4)
SODIUM SERPL-SCNC: 140 MMOL/L (ref 136–145)
WBC # BLD AUTO: 7.46 K/UL (ref 3.9–12.7)

## 2023-05-19 PROCEDURE — 83735 ASSAY OF MAGNESIUM: CPT | Performed by: INTERNAL MEDICINE

## 2023-05-19 PROCEDURE — 1159F PR MEDICATION LIST DOCUMENTED IN MEDICAL RECORD: ICD-10-PCS | Mod: CPTII,,, | Performed by: INTERNAL MEDICINE

## 2023-05-19 PROCEDURE — 3288F PR FALLS RISK ASSESSMENT DOCUMENTED: ICD-10-PCS | Mod: CPTII,,, | Performed by: INTERNAL MEDICINE

## 2023-05-19 PROCEDURE — 1126F PR PAIN SEVERITY QUANTIFIED, NO PAIN PRESENT: ICD-10-PCS | Mod: CPTII,,, | Performed by: INTERNAL MEDICINE

## 2023-05-19 PROCEDURE — 1100F PR PT FALLS ASSESS DOC 2+ FALLS/FALL W/INJURY/YR: ICD-10-PCS | Mod: CPTII,,, | Performed by: INTERNAL MEDICINE

## 2023-05-19 PROCEDURE — 3074F PR MOST RECENT SYSTOLIC BLOOD PRESSURE < 130 MM HG: ICD-10-PCS | Mod: CPTII,,, | Performed by: INTERNAL MEDICINE

## 2023-05-19 PROCEDURE — 1159F MED LIST DOCD IN RCRD: CPT | Mod: CPTII,,, | Performed by: INTERNAL MEDICINE

## 2023-05-19 PROCEDURE — 85025 COMPLETE CBC W/AUTO DIFF WBC: CPT | Performed by: INTERNAL MEDICINE

## 2023-05-19 PROCEDURE — 99215 OFFICE O/P EST HI 40 MIN: CPT | Mod: ,,, | Performed by: INTERNAL MEDICINE

## 2023-05-19 PROCEDURE — 99215 PR OFFICE/OUTPT VISIT, EST, LEVL V, 40-54 MIN: ICD-10-PCS | Mod: ,,, | Performed by: INTERNAL MEDICINE

## 2023-05-19 PROCEDURE — 99213 OFFICE O/P EST LOW 20 MIN: CPT | Performed by: INTERNAL MEDICINE

## 2023-05-19 PROCEDURE — 99999 PR PBB SHADOW E&M-EST. PATIENT-LVL III: ICD-10-PCS | Mod: PBBFAC,,, | Performed by: INTERNAL MEDICINE

## 2023-05-19 PROCEDURE — 3078F DIAST BP <80 MM HG: CPT | Mod: CPTII,,, | Performed by: INTERNAL MEDICINE

## 2023-05-19 PROCEDURE — 3078F PR MOST RECENT DIASTOLIC BLOOD PRESSURE < 80 MM HG: ICD-10-PCS | Mod: CPTII,,, | Performed by: INTERNAL MEDICINE

## 2023-05-19 PROCEDURE — 80053 COMPREHEN METABOLIC PANEL: CPT | Performed by: INTERNAL MEDICINE

## 2023-05-19 PROCEDURE — 1100F PTFALLS ASSESS-DOCD GE2>/YR: CPT | Mod: CPTII,,, | Performed by: INTERNAL MEDICINE

## 2023-05-19 PROCEDURE — 3288F FALL RISK ASSESSMENT DOCD: CPT | Mod: CPTII,,, | Performed by: INTERNAL MEDICINE

## 2023-05-19 PROCEDURE — 36415 COLL VENOUS BLD VENIPUNCTURE: CPT | Performed by: INTERNAL MEDICINE

## 2023-05-19 PROCEDURE — 1126F AMNT PAIN NOTED NONE PRSNT: CPT | Mod: CPTII,,, | Performed by: INTERNAL MEDICINE

## 2023-05-19 PROCEDURE — 99999 PR PBB SHADOW E&M-EST. PATIENT-LVL III: CPT | Mod: PBBFAC,,, | Performed by: INTERNAL MEDICINE

## 2023-05-19 PROCEDURE — 3074F SYST BP LT 130 MM HG: CPT | Mod: CPTII,,, | Performed by: INTERNAL MEDICINE

## 2023-05-19 NOTE — PROGRESS NOTES
Subjective:      DATE OF VISIT: 5/19/23     ?  Patient ID:?Radha Lamas is a 73 y.o. female.?? MR#: [id number]     PRIMARY ONCOLOGIST: Dr. Brower    ? Primary Care Providers:  Ab Fletcher MD, MD (General)     CHIEF COMPLAINT: ??Establish care with Medical Oncology for newly diagnosed invasive adenocarcinoma left lower lung??   ?   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   CURRENT TREATMENT:  adjuvant cisplatin and pemetrexed q. 3 weeks followed by atezolizumab    PAST TREATMENT:  Wedge resection  ?   ONCOLOGIC HISTORY:   ?   Oncology History   Malignant neoplasm of lower lobe of left lung - Squamous cell   4/15/2021 Initial Diagnosis    Malignant neoplasm of lower lobe of left lung - Squamous cell       5/25/2021 Cancer Staged    Staging form: Lung, AJCC 8th Edition  - Clinical: Stage IA1 (cT1a, cN0, cM0)        Cancer Staged    9mm non-keratinizing squamous cell carcinoma, no VPI, no LVI, margin at least 8mm.  Levels 9 and 11 = negative.  T1aN0     4/5/2023 Cancer Staged    Staging form: Lung, AJCC 8th Edition  - Pathologic stage from 4/5/2023: Stage IIB (pT1a, pN1, cM0)       5/1/2023 -  Chemotherapy    Treatment Summary   Plan Name: OP NSCLC PEMETREXED + CISPLATIN Q3W  Treatment Goal: Supportive  Status: Active  Start Date: 5/1/2023  End Date: 7/15/2023 (Planned)  Provider: Marissa Brower MD  Chemotherapy: CISplatin (Platinol) 75 mg/m2 = 138 mg in sodium chloride 0.9% 665 mL chemo infusion, 75 mg/m2 = 138 mg, Intravenous, Clinic/HOD 1 time, 1 of 4 cycles  Administration: 138 mg (5/12/2023)  PEMEtrexed disodium (ALIMTA) 900 mg in sodium chloride 0.9% SolP 100 mL chemo infusion, 925 mg, Intravenous, Clinic/HOD 1 time, 1 of 4 cycles  Administration: 900 mg (5/12/2023)       7/20/2023 -  Chemotherapy    Treatment  Summary   Plan Name: OP ATEZOLIZUMAB Q3W  Treatment Goal: Supportive  Status: Future  Start Date: 7/20/2023 (Planned)  End Date: 6/20/2024 (Planned)  Provider: Marissa Brower MD  Chemotherapy: [No matching medication found in this treatment plan]       NSCLC of left lung (Resolved)   4/28/2021 Initial Diagnosis    NSCLC of left lung (Resolved)      Cancer Staged    9mm non-keratinizing squamous cell carcinoma, no VPI, no LVI, margin at least 8mm.  Levels 9 and 11 = negative.  T1aN0        Cancer Staging   Malignant neoplasm of lower lobe of left lung - Squamous cell  - Pathologic stage from 4/5/2023: Stage IIB (pT1a, pN1, cM0) - Signed by Refugio Medley MD on 4/5/2023         HPI      during her 1st infusion of chemotherapy she had brief episode of black spots which self resolved infusion was continued and completed.  Several days later she had another isolated episode of black spots in her vision.  She was unable to check blood pressure at the time.  She notes atypical sensation/taste in her mouth but has continued to have good oral intake and denies any oral ulceration. She endorses constipation.  No notable nausea.   Review of Systems    ?   A comprehensive 14-point review of systems was reviewed with patient and was negative other than as specified above.   ?   PAST MEDICAL HISTORY:   Past Medical History:   Diagnosis Date    COPD (chronic obstructive pulmonary disease) 2013    Eczema     GERD (gastroesophageal reflux disease)     Hiatal hernia     History of torn meniscus of right knee 01/2011    Hypothyroid     Incidental pulmonary nodule, > 3mm and < 8mm - [facility] 8/12/2021    Lung cancer     Urinary incontinence in female     Mild , only takes mediciane with travel    ?     PAST SURGICAL HISTORY:   Past Surgical History:   Procedure Laterality Date    FLUOROSCOPY N/A 4/27/2023    Procedure: Fluoroscopy/Mediport placement;  Surgeon: Kodak Retana MD;  Location: [facility] CATH LAB;  Service: General;   Laterality: N/A;    INJECTION OF ANESTHETIC AGENT AROUND MULTIPLE INTERCOSTAL NERVES Left 3/20/2023    Procedure: BLOCK, NERVE, INTERCOSTAL, 2 OR MORE;  Surgeon: Refugio Medley MD;  Location: [facility] OR 2ND FLR;  Service: Thoracic;  Laterality: Left;    KNEE CARTILAGE SURGERY Right 01/2011    LAPAROSCOPIC LYSIS OF ADHESIONS Left 3/20/2023    Procedure: LYSIS, ADHESIONS, LAPAROSCOPIC;  Surgeon: Refugio Medley MD;  Location: [facility] OR 2ND FLR;  Service: Thoracic;  Laterality: Left;    ROBOT-ASSISTED LAPAROSCOPIC LYMPHADENECTOMY USING DA ROSEMARY XI Left 3/20/2023    Procedure: XI ROBOTIC LYMPHADENECTOMY;  Surgeon: Refugio Medley MD;  Location: [facility] OR 2ND FLR;  Service: Thoracic;  Laterality: Left;    THORACOSCOPIC WEDGE RESECTION OF LUNG Left 04/28/2021    Procedure: VATS, WITH WEDGE RESECTION, LUNG;  Surgeon: Clarke Sauceda MD;  Location: [facility] OR 2ND FLR;  Service: Thoracic;  Laterality: Left;  lymph node dissection     TUBAL LIGATION  1976    WEDGE RESECTION, LUNG, ROBOT-ASSISTED, USING VATS, USING DA ROSEMARY XI Left 3/20/2023    Procedure: XI ROBOTIC WEDGE RESECTION, LUNG (RATS); segmentectomy;  Surgeon: Refugio Medley MD;  Location: [facility] OR 2ND FLR;  Service: Thoracic;  Laterality: Left;      ?   ALLERGIES:   Allergies as of 05/19/2023    (No Known Allergies)      ?   MEDICATIONS:?   Outpatient Medications Marked as Taking for the 5/19/23 encounter (Office Visit) with Marissa Brower MD   Medication Sig Dispense Refill    albuterol (PROAIR HFA) 90 mcg/actuation inhaler Inhale 2 puffs into the lungs every 4 (four) hours as needed for Wheezing or Shortness of Breath. Rescue 54 g 4    calcium carbonate (OS-CYDNEY) 600 mg calcium (1,500 mg) Tab Take 600 mg by mouth.      dexAMETHasone (DECADRON) 4 MG Tab Take 2 tablets (8 mg total) by mouth once daily. on the day prior to chemotherapy then daily on days 2,3,4 of each chemotherapy cycle. 24 tablet 3    diphenhydrAMINE-aluminum-magnesium  hydroxide-simethicone-LIDOcaine HCl 2% Swish and spit 15 mLs every 4 (four) hours as needed. 540 each 2    DUPIXENT  mg/2 mL PnIj Inject into the skin every 14 (fourteen) days.      folic acid (FOLVITE) 1 MG tablet Take 1 tablet (1 mg total) by mouth once daily. starting 1 week prior to pemetrexed and continuing for 21 days after stopping pemetrexed 30 tablet 5    gabapentin (NEURONTIN) 300 MG capsule Take 1 capsule (300 mg total) by mouth every evening. 30 capsule 11    levothyroxine (SYNTHROID) 75 MCG tablet Take 75 mcg by mouth once daily.      loratadine (CLARITIN) 10 mg tablet Take 10 mg by mouth once daily.      OLANZapine (ZYPREXA) 5 MG tablet Take 1 tablet (5 mg total) by mouth every evening. Take as directed on days 1-4 of your chemotherapy cycle. 16 tablet 0    omeprazole (PRILOSEC) 20 MG capsule Take 20 mg by mouth once daily.      ondansetron (ZOFRAN-ODT) 8 MG TbDL Take 1 tablet (8 mg total) by mouth every 8 (eight) hours as needed (nausea). 60 tablet 5    oxyCODONE (ROXICODONE) 5 MG immediate release tablet Take 1 tablet (5 mg total) by mouth every 4 (four) hours as needed for Pain. 41 tablet 0    umeclidinium-vilanteroL (ANORO ELLIPTA) 62.5-25 mcg/actuation DsDv USE 1 INHALATION DAILY (CONTROLLER) 180 each 3      ?   SOCIAL HISTORY:?   Social History     Tobacco Use    Smoking status: Former     Packs/day: 1.50     Years: 45.00     Pack years: 67.50     Types: Cigarettes     Start date:      Quit date:      Years since quittin.3    Smokeless tobacco: Never   Substance Use Topics    Alcohol use: No     Alcohol/week: 0.0 standard drinks      ?      ?   FAMILY HISTORY:   family history includes Cancer in her brother, father, and sister; Heart failure in her mother; Hypertension in her father and mother.   ?        Objective:      Physical Exam      ?   Vitals:    23 0829   BP: 103/67   Pulse: 76   Temp: 97.6 °F (36.4 °C)      ?   ECOG:?0   General appearance: Generally well  appearing, in no acute distress.   Head, eyes, ears, nose, and throat: moist mucous membranes.   Respiratory:  Normal work of breathing  Abdomen: nontender, nondistended.   Extremities: Warm, without edema.   Neurologic: Alert and oriented. Grossly normal strength, coordination, and gait.   Skin: No rashes, ecchymoses or petechial lesion.   Psychiatric:  Normal mood and affect.    ?   Laboratory:  ?   Lab Visit on 05/19/2023   Component Date Value Ref Range Status    WBC 05/19/2023 7.46  3.90 - 12.70 K/uL Final    RBC 05/19/2023 4.74  4.00 - 5.40 M/uL Final    Hemoglobin 05/19/2023 13.4  12.0 - 16.0 g/dL Final    Hematocrit 05/19/2023 41.8  37.0 - 48.5 % Final    MCV 05/19/2023 88  82 - 98 fL Final    MCH 05/19/2023 28.3  27.0 - 31.0 pg Final    MCHC 05/19/2023 32.1  32.0 - 36.0 g/dL Final    RDW 05/19/2023 13.2  11.5 - 14.5 % Final    Platelets 05/19/2023 158  150 - 450 K/uL Final    MPV 05/19/2023 10.5  9.2 - 12.9 fL Final    Immature Granulocytes 05/19/2023 0.8 (H)  0.0 - 0.5 % Final    Gran # (ANC) 05/19/2023 5.5  1.8 - 7.7 K/uL Final    Immature Grans (Abs) 05/19/2023 0.06 (H)  0.00 - 0.04 K/uL Final    Lymph # 05/19/2023 1.6  1.0 - 4.8 K/uL Final    Mono # 05/19/2023 0.1 (L)  0.3 - 1.0 K/uL Final    Eos # 05/19/2023 0.2  0.0 - 0.5 K/uL Final    Baso # 05/19/2023 0.02  0.00 - 0.20 K/uL Final    nRBC 05/19/2023 0  0 /100 WBC Final    Gran % 05/19/2023 73.7 (H)  38.0 - 73.0 % Final    Lymph % 05/19/2023 20.9  18.0 - 48.0 % Final    Mono % 05/19/2023 1.9 (L)  4.0 - 15.0 % Final    Eosinophil % 05/19/2023 2.4  0.0 - 8.0 % Final    Basophil % 05/19/2023 0.3  0.0 - 1.9 % Final    Differential Method 05/19/2023 Automated   Final    Sodium 05/19/2023 140  136 - 145 mmol/L Final    Potassium 05/19/2023 3.5  3.5 - 5.1 mmol/L Final    Chloride 05/19/2023 100  95 - 110 mmol/L Final    CO2 05/19/2023 30 (H)  23 - 29 mmol/L Final    Glucose 05/19/2023 91  70 - 110 mg/dL Final    BUN 05/19/2023 17  8 - 23 mg/dL Final     Creatinine 05/19/2023 0.9  0.5 - 1.4 mg/dL Final    Calcium 05/19/2023 8.9  8.7 - 10.5 mg/dL Final    Total Protein 05/19/2023 6.0  6.0 - 8.4 g/dL Final    Albumin 05/19/2023 3.4 (L)  3.5 - 5.2 g/dL Final    Total Bilirubin 05/19/2023 0.6  0.1 - 1.0 mg/dL Final    Alkaline Phosphatase 05/19/2023 83  55 - 135 U/L Final    AST 05/19/2023 19  10 - 40 U/L Final    ALT 05/19/2023 9 (L)  10 - 44 U/L Final    Anion Gap 05/19/2023 10  8 - 16 mmol/L Final    eGFR 05/19/2023 >60  >60 mL/min/1.73 m^2 Final    Magnesium 05/19/2023 2.0  1.6 - 2.6 mg/dL Final      ?     ?   Imaging:  ?    Results for orders placed or performed during the hospital encounter of 02/20/23 (from the past 2160 hour(s))   CT Biopsy Lung w/ guidance    Narrative    EXAMINATION:  CT BIOPSY LUNG W/ GUIDANCE    CLINICAL HISTORY:  Personal history of other malignant neoplasm of bronchus and lung    TECHNIQUE:  The CT exam was performed using one or more of the following dose reduction techniques- Automated exposure control, adjustment of the mA and/or kV according to patient size, and/or use of iterative reconstructed technique.    All CT scans at this facility use dose modulation, iterative reconstruction, and/or weight based dosing when appropriate to reduce radiation dose to as low as reasonable achievable.    Medications:    Moderate sedation using versed and fentanyl was administered by a trained observer monitoring the patient's vital signs and level of consciousness. The total time of sedation was 30 minutes.    1% lidocaine locally    : VANDANA Kwan    Complications: None immediate.    COMPARISON:  None    FINDINGS:  Procedure: The risks and benefits of this procedure were discussed, all questions were answered and informed consent was obtained.  The patient was placed supine on the CT gantry.  Preprocedural imaging was performed.  A suitable skin site for biopsy was selected.  IV fentanyl and Versed was administered for conscious sedation  by a trained observer.  The skin site was prepped and draped in sterile fashion.  The skin was anesthetized with 1% lidocaine.    Using CT guidance a 19 gauge introducer needle was guided into the left pulmonary nodule.  Prior to biopsy appropriate needle positioning was confirmed with CT.  Two 20 gauge samples were acquired.  The stylet was replaced and the needle was removed.    Postprocedural imaging was acquired. The site was bandaged sterilely. The patient left the room in stable condition.    Findings:    The needle is at the nodule.      Impression    Technically successful CT guided biopsy of the left pulmonary nodule..      Electronically signed by: Pk Nathan  Date:    02/20/2023  Time:    11:28     Results for orders placed or performed during the hospital encounter of 05/03/23 (from the past 2160 hour(s))   MRI Brain W WO Contrast    Impression    No evidence of intracranial metastatic disease.  No acute findings or abnormal enhancement.      Electronically signed by: Humphrey Martin  Date:    05/03/2023  Time:    15:51     Results for orders placed or performed during the hospital encounter of 05/04/23 (from the past 2160 hour(s))   NM PET CT Routine FDG    Impression    Postoperative changes left lung with a very small loculated hydropneumothorax in the upper left hemithorax (mainly fluid with minimal air).    -Small approximate 1 cm hypermetabolic node at the left hilum adjacent to staple line.  Subcentimeter minimally hypermetabolic nearby AP window lymph node, increased from prior.  These could be inflammatory in nature though close follow-up is advised particularly for the left hilar hypermetabolic focus.    The examination elsewhere is unremarkable with no additional foci of abnormal uptake.      Electronically signed by: Onel Valdez MD  Date:    05/04/2023  Time:    14:29         Pathology:    Reviewed in epic     ?   Assessment/Plan:   Malignant neoplasm of unspecified part of unspecified  bronchus or lung    Mucositis due to chemotherapy         1. Malignant neoplasm of unspecified part of unspecified bronchus or lung    2. 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 Initial biopsy February 20, 2023 without neoplasm identified who after multiple disciplinary discussion proceeded with left lower lobe wedge resection, final pathology positive for 03/28/2022 for invasive moderately differentiated adenocarcinoma station 10 lymph node positive, pleural invasion noted, margins negative.    Recommended staging with MRI brain and PET now 1 month out from surgery MRI brain negative PET scan with avidity left hilar SUV 4 and chest wall.  Given recent surgery potential inflammation presented at tumor board recommended proceeding with adjuvant chemotherapy and follow-up on repeat imaging.  We discussed indication for adjuvant therapy including chemotherapy and immunotherapy per [facility] 010 showed benefit to adjuvant atezolizumab following chemotherapy given PDL1 positive disease 95%.  Mutational analysis negative for EGFR mutation.  Will reschedule if appropriate cisplatin and pemetrexed with adjuvant supplemental folic acid and vitamin B12.      Last week 1st cycle adjuvant chemotherapy tolerated well aside from brief episode black spots and 1 self isolated recurrence while at home without other neurologic symptoms.  Constipation well managed.  No notable nausea.  Labs reviewed without notable abnormality.  Will proceed with cycle 2 and close  monitoring for any vision abnormalities and imaging if recurrent or neurologic symptoms low threshold.      Advance Care Planning   Stage II malignancy asymptomatic will defer palliative care consultation at this time.       Follow-Up:   Route Chart for Scheduling    Med Onc Chart Routing      Follow up with physician 2 weeks.   Follow up with [facility]    Infusion scheduling note cisplatin pemetrexed   Injection scheduling note    Labs CBC, CMP and magnesium   Schedulin day prior to infusion  Preferred lab:  Lab interval:     Imaging    Pharmacy appointment    Other referrals         Treatment Plan Information   OP NSCLC PEMETREXED + CISPLATIN Q3W   Marissa Brower MD   Upcoming Treatment Dates - OP NSCLC PEMETREXED + CISPLATIN Q3W    2023       Chemotherapy       PEMEtrexed disodium (ALIMTA) 925 mg in sodium chloride 0.9% SolP 100 mL chemo infusion       CISplatin (Platinol) 75 mg/m2 = 138 mg in sodium chloride 0.9% 638 mL chemo infusion       Pre-Hydration       sodium chloride 0.9% bolus 1,000 mL 1,000 mL       Post-Hydration       sodium chloride 0.9% 1,000 mL with magnesium sulfate 1 g, potassium chloride 20 mEq infusion       Antiemetics       aprepitant (CINVANTI) injection 130 mg       palonosetron (ALOXI) 0.25 mg with Dexamethasone (DECADRON) 12 mg in NS 50 mL IVPB  2023       Chemotherapy       PEMEtrexed disodium (ALIMTA) 925 mg in sodium chloride 0.9% SolP 100 mL chemo infusion       CISplatin (Platinol) 75 mg/m2 = 138 mg in sodium chloride 0.9% 638 mL chemo infusion       Pre-Hydration       sodium chloride 0.9% bolus 1,000 mL 1,000 mL       Post-Hydration       sodium chloride 0.9% 1,000 mL with magnesium sulfate 1 g, potassium chloride 20 mEq infusion       Antiemetics       aprepitant (CINVANTI) injection 130 mg       palonosetron (ALOXI) 0.25 mg with Dexamethasone (DECADRON) 12 mg in NS 50 mL IVPB  2023       Chemotherapy       PEMEtrexed disodium (ALIMTA) 925 mg in sodium chloride  0.9% SolP 100 mL chemo infusion       CISplatin (Platinol) 75 mg/m2 = 138 mg in sodium chloride 0.9% 638 mL chemo infusion       Supportive Care       cyanocobalamin injection 1,000 mcg       Pre-Hydration       sodium chloride 0.9% bolus 1,000 mL 1,000 mL       Post-Hydration       sodium chloride 0.9% 1,000 mL with magnesium sulfate 1 g, potassium chloride 20 mEq infusion       Antiemetics       aprepitant (CINVANTI) injection 130 mg       palonosetron (ALOXI) 0.25 mg with Dexamethasone (DECADRON) 12 mg in NS 50 mL IVPB

## 2023-05-31 ENCOUNTER — LAB VISIT (OUTPATIENT)
Dept: LAB | Facility: HOSPITAL | Age: 73
End: 2023-05-31
Attending: INTERNAL MEDICINE
Payer: MEDICARE

## 2023-05-31 ENCOUNTER — TELEPHONE (OUTPATIENT)
Dept: HEMATOLOGY/ONCOLOGY | Facility: CLINIC | Age: 73
End: 2023-05-31

## 2023-05-31 ENCOUNTER — TELEPHONE (OUTPATIENT)
Dept: HEMATOLOGY/ONCOLOGY | Facility: CLINIC | Age: 73
End: 2023-05-31
Payer: MEDICARE

## 2023-05-31 ENCOUNTER — OFFICE VISIT (OUTPATIENT)
Dept: HEMATOLOGY/ONCOLOGY | Facility: CLINIC | Age: 73
End: 2023-05-31
Payer: MEDICARE

## 2023-05-31 VITALS
TEMPERATURE: 98 F | SYSTOLIC BLOOD PRESSURE: 121 MMHG | HEIGHT: 65 IN | BODY MASS INDEX: 27.25 KG/M2 | WEIGHT: 163.56 LBS | OXYGEN SATURATION: 98 % | HEART RATE: 73 BPM | DIASTOLIC BLOOD PRESSURE: 77 MMHG

## 2023-05-31 DIAGNOSIS — T45.1X5A CHEMOTHERAPY INDUCED NEUTROPENIA: ICD-10-CM

## 2023-05-31 DIAGNOSIS — C34.90 MALIGNANT NEOPLASM OF UNSPECIFIED PART OF UNSPECIFIED BRONCHUS OR LUNG: Primary | ICD-10-CM

## 2023-05-31 DIAGNOSIS — C34.90 MALIGNANT NEOPLASM OF UNSPECIFIED PART OF UNSPECIFIED BRONCHUS OR LUNG: ICD-10-CM

## 2023-05-31 DIAGNOSIS — D70.1 CHEMOTHERAPY INDUCED NEUTROPENIA: ICD-10-CM

## 2023-05-31 LAB
ALBUMIN SERPL BCP-MCNC: 3.4 G/DL (ref 3.5–5.2)
ALP SERPL-CCNC: 85 U/L (ref 55–135)
ALT SERPL W/O P-5'-P-CCNC: 6 U/L (ref 10–44)
ANION GAP SERPL CALC-SCNC: 9 MMOL/L (ref 8–16)
AST SERPL-CCNC: 22 U/L (ref 10–40)
BASOPHILS NFR BLD: 0 % (ref 0–1.9)
BILIRUB SERPL-MCNC: 0.4 MG/DL (ref 0.1–1)
BUN SERPL-MCNC: 7 MG/DL (ref 8–23)
CALCIUM SERPL-MCNC: 9.2 MG/DL (ref 8.7–10.5)
CHLORIDE SERPL-SCNC: 107 MMOL/L (ref 95–110)
CO2 SERPL-SCNC: 27 MMOL/L (ref 23–29)
CREAT SERPL-MCNC: 0.8 MG/DL (ref 0.5–1.4)
DIFFERENTIAL METHOD: ABNORMAL
EOSINOPHIL NFR BLD: 8 % (ref 0–8)
ERYTHROCYTE [DISTWIDTH] IN BLOOD BY AUTOMATED COUNT: 14.5 % (ref 11.5–14.5)
EST. GFR  (NO RACE VARIABLE): >60 ML/MIN/1.73 M^2
GLUCOSE SERPL-MCNC: 121 MG/DL (ref 70–110)
HCT VFR BLD AUTO: 35.6 % (ref 37–48.5)
HGB BLD-MCNC: 11.2 G/DL (ref 12–16)
IMM GRANULOCYTES # BLD AUTO: ABNORMAL K/UL (ref 0–0.04)
IMM GRANULOCYTES NFR BLD AUTO: ABNORMAL % (ref 0–0.5)
LYMPHOCYTES NFR BLD: 65 % (ref 18–48)
MCH RBC QN AUTO: 28.4 PG (ref 27–31)
MCHC RBC AUTO-ENTMCNC: 31.5 G/DL (ref 32–36)
MCV RBC AUTO: 90 FL (ref 82–98)
METAMYELOCYTES NFR BLD MANUAL: 1 %
MONOCYTES NFR BLD: 8 % (ref 4–15)
NEUTROPHILS NFR BLD: 18 % (ref 38–73)
NRBC BLD-RTO: 0 /100 WBC
PLATELET # BLD AUTO: 229 K/UL (ref 150–450)
PLATELET BLD QL SMEAR: ABNORMAL
PMV BLD AUTO: 9.3 FL (ref 9.2–12.9)
POIKILOCYTOSIS BLD QL SMEAR: SLIGHT
POLYCHROMASIA BLD QL SMEAR: ABNORMAL
POTASSIUM SERPL-SCNC: 3.8 MMOL/L (ref 3.5–5.1)
PROT SERPL-MCNC: 6.4 G/DL (ref 6–8.4)
RBC # BLD AUTO: 3.95 M/UL (ref 4–5.4)
SODIUM SERPL-SCNC: 143 MMOL/L (ref 136–145)
WBC # BLD AUTO: 1.56 K/UL (ref 3.9–12.7)

## 2023-05-31 PROCEDURE — 3288F PR FALLS RISK ASSESSMENT DOCUMENTED: ICD-10-PCS | Mod: CPTII,S$GLB,, | Performed by: INTERNAL MEDICINE

## 2023-05-31 PROCEDURE — 99215 OFFICE O/P EST HI 40 MIN: CPT | Mod: S$GLB,,, | Performed by: INTERNAL MEDICINE

## 2023-05-31 PROCEDURE — 1159F MED LIST DOCD IN RCRD: CPT | Mod: CPTII,S$GLB,, | Performed by: INTERNAL MEDICINE

## 2023-05-31 PROCEDURE — 3074F PR MOST RECENT SYSTOLIC BLOOD PRESSURE < 130 MM HG: ICD-10-PCS | Mod: CPTII,S$GLB,, | Performed by: INTERNAL MEDICINE

## 2023-05-31 PROCEDURE — 1126F PR PAIN SEVERITY QUANTIFIED, NO PAIN PRESENT: ICD-10-PCS | Mod: CPTII,S$GLB,, | Performed by: INTERNAL MEDICINE

## 2023-05-31 PROCEDURE — 1101F PR PT FALLS ASSESS DOC 0-1 FALLS W/OUT INJ PAST YR: ICD-10-PCS | Mod: CPTII,S$GLB,, | Performed by: INTERNAL MEDICINE

## 2023-05-31 PROCEDURE — 99999 PR PBB SHADOW E&M-EST. PATIENT-LVL III: CPT | Mod: PBBFAC,,, | Performed by: INTERNAL MEDICINE

## 2023-05-31 PROCEDURE — 3074F SYST BP LT 130 MM HG: CPT | Mod: CPTII,S$GLB,, | Performed by: INTERNAL MEDICINE

## 2023-05-31 PROCEDURE — 1126F AMNT PAIN NOTED NONE PRSNT: CPT | Mod: CPTII,S$GLB,, | Performed by: INTERNAL MEDICINE

## 2023-05-31 PROCEDURE — 1159F PR MEDICATION LIST DOCUMENTED IN MEDICAL RECORD: ICD-10-PCS | Mod: CPTII,S$GLB,, | Performed by: INTERNAL MEDICINE

## 2023-05-31 PROCEDURE — 3078F PR MOST RECENT DIASTOLIC BLOOD PRESSURE < 80 MM HG: ICD-10-PCS | Mod: CPTII,S$GLB,, | Performed by: INTERNAL MEDICINE

## 2023-05-31 PROCEDURE — 85007 BL SMEAR W/DIFF WBC COUNT: CPT | Performed by: INTERNAL MEDICINE

## 2023-05-31 PROCEDURE — 80053 COMPREHEN METABOLIC PANEL: CPT | Performed by: INTERNAL MEDICINE

## 2023-05-31 PROCEDURE — 85027 COMPLETE CBC AUTOMATED: CPT | Performed by: INTERNAL MEDICINE

## 2023-05-31 PROCEDURE — 36415 COLL VENOUS BLD VENIPUNCTURE: CPT | Performed by: INTERNAL MEDICINE

## 2023-05-31 PROCEDURE — 99999 PR PBB SHADOW E&M-EST. PATIENT-LVL III: ICD-10-PCS | Mod: PBBFAC,,, | Performed by: INTERNAL MEDICINE

## 2023-05-31 PROCEDURE — 3288F FALL RISK ASSESSMENT DOCD: CPT | Mod: CPTII,S$GLB,, | Performed by: INTERNAL MEDICINE

## 2023-05-31 PROCEDURE — 3078F DIAST BP <80 MM HG: CPT | Mod: CPTII,S$GLB,, | Performed by: INTERNAL MEDICINE

## 2023-05-31 PROCEDURE — 99215 PR OFFICE/OUTPT VISIT, EST, LEVL V, 40-54 MIN: ICD-10-PCS | Mod: S$GLB,,, | Performed by: INTERNAL MEDICINE

## 2023-05-31 PROCEDURE — 1101F PT FALLS ASSESS-DOCD LE1/YR: CPT | Mod: CPTII,S$GLB,, | Performed by: INTERNAL MEDICINE

## 2023-05-31 NOTE — PROGRESS NOTES
Subjective:      DATE OF VISIT: 5/31/23     ?  Patient ID:?Radha Lamas is a 73 y.o. female.?? MR#: [id number]     PRIMARY ONCOLOGIST: Dr. Brower    ? Primary Care Providers:  Ab Fletcher MD, MD (General)     CHIEF COMPLAINT: ?? follow-up on adjuvant chemotherapy  ?   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   CURRENT TREATMENT:  adjuvant cisplatin and pemetrexed q. 3 weeks followed by atezolizumab    PAST TREATMENT:  Wedge resection  ?   ONCOLOGIC HISTORY:   ?   Oncology History   Malignant neoplasm of lower lobe of left lung - Squamous cell   4/15/2021 Initial Diagnosis    Malignant neoplasm of lower lobe of left lung - Squamous cell     5/25/2021 Cancer Staged    Staging form: Lung, AJCC 8th Edition  - Clinical: Stage IA1 (cT1a, cN0, cM0)      Cancer Staged    9mm non-keratinizing squamous cell carcinoma, no VPI, no LVI, margin at least 8mm.  Levels 9 and 11 = negative.  T1aN0     4/5/2023 Cancer Staged    Staging form: Lung, AJCC 8th Edition  - Pathologic stage from 4/5/2023: Stage IIB (pT1a, pN1, cM0)     5/1/2023 -  Chemotherapy    Treatment Summary   Plan Name: OP NSCLC PEMETREXED + CISPLATIN Q3W  Treatment Goal: Supportive  Status: Active  Start Date: 5/1/2023  End Date: 7/15/2023 (Planned)  Provider: Marissa Brower MD  Chemotherapy: CISplatin (Platinol) 75 mg/m2 = 138 mg in sodium chloride 0.9% 665 mL chemo infusion, 75 mg/m2 = 138 mg, Intravenous, Clinic/HOD 1 time, 1 of 4 cycles  Administration: 138 mg (5/12/2023)  PEMEtrexed disodium (ALIMTA) 900 mg in sodium chloride 0.9% SolP 100 mL chemo infusion, 925 mg, Intravenous, Clinic/HOD 1 time, 1 of 4 cycles  Administration: 900 mg (5/12/2023)     7/20/2023 -  Chemotherapy    Treatment Summary   Plan Name: OP ATEZOLIZUMAB Q3W  Treatment Goal:  Supportive  Status: Future  Start Date: 7/20/2023 (Planned)  End Date: 6/20/2024 (Planned)  Provider: Marissa Brower MD  Chemotherapy: [No matching medication found in this treatment plan]     NSCLC of left lung (Resolved)   4/28/2021 Initial Diagnosis    NSCLC of left lung (Resolved)      Cancer Staged    9mm non-keratinizing squamous cell carcinoma, no VPI, no LVI, margin at least 8mm.  Levels 9 and 11 = negative.  T1aN0        Cancer Staging   Malignant neoplasm of lower lobe of left lung - Squamous cell  - Pathologic stage from 4/5/2023: Stage IIB (pT1a, pN1, cM0) - Signed by Refugio Medley MD on 4/5/2023         HPI      She is been doing very well over the last couple weeks improved energy.  Good appetite.  No GI symptoms.  No visual change or neurologic symptoms.      Review of Systems    ?   A comprehensive 14-point review of systems was reviewed with patient and was negative other than as specified above.   ?   PAST MEDICAL HISTORY:   Past Medical History:   Diagnosis Date    COPD (chronic obstructive pulmonary disease) 2013    Eczema     GERD (gastroesophageal reflux disease)     Hiatal hernia     History of torn meniscus of right knee 01/2011    Hypothyroid     Incidental pulmonary nodule, > 3mm and < 8mm - Lingula 8/12/2021    Lung cancer     Urinary incontinence in female     Mild , only takes mediciane with travel    ?     PAST SURGICAL HISTORY:   Past Surgical History:   Procedure Laterality Date    FLUOROSCOPY N/A 4/27/2023    Procedure: Fluoroscopy/Mediport placement;  Surgeon: Kodak Retana MD;  Location: [facility] CATH LAB;  Service: General;  Laterality: N/A;    INJECTION OF ANESTHETIC AGENT AROUND MULTIPLE INTERCOSTAL NERVES Left 3/20/2023    Procedure: BLOCK, NERVE, INTERCOSTAL, 2 OR MORE;  Surgeon: Refugio Medley MD;  Location: [facility] OR [address] [place] [address];  Service: Thoracic;  Laterality: Left;    KNEE CARTILAGE SURGERY Right 01/2011    LAPAROSCOPIC LYSIS OF ADHESIONS Left  3/20/2023    Procedure: LYSIS, ADHESIONS, LAPAROSCOPIC;  Surgeon: Refugio Medley MD;  Location: [facility] OR 2ND FLR;  Service: Thoracic;  Laterality: Left;    ROBOT-ASSISTED LAPAROSCOPIC LYMPHADENECTOMY USING DA ROSEMARY XI Left 3/20/2023    Procedure: XI ROBOTIC LYMPHADENECTOMY;  Surgeon: Refugio Medley MD;  Location: [facility] OR 2ND FLR;  Service: Thoracic;  Laterality: Left;    THORACOSCOPIC WEDGE RESECTION OF LUNG Left 04/28/2021    Procedure: VATS, WITH WEDGE RESECTION, LUNG;  Surgeon: Clarke Sauceda MD;  Location: [facility] OR 2ND FLR;  Service: Thoracic;  Laterality: Left;  lymph node dissection     TUBAL LIGATION  1976    WEDGE RESECTION, LUNG, ROBOT-ASSISTED, USING VATS, USING DA ROSEMARY XI Left 3/20/2023    Procedure: XI ROBOTIC WEDGE RESECTION, LUNG (RATS); segmentectomy;  Surgeon: Refugio Medley MD;  Location: [facility] OR 2ND FLR;  Service: Thoracic;  Laterality: Left;      ?   ALLERGIES:   Allergies as of 05/31/2023    (No Known Allergies)      ?   MEDICATIONS:?   Outpatient Medications Marked as Taking for the 5/31/23 encounter (Office Visit) with Marissa Brower MD   Medication Sig Dispense Refill    albuterol (PROAIR HFA) 90 mcg/actuation inhaler Inhale 2 puffs into the lungs every 4 (four) hours as needed for Wheezing or Shortness of Breath. Rescue 54 g 4    calcium carbonate (OS-CYDNEY) 600 mg calcium (1,500 mg) Tab Take 600 mg by mouth.      dexAMETHasone (DECADRON) 4 MG Tab Take 2 tablets (8 mg total) by mouth once daily. on the day prior to chemotherapy then daily on days 2,3,4 of each chemotherapy cycle. 24 tablet 3    diphenhydrAMINE-aluminum-magnesium hydroxide-simethicone-LIDOcaine HCl 2% Swish and spit 15 mLs every 4 (four) hours as needed. 540 each 2    DUPIXENT  mg/2 mL PnIj Inject into the skin every 14 (fourteen) days.      folic acid (FOLVITE) 1 MG tablet Take 1 tablet (1 mg total) by mouth once daily. starting 1 week prior to pemetrexed and continuing for 21 days after  stopping pemetrexed 30 tablet 5    gabapentin (NEURONTIN) 300 MG capsule Take 1 capsule (300 mg total) by mouth every evening. 30 capsule 11    levothyroxine (SYNTHROID) 75 MCG tablet Take 75 mcg by mouth once daily.      loratadine (CLARITIN) 10 mg tablet Take 10 mg by mouth once daily.      OLANZapine (ZYPREXA) 5 MG tablet Take 1 tablet (5 mg total) by mouth every evening. Take as directed on days 1-4 of your chemotherapy cycle. 16 tablet 0    omeprazole (PRILOSEC) 20 MG capsule Take 20 mg by mouth once daily.      ondansetron (ZOFRAN-ODT) 8 MG TbDL Take 1 tablet (8 mg total) by mouth every 8 (eight) hours as needed (nausea). 60 tablet 5    oxyCODONE (ROXICODONE) 5 MG immediate release tablet Take 1 tablet (5 mg total) by mouth every 4 (four) hours as needed for Pain. 41 tablet 0    umeclidinium-vilanteroL (ANORO ELLIPTA) 62.5-25 mcg/actuation DsDv USE 1 INHALATION DAILY (CONTROLLER) 180 each 3      ?   SOCIAL HISTORY:?   Social History     Tobacco Use    Smoking status: Former     Packs/day: 1.50     Years: 45.00     Pack years: 67.50     Types: Cigarettes     Start date:      Quit date:      Years since quittin.4    Smokeless tobacco: Never   Substance Use Topics    Alcohol use: No     Alcohol/week: 0.0 standard drinks      ?      ?   FAMILY HISTORY:   family history includes Cancer in her brother, father, and sister; Heart failure in her mother; Hypertension in her father and mother.   ?        Objective:      Physical Exam      ?   Vitals:    23 0823   BP: 121/77   Pulse: 73   Temp: 98.3 °F (36.8 °C)      ?   ECOG:?0   General appearance: Generally well appearing, in no acute distress.   Head, eyes, ears, nose, and throat: moist mucous membranes.   Respiratory:  Normal work of breathing  Abdomen: nontender, nondistended.   Extremities: Warm, without edema.   Neurologic: Alert and oriented. Grossly normal strength, coordination, and gait.   Skin: No rashes, ecchymoses or petechial  lesion.   Psychiatric:  Normal mood and affect.    ?   Laboratory:  ?   Lab Visit on 05/31/2023   Component Date Value Ref Range Status    WBC 05/31/2023 1.56 (LL)  3.90 - 12.70 K/uL Final    RBC 05/31/2023 3.95 (L)  4.00 - 5.40 M/uL Final    Hemoglobin 05/31/2023 11.2 (L)  12.0 - 16.0 g/dL Final    Hematocrit 05/31/2023 35.6 (L)  37.0 - 48.5 % Final    MCV 05/31/2023 90  82 - 98 fL Final    MCH 05/31/2023 28.4  27.0 - 31.0 pg Final    MCHC 05/31/2023 31.5 (L)  32.0 - 36.0 g/dL Final    RDW 05/31/2023 14.5  11.5 - 14.5 % Final    Platelets 05/31/2023 229  150 - 450 K/uL Final    MPV 05/31/2023 9.3  9.2 - 12.9 fL Final    Immature Granulocytes 05/31/2023 CANCELED  0.0 - 0.5 % Final    Immature Grans (Abs) 05/31/2023 CANCELED  0.00 - 0.04 K/uL Final    nRBC 05/31/2023 0  0 /100 WBC Final    Gran % 05/31/2023 18.0 (L)  38.0 - 73.0 % Final    Lymph % 05/31/2023 65.0 (H)  18.0 - 48.0 % Final    Mono % 05/31/2023 8.0  4.0 - 15.0 % Final    Eosinophil % 05/31/2023 8.0  0.0 - 8.0 % Final    Basophil % 05/31/2023 0.0  0.0 - 1.9 % Final    Metamyelocytes 05/31/2023 1.0  % Final    Platelet Estimate 05/31/2023 Appears normal   Final    Poik 05/31/2023 Slight   Final    Poly 05/31/2023 Occasional   Final    Differential Method 05/31/2023 Manual   Final    Sodium 05/31/2023 143  136 - 145 mmol/L Final    Potassium 05/31/2023 3.8  3.5 - 5.1 mmol/L Final    Chloride 05/31/2023 107  95 - 110 mmol/L Final    CO2 05/31/2023 27  23 - 29 mmol/L Final    Glucose 05/31/2023 121 (H)  70 - 110 mg/dL Final    BUN 05/31/2023 7 (L)  8 - 23 mg/dL Final    Creatinine 05/31/2023 0.8  0.5 - 1.4 mg/dL Final    Calcium 05/31/2023 9.2  8.7 - 10.5 mg/dL Final    Total Protein 05/31/2023 6.4  6.0 - 8.4 g/dL Final    Albumin 05/31/2023 3.4 (L)  3.5 - 5.2 g/dL Final    Total Bilirubin 05/31/2023 0.4  0.1 - 1.0 mg/dL Final    Alkaline Phosphatase 05/31/2023 85  55 - 135 U/L Final    AST 05/31/2023 22  10 - 40 U/L Final     ALT 05/31/2023 6 (L)  10 - 44 U/L Final    Anion Gap 05/31/2023 9  8 - 16 mmol/L Final    eGFR 05/31/2023 >60  >60 mL/min/1.73 m^2 Final      ?     ?   Imaging:  ?    No results found for this or any previous visit (from the past 2160 hour(s)).    Results for orders placed or performed during the hospital encounter of 05/03/23 (from the past 2160 hour(s))   MRI Brain W WO Contrast    Impression    No evidence of intracranial metastatic disease.  No acute findings or abnormal enhancement.      Electronically signed by: Humphrey Martin  Date:    05/03/2023  Time:    15:51     Results for orders placed or performed during the hospital encounter of 05/04/23 (from the past 2160 hour(s))   NM PET CT Routine FDG    Impression    Postoperative changes left lung with a very small loculated hydropneumothorax in the upper left hemithorax (mainly fluid with minimal air).    -Small approximate 1 cm hypermetabolic node at the left hilum adjacent to staple line.  Subcentimeter minimally hypermetabolic nearby AP window lymph node, increased from prior.  These could be inflammatory in nature though close follow-up is advised particularly for the left hilar hypermetabolic focus.    The examination elsewhere is unremarkable with no additional foci of abnormal uptake.      Electronically signed by: Onle Valdez MD  Date:    05/04/2023  Time:    14:29         Pathology:    Reviewed in epic     ?   Assessment/Plan:   Malignant neoplasm of unspecified part of unspecified bronchus or lung    Chemotherapy induced neutropenia           1. Malignant neoplasm of unspecified part of unspecified bronchus or lung    2. 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 Initial biopsy February 20, 2023 without neoplasm identified who after multiple disciplinary discussion proceeded with left lower lobe wedge resection, final pathology positive for 03/28/2022 for invasive moderately differentiated adenocarcinoma station 10 lymph node positive, pleural invasion noted, margins negative.    Recommended staging with MRI brain and PET now 1 month out from surgery MRI brain negative PET scan with avidity left hilar SUV 4 and chest wall.  Given recent surgery potential inflammation presented at tumor board recommended proceeding with adjuvant chemotherapy and follow-up on repeat imaging.  We discussed indication for adjuvant therapy including chemotherapy and immunotherapy per IMPOWER 010 showed benefit to adjuvant atezolizumab following chemotherapy given PDL1 positive disease 95%.  Mutational analysis negative for EGFR mutation.  Will reschedule if appropriate cisplatin and pemetrexed with adjuvant supplemental folic acid and vitamin B12.      Last week 1st cycle adjuvant chemotherapy tolerated well aside from brief episode black spots and 1 self isolated recurrence while at home without other neurologic symptoms.  Constipation well managed.  No notable nausea.  Labs reviewed without notable abnormality.    Has recovered well tolerating remainder of cycle well plan for cycle 2.  Chemotherapy-induced neutropenia .  Recommend addition of GMCSF and pemetrexed 75% dose reduction moving forward.  Will re-evaluate in 1 week with repeat labs.      Advance Care Planning   Stage II malignancy asymptomatic will defer palliative care consultation at this time.       Follow-Up:   Route Chart for Scheduling    Med Onc Chart Routing      Follow up with physician 1 week.   Follow up with  KYLA[facility]    Infusion scheduling note Cisplatin and pemetrexed dose reduced with GMCSF and fluids on day 2   Injection scheduling note    Labs CBC and CMP   Schedulin day prior to infusion  Preferred lab:  Lab interval:     Imaging    Pharmacy appointment    Other referrals       Treatment Plan Information   OP NSCLC PEMETREXED + CISPLATIN Q3W   Marissa Brower MD   Upcoming Treatment Dates - OP NSCLC PEMETREXED + CISPLATIN Q3W    2023       Chemotherapy       PEMEtrexed disodium (ALIMTA) 700 mg in sodium chloride 0.9% SolP 100 mL chemo infusion       CISplatin (Platinol) 75 mg/m2 = 138 mg in sodium chloride 0.9% 638 mL chemo infusion       Pre-Hydration       sodium chloride 0.9% bolus 1,000 mL 1,000 mL       Post-Hydration       sodium chloride 0.9% 1,000 mL with magnesium sulfate 1 g, potassium chloride 20 mEq infusion       Antiemetics       aprepitant (CINVANTI) injection 130 mg       palonosetron (ALOXI) 0.25 mg with Dexamethasone (DECADRON) 12 mg in NS 50 mL IVPB  6/3/2023       Growth Factor       pegfilgrastim-cbqv (UDENYCA) injection 6 mg  2023       Chemotherapy       PEMEtrexed disodium (ALIMTA) 700 mg in sodium chloride 0.9% SolP 100 mL chemo infusion       CISplatin (Platinol) 75 mg/m2 = 138 mg in sodium chloride 0.9% 638 mL chemo infusion       Pre-Hydration       sodium chloride 0.9% bolus 1,000 mL 1,000 mL       Post-Hydration       sodium chloride 0.9% 1,000 mL with magnesium sulfate 1 g, potassium chloride 20 mEq infusion       Antiemetics       aprepitant (CINVANTI) injection 130 mg       palonosetron (ALOXI) 0.25 mg with Dexamethasone (DECADRON) 12 mg in NS 50 mL IVPB  2023       Growth Factor       pegfilgrastim-cbqv (UDENYCA) injection 6 mg

## 2023-05-31 NOTE — NURSING
1413pm: Incoming call from pt regarding chemo appts. Spoke with pt. Pt stated chemo appts are incorrect. Pt stated Dr. Brower cancelled pt chemo for tomorrow due to counts and pt is supposed to come back in 1 week. Pt didn't have lab nor D2 fluids scheduled. ANANT Obrien, infusion  notified via phone to fix pt appts. Camille verbalized she would fix appts. Pt verbalized understanding that appts would be corrected by .   Oncology Navigation   Intake  Internal / External Referral: Internal (Dr. Refugio Medley)  Initial Nurse Navigator Contact: 23  Date Worked: 23  Multiple appointments: No  Start of Treatment: 23     Treatment  Current Status: Active  Date Presented to Tumor Board: 23  Presentation Notes: Discuss left VATS anatomic resection for growing NANI nodule    Surgery: Planned  Surgical Oncologist: Jasmyne  Consult Date: 03/15/23    Medical Oncologist: Dr. Marissa Brower  Consult Date: 23  Chemotherapy: Planned  Chemotherapy Regimen: Alimta Cisplatin  Immunotherapy: Planned  Immunotherapy Name: Tecentriq       Procedures: Port / PICC  MRI Schedule Date: 23 (brain w wo contrast)  PET Scan Schedule Date: 23  Port / PICC Schedule Date: 23  Other Schedule Date: 23 (audiogram)    General Referrals: Social Work  Social Work: notified via in-basket msg  Social Work Referral Date: 23          Support Systems: Spouse/significant other  Barriers of Care: Transportation  Transportation Barriers: Patient lives in [facility]  Stage: 1  Systemic Treatment - predicted or initiated: Chemotherapy Regimen with Multiple drugs (+1)  Treatment Tolerability: Has not started treatment yet/treatment fully completed and side effects resolved  ECO  Comorbidities in Medical History: 2  Hospitalization Within the Past Month: 0   Needed: 0  Support: 0  Transportation: 0  History of noncompliance/frequent no shows and cancellations:  0  Verbalizes the need for more education: 1  Navigation Acuity: 4     Follow Up  No follow-ups on file.

## 2023-06-08 ENCOUNTER — OFFICE VISIT (OUTPATIENT)
Dept: HEMATOLOGY/ONCOLOGY | Facility: CLINIC | Age: 73
End: 2023-06-08
Payer: MEDICARE

## 2023-06-08 ENCOUNTER — INFUSION (OUTPATIENT)
Dept: INFUSION THERAPY | Facility: HOSPITAL | Age: 73
End: 2023-06-08
Attending: INTERNAL MEDICINE
Payer: MEDICARE

## 2023-06-08 ENCOUNTER — TELEPHONE (OUTPATIENT)
Dept: HEMATOLOGY/ONCOLOGY | Facility: CLINIC | Age: 73
End: 2023-06-08

## 2023-06-08 VITALS
DIASTOLIC BLOOD PRESSURE: 71 MMHG | SYSTOLIC BLOOD PRESSURE: 133 MMHG | OXYGEN SATURATION: 97 % | HEART RATE: 70 BPM | RESPIRATION RATE: 16 BRPM | TEMPERATURE: 97 F

## 2023-06-08 VITALS
WEIGHT: 161.63 LBS | HEART RATE: 77 BPM | HEIGHT: 65 IN | BODY MASS INDEX: 26.93 KG/M2 | OXYGEN SATURATION: 97 % | DIASTOLIC BLOOD PRESSURE: 63 MMHG | SYSTOLIC BLOOD PRESSURE: 115 MMHG | TEMPERATURE: 97 F

## 2023-06-08 DIAGNOSIS — D70.1 CHEMOTHERAPY INDUCED NEUTROPENIA: ICD-10-CM

## 2023-06-08 DIAGNOSIS — C34.32 MALIGNANT NEOPLASM OF LOWER LOBE OF LEFT LUNG: Primary | ICD-10-CM

## 2023-06-08 DIAGNOSIS — C34.90 MALIGNANT NEOPLASM OF UNSPECIFIED PART OF UNSPECIFIED BRONCHUS OR LUNG: Primary | ICD-10-CM

## 2023-06-08 DIAGNOSIS — T45.1X5A CHEMOTHERAPY INDUCED NEUTROPENIA: ICD-10-CM

## 2023-06-08 PROCEDURE — 99215 OFFICE O/P EST HI 40 MIN: CPT | Mod: S$GLB,,, | Performed by: INTERNAL MEDICINE

## 2023-06-08 PROCEDURE — 99999 PR PBB SHADOW E&M-EST. PATIENT-LVL III: CPT | Mod: PBBFAC,,, | Performed by: INTERNAL MEDICINE

## 2023-06-08 PROCEDURE — 1126F PR PAIN SEVERITY QUANTIFIED, NO PAIN PRESENT: ICD-10-PCS | Mod: CPTII,S$GLB,, | Performed by: INTERNAL MEDICINE

## 2023-06-08 PROCEDURE — 63600175 PHARM REV CODE 636 W HCPCS: Mod: JZ,JG | Performed by: INTERNAL MEDICINE

## 2023-06-08 PROCEDURE — 96411 CHEMO IV PUSH ADDL DRUG: CPT

## 2023-06-08 PROCEDURE — 3074F PR MOST RECENT SYSTOLIC BLOOD PRESSURE < 130 MM HG: ICD-10-PCS | Mod: CPTII,S$GLB,, | Performed by: INTERNAL MEDICINE

## 2023-06-08 PROCEDURE — 3074F SYST BP LT 130 MM HG: CPT | Mod: CPTII,S$GLB,, | Performed by: INTERNAL MEDICINE

## 2023-06-08 PROCEDURE — 3078F PR MOST RECENT DIASTOLIC BLOOD PRESSURE < 80 MM HG: ICD-10-PCS | Mod: CPTII,S$GLB,, | Performed by: INTERNAL MEDICINE

## 2023-06-08 PROCEDURE — 96413 CHEMO IV INFUSION 1 HR: CPT

## 2023-06-08 PROCEDURE — 96366 THER/PROPH/DIAG IV INF ADDON: CPT

## 2023-06-08 PROCEDURE — 1159F MED LIST DOCD IN RCRD: CPT | Mod: CPTII,S$GLB,, | Performed by: INTERNAL MEDICINE

## 2023-06-08 PROCEDURE — 3078F DIAST BP <80 MM HG: CPT | Mod: CPTII,S$GLB,, | Performed by: INTERNAL MEDICINE

## 2023-06-08 PROCEDURE — 96361 HYDRATE IV INFUSION ADD-ON: CPT

## 2023-06-08 PROCEDURE — 99215 PR OFFICE/OUTPT VISIT, EST, LEVL V, 40-54 MIN: ICD-10-PCS | Mod: S$GLB,,, | Performed by: INTERNAL MEDICINE

## 2023-06-08 PROCEDURE — 25000003 PHARM REV CODE 250: Performed by: INTERNAL MEDICINE

## 2023-06-08 PROCEDURE — 99999 PR PBB SHADOW E&M-EST. PATIENT-LVL III: ICD-10-PCS | Mod: PBBFAC,,, | Performed by: INTERNAL MEDICINE

## 2023-06-08 PROCEDURE — 96367 TX/PROPH/DG ADDL SEQ IV INF: CPT

## 2023-06-08 PROCEDURE — 1159F PR MEDICATION LIST DOCUMENTED IN MEDICAL RECORD: ICD-10-PCS | Mod: CPTII,S$GLB,, | Performed by: INTERNAL MEDICINE

## 2023-06-08 PROCEDURE — 96375 TX/PRO/DX INJ NEW DRUG ADDON: CPT

## 2023-06-08 PROCEDURE — 1126F AMNT PAIN NOTED NONE PRSNT: CPT | Mod: CPTII,S$GLB,, | Performed by: INTERNAL MEDICINE

## 2023-06-08 RX ORDER — SODIUM CHLORIDE 0.9 % (FLUSH) 0.9 %
10 SYRINGE (ML) INJECTION
Status: CANCELLED | OUTPATIENT
Start: 2023-06-09

## 2023-06-08 RX ORDER — SODIUM CHLORIDE 0.9 % (FLUSH) 0.9 %
10 SYRINGE (ML) INJECTION
Status: CANCELLED | OUTPATIENT
Start: 2023-06-08

## 2023-06-08 RX ORDER — HEPARIN 100 UNIT/ML
500 SYRINGE INTRAVENOUS
Status: CANCELLED | OUTPATIENT
Start: 2023-06-09

## 2023-06-08 RX ORDER — HEPARIN 100 UNIT/ML
500 SYRINGE INTRAVENOUS
Status: CANCELLED | OUTPATIENT
Start: 2023-06-08

## 2023-06-08 RX ORDER — SODIUM CHLORIDE 0.9 % (FLUSH) 0.9 %
10 SYRINGE (ML) INJECTION
Status: DISCONTINUED | OUTPATIENT
Start: 2023-06-08 | End: 2023-06-08 | Stop reason: HOSPADM

## 2023-06-08 RX ORDER — HEPARIN 100 UNIT/ML
500 SYRINGE INTRAVENOUS
Status: DISCONTINUED | OUTPATIENT
Start: 2023-06-08 | End: 2023-06-08 | Stop reason: HOSPADM

## 2023-06-08 RX ADMIN — HEPARIN 500 UNITS: 100 SYRINGE at 02:06

## 2023-06-08 RX ADMIN — MAGNESIUM SULFATE HEPTAHYDRATE 506 ML/HR: 500 INJECTION, SOLUTION INTRAMUSCULAR; INTRAVENOUS at 12:06

## 2023-06-08 RX ADMIN — SODIUM CHLORIDE 1000 ML: 9 INJECTION, SOLUTION INTRAVENOUS at 08:06

## 2023-06-08 RX ADMIN — APREPITANT 130 MG: 130 INJECTION, EMULSION INTRAVENOUS at 09:06

## 2023-06-08 RX ADMIN — SODIUM CHLORIDE: 9 INJECTION, SOLUTION INTRAVENOUS at 09:06

## 2023-06-08 RX ADMIN — DEXAMETHASONE SODIUM PHOSPHATE 0.25 MG: 4 INJECTION, SOLUTION INTRA-ARTICULAR; INTRALESIONAL; INTRAMUSCULAR; INTRAVENOUS; SOFT TISSUE at 09:06

## 2023-06-08 RX ADMIN — CISPLATIN 138 MG: 1 INJECTION INTRAVENOUS at 11:06

## 2023-06-08 RX ADMIN — SODIUM CHLORIDE 700 MG: 9 INJECTION, SOLUTION INTRAVENOUS at 10:06

## 2023-06-08 NOTE — PROGRESS NOTES
Subjective:      DATE OF VISIT: 6/8/23     ?  Patient ID:?Radha Lamas is a 73 y.o. female.?? MR#: [id number]     PRIMARY ONCOLOGIST: Dr. Brower    ? Primary Care Providers:  Ab Fletcher MD, MD (General)     CHIEF COMPLAINT: ?? follow-up on adjuvant chemotherapy  ?   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   CURRENT TREATMENT:  adjuvant cisplatin and pemetrexed q. 3 weeks followed by atezolizumab    PAST TREATMENT:  Wedge resection  ?   ONCOLOGIC HISTORY:   ?   Oncology History   Malignant neoplasm of lower lobe of left lung - Squamous cell   4/15/2021 Initial Diagnosis    Malignant neoplasm of lower lobe of left lung - Squamous cell     5/25/2021 Cancer Staged    Staging form: Lung, AJCC 8th Edition  - Clinical: Stage IA1 (cT1a, cN0, cM0)      Cancer Staged    9mm non-keratinizing squamous cell carcinoma, no VPI, no LVI, margin at least 8mm.  Levels 9 and 11 = negative.  T1aN0     4/5/2023 Cancer Staged    Staging form: Lung, AJCC 8th Edition  - Pathologic stage from 4/5/2023: Stage IIB (pT1a, pN1, cM0)     5/1/2023 -  Chemotherapy    Treatment Summary   Plan Name: OP NSCLC PEMETREXED + CISPLATIN Q3W  Treatment Goal: Supportive  Status: Active  Start Date: 5/1/2023  End Date: 7/21/2023 (Planned)  Provider: Marissa Brower MD  Chemotherapy: CISplatin (Platinol) 75 mg/m2 = 138 mg in sodium chloride 0.9% 665 mL chemo infusion, 75 mg/m2 = 138 mg, Intravenous, Clinic/HOD 1 time, 2 of 4 cycles  Administration: 138 mg (5/12/2023)  PEMEtrexed disodium (ALIMTA) 900 mg in sodium chloride 0.9% SolP 100 mL chemo infusion, 925 mg, Intravenous, Clinic/HOD 1 time, 2 of 4 cycles  Dose modification: 375 mg/m2 (original dose 500 mg/m2, Cycle 3, Reason: MD Discretion, Comment: neutropenia )  Administration: 900 mg (5/12/2023)      7/20/2023 -  Chemotherapy    Treatment Summary   Plan Name: OP ATEZOLIZUMAB Q3W  Treatment Goal: Supportive  Status: Future  Start Date: 7/20/2023 (Planned)  End Date: 6/20/2024 (Planned)  Provider: Marissa Brower MD  Chemotherapy: [No matching medication found in this treatment plan]     NSCLC of left lung (Resolved)   4/28/2021 Initial Diagnosis    NSCLC of left lung (Resolved)      Cancer Staged    9mm non-keratinizing squamous cell carcinoma, no VPI, no LVI, margin at least 8mm.  Levels 9 and 11 = negative.  T1aN0        Cancer Staging   Malignant neoplasm of lower lobe of left lung - Squamous cell  - Pathologic stage from 4/5/2023: Stage IIB (pT1a, pN1, cM0) - Signed by Refugio Medley MD on 4/5/2023         HPI      She is been doing very well over the last week infectious precautions given neutropenia last week therapy held.  Good energy appetite no new visual changes or neurologic symptoms.      Review of Systems    ?   A comprehensive 14-point review of systems was reviewed with patient and was negative other than as specified above.   ?   PAST MEDICAL HISTORY:   Past Medical History:   Diagnosis Date    COPD (chronic obstructive pulmonary disease) 2013    Eczema     GERD (gastroesophageal reflux disease)     Hiatal hernia     History of torn meniscus of right knee 01/2011    Hypothyroid     Incidental pulmonary nodule, > 3mm and < 8mm - [facility] 8/12/2021    Lung cancer     Urinary incontinence in female     Mild , only takes mediciane with travel    ?     PAST SURGICAL HISTORY:   Past Surgical History:   Procedure Laterality Date    FLUOROSCOPY N/A 4/27/2023    Procedure: Fluoroscopy/Mediport placement;  Surgeon: Kodak Retana MD;  Location: [facility] CATH LAB;  Service: General;  Laterality: N/A;    INJECTION OF ANESTHETIC AGENT AROUND MULTIPLE INTERCOSTAL NERVES Left 3/20/2023    Procedure: BLOCK, NERVE, INTERCOSTAL, 2 OR MORE;  Surgeon: Refugio Medley MD;  Location: [facility] OR [address] [place] [address];   Service: Thoracic;  Laterality: Left;    KNEE CARTILAGE SURGERY Right 01/2011    LAPAROSCOPIC LYSIS OF ADHESIONS Left 3/20/2023    Procedure: LYSIS, ADHESIONS, LAPAROSCOPIC;  Surgeon: Refugio Medley MD;  Location: [facility] OR 2ND FLR;  Service: Thoracic;  Laterality: Left;    ROBOT-ASSISTED LAPAROSCOPIC LYMPHADENECTOMY USING DA ROSEMARY XI Left 3/20/2023    Procedure: XI ROBOTIC LYMPHADENECTOMY;  Surgeon: Refugio Medley MD;  Location: [facility] OR 2ND FLR;  Service: Thoracic;  Laterality: Left;    THORACOSCOPIC WEDGE RESECTION OF LUNG Left 04/28/2021    Procedure: VATS, WITH WEDGE RESECTION, LUNG;  Surgeon: Clarke Sauceda MD;  Location: [facility] OR 2ND FLR;  Service: Thoracic;  Laterality: Left;  lymph node dissection     TUBAL LIGATION  1976    WEDGE RESECTION, LUNG, ROBOT-ASSISTED, USING VATS, USING DA ROSEMARY XI Left 3/20/2023    Procedure: XI ROBOTIC WEDGE RESECTION, LUNG (RATS); segmentectomy;  Surgeon: Refugio Medley MD;  Location: [facility] OR 2ND FLR;  Service: Thoracic;  Laterality: Left;      ?   ALLERGIES:   Allergies as of 06/08/2023    (No Known Allergies)      ?   MEDICATIONS:?   Outpatient Medications Marked as Taking for the 6/8/23 encounter (Office Visit) with Marissa Brower MD   Medication Sig Dispense Refill    albuterol (PROAIR HFA) 90 mcg/actuation inhaler Inhale 2 puffs into the lungs every 4 (four) hours as needed for Wheezing or Shortness of Breath. Rescue 54 g 4    calcium carbonate (OS-CYDNEY) 600 mg calcium (1,500 mg) Tab Take 600 mg by mouth.      dexAMETHasone (DECADRON) 4 MG Tab Take 2 tablets (8 mg total) by mouth once daily. on the day prior to chemotherapy then daily on days 2,3,4 of each chemotherapy cycle. 24 tablet 3    diphenhydrAMINE-aluminum-magnesium hydroxide-simethicone-LIDOcaine HCl 2% Swish and spit 15 mLs every 4 (four) hours as needed. 540 each 2    DUPIXENT  mg/2 mL PnIj Inject into the skin every 14 (fourteen) days.      folic acid (FOLVITE) 1 MG tablet Take 1  tablet (1 mg total) by mouth once daily. starting 1 week prior to pemetrexed and continuing for 21 days after stopping pemetrexed 30 tablet 5    gabapentin (NEURONTIN) 300 MG capsule Take 1 capsule (300 mg total) by mouth every evening. 30 capsule 11    levothyroxine (SYNTHROID) 75 MCG tablet Take 75 mcg by mouth once daily.      loratadine (CLARITIN) 10 mg tablet Take 10 mg by mouth once daily.      OLANZapine (ZYPREXA) 5 MG tablet Take 1 tablet (5 mg total) by mouth every evening. Take as directed on days 1-4 of your chemotherapy cycle. 16 tablet 0    omeprazole (PRILOSEC) 20 MG capsule Take 20 mg by mouth once daily.      ondansetron (ZOFRAN-ODT) 8 MG TbDL Take 1 tablet (8 mg total) by mouth every 8 (eight) hours as needed (nausea). 60 tablet 5    oxyCODONE (ROXICODONE) 5 MG immediate release tablet Take 1 tablet (5 mg total) by mouth every 4 (four) hours as needed for Pain. 41 tablet 0    umeclidinium-vilanteroL (ANORO ELLIPTA) 62.5-25 mcg/actuation DsDv USE 1 INHALATION DAILY (CONTROLLER) 180 each 3      ?   SOCIAL HISTORY:?   Social History     Tobacco Use    Smoking status: Former     Packs/day: 1.50     Years: 45.00     Pack years: 67.50     Types: Cigarettes     Start date:      Quit date:      Years since quittin.4    Smokeless tobacco: Never   Substance Use Topics    Alcohol use: No     Alcohol/week: 0.0 standard drinks      ?      ?   FAMILY HISTORY:   family history includes Cancer in her brother, father, and sister; Heart failure in her mother; Hypertension in her father and mother.   ?        Objective:      Physical Exam      ?   Vitals:    23 0735   BP: 115/63   Pulse: 77   Temp: 97.1 °F (36.2 °C)        ?   ECOG:?0   General appearance: Generally well appearing, in no acute distress.   Head, eyes, ears, nose, and throat: moist mucous membranes.   Respiratory:  Normal work of breathing  Abdomen: nontender, nondistended.   Extremities: Warm, without edema.   Neurologic: Alert and  oriented. Grossly normal strength, coordination, and gait.   Skin: No rashes, ecchymoses or petechial lesion.   Psychiatric:  Normal mood and affect.    ?   Laboratory:  ?   Lab Visit on 06/08/2023   Component Date Value Ref Range Status    WBC 06/08/2023 13.12 (H)  3.90 - 12.70 K/uL Final    RBC 06/08/2023 4.06  4.00 - 5.40 M/uL Final    Hemoglobin 06/08/2023 11.4 (L)  12.0 - 16.0 g/dL Final    Hematocrit 06/08/2023 36.4 (L)  37.0 - 48.5 % Final    MCV 06/08/2023 90  82 - 98 fL Final    MCH 06/08/2023 28.1  27.0 - 31.0 pg Final    MCHC 06/08/2023 31.3 (L)  32.0 - 36.0 g/dL Final    RDW 06/08/2023 15.6 (H)  11.5 - 14.5 % Final    Platelets 06/08/2023 230  150 - 450 K/uL Final    MPV 06/08/2023 10.0  9.2 - 12.9 fL Final    Immature Granulocytes 06/08/2023 CANCELED  0.0 - 0.5 % Final    Immature Grans (Abs) 06/08/2023 CANCELED  0.00 - 0.04 K/uL Final    nRBC 06/08/2023 0  0 /100 WBC Final    Gran % 06/08/2023 77.0 (H)  38.0 - 73.0 % Final    Lymph % 06/08/2023 12.0 (L)  18.0 - 48.0 % Final    Mono % 06/08/2023 8.0  4.0 - 15.0 % Final    Eosinophil % 06/08/2023 0.0  0.0 - 8.0 % Final    Basophil % 06/08/2023 0.0  0.0 - 1.9 % Final    Bands 06/08/2023 2.0  % Final    Metamyelocytes 06/08/2023 1.0  % Final    Platelet Estimate 06/08/2023 Appears normal   Final    Large/Giant Platelets 06/08/2023 Present   Final    Differential Method 06/08/2023 Manual   Final    Sodium 06/08/2023 141  136 - 145 mmol/L Final    Potassium 06/08/2023 4.0  3.5 - 5.1 mmol/L Final    Chloride 06/08/2023 106  95 - 110 mmol/L Final    CO2 06/08/2023 24  23 - 29 mmol/L Final    Glucose 06/08/2023 155 (H)  70 - 110 mg/dL Final    BUN 06/08/2023 14  8 - 23 mg/dL Final    Creatinine 06/08/2023 0.9  0.5 - 1.4 mg/dL Final    Calcium 06/08/2023 9.9  8.7 - 10.5 mg/dL Final    Total Protein 06/08/2023 6.9  6.0 - 8.4 g/dL Final    Albumin 06/08/2023 3.8  3.5 - 5.2 g/dL Final    Total Bilirubin 06/08/2023 0.3  0.1 - 1.0 mg/dL Final    Alkaline Phosphatase  06/08/2023 92  55 - 135 U/L Final    AST 06/08/2023 21  10 - 40 U/L Final    ALT 06/08/2023 5 (L)  10 - 44 U/L Final    Anion Gap 06/08/2023 11  8 - 16 mmol/L Final    eGFR 06/08/2023 >60  >60 mL/min/1.73 m^2 Final      ?     ?   Imaging:  ?    No results found for this or any previous visit (from the past 2160 hour(s)).    Results for orders placed or performed during the hospital encounter of 05/03/23 (from the past 2160 hour(s))   MRI Brain W WO Contrast    Impression    No evidence of intracranial metastatic disease.  No acute findings or abnormal enhancement.      Electronically signed by: Humphrey Martin  Date:    05/03/2023  Time:    15:51     Results for orders placed or performed during the hospital encounter of 05/04/23 (from the past 2160 hour(s))   NM PET CT Routine FDG    Impression    Postoperative changes left lung with a very small loculated hydropneumothorax in the upper left hemithorax (mainly fluid with minimal air).    -Small approximate 1 cm hypermetabolic node at the left hilum adjacent to staple line.  Subcentimeter minimally hypermetabolic nearby AP window lymph node, increased from prior.  These could be inflammatory in nature though close follow-up is advised particularly for the left hilar hypermetabolic focus.    The examination elsewhere is unremarkable with no additional foci of abnormal uptake.      Electronically signed by: Onel Valdez MD  Date:    05/04/2023  Time:    14:29         Pathology:    Reviewed in epic     ?   Assessment/Plan:   Malignant neoplasm of unspecified part of unspecified bronchus or lung    Chemotherapy induced neutropenia    Other orders  -     aprepitant (CINVANTI) injection 130 mg  -     palonosetron (ALOXI) 0.25 mg with Dexamethasone (DECADRON) 12 mg in NS 50 mL IVPB  -     PEMEtrexed disodium (ALIMTA) 700 mg in sodium chloride 0.9% SolP 100 mL chemo infusion  -     CISplatin (Platinol) 75 mg/m2 = 138 mg in sodium chloride 0.9% 638 mL chemo infusion  -      sodium chloride 0.9% 1,000 mL with magnesium sulfate 1 g, potassium chloride 20 mEq infusion  -     sodium chloride 0.9% 250 mL flush bag  -     sodium chloride 0.9% flush 10 mL  -     heparin, porcine (PF) 100 unit/mL injection flush 500 Units  -     alteplase injection 2 mg  -     pegfilgrastim-cbqv (UDENYCA) injection 6 mg  -     sodium chloride 0.9% bolus 1,000 mL 1,000 mL  -     sodium chloride 0.9% flush 10 mL  -     heparin, porcine (PF) 100 unit/mL injection flush 500 Units  -     alteplase injection 2 mg             1. Malignant neoplasm of unspecified part of unspecified bronchus or lung    2. 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 Initial biopsy February 20, 2023 without neoplasm identified who after multiple disciplinary discussion proceeded with left lower lobe wedge resection, final pathology positive for 03/28/2022 for invasive moderately differentiated adenocarcinoma station 10 lymph node positive, pleural invasion noted, margins negative.    Recommended staging with MRI brain and PET now 1 month out from surgery MRI brain negative PET scan with avidity left hilar SUV 4 and chest wall.  Given recent surgery potential inflammation presented at tumor board recommended proceeding with adjuvant chemotherapy and follow-up on repeat imaging.  We discussed indication for adjuvant therapy including chemotherapy and immunotherapy per IMPOWER 010 showed benefit to adjuvant atezolizumab following chemotherapy given PDL1  positive disease 95%.  Mutational analysis negative for EGFR mutation.  Will reschedule if appropriate cisplatin and pemetrexed with adjuvant supplemental folic acid and vitamin B12.      Last week 1st cycle adjuvant chemotherapy tolerated well aside from brief episode black spots and 1 self isolated recurrence while at home without other neurologic symptoms.  Constipation well managed.  No notable nausea.  Labs reviewed without notable abnormality.    Has recovered well tolerating remainder of cycle well plan for cycle 2.  Chemotherapy-induced neutropenia  and treatment held last week, today labs with recovery, leukocytosis today, secondary to steroid effect premedication.  She is feeling well labs reviewed okay to proceed with cycle 2 today.    Advance Care Planning   Stage II malignancy asymptomatic will defer palliative care consultation at this time.       Follow-Up:   Route Chart for Scheduling    Med Onc Chart Routing      Follow up with physician 3 weeks.   Follow up with [facility]    Infusion scheduling note   Cisplatin pemetrexed in 3 weeks planned   Injection scheduling note    Labs CBC, CMP and magnesium   Scheduling:  Preferred lab:  Lab interval:     Imaging    Pharmacy appointment    Other referrals        Treatment Plan Information   OP NSCLC PEMETREXED + CISPLATIN Q3W   Marissa Brower MD   Upcoming Treatment Dates - OP NSCLC PEMETREXED + CISPLATIN Q3W    6/9/2023       Growth Factor       pegfilgrastim-cbqv (UDENYCA) injection 6 mg  6/29/2023       Chemotherapy       PEMEtrexed disodium (ALIMTA) 700 mg in sodium chloride 0.9% SolP 100 mL chemo infusion       CISplatin (Platinol) 75 mg/m2 = 138 mg in sodium chloride 0.9% 638 mL chemo infusion       Pre-Hydration       sodium chloride 0.9% bolus 1,000 mL 1,000 mL       Post-Hydration       sodium chloride 0.9% 1,000 mL with magnesium sulfate 1 g, potassium chloride 20 mEq infusion       Antiemetics       aprepitant (CINVANTI) injection 130 mg        palonosetron (ALOXI) 0.25 mg with Dexamethasone (DECADRON) 12 mg in NS 50 mL IVPB  6/30/2023       Growth Factor       pegfilgrastim-cbqv (UDENYCA) injection 6 mg  7/20/2023       Chemotherapy       PEMEtrexed disodium (ALIMTA) 700 mg in sodium chloride 0.9% SolP 100 mL chemo infusion       CISplatin (Platinol) 75 mg/m2 = 138 mg in sodium chloride 0.9% 638 mL chemo infusion       Supportive Care       cyanocobalamin injection 1,000 mcg       Pre-Hydration       sodium chloride 0.9% bolus 1,000 mL 1,000 mL       Post-Hydration       sodium chloride 0.9% 1,000 mL with magnesium sulfate 1 g, potassium chloride 20 mEq infusion       Antiemetics       aprepitant (CINVANTI) injection 130 mg       palonosetron (ALOXI) 0.25 mg with Dexamethasone (DECADRON) 12 mg in NS 50 mL IVPB

## 2023-06-08 NOTE — NURSING
1428pm: Outgoing call to pt regarding 2nd chemo infusion today. Spoke with pt. Pt tolerated chemo so far so good today. Pt had no concerns, complaints, needs, and/or questions noted at this time. Pt informed that I'll fup with pt in 1 mth for onc hermelinda check. Pt encouraged to contact me directly should any issues arise prior. Pt verbalized understanding.   Oncology Navigation   Intake  Internal / External Referral: Internal (Dr. Refugio Medley)  Initial Nurse Navigator Contact: 23  Date Worked: 23  Multiple appointments: No  Start of Treatment: 23     Treatment  Current Status: Active  Date Presented to Tumor Board: 23  Presentation Notes: Discuss left VATS anatomic resection for growing NANI nodule    Surgery: Planned  Surgical Oncologist: Jasmyne  Consult Date: 03/15/23    Medical Oncologist: Dr. Marissa Brower  Consult Date: 23  Chemotherapy: Planned  Chemotherapy Regimen: Alimta Cisplatin  Immunotherapy: Planned  Immunotherapy Name: Tecentriq       Procedures: Port / PICC  MRI Schedule Date: 23 (brain w wo contrast)  PET Scan Schedule Date: 23  Port / PICC Schedule Date: 23  Other Schedule Date: 23 (audiogram)    General Referrals: Social Work  Social Work: notified via in-basket msg  Social Work Referral Date: 23          Support Systems: Spouse/significant other  Barriers of Care: Transportation  Transportation Barriers: Patient lives in [place]     Acuity  Stage: 1  Systemic Treatment - predicted or initiated: Chemotherapy Regimen with Multiple drugs (+1)  Treatment Tolerability: Has not started treatment yet/treatment fully completed and side effects resolved  ECO  Comorbidities in Medical History: 2  Hospitalization Within the Past Month: 0   Needed: 0  Support: 0  Transportation: 0  History of noncompliance/frequent no shows and cancellations: 0  Verbalizes the need for more education: 1  Navigation Acuity: 4     Follow  Up  Follow up in about 29 days (around 7/7/2023) for 1 mth acuity onc hermelinda check .

## 2023-06-09 ENCOUNTER — INFUSION (OUTPATIENT)
Dept: INFUSION THERAPY | Facility: HOSPITAL | Age: 73
End: 2023-06-09
Attending: INTERNAL MEDICINE
Payer: MEDICARE

## 2023-06-09 VITALS
TEMPERATURE: 98 F | SYSTOLIC BLOOD PRESSURE: 111 MMHG | OXYGEN SATURATION: 98 % | RESPIRATION RATE: 17 BRPM | HEART RATE: 56 BPM | DIASTOLIC BLOOD PRESSURE: 66 MMHG

## 2023-06-09 DIAGNOSIS — C34.32 MALIGNANT NEOPLASM OF LOWER LOBE OF LEFT LUNG: Primary | ICD-10-CM

## 2023-06-09 PROCEDURE — 96361 HYDRATE IV INFUSION ADD-ON: CPT

## 2023-06-09 PROCEDURE — 25000003 PHARM REV CODE 250: Performed by: INTERNAL MEDICINE

## 2023-06-09 PROCEDURE — 63600175 PHARM REV CODE 636 W HCPCS: Performed by: INTERNAL MEDICINE

## 2023-06-09 PROCEDURE — 96372 THER/PROPH/DIAG INJ SC/IM: CPT | Mod: 59

## 2023-06-09 PROCEDURE — 96360 HYDRATION IV INFUSION INIT: CPT

## 2023-06-09 RX ORDER — HEPARIN 100 UNIT/ML
500 SYRINGE INTRAVENOUS
Status: DISCONTINUED | OUTPATIENT
Start: 2023-06-09 | End: 2023-06-09 | Stop reason: HOSPADM

## 2023-06-09 RX ADMIN — PEGFILGRASTIM-CBQV 6 MG: 6 INJECTION, SOLUTION SUBCUTANEOUS at 12:06

## 2023-06-09 RX ADMIN — HEPARIN 500 UNITS: 100 SYRINGE at 10:06

## 2023-06-09 RX ADMIN — SODIUM CHLORIDE 1000 ML: 9 INJECTION, SOLUTION INTRAVENOUS at 08:06

## 2023-06-09 NOTE — PLAN OF CARE
Problem: Adult Inpatient Plan of Care  Goal: Plan of Care Review  Outcome: Ongoing, Progressing  Flowsheets (Taken 6/9/2023 0804)  Plan of Care Reviewed With:   patient   spouse  Goal: Optimal Comfort and Wellbeing  Outcome: Ongoing, Progressing  Intervention: Provide Person-Centered Care  Flowsheets (Taken 6/9/2023 0804)  Trust Relationship/Rapport:   care explained   choices provided   emotional support provided   empathic listening provided   questions answered   questions encouraged   reassurance provided   thoughts/feelings acknowledged

## 2023-06-27 ENCOUNTER — TELEPHONE (OUTPATIENT)
Dept: HEMATOLOGY/ONCOLOGY | Facility: CLINIC | Age: 73
End: 2023-06-27
Payer: MEDICARE

## 2023-06-27 NOTE — NURSING
1328pm: Incoming call from pt regarding swelling in her thumb and wrist. Spoke with pt. Pt described swelling and pain in her thumb, thumb joint, and wrist. Pt says it hurts a little but tolerable. Pt wasn't sure if it was a side effect from chemo or not. I informed pt I don't think that it is a side effect from chemo, but I also can't see her wrist and thumb either. I informed pt that Dr. Brower didn't have nay openings prior to Thursday 6/29 as pt is already scheduled. Pt wants to just wait until her appt on Thursday 6/29. Pt advised to try warm hand soaks with Epsom salt and either red, green, and/or blue rubbing alcohol to see if pt gets any relief. If pain and swelling is relieved, I informed pt it may be some type of arthritis. Pt would just need to inform Dr. Brower during her visit on Thursday. Dr. Brower can refer pt to Rheumatology her to see. Pt verbalized understanding.   Oncology Navigation   Intake  Internal / External Referral: Internal (Dr. Refugio Medley)  Initial Nurse Navigator Contact: 04/05/23  Date Worked: 06/27/23  Multiple appointments: No  Start of Treatment: 05/08/23     Treatment  Current Status: Active  Date Presented to Tumor Board: 03/08/23  Presentation Notes: Discuss left VATS anatomic resection for growing NANI nodule    Surgery: Planned  Surgical Oncologist: Jasmyne  Consult Date: 03/15/23    Medical Oncologist: Dr. Marissa Brower  Consult Date: 04/13/23  Chemotherapy: Planned  Chemotherapy Regimen: Alimta Cisplatin  Immunotherapy: Planned  Immunotherapy Name: Tecentriq       Procedures: Port / PICC  MRI Schedule Date: 05/03/23 (brain w wo contrast)  PET Scan Schedule Date: 05/04/23  Port / PICC Schedule Date: 04/27/23  Other Schedule Date: 05/03/23 (audiogram)    General Referrals: Social Work  Social Work: notified via in-basket msg  Social Work Referral Date: 04/25/23          Support Systems: Spouse/significant other  Barriers of Care: Transportation  Transportation Barriers:  Patient lives in [place]     Acuity  Stage: 1  Systemic Treatment - predicted or initiated: Chemotherapy Regimen with Multiple drugs (+1)  Treatment Tolerability: Has not started treatment yet/treatment fully completed and side effects resolved  ECO  Comorbidities in Medical History: 2  Hospitalization Within the Past Month: 0   Needed: 0  Support: 0  Transportation: 0  History of noncompliance/frequent no shows and cancellations: 0  Verbalizes the need for more education: 1  Navigation Acuity: 4     Follow Up  No follow-ups on file.

## 2023-06-29 ENCOUNTER — INFUSION (OUTPATIENT)
Dept: INFUSION THERAPY | Facility: HOSPITAL | Age: 73
End: 2023-06-29
Attending: INTERNAL MEDICINE
Payer: MEDICARE

## 2023-06-29 ENCOUNTER — OFFICE VISIT (OUTPATIENT)
Dept: HEMATOLOGY/ONCOLOGY | Facility: CLINIC | Age: 73
End: 2023-06-29
Payer: MEDICARE

## 2023-06-29 VITALS
BODY MASS INDEX: 27.22 KG/M2 | WEIGHT: 163.38 LBS | HEIGHT: 65 IN | TEMPERATURE: 97 F | SYSTOLIC BLOOD PRESSURE: 109 MMHG | OXYGEN SATURATION: 97 % | DIASTOLIC BLOOD PRESSURE: 71 MMHG | HEART RATE: 68 BPM

## 2023-06-29 VITALS
HEIGHT: 65 IN | OXYGEN SATURATION: 98 % | BODY MASS INDEX: 27.22 KG/M2 | HEART RATE: 67 BPM | RESPIRATION RATE: 16 BRPM | WEIGHT: 163.38 LBS | TEMPERATURE: 97 F | SYSTOLIC BLOOD PRESSURE: 131 MMHG | DIASTOLIC BLOOD PRESSURE: 70 MMHG

## 2023-06-29 DIAGNOSIS — K12.31 MUCOSITIS DUE TO CHEMOTHERAPY: ICD-10-CM

## 2023-06-29 DIAGNOSIS — T45.1X5A CHEMOTHERAPY INDUCED NEUTROPENIA: ICD-10-CM

## 2023-06-29 DIAGNOSIS — D70.1 CHEMOTHERAPY INDUCED NEUTROPENIA: ICD-10-CM

## 2023-06-29 DIAGNOSIS — C34.90 MALIGNANT NEOPLASM OF UNSPECIFIED PART OF UNSPECIFIED BRONCHUS OR LUNG: Primary | ICD-10-CM

## 2023-06-29 DIAGNOSIS — C34.32 MALIGNANT NEOPLASM OF LOWER LOBE OF LEFT LUNG: Primary | ICD-10-CM

## 2023-06-29 PROCEDURE — 3074F PR MOST RECENT SYSTOLIC BLOOD PRESSURE < 130 MM HG: ICD-10-PCS | Mod: CPTII,S$GLB,, | Performed by: INTERNAL MEDICINE

## 2023-06-29 PROCEDURE — 3078F DIAST BP <80 MM HG: CPT | Mod: CPTII,S$GLB,, | Performed by: INTERNAL MEDICINE

## 2023-06-29 PROCEDURE — 99215 OFFICE O/P EST HI 40 MIN: CPT | Mod: S$GLB,,, | Performed by: INTERNAL MEDICINE

## 2023-06-29 PROCEDURE — 96367 TX/PROPH/DG ADDL SEQ IV INF: CPT

## 2023-06-29 PROCEDURE — 96361 HYDRATE IV INFUSION ADD-ON: CPT

## 2023-06-29 PROCEDURE — 99999 PR PBB SHADOW E&M-EST. PATIENT-LVL III: ICD-10-PCS | Mod: PBBFAC,,, | Performed by: INTERNAL MEDICINE

## 2023-06-29 PROCEDURE — 96413 CHEMO IV INFUSION 1 HR: CPT

## 2023-06-29 PROCEDURE — 96417 CHEMO IV INFUS EACH ADDL SEQ: CPT

## 2023-06-29 PROCEDURE — 1101F PR PT FALLS ASSESS DOC 0-1 FALLS W/OUT INJ PAST YR: ICD-10-PCS | Mod: CPTII,S$GLB,, | Performed by: INTERNAL MEDICINE

## 2023-06-29 PROCEDURE — 1101F PT FALLS ASSESS-DOCD LE1/YR: CPT | Mod: CPTII,S$GLB,, | Performed by: INTERNAL MEDICINE

## 2023-06-29 PROCEDURE — 99215 PR OFFICE/OUTPT VISIT, EST, LEVL V, 40-54 MIN: ICD-10-PCS | Mod: S$GLB,,, | Performed by: INTERNAL MEDICINE

## 2023-06-29 PROCEDURE — 96375 TX/PRO/DX INJ NEW DRUG ADDON: CPT

## 2023-06-29 PROCEDURE — 3074F SYST BP LT 130 MM HG: CPT | Mod: CPTII,S$GLB,, | Performed by: INTERNAL MEDICINE

## 2023-06-29 PROCEDURE — 3078F PR MOST RECENT DIASTOLIC BLOOD PRESSURE < 80 MM HG: ICD-10-PCS | Mod: CPTII,S$GLB,, | Performed by: INTERNAL MEDICINE

## 2023-06-29 PROCEDURE — 96411 CHEMO IV PUSH ADDL DRUG: CPT

## 2023-06-29 PROCEDURE — 3288F PR FALLS RISK ASSESSMENT DOCUMENTED: ICD-10-PCS | Mod: CPTII,S$GLB,, | Performed by: INTERNAL MEDICINE

## 2023-06-29 PROCEDURE — 1126F PR PAIN SEVERITY QUANTIFIED, NO PAIN PRESENT: ICD-10-PCS | Mod: CPTII,S$GLB,, | Performed by: INTERNAL MEDICINE

## 2023-06-29 PROCEDURE — 25000003 PHARM REV CODE 250: Performed by: INTERNAL MEDICINE

## 2023-06-29 PROCEDURE — 99999 PR PBB SHADOW E&M-EST. PATIENT-LVL III: CPT | Mod: PBBFAC,,, | Performed by: INTERNAL MEDICINE

## 2023-06-29 PROCEDURE — 96366 THER/PROPH/DIAG IV INF ADDON: CPT

## 2023-06-29 PROCEDURE — 63600175 PHARM REV CODE 636 W HCPCS: Performed by: INTERNAL MEDICINE

## 2023-06-29 PROCEDURE — 1126F AMNT PAIN NOTED NONE PRSNT: CPT | Mod: CPTII,S$GLB,, | Performed by: INTERNAL MEDICINE

## 2023-06-29 PROCEDURE — 3288F FALL RISK ASSESSMENT DOCD: CPT | Mod: CPTII,S$GLB,, | Performed by: INTERNAL MEDICINE

## 2023-06-29 RX ORDER — SODIUM CHLORIDE 0.9 % (FLUSH) 0.9 %
10 SYRINGE (ML) INJECTION
Status: CANCELLED | OUTPATIENT
Start: 2023-06-29

## 2023-06-29 RX ORDER — HEPARIN 100 UNIT/ML
500 SYRINGE INTRAVENOUS
Status: DISCONTINUED | OUTPATIENT
Start: 2023-06-29 | End: 2023-06-29 | Stop reason: HOSPADM

## 2023-06-29 RX ORDER — HEPARIN 100 UNIT/ML
500 SYRINGE INTRAVENOUS
Status: CANCELLED | OUTPATIENT
Start: 2023-06-30

## 2023-06-29 RX ORDER — SODIUM CHLORIDE 0.9 % (FLUSH) 0.9 %
10 SYRINGE (ML) INJECTION
Status: CANCELLED | OUTPATIENT
Start: 2023-06-30

## 2023-06-29 RX ORDER — HEPARIN 100 UNIT/ML
500 SYRINGE INTRAVENOUS
Status: CANCELLED | OUTPATIENT
Start: 2023-06-29

## 2023-06-29 RX ADMIN — CISPLATIN 138 MG: 1 INJECTION, SOLUTION INTRAVENOUS at 12:06

## 2023-06-29 RX ADMIN — APREPITANT 130 MG: 130 INJECTION, EMULSION INTRAVENOUS at 11:06

## 2023-06-29 RX ADMIN — PEMETREXED DISODIUM 700 MG: 500 INJECTION, POWDER, LYOPHILIZED, FOR SOLUTION INTRAVENOUS at 11:06

## 2023-06-29 RX ADMIN — SODIUM CHLORIDE: 9 INJECTION, SOLUTION INTRAVENOUS at 11:06

## 2023-06-29 RX ADMIN — HEPARIN 500 UNITS: 100 SYRINGE at 03:06

## 2023-06-29 RX ADMIN — MAGNESIUM SULFATE HEPTAHYDRATE 506 ML/HR: 500 INJECTION, SOLUTION INTRAMUSCULAR; INTRAVENOUS at 01:06

## 2023-06-29 RX ADMIN — DEXAMETHASONE SODIUM PHOSPHATE 0.25 MG: 4 INJECTION, SOLUTION INTRA-ARTICULAR; INTRALESIONAL; INTRAMUSCULAR; INTRAVENOUS; SOFT TISSUE at 11:06

## 2023-06-29 RX ADMIN — SODIUM CHLORIDE 1000 ML: 9 INJECTION, SOLUTION INTRAVENOUS at 09:06

## 2023-06-29 NOTE — DISCHARGE INSTRUCTIONS
..[facility]  [address] [facility]  [address] [address]  [phone] phone     [phone] fax  Hours of Operation: Monday- Friday 8:00am- 5:00pm  After hours phone  [phone]  Hematology / Oncology Physicians on call    Dr. Feliberto Adkins      Nurse Practitioners:    Alejandra Lopez, GINNA Bradley, GINNA Mojica, GINNA Taylor, GINNA Curtis, GINNA Pham, PA      Please don't hesitate to call if you have any concerns.    .FALL PREVENTION   Falls often occur due to slipping, tripping or losing your balance. Here are ways to reduce your risk of falling again.   Was there anything that caused your fall that can be fixed, removed or replaced?   Make your home safe by keeping walkways clear of objects you may trip over.   Use non-slip pads under rugs.   Do not walk in poorly lit areas.   Do not stand on chairs or wobbly ladders.   Use caution when reaching overhead or looking upward. This position can cause a loss of balance.   Be sure your shoes fit properly, have non-slip bottoms and are in good condition.   Be cautious when going up and down stairs, curbs, and when walking on uneven sidewalks.   If your balance is poor, consider using a cane or walker.   If your fall was related to alcohol use, stop or limit alcohol intake.   If your fall was related to use of sleeping medicines, talk to your doctor about this. You may need to reduce your dosage at bedtime if you awaken during the night to go to the bathroom.   To reduce the need for nighttime bathroom trips:   Avoid drinking fluids for several hours before going to bed   Empty your bladder before going to bed   Men can keep a urinal at the bedside   © 6804-1526 [employer] Blackmon, [address] [place] [address], [place] [address]. All rights reserved. This information is not intended as a substitute for professional medical care. Always follow your healthcare  professional's instructions.  .WAYS TO HELP PREVENT INFECTION        WASH YOUR HANDS OFTEN DURING THE DAY, ESPECIALLY BEFORE YOU EAT, AFTER USING THE BATHROOM, AND AFTER TOUCHING ANIMALS    STAY AWAY FROM PEOPLE WHO HAVE ILLNESSES YOU CAN CATCH; SUCH AS COLDS, FLU, CHICKEN POX    TRY TO AVOID CROWDS    STAY AWAY FROM CHILDREN WHO RECENTLY HAVE RECEIVED LIVE VIRUS VACCINES    MAINTAIN GOOD MOUTH CARE    DO NOT SQUEEZE OR SCRATCH PIMPLES    CLEAN CUTS & SCRAPES RIGHT AWAY AND DAILY UNTIL HEALED WITH WARM WATER, SOAP & AN ANTISEPTIC    AVOID CONTACT WITH LITTER BOXES, BIRD CAGES, & FISH TANKS    AVOID STANDING WATER, IE., BIRD BATHS, FLOWER POTS/VASES, OR HUMIDIFIERS    WEAR GLOVES WHEN GARDENING OR CLEANING UP AFTER OTHERS, ESPECIALLY BABIES & SMALL CHILDREN    DO NOT EAT RAW FISH, SEAFOOD, MEAT, OR EGGS

## 2023-06-29 NOTE — PLAN OF CARE
Problem: Adult Inpatient Plan of Care  Goal: Plan of Care Review  6/29/2023 0954 by Angella Matias RN  Outcome: Ongoing, Progressing  6/29/2023 0953 by Angella Matias RN  Outcome: Ongoing, Progressing  Flowsheets (Taken 6/29/2023 0953)  Plan of Care Reviewed With:   patient   spouse  Goal: Optimal Comfort and Wellbeing  6/29/2023 0954 by Angella Matias RN  Outcome: Ongoing, Progressing  6/29/2023 0953 by Angella Matias RN  Outcome: Ongoing, Progressing  Goal: Patient-Specific Goal (Individualized)  Outcome: Ongoing, Progressing  Flowsheets (Taken 6/29/2023 0954)  Anxieties, Fears or Concerns: Pt voices no concerns at this time  Individualized Care Needs: Pt reclined, warm blanket/pillow provided and snacks offered     Problem: Anemia (Chemotherapy Effects)  Goal: Anemia Symptom Improvement  6/29/2023 0954 by Angella Matias RN  Outcome: Ongoing, Progressing  6/29/2023 0953 by Angella Matias RN  Outcome: Ongoing, Progressing     Problem: Urinary Bleeding Risk or Actual (Chemotherapy Effects)  Goal: Absence of Hematuria  6/29/2023 0954 by Angella Matias RN  Outcome: Ongoing, Progressing  6/29/2023 0953 by Angella Matias RN  Outcome: Ongoing, Progressing     Problem: Nausea and Vomiting (Chemotherapy Effects)  Goal: Fluid and Electrolyte Balance  6/29/2023 0954 by Angella Matias RN  Outcome: Ongoing, Progressing  6/29/2023 0953 by Angella Matias RN  Outcome: Ongoing, Progressing     Problem: Neurotoxicity (Chemotherapy Effects)  Goal: Neurotoxicity Symptom Control  6/29/2023 0954 by Angella Matias RN  Outcome: Ongoing, Progressing  6/29/2023 0953 by Angella Matias RN  Outcome: Ongoing, Progressing     Problem: Neutropenia (Chemotherapy Effects)  Goal: Absence of Infection  6/29/2023 0954 by Angella Matias RN  Outcome: Ongoing, Progressing  6/29/2023 0953 by Angella Matias RN  Outcome: Ongoing, Progressing     Problem: Oral Mucositis (Chemotherapy Effects)  Goal:  Improved Oral Mucous Membrane Integrity  6/29/2023 0954 by Angella Matias RN  Outcome: Ongoing, Progressing  6/29/2023 0953 by Angella Matias RN  Outcome: Ongoing, Progressing     Problem: Thrombocytopenia Bleeding Risk (Chemotherapy Effects)  Goal: Absence of Bleeding  6/29/2023 0954 by Angella Matias RN  Outcome: Ongoing, Progressing  6/29/2023 0953 by Angella Matias RN  Outcome: Ongoing, Progressing

## 2023-06-29 NOTE — PROGRESS NOTES
Subjective:      DATE OF VISIT: 6/29/23     ?  Patient ID:?Radha Lamas is a 73 y.o. female.?? MR#: [id number]     PRIMARY ONCOLOGIST: Dr. Brower    ? Primary Care Providers:  Ab Fletcher MD, MD (General)     CHIEF COMPLAINT: ?? follow-up on adjuvant chemotherapy  ?   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   CURRENT TREATMENT:  adjuvant cisplatin and pemetrexed q. 3 weeks followed by atezolizumab    PAST TREATMENT:  Wedge resection  ?   ONCOLOGIC HISTORY:   ?   Oncology History   Malignant neoplasm of lower lobe of left lung - Squamous cell   4/15/2021 Initial Diagnosis    Malignant neoplasm of lower lobe of left lung - Squamous cell     5/25/2021 Cancer Staged    Staging form: Lung, AJCC 8th Edition  - Clinical: Stage IA1 (cT1a, cN0, cM0)      Cancer Staged    9mm non-keratinizing squamous cell carcinoma, no VPI, no LVI, margin at least 8mm.  Levels 9 and 11 = negative.  T1aN0     4/5/2023 Cancer Staged    Staging form: Lung, AJCC 8th Edition  - Pathologic stage from 4/5/2023: Stage IIB (pT2, pN1, cM0)     5/1/2023 -  Chemotherapy    Treatment Summary   Plan Name: OP NSCLC PEMETREXED + CISPLATIN Q3W  Treatment Goal: Supportive  Status: Active  Start Date: 5/1/2023  End Date: 7/21/2023 (Planned)  Provider: Marissa Brower MD  Chemotherapy: CISplatin (Platinol) 75 mg/m2 = 138 mg in sodium chloride 0.9% 665 mL chemo infusion, 75 mg/m2 = 138 mg, Intravenous, Clinic/HOD 1 time, 2 of 4 cycles  Administration: 138 mg (5/12/2023), 138 mg (6/8/2023)  PEMEtrexed disodium (ALIMTA) 900 mg in sodium chloride 0.9% SolP 100 mL chemo infusion, 925 mg, Intravenous, Clinic/HOD 1 time, 2 of 4 cycles  Dose modification: 375 mg/m2 (original dose 500 mg/m2, Cycle 3, Reason: MD Discretion, Comment: neutropenia )  Administration:  900 mg (5/12/2023), 700 mg (6/8/2023)     7/20/2023 -  Chemotherapy    Treatment Summary   Plan Name: OP ATEZOLIZUMAB Q3W  Treatment Goal: Supportive  Status: Future  Start Date: 7/20/2023 (Planned)  End Date: 6/20/2024 (Planned)  Provider: Marissa Brower MD  Chemotherapy: [No matching medication found in this treatment plan]     NSCLC of left lung (Resolved)   4/28/2021 Initial Diagnosis    NSCLC of left lung (Resolved)      Cancer Staged    9mm non-keratinizing squamous cell carcinoma, no VPI, no LVI, margin at least 8mm.  Levels 9 and 11 = negative.  T1aN0        Cancer Staging   Malignant neoplasm of lower lobe of left lung - Squamous cell  - Pathologic stage from 4/5/2023: Stage IIB (pT2, pN1, cM0) - Signed by Marissa Brower MD on 6/8/2023         HPI      She is been doing very well she developed aside from unprovoked edema right first metacarpal joint, use compress soaking with resolution. No known injury or trauma/insect bite. No other areas of edema erythema, fevers or chills.     Review of Systems    ?   A comprehensive 14-point review of systems was reviewed with patient and was negative other than as specified above.   ?   PAST MEDICAL HISTORY:   Past Medical History:   Diagnosis Date    COPD (chronic obstructive pulmonary disease) 2013    Eczema     GERD (gastroesophageal reflux disease)     Hiatal hernia     History of torn meniscus of right knee 01/2011    Hypothyroid     Incidental pulmonary nodule, > 3mm and < 8mm - [facility] 8/12/2021    Lung cancer     Urinary incontinence in female     Mild , only takes mediciane with travel    ?     PAST SURGICAL HISTORY:   Past Surgical History:   Procedure Laterality Date    FLUOROSCOPY N/A 4/27/2023    Procedure: Fluoroscopy/Mediport placement;  Surgeon: Kodak Retana MD;  [facility] CATH LAB;  Service: General;  Laterality: N/A;    INJECTION OF ANESTHETIC AGENT AROUND MULTIPLE INTERCOSTAL NERVES Left 3/20/2023    Procedure: BLOCK,  NERVE, INTERCOSTAL, 2 OR MORE;  Surgeon: Refugio Medley MD;  Location: [facility] OR 2ND FLR;  Service: Thoracic;  Laterality: Left;    KNEE CARTILAGE SURGERY Right 01/2011    LAPAROSCOPIC LYSIS OF ADHESIONS Left 3/20/2023    Procedure: LYSIS, ADHESIONS, LAPAROSCOPIC;  Surgeon: Refugio Medley MD;  Location: [facility] OR 2ND FLR;  Service: Thoracic;  Laterality: Left;    ROBOT-ASSISTED LAPAROSCOPIC LYMPHADENECTOMY USING DA ROSEMARY XI Left 3/20/2023    Procedure: XI ROBOTIC LYMPHADENECTOMY;  Surgeon: Refugio Medley MD;  Location: [facility] OR [facility] FLR;  Service: Thoracic;  Laterality: Left;    THORACOSCOPIC WEDGE RESECTION OF LUNG Left 04/28/2021    Procedure: VATS, WITH WEDGE RESECTION, LUNG;  Surgeon: Clarke Sauceda MD;  Location: [facility] OR [facility] FLR;  Service: Thoracic;  Laterality: Left;  lymph node dissection     TUBAL LIGATION  1976    WEDGE RESECTION, LUNG, ROBOT-ASSISTED, USING VATS, USING DA ROSEMARY XI Left 3/20/2023    Procedure: XI ROBOTIC WEDGE RESECTION, LUNG (RATS); segmentectomy;  Surgeon: Refugio Medley MD;  [facility];  Service: Thoracic;  Laterality: Left;      ?   ALLERGIES:   Allergies as of 06/29/2023    (No Known Allergies)      ?   MEDICATIONS:?   Outpatient Medications Marked as Taking for the 6/29/23 encounter (Office Visit) with Marissa Brower MD   Medication Sig Dispense Refill    albuterol (PROAIR HFA) 90 mcg/actuation inhaler Inhale 2 puffs into the lungs every 4 (four) hours as needed for Wheezing or Shortness of Breath. Rescue 54 g 4    calcium carbonate (OS-CYDNEY) 600 mg calcium (1,500 mg) Tab Take 600 mg by mouth.      dexAMETHasone (DECADRON) 4 MG Tab Take 2 tablets (8 mg total) by mouth once daily. on the day prior to chemotherapy then daily on days 2,3,4 of each chemotherapy cycle. 24 tablet 3    diphenhydrAMINE-aluminum-magnesium hydroxide-simethicone-LIDOcaine HCl 2% Swish and spit 15 mLs every 4 (four) hours as needed. 540 each 2    DUPIXENT   mg/2 mL PnIj Inject into the skin every 14 (fourteen) days.      folic acid (FOLVITE) 1 MG tablet Take 1 tablet (1 mg total) by mouth once daily. starting 1 week prior to pemetrexed and continuing for 21 days after stopping pemetrexed 30 tablet 5    levothyroxine (SYNTHROID) 75 MCG tablet Take 75 mcg by mouth once daily.      loratadine (CLARITIN) 10 mg tablet Take 10 mg by mouth once daily.      OLANZapine (ZYPREXA) 5 MG tablet Take 1 tablet (5 mg total) by mouth every evening. Take as directed on days 1-4 of your chemotherapy cycle. 16 tablet 0    omeprazole (PRILOSEC) 20 MG capsule Take 20 mg by mouth once daily.      ondansetron (ZOFRAN-ODT) 8 MG TbDL Take 1 tablet (8 mg total) by mouth every 8 (eight) hours as needed (nausea). 60 tablet 5    oxyCODONE (ROXICODONE) 5 MG immediate release tablet Take 1 tablet (5 mg total) by mouth every 4 (four) hours as needed for Pain. 41 tablet 0    umeclidinium-vilanteroL (ANORO ELLIPTA) 62.5-25 mcg/actuation DsDv USE 1 INHALATION DAILY (CONTROLLER) 180 each 3      ?   SOCIAL HISTORY:?   Social History     Tobacco Use    Smoking status: Former     Packs/day: 1.50     Years: 45.00     Pack years: 67.50     Types: Cigarettes     Start date:      Quit date:      Years since quittin.4    Smokeless tobacco: Never   Substance Use Topics    Alcohol use: No     Alcohol/week: 0.0 standard drinks      ?      ?   FAMILY HISTORY:   family history includes Cancer in her brother, father, and sister; Heart failure in her mother; Hypertension in her father and mother.   ?        Objective:      Physical Exam      ?   Vitals:    23 0727   BP: 109/71   Pulse: 68   Temp: 97.2 °F (36.2 °C)        ?   ECOG:?0   General appearance: Generally well appearing, in no acute distress.   Head, eyes, ears, nose, and throat: moist mucous membranes.   Respiratory:  Normal work of breathing  Abdomen: nontender, nondistended.   Extremities: Warm, without edema.  Right hand prior  area of edema appears within normal limits normal perfusion no edema or tenderness or erythema.  Neurologic: Alert and oriented. Grossly normal strength, coordination, and gait.   Skin: No rashes, ecchymoses or petechial lesion.   Psychiatric:  Normal mood and affect.    ?   Laboratory:  ?   Lab Visit on 06/29/2023   Component Date Value Ref Range Status    WBC 06/29/2023 15.78 (H)  3.90 - 12.70 K/uL Final    RBC 06/29/2023 3.64 (L)  4.00 - 5.40 M/uL Final    Hemoglobin 06/29/2023 10.4 (L)  12.0 - 16.0 g/dL Final    Hematocrit 06/29/2023 33.1 (L)  37.0 - 48.5 % Final    MCV 06/29/2023 91  82 - 98 fL Final    MCH 06/29/2023 28.6  27.0 - 31.0 pg Final    MCHC 06/29/2023 31.4 (L)  32.0 - 36.0 g/dL Final    RDW 06/29/2023 17.2 (H)  11.5 - 14.5 % Final    Platelets 06/29/2023 218  150 - 450 K/uL Final    MPV 06/29/2023 9.6  9.2 - 12.9 fL Final    Immature Granulocytes 06/29/2023 2.0 (H)  0.0 - 0.5 % Final    Gran # (ANC) 06/29/2023 13.0 (H)  1.8 - 7.7 K/uL Final    Immature Grans (Abs) 06/29/2023 0.31 (H)  0.00 - 0.04 K/uL Final    Lymph # 06/29/2023 1.6  1.0 - 4.8 K/uL Final    Mono # 06/29/2023 0.9  0.3 - 1.0 K/uL Final    Eos # 06/29/2023 0.0  0.0 - 0.5 K/uL Final    Baso # 06/29/2023 0.02  0.00 - 0.20 K/uL Final    nRBC 06/29/2023 0  0 /100 WBC Final    Gran % 06/29/2023 82.0 (H)  38.0 - 73.0 % Final    Lymph % 06/29/2023 9.8 (L)  18.0 - 48.0 % Final    Mono % 06/29/2023 6.0  4.0 - 15.0 % Final    Eosinophil % 06/29/2023 0.1  0.0 - 8.0 % Final    Basophil % 06/29/2023 0.1  0.0 - 1.9 % Final    Differential Method 06/29/2023 Automated   Final    Sodium 06/29/2023 142  136 - 145 mmol/L Final    Potassium 06/29/2023 4.1  3.5 - 5.1 mmol/L Final    Chloride 06/29/2023 106  95 - 110 mmol/L Final    CO2 06/29/2023 27  23 - 29 mmol/L Final    Glucose 06/29/2023 100  70 - 110 mg/dL Final    BUN 06/29/2023 18  8 - 23 mg/dL Final    Creatinine 06/29/2023 0.8  0.5 - 1.4 mg/dL Final    Calcium  06/29/2023 9.8  8.7 - 10.5 mg/dL Final    Total Protein 06/29/2023 6.7  6.0 - 8.4 g/dL Final    Albumin 06/29/2023 3.8  3.5 - 5.2 g/dL Final    Total Bilirubin 06/29/2023 0.4  0.1 - 1.0 mg/dL Final    Alkaline Phosphatase 06/29/2023 90  55 - 135 U/L Final    AST 06/29/2023 19  10 - 40 U/L Final    ALT 06/29/2023 6 (L)  10 - 44 U/L Final    eGFR 06/29/2023 >60  >60 mL/min/1.73 m^2 Final    Anion Gap 06/29/2023 9  8 - 16 mmol/L Final    Magnesium 06/29/2023 1.9  1.6 - 2.6 mg/dL Final      ?     ?   Imaging:  ?    No results found for this or any previous visit (from the past 2160 hour(s)).    Results for orders placed or performed during the hospital encounter of 05/03/23 (from the past 2160 hour(s))   MRI Brain W WO Contrast    Impression    No evidence of intracranial metastatic disease.  No acute findings or abnormal enhancement.      Electronically signed by: Humphrey Martin  Date:    05/03/2023  Time:    15:51     Results for orders placed or performed during the hospital encounter of 05/04/23 (from the past 2160 hour(s))   NM PET CT Routine FDG    Impression    Postoperative changes left lung with a very small loculated hydropneumothorax in the upper left hemithorax (mainly fluid with minimal air).    -Small approximate 1 cm hypermetabolic node at the left hilum adjacent to staple line.  Subcentimeter minimally hypermetabolic nearby AP window lymph node, increased from prior.  These could be inflammatory in nature though close follow-up is advised particularly for the left hilar hypermetabolic focus.    The examination elsewhere is unremarkable with no additional foci of abnormal uptake.      Electronically signed by: Onel Valdez MD  Date:    05/04/2023  Time:    14:29         Pathology:    Reviewed in epic     ?   Assessment/Plan:   Malignant neoplasm of unspecified part of unspecified bronchus or lung    Chemotherapy induced neutropenia    Mucositis due to chemotherapy    Other orders  -     sodium  chloride 0.9% bolus 1,000 mL 1,000 mL  -     aprepitant (CINVANTI) injection 130 mg  -     palonosetron (ALOXI) 0.25 mg with Dexamethasone (DECADRON) 12 mg in NS 50 mL IVPB  -     PEMEtrexed disodium (ALIMTA) 700 mg in sodium chloride 0.9% SolP 100 mL chemo infusion  -     CISplatin (Platinol) 75 mg/m2 = 138 mg in sodium chloride 0.9% 638 mL chemo infusion  -     sodium chloride 0.9% 1,000 mL with magnesium sulfate 1 g, potassium chloride 20 mEq infusion  -     sodium chloride 0.9% 250 mL flush bag  -     sodium chloride 0.9% flush 10 mL  -     heparin, porcine (PF) 100 unit/mL injection flush 500 Units  -     alteplase injection 2 mg  -     Cancel: pegfilgrastim-cbqv (UDENYCA) injection 6 mg  -     sodium chloride 0.9% bolus 1,000 mL 1,000 mL  -     sodium chloride 0.9% flush 10 mL  -     heparin, porcine (PF) 100 unit/mL injection flush 500 Units  -     alteplase injection 2 mg             1. Malignant neoplasm of unspecified part of unspecified bronchus or lung    2. Chemotherapy induced neutropenia    3. 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 Initial biopsy February 20, 2023 without neoplasm identified who after multiple disciplinary discussion proceeded with left lower lobe wedge resection, final pathology positive for 03/28/2022 for invasive moderately differentiated adenocarcinoma station 10 lymph node positive, pleural invasion noted, margins negative.    Recommended staging with MRI brain  and PET now 1 month out from surgery MRI brain negative PET scan with avidity left hilar SUV 4 and chest wall.  Given recent surgery potential inflammation presented at tumor board recommended proceeding with adjuvant chemotherapy and follow-up on repeat imaging.  We discussed indication for adjuvant therapy including chemotherapy and immunotherapy per IMPOWER 010 showed benefit to adjuvant atezolizumab following chemotherapy given PDL1 positive disease 95%.  Mutational analysis negative for EGFR mutation.  Will reschedule if appropriate cisplatin and pemetrexed with adjuvant supplemental folic acid and vitamin B12.      Last week 1st cycle adjuvant chemotherapy tolerated well aside from brief episode black spots and 1 self isolated recurrence while at home without other neurologic symptoms.  Cycle 2 delay with chemotherapy-induced neutropenia with growth factor resolution following doses had persistent mild elevated WBC in absence of infectious symptoms suspect growth factor.  Will hold growth factor with next cycle and will need to monitor and add growth factor as needed in future.  Okay to proceed with her treatment today.  Monitor for any signs or symptoms of infection.      Advance Care Planning   Stage II malignancy asymptomatic will defer palliative care consultation at this time.       Follow-Up:   Route Chart for Scheduling    Med Onc Chart Routing      Follow up with physician 3 weeks.   Follow up with [facility]    Infusion scheduling note   Cycle 4 cisplatin and pemetrexed with B12 in 3 weeks   Injection scheduling note    Labs CMP, CBC and magnesium   Scheduling:  Preferred lab:  Lab interval:     Imaging    Pharmacy appointment    Other referrals        Treatment Plan Information   OP NSCLC PEMETREXED + CISPLATIN Q3W   Marissa Brower MD   Upcoming Treatment Dates - OP NSCLC PEMETREXED + CISPLATIN Q3W    6/29/2023       Chemotherapy       PEMEtrexed disodium (ALIMTA) 700 mg in sodium chloride 0.9% SolP  100 mL chemo infusion       CISplatin (Platinol) 75 mg/m2 = 138 mg in sodium chloride 0.9% 638 mL chemo infusion       Pre-Hydration       sodium chloride 0.9% bolus 1,000 mL 1,000 mL       Post-Hydration       sodium chloride 0.9% 1,000 mL with magnesium sulfate 1 g, potassium chloride 20 mEq infusion       Antiemetics       aprepitant (CINVANTI) injection 130 mg       palonosetron (ALOXI) 0.25 mg with Dexamethasone (DECADRON) 12 mg in NS 50 mL IVPB  7/20/2023       Chemotherapy       PEMEtrexed disodium (ALIMTA) 700 mg in sodium chloride 0.9% SolP 100 mL chemo infusion       CISplatin (Platinol) 75 mg/m2 = 138 mg in sodium chloride 0.9% 638 mL chemo infusion       Supportive Care       cyanocobalamin injection 1,000 mcg       Pre-Hydration       sodium chloride 0.9% bolus 1,000 mL 1,000 mL       Post-Hydration       sodium chloride 0.9% 1,000 mL with magnesium sulfate 1 g, potassium chloride 20 mEq infusion       Antiemetics       aprepitant (CINVANTI) injection 130 mg       palonosetron (ALOXI) 0.25 mg with Dexamethasone (DECADRON) 12 mg in NS 50 mL IVPB  7/21/2023       Growth Factor       pegfilgrastim-cbqv (UDENYCA) injection 6 mg

## 2023-06-30 ENCOUNTER — INFUSION (OUTPATIENT)
Dept: INFUSION THERAPY | Facility: HOSPITAL | Age: 73
End: 2023-06-30
Attending: INTERNAL MEDICINE
Payer: MEDICARE

## 2023-06-30 VITALS
RESPIRATION RATE: 16 BRPM | HEART RATE: 62 BPM | DIASTOLIC BLOOD PRESSURE: 71 MMHG | OXYGEN SATURATION: 98 % | SYSTOLIC BLOOD PRESSURE: 120 MMHG | TEMPERATURE: 98 F

## 2023-06-30 DIAGNOSIS — C34.32 MALIGNANT NEOPLASM OF LOWER LOBE OF LEFT LUNG: Primary | ICD-10-CM

## 2023-06-30 PROCEDURE — 96360 HYDRATION IV INFUSION INIT: CPT

## 2023-06-30 PROCEDURE — 96361 HYDRATE IV INFUSION ADD-ON: CPT

## 2023-06-30 PROCEDURE — 25000003 PHARM REV CODE 250: Performed by: INTERNAL MEDICINE

## 2023-06-30 PROCEDURE — 63600175 PHARM REV CODE 636 W HCPCS: Performed by: INTERNAL MEDICINE

## 2023-06-30 RX ORDER — HEPARIN 100 UNIT/ML
500 SYRINGE INTRAVENOUS
Status: DISCONTINUED | OUTPATIENT
Start: 2023-06-30 | End: 2023-06-30 | Stop reason: HOSPADM

## 2023-06-30 RX ADMIN — SODIUM CHLORIDE 1000 ML: 9 INJECTION, SOLUTION INTRAVENOUS at 08:06

## 2023-06-30 RX ADMIN — HEPARIN 500 UNITS: 100 SYRINGE at 10:06

## 2023-06-30 NOTE — PLAN OF CARE
Problem: Adult Inpatient Plan of Care  Goal: Plan of Care Review  Outcome: Ongoing, Progressing  Flowsheets (Taken 6/30/2023 0827)  Plan of Care Reviewed With:   patient   spouse  Goal: Optimal Comfort and Wellbeing  Outcome: Ongoing, Progressing  Goal: Patient-Specific Goal (Individualized)  Outcome: Ongoing, Progressing  Flowsheets (Taken 6/30/2023 0827)  Anxieties, Fears or Concerns: Pt voices no concerns at this time  Individualized Care Needs: Pt reclined, warm blanket/pillow provided and snacks offered     Problem: Anemia (Chemotherapy Effects)  Goal: Anemia Symptom Improvement  Outcome: Ongoing, Progressing     Problem: Urinary Bleeding Risk or Actual (Chemotherapy Effects)  Goal: Absence of Hematuria  Outcome: Ongoing, Progressing     Problem: Nausea and Vomiting (Chemotherapy Effects)  Goal: Fluid and Electrolyte Balance  Outcome: Ongoing, Progressing     Problem: Neurotoxicity (Chemotherapy Effects)  Goal: Neurotoxicity Symptom Control  Outcome: Ongoing, Progressing     Problem: Neutropenia (Chemotherapy Effects)  Goal: Absence of Infection  Outcome: Ongoing, Progressing     Problem: Oral Mucositis (Chemotherapy Effects)  Goal: Improved Oral Mucous Membrane Integrity  Outcome: Ongoing, Progressing     Problem: Thrombocytopenia Bleeding Risk (Chemotherapy Effects)  Goal: Absence of Bleeding  Outcome: Ongoing, Progressing

## 2023-06-30 NOTE — DISCHARGE INSTRUCTIONS
..[facility]  [address] [facility]  [address] [address]  [phone] phone     [phone] fax  Hours of Operation: Monday- Friday 8:00am- 5:00pm  After hours phone  [phone]  Hematology / Oncology Physicians on call    Dr. Feliberto Adkins      Nurse Practitioners:    Alejandra Lopez, GINNA Bradley, GINNA Mojica, GINNA Taylor, GINNA Curtis, GINNA Pham, PA      Please don't hesitate to call if you have any concerns.    .FALL PREVENTION   Falls often occur due to slipping, tripping or losing your balance. Here are ways to reduce your risk of falling again.   Was there anything that caused your fall that can be fixed, removed or replaced?   Make your home safe by keeping walkways clear of objects you may trip over.   Use non-slip pads under rugs.   Do not walk in poorly lit areas.   Do not stand on chairs or wobbly ladders.   Use caution when reaching overhead or looking upward. This position can cause a loss of balance.   Be sure your shoes fit properly, have non-slip bottoms and are in good condition.   Be cautious when going up and down stairs, curbs, and when walking on uneven sidewalks.   If your balance is poor, consider using a cane or walker.   If your fall was related to alcohol use, stop or limit alcohol intake.   If your fall was related to use of sleeping medicines, talk to your doctor about this. You may need to reduce your dosage at bedtime if you awaken during the night to go to the bathroom.   To reduce the need for nighttime bathroom trips:   Avoid drinking fluids for several hours before going to bed   Empty your bladder before going to bed   Men can keep a urinal at the bedside   © 4753-9679 Heidi Blackmon, [address] [place] [address], [place] [address]. All rights reserved. This information is not intended as a substitute for professional medical care. Always follow your healthcare  professional's instructions.  .WAYS TO HELP PREVENT INFECTION        WASH YOUR HANDS OFTEN DURING THE DAY, ESPECIALLY BEFORE YOU EAT, AFTER USING THE BATHROOM, AND AFTER TOUCHING ANIMALS    STAY AWAY FROM PEOPLE WHO HAVE ILLNESSES YOU CAN CATCH; SUCH AS COLDS, FLU, CHICKEN POX    TRY TO AVOID CROWDS    STAY AWAY FROM CHILDREN WHO RECENTLY HAVE RECEIVED LIVE VIRUS VACCINES    MAINTAIN GOOD MOUTH CARE    DO NOT SQUEEZE OR SCRATCH PIMPLES    CLEAN CUTS & SCRAPES RIGHT AWAY AND DAILY UNTIL HEALED WITH WARM WATER, SOAP & AN ANTISEPTIC    AVOID CONTACT WITH LITTER BOXES, BIRD CAGES, & FISH TANKS    AVOID STANDING WATER, IE., BIRD BATHS, FLOWER POTS/VASES, OR HUMIDIFIERS    WEAR GLOVES WHEN GARDENING OR CLEANING UP AFTER OTHERS, ESPECIALLY BABIES & SMALL CHILDREN    DO NOT EAT RAW FISH, SEAFOOD, MEAT, OR EGGS

## 2023-07-13 ENCOUNTER — PATIENT MESSAGE (OUTPATIENT)
Dept: HEMATOLOGY/ONCOLOGY | Facility: CLINIC | Age: 73
End: 2023-07-13
Payer: MEDICARE

## 2023-07-20 ENCOUNTER — INFUSION (OUTPATIENT)
Dept: INFUSION THERAPY | Facility: HOSPITAL | Age: 73
End: 2023-07-20
Attending: INTERNAL MEDICINE
Payer: MEDICARE

## 2023-07-20 ENCOUNTER — OFFICE VISIT (OUTPATIENT)
Dept: HEMATOLOGY/ONCOLOGY | Facility: CLINIC | Age: 73
End: 2023-07-20
Payer: MEDICARE

## 2023-07-20 VITALS
HEIGHT: 65 IN | SYSTOLIC BLOOD PRESSURE: 152 MMHG | TEMPERATURE: 98 F | RESPIRATION RATE: 16 BRPM | OXYGEN SATURATION: 99 % | WEIGHT: 168.19 LBS | BODY MASS INDEX: 28.02 KG/M2 | HEART RATE: 67 BPM | DIASTOLIC BLOOD PRESSURE: 80 MMHG

## 2023-07-20 VITALS
HEART RATE: 85 BPM | DIASTOLIC BLOOD PRESSURE: 70 MMHG | TEMPERATURE: 98 F | WEIGHT: 168.19 LBS | SYSTOLIC BLOOD PRESSURE: 121 MMHG | BODY MASS INDEX: 28.02 KG/M2 | HEIGHT: 65 IN | OXYGEN SATURATION: 98 % | RESPIRATION RATE: 18 BRPM

## 2023-07-20 DIAGNOSIS — Z09 ENCOUNTER FOR FOLLOW-UP EXAMINATION AFTER COMPLETED TREATMENT FOR CONDITIONS OTHER THAN MALIGNANT NEOPLASM: ICD-10-CM

## 2023-07-20 DIAGNOSIS — C34.32 MALIGNANT NEOPLASM OF LOWER LOBE OF LEFT LUNG: Primary | ICD-10-CM

## 2023-07-20 DIAGNOSIS — C34.90 MALIGNANT NEOPLASM OF UNSPECIFIED PART OF UNSPECIFIED BRONCHUS OR LUNG: Primary | ICD-10-CM

## 2023-07-20 PROCEDURE — 1101F PT FALLS ASSESS-DOCD LE1/YR: CPT | Mod: CPTII,S$GLB,, | Performed by: INTERNAL MEDICINE

## 2023-07-20 PROCEDURE — 99999 PR PBB SHADOW E&M-EST. PATIENT-LVL IV: ICD-10-PCS | Mod: PBBFAC,,, | Performed by: INTERNAL MEDICINE

## 2023-07-20 PROCEDURE — 3078F PR MOST RECENT DIASTOLIC BLOOD PRESSURE < 80 MM HG: ICD-10-PCS | Mod: CPTII,S$GLB,, | Performed by: INTERNAL MEDICINE

## 2023-07-20 PROCEDURE — 25000003 PHARM REV CODE 250: Performed by: INTERNAL MEDICINE

## 2023-07-20 PROCEDURE — 96366 THER/PROPH/DIAG IV INF ADDON: CPT

## 2023-07-20 PROCEDURE — 99999 PR PBB SHADOW E&M-EST. PATIENT-LVL IV: CPT | Mod: PBBFAC,,, | Performed by: INTERNAL MEDICINE

## 2023-07-20 PROCEDURE — 3074F SYST BP LT 130 MM HG: CPT | Mod: CPTII,S$GLB,, | Performed by: INTERNAL MEDICINE

## 2023-07-20 PROCEDURE — 99215 PR OFFICE/OUTPT VISIT, EST, LEVL V, 40-54 MIN: ICD-10-PCS | Mod: S$GLB,,, | Performed by: INTERNAL MEDICINE

## 2023-07-20 PROCEDURE — 1126F AMNT PAIN NOTED NONE PRSNT: CPT | Mod: CPTII,S$GLB,, | Performed by: INTERNAL MEDICINE

## 2023-07-20 PROCEDURE — 3288F FALL RISK ASSESSMENT DOCD: CPT | Mod: CPTII,S$GLB,, | Performed by: INTERNAL MEDICINE

## 2023-07-20 PROCEDURE — 96413 CHEMO IV INFUSION 1 HR: CPT

## 2023-07-20 PROCEDURE — 63600175 PHARM REV CODE 636 W HCPCS: Mod: JZ,JG | Performed by: INTERNAL MEDICINE

## 2023-07-20 PROCEDURE — 96367 TX/PROPH/DG ADDL SEQ IV INF: CPT

## 2023-07-20 PROCEDURE — 1101F PR PT FALLS ASSESS DOC 0-1 FALLS W/OUT INJ PAST YR: ICD-10-PCS | Mod: CPTII,S$GLB,, | Performed by: INTERNAL MEDICINE

## 2023-07-20 PROCEDURE — 1126F PR PAIN SEVERITY QUANTIFIED, NO PAIN PRESENT: ICD-10-PCS | Mod: CPTII,S$GLB,, | Performed by: INTERNAL MEDICINE

## 2023-07-20 PROCEDURE — 96372 THER/PROPH/DIAG INJ SC/IM: CPT | Mod: 59

## 2023-07-20 PROCEDURE — 96361 HYDRATE IV INFUSION ADD-ON: CPT

## 2023-07-20 PROCEDURE — 99215 OFFICE O/P EST HI 40 MIN: CPT | Mod: S$GLB,,, | Performed by: INTERNAL MEDICINE

## 2023-07-20 PROCEDURE — 96411 CHEMO IV PUSH ADDL DRUG: CPT

## 2023-07-20 PROCEDURE — 3074F PR MOST RECENT SYSTOLIC BLOOD PRESSURE < 130 MM HG: ICD-10-PCS | Mod: CPTII,S$GLB,, | Performed by: INTERNAL MEDICINE

## 2023-07-20 PROCEDURE — A4216 STERILE WATER/SALINE, 10 ML: HCPCS | Performed by: INTERNAL MEDICINE

## 2023-07-20 PROCEDURE — 96375 TX/PRO/DX INJ NEW DRUG ADDON: CPT

## 2023-07-20 PROCEDURE — 3288F PR FALLS RISK ASSESSMENT DOCUMENTED: ICD-10-PCS | Mod: CPTII,S$GLB,, | Performed by: INTERNAL MEDICINE

## 2023-07-20 PROCEDURE — 3078F DIAST BP <80 MM HG: CPT | Mod: CPTII,S$GLB,, | Performed by: INTERNAL MEDICINE

## 2023-07-20 RX ORDER — HEPARIN 100 UNIT/ML
500 SYRINGE INTRAVENOUS
Status: CANCELLED | OUTPATIENT
Start: 2023-07-20

## 2023-07-20 RX ORDER — CYANOCOBALAMIN 1000 UG/ML
1000 INJECTION, SOLUTION INTRAMUSCULAR; SUBCUTANEOUS
Status: CANCELLED
Start: 2023-07-20

## 2023-07-20 RX ORDER — SODIUM CHLORIDE 0.9 % (FLUSH) 0.9 %
10 SYRINGE (ML) INJECTION
Status: CANCELLED | OUTPATIENT
Start: 2023-07-20

## 2023-07-20 RX ORDER — HEPARIN 100 UNIT/ML
500 SYRINGE INTRAVENOUS
Status: CANCELLED | OUTPATIENT
Start: 2023-07-21

## 2023-07-20 RX ORDER — HEPARIN 100 UNIT/ML
500 SYRINGE INTRAVENOUS
Status: DISCONTINUED | OUTPATIENT
Start: 2023-07-20 | End: 2023-07-20 | Stop reason: HOSPADM

## 2023-07-20 RX ORDER — SODIUM CHLORIDE 0.9 % (FLUSH) 0.9 %
10 SYRINGE (ML) INJECTION
Status: CANCELLED | OUTPATIENT
Start: 2023-07-21

## 2023-07-20 RX ORDER — CYANOCOBALAMIN 1000 UG/ML
1000 INJECTION, SOLUTION INTRAMUSCULAR; SUBCUTANEOUS
Status: COMPLETED | OUTPATIENT
Start: 2023-07-20 | End: 2023-07-20

## 2023-07-20 RX ORDER — SODIUM CHLORIDE 0.9 % (FLUSH) 0.9 %
10 SYRINGE (ML) INJECTION
Status: DISCONTINUED | OUTPATIENT
Start: 2023-07-20 | End: 2023-07-20 | Stop reason: HOSPADM

## 2023-07-20 RX ADMIN — APREPITANT 130 MG: 130 INJECTION, EMULSION INTRAVENOUS at 10:07

## 2023-07-20 RX ADMIN — DEXAMETHASONE SODIUM PHOSPHATE 0.25 MG: 4 INJECTION, SOLUTION INTRA-ARTICULAR; INTRALESIONAL; INTRAMUSCULAR; INTRAVENOUS; SOFT TISSUE at 10:07

## 2023-07-20 RX ADMIN — PEMETREXED DISODIUM 700 MG: 100 INJECTION, POWDER, LYOPHILIZED, FOR SOLUTION INTRAVENOUS at 11:07

## 2023-07-20 RX ADMIN — HEPARIN 500 UNITS: 100 SYRINGE at 02:07

## 2023-07-20 RX ADMIN — CISPLATIN 138 MG: 1 INJECTION INTRAVENOUS at 11:07

## 2023-07-20 RX ADMIN — POTASSIUM CHLORIDE 506 ML/HR: 2 INJECTION, SOLUTION, CONCENTRATE INTRAVENOUS at 12:07

## 2023-07-20 RX ADMIN — SODIUM CHLORIDE 1000 ML: 9 INJECTION, SOLUTION INTRAVENOUS at 09:07

## 2023-07-20 RX ADMIN — Medication 10 ML: at 02:07

## 2023-07-20 RX ADMIN — CYANOCOBALAMIN 1000 MCG: 1000 INJECTION INTRAMUSCULAR; SUBCUTANEOUS at 09:07

## 2023-07-20 NOTE — PROGRESS NOTES
Subjective:      DATE OF VISIT: 7/20/23     ?  Patient ID:?Radha Lamas is a 73 y.o. female.?? MR#: [id number]     PRIMARY ONCOLOGIST: Dr. Brower    ? Primary Care Providers:  Ab Fletcher MD, MD (General)     CHIEF COMPLAINT: ?? follow-up on adjuvant chemotherapy  ?   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   CURRENT TREATMENT:  adjuvant cisplatin and pemetrexed q. 3 weeks followed by atezolizumab    PAST TREATMENT:  Wedge resection  ?   ONCOLOGIC HISTORY:   ?   Oncology History   Malignant neoplasm of lower lobe of left lung - Squamous cell   4/15/2021 Initial Diagnosis    Malignant neoplasm of lower lobe of left lung - Squamous cell     5/25/2021 Cancer Staged    Staging form: Lung, AJCC 8th Edition  - Clinical: Stage IA1 (cT1a, cN0, cM0)      Cancer Staged    9mm non-keratinizing squamous cell carcinoma, no VPI, no LVI, margin at least 8mm.  Levels 9 and 11 = negative.  T1aN0     4/5/2023 Cancer Staged    Staging form: Lung, AJCC 8th Edition  - Pathologic stage from 4/5/2023: Stage IIB (pT2, pN1, cM0)     5/1/2023 -  Chemotherapy    Treatment Summary   Plan Name: OP NSCLC PEMETREXED + CISPLATIN Q3W  Treatment Goal: Supportive  Status: Active  Start Date: 5/1/2023  End Date: 7/21/2023 (Planned)  Provider: Marissa Brower MD  Chemotherapy: CISplatin (Platinol) 75 mg/m2 = 138 mg in sodium chloride 0.9% 665 mL chemo infusion, 75 mg/m2 = 138 mg, Intravenous, Clinic/HOD 1 time, 3 of 4 cycles  Administration: 138 mg (5/12/2023), 138 mg (6/29/2023), 138 mg (6/8/2023)  PEMEtrexed disodium (ALIMTA) 900 mg in sodium chloride 0.9% SolP 100 mL chemo infusion, 925 mg, Intravenous, Clinic/HOD 1 time, 3 of 4 cycles  Dose modification: 375 mg/m2 (original dose 500 mg/m2, Cycle 3, Reason: MD Discretion, Comment: neutropenia ANC  280)  Administration: 900 mg (5/12/2023), 700 mg (6/29/2023), 700 mg (6/8/2023)     7/20/2023 -  Chemotherapy    Treatment Summary   Plan Name: OP ATEZOLIZUMAB Q3W  Treatment Goal: Supportive  Status: Future  Start Date: 7/20/2023 (Planned)  End Date: 6/20/2024 (Planned)  Provider: Marissa Brower MD  Chemotherapy: [No matching medication found in this treatment plan]     NSCLC of left lung (Resolved)   4/28/2021 Initial Diagnosis    NSCLC of left lung (Resolved)      Cancer Staged    9mm non-keratinizing squamous cell carcinoma, no VPI, no LVI, margin at least 8mm.  Levels 9 and 11 = negative.  T1aN0        Cancer Staging   Malignant neoplasm of lower lobe of left lung - Squamous cell  - Pathologic stage from 4/5/2023: Stage IIB (pT2, pN1, cM0) - Signed by Marissa Brower MD on 6/8/2023         HPI     Doing exceptionally well on treatment without toxicity fevers chills or other infectious symptoms.      Review of Systems    ?   A comprehensive 14-point review of systems was reviewed with patient and was negative other than as specified above.   ?   PAST MEDICAL HISTORY:   Past Medical History:   Diagnosis Date    COPD (chronic obstructive pulmonary disease) 2013    Eczema     GERD (gastroesophageal reflux disease)     Hiatal hernia     History of torn meniscus of right knee 01/2011    Hypothyroid     Incidental pulmonary nodule, > 3mm and < 8mm - Lingula 8/12/2021    Lung cancer     Urinary incontinence in female     Mild , only takes mediciane with travel    ?     PAST SURGICAL HISTORY:   Past Surgical History:   Procedure Laterality Date    FLUOROSCOPY N/A 4/27/2023    Procedure: Fluoroscopy/Mediport placement;  Surgeon: Kodak Retana MD;  Location: [facility] CATH LAB;  Service: General;  Laterality: N/A;    INJECTION OF ANESTHETIC AGENT AROUND MULTIPLE INTERCOSTAL NERVES Left 3/20/2023    Procedure: BLOCK, NERVE, INTERCOSTAL, 2 OR MORE;  Surgeon: Refugio Medley MD;  Location: [facility] OR [address] [place] [address];   Service: Thoracic;  Laterality: Left;    KNEE CARTILAGE SURGERY Right 01/2011    LAPAROSCOPIC LYSIS OF ADHESIONS Left 3/20/2023    Procedure: LYSIS, ADHESIONS, LAPAROSCOPIC;  Surgeon: Refugio Medley MD;  Location: [facility] OR 2ND FLR;  Service: Thoracic;  Laterality: Left;    ROBOT-ASSISTED LAPAROSCOPIC LYMPHADENECTOMY USING DA ROSEMARY XI Left 3/20/2023    Procedure: XI ROBOTIC LYMPHADENECTOMY;  Surgeon: Refugio Medley MD;  Location: [facility] OR 2ND FLR;  Service: Thoracic;  Laterality: Left;    THORACOSCOPIC WEDGE RESECTION OF LUNG Left 04/28/2021    Procedure: VATS, WITH WEDGE RESECTION, LUNG;  Surgeon: Clarke Sauceda MD;  Location: [facility] OR 2ND FLR;  Service: Thoracic;  Laterality: Left;  lymph node dissection     TUBAL LIGATION  1976    WEDGE RESECTION, LUNG, ROBOT-ASSISTED, USING VATS, USING DA ROSEMARY XI Left 3/20/2023    Procedure: XI ROBOTIC WEDGE RESECTION, LUNG (RATS); segmentectomy;  Surgeon: Refugio Medley MD;  Location: [facility] OR 2ND FLR;  Service: Thoracic;  Laterality: Left;      ?   ALLERGIES:   Allergies as of 07/20/2023    (No Known Allergies)      ?   MEDICATIONS:?   Outpatient Medications Marked as Taking for the 7/20/23 encounter (Office Visit) with Marissa Brower MD   Medication Sig Dispense Refill    albuterol (PROAIR HFA) 90 mcg/actuation inhaler Inhale 2 puffs into the lungs every 4 (four) hours as needed for Wheezing or Shortness of Breath. Rescue 54 g 4    calcium carbonate (OS-CYDNEY) 600 mg calcium (1,500 mg) Tab Take 600 mg by mouth.      dexAMETHasone (DECADRON) 4 MG Tab Take 2 tablets (8 mg total) by mouth once daily. on the day prior to chemotherapy then daily on days 2,3,4 of each chemotherapy cycle. 24 tablet 3    diphenhydrAMINE-aluminum-magnesium hydroxide-simethicone-LIDOcaine HCl 2% Swish and spit 15 mLs every 4 (four) hours as needed. 540 each 2    DUPIXENT  mg/2 mL PnIj Inject into the skin every 14 (fourteen) days.      folic acid (FOLVITE) 1 MG tablet Take 1  tablet (1 mg total) by mouth once daily. starting 1 week prior to pemetrexed and continuing for 21 days after stopping pemetrexed 30 tablet 5    levothyroxine (SYNTHROID) 75 MCG tablet Take 75 mcg by mouth once daily.      loratadine (CLARITIN) 10 mg tablet Take 10 mg by mouth once daily.      OLANZapine (ZYPREXA) 5 MG tablet Take 1 tablet (5 mg total) by mouth every evening. Take as directed on days 1-4 of your chemotherapy cycle. 16 tablet 0    omeprazole (PRILOSEC) 20 MG capsule Take 20 mg by mouth once daily.      ondansetron (ZOFRAN-ODT) 8 MG TbDL Take 1 tablet (8 mg total) by mouth every 8 (eight) hours as needed (nausea). 60 tablet 5    oxyCODONE (ROXICODONE) 5 MG immediate release tablet Take 1 tablet (5 mg total) by mouth every 4 (four) hours as needed for Pain. 41 tablet 0    umeclidinium-vilanteroL (ANORO ELLIPTA) 62.5-25 mcg/actuation DsDv USE 1 INHALATION DAILY (CONTROLLER) 180 each 3      ?   SOCIAL HISTORY:?   Social History     Tobacco Use    Smoking status: Former     Packs/day: 1.50     Years: 45.00     Pack years: 67.50     Types: Cigarettes     Start date:      Quit date:      Years since quittin.5    Smokeless tobacco: Never   Substance Use Topics    Alcohol use: No     Alcohol/week: 0.0 standard drinks      ?      ?   FAMILY HISTORY:   family history includes Cancer in her brother, father, and sister; Heart failure in her mother; Hypertension in her father and mother.   ?        Objective:      Physical Exam      ?   Vitals:    23 0800   BP: 121/70   Pulse: 85   Resp: 18   Temp: 97.6 °F (36.4 °C)        ?   ECOG:?0   General appearance: Generally well appearing, in no acute distress.   Head, eyes, ears, nose, and throat: moist mucous membranes.   Respiratory:  Normal work of breathing  Abdomen: nontender, nondistended.   Extremities: Warm, without edema.    Neurologic: Alert and oriented. Grossly normal strength, coordination, and gait.   Skin: No rashes, ecchymoses or  petechial lesion.   Psychiatric:  Normal mood and affect.    ?   Laboratory:  ?   Lab Visit on 07/20/2023   Component Date Value Ref Range Status    WBC 07/20/2023 2.93 (L)  3.90 - 12.70 K/uL Final    RBC 07/20/2023 2.93 (L)  4.00 - 5.40 M/uL Final    Hemoglobin 07/20/2023 8.7 (L)  12.0 - 16.0 g/dL Final    Hematocrit 07/20/2023 27.8 (L)  37.0 - 48.5 % Final    MCV 07/20/2023 95  82 - 98 fL Final    MCH 07/20/2023 29.7  27.0 - 31.0 pg Final    MCHC 07/20/2023 31.3 (L)  32.0 - 36.0 g/dL Final    RDW 07/20/2023 19.1 (H)  11.5 - 14.5 % Final    Platelets 07/20/2023 222  150 - 450 K/uL Final    MPV 07/20/2023 9.5  9.2 - 12.9 fL Final    Immature Granulocytes 07/20/2023 1.0 (H)  0.0 - 0.5 % Final    Gran # (ANC) 07/20/2023 1.6 (L)  1.8 - 7.7 K/uL Final    Immature Grans (Abs) 07/20/2023 0.03  0.00 - 0.04 K/uL Final    Lymph # 07/20/2023 0.8 (L)  1.0 - 4.8 K/uL Final    Mono # 07/20/2023 0.6  0.3 - 1.0 K/uL Final    Eos # 07/20/2023 0.0  0.0 - 0.5 K/uL Final    Baso # 07/20/2023 0.00  0.00 - 0.20 K/uL Final    nRBC 07/20/2023 0  0 /100 WBC Final    Gran % 07/20/2023 53.3  38.0 - 73.0 % Final    Lymph % 07/20/2023 25.9  18.0 - 48.0 % Final    Mono % 07/20/2023 19.8 (H)  4.0 - 15.0 % Final    Eosinophil % 07/20/2023 0.0  0.0 - 8.0 % Final    Basophil % 07/20/2023 0.0  0.0 - 1.9 % Final    Differential Method 07/20/2023 Automated   Final    Sodium 07/20/2023 144  136 - 145 mmol/L Final    Potassium 07/20/2023 3.5  3.5 - 5.1 mmol/L Final    Chloride 07/20/2023 106  95 - 110 mmol/L Final    CO2 07/20/2023 28  23 - 29 mmol/L Final    Glucose 07/20/2023 122 (H)  70 - 110 mg/dL Final    BUN 07/20/2023 11  8 - 23 mg/dL Final    Creatinine 07/20/2023 0.9  0.5 - 1.4 mg/dL Final    Calcium 07/20/2023 9.3  8.7 - 10.5 mg/dL Final    Total Protein 07/20/2023 6.3  6.0 - 8.4 g/dL Final    Albumin 07/20/2023 3.6  3.5 - 5.2 g/dL Final    Total Bilirubin 07/20/2023 0.4  0.1 - 1.0 mg/dL Final    Alkaline Phosphatase 07/20/2023 79  55 - 135  U/L Final    AST 07/20/2023 20  10 - 40 U/L Final    ALT 07/20/2023 5 (L)  10 - 44 U/L Final    eGFR 07/20/2023 >60  >60 mL/min/1.73 m^2 Final    Anion Gap 07/20/2023 10  8 - 16 mmol/L Final    Magnesium 07/20/2023 1.6  1.6 - 2.6 mg/dL Final      ?     ?   Imaging:  ?    No results found for this or any previous visit (from the past 2160 hour(s)).    Results for orders placed or performed during the hospital encounter of 05/03/23 (from the past 2160 hour(s))   MRI Brain W WO Contrast    Impression    No evidence of intracranial metastatic disease.  No acute findings or abnormal enhancement.      Electronically signed by: Humphrey Martin  Date:    05/03/2023  Time:    15:51     Results for orders placed or performed during the hospital encounter of 05/04/23 (from the past 2160 hour(s))   NM PET CT Routine FDG    Impression    Postoperative changes left lung with a very small loculated hydropneumothorax in the upper left hemithorax (mainly fluid with minimal air).    -Small approximate 1 cm hypermetabolic node at the left hilum adjacent to staple line.  Subcentimeter minimally hypermetabolic nearby AP window lymph node, increased from prior.  These could be inflammatory in nature though close follow-up is advised particularly for the left hilar hypermetabolic focus.    The examination elsewhere is unremarkable with no additional foci of abnormal uptake.      Electronically signed by: Onel Valdez MD  Date:    05/04/2023  Time:    14:29         Pathology:    Reviewed in epic     ?   Assessment/Plan:   Malignant neoplasm of unspecified part of unspecified bronchus or lung  -     NM PET CT Routine FDG; Future; Expected date: 07/20/2023    Other orders  -     cyanocobalamin injection 1,000 mcg  -     sodium chloride 0.9% bolus 1,000 mL 1,000 mL  -     aprepitant (CINVANTI) injection 130 mg  -     palonosetron (ALOXI) 0.25 mg with Dexamethasone (DECADRON) 12 mg in NS 50 mL IVPB  -     PEMEtrexed disodium (ALIMTA) 700 mg  in sodium chloride 0.9% SolP 100 mL chemo infusion  -     CISplatin (Platinol) 75 mg/m2 = 138 mg in sodium chloride 0.9% 638 mL chemo infusion  -     sodium chloride 0.9% 1,000 mL with magnesium sulfate 1 g, potassium chloride 20 mEq infusion  -     sodium chloride 0.9% 250 mL flush bag  -     sodium chloride 0.9% flush 10 mL  -     heparin, porcine (PF) 100 unit/mL injection flush 500 Units  -     alteplase injection 2 mg  -     pegfilgrastim-cbqv (UDENYCA) injection 6 mg  -     sodium chloride 0.9% bolus 1,000 mL 1,000 mL  -     sodium chloride 0.9% flush 10 mL  -     heparin, porcine (PF) 100 unit/mL injection flush 500 Units  -     alteplase injection 2 mg             1. 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 Initial biopsy February 20, 2023 without neoplasm identified who after multiple disciplinary discussion proceeded with left lower lobe wedge resection, final pathology positive for 03/28/2022 for invasive moderately differentiated adenocarcinoma station 10 lymph node positive, pleural invasion noted, margins negative.    Recommended staging with MRI brain and PET now 1 month out from surgery MRI brain negative PET scan with avidity left hilar SUV 4 and chest wall.  Given recent surgery potential inflammation presented at tumor board recommended proceeding with adjuvant chemotherapy and follow-up on repeat imaging.  We discussed indication for adjuvant therapy including chemotherapy and immunotherapy per IMPOWER 010 showed  benefit to adjuvant atezolizumab following chemotherapy given PDL1 positive disease 95%.  Mutational analysis negative for EGFR mutation.  Will reschedule if appropriate cisplatin and pemetrexed with adjuvant supplemental folic acid and vitamin B12.      Last week 1st cycle adjuvant chemotherapy tolerated well aside from brief episode black spots and 1 self isolated recurrence while at home without other neurologic symptoms.  Cycle 2 delay with chemotherapy-induced neutropenia with growth factor resolution following doses had persistent mild elevated WBC in absence of infectious symptoms suspect growth factor.  Growth factor held with cycle 3.  Labs prior to cycle 4 with chemotherapy-induced neutropenia ANC 1.6.  Will proceed with growth factor this cycle.  Infectious precautions discussed.  Mild progression anemia consistent with chemotherapy effect.  Will proceed with treatment and monitor with restaging PET following this cycle in consideration for proceeding with maintenance atezolizumab.      Advance Care Planning   Stage II malignancy asymptomatic will defer palliative care consultation at this time.       Follow-Up:   Route Chart for Scheduling    Med Onc Chart Routing      Follow up with physician 3 weeks. after PET   Follow up with [facility]    Infusion scheduling note   atezo maintenance   Injection scheduling note    Labs CBC, CMP and TSH   Scheduling:  Preferred lab:  Lab interval:     Imaging PET scan      Pharmacy appointment    Other referrals        Treatment Plan Information   OP NSCLC PEMETREXED + CISPLATIN Q3W   Marissa Brower MD   Upcoming Treatment Dates - OP NSCLC PEMETREXED + CISPLATIN Q3W    7/20/2023       Chemotherapy       PEMEtrexed disodium (ALIMTA) 700 mg in sodium chloride 0.9% SolP 100 mL chemo infusion       CISplatin (Platinol) 75 mg/m2 = 138 mg in sodium chloride 0.9% 638 mL chemo infusion       Supportive Care       cyanocobalamin injection 1,000 mcg       Pre-Hydration        sodium chloride 0.9% bolus 1,000 mL 1,000 mL       Post-Hydration       sodium chloride 0.9% 1,000 mL with magnesium sulfate 1 g, potassium chloride 20 mEq infusion       Antiemetics       aprepitant (CINVANTI) injection 130 mg       palonosetron (ALOXI) 0.25 mg with Dexamethasone (DECADRON) 12 mg in NS 50 mL IVPB  7/21/2023       Growth Factor       pegfilgrastim-cbqv (UDENYCA) injection 6 mg

## 2023-07-20 NOTE — DISCHARGE INSTRUCTIONS
.[facility]  [address] [facility]  [address]  [phone] phone     [phone] fax  Hours of Operation: Monday- Friday 8:00am- 5:00pm  After hours phone  [phone]  Hematology / Oncology Physicians on call    Dr. Feliberto Adkins      Nurse Practitioners:    Alejandra Lopez, NP  Kaitlin Bradley, GINNA Mojica, GINNA Taylor, GINNA Curtis, GINNA Pham, PA      Please don't hesitate to call if you have any concerns.     .WAYS TO HELP PREVENT INFECTION        WASH YOUR HANDS OFTEN DURING THE DAY, ESPECIALLY BEFORE YOU EAT, AFTER USING THE BATHROOM, AND AFTER TOUCHING ANIMALS    STAY AWAY FROM PEOPLE WHO HAVE ILLNESSES YOU CAN CATCH; SUCH AS COLDS, FLU, CHICKEN POX    TRY TO AVOID CROWDS    STAY AWAY FROM CHILDREN WHO RECENTLY HAVE RECEIVED LIVE VIRUS VACCINES    MAINTAIN GOOD MOUTH CARE    DO NOT SQUEEZE OR SCRATCH PIMPLES    CLEAN CUTS & SCRAPES RIGHT AWAY AND DAILY UNTIL HEALED WITH WARM WATER, SOAP & AN ANTISEPTIC    AVOID CONTACT WITH LITTER BOXES, BIRD CAGES, & FISH TANKS    AVOID STANDING WATER, IE., BIRD BATHS, FLOWER POTS/VASES, OR HUMIDIFIERS    WEAR GLOVES WHEN GARDENING OR CLEANING UP AFTER OTHERS, ESPECIALLY BABIES & SMALL CHILDREN    DO NOT EAT RAW FISH, SEAFOOD, MEAT, OR EGGS     .FALL PREVENTION   Falls often occur due to slipping, tripping or losing your balance. Here are ways to reduce your risk of falling again.   Was there anything that caused your fall that can be fixed, removed or replaced?   Make your home safe by keeping walkways clear of objects you may trip over.   Use non-slip pads under rugs.   Do not walk in poorly lit areas.   Do not stand on chairs or wobbly ladders.   Use caution when reaching overhead or looking upward. This position can cause a loss of balance.   Be sure your shoes fit properly, have non-slip bottoms and are in good condition.   Be cautious when going  up and down stairs, curbs, and when walking on uneven sidewalks.   If your balance is poor, consider using a cane or walker.   If your fall was related to alcohol use, stop or limit alcohol intake.   If your fall was related to use of sleeping medicines, talk to your doctor about this. You may need to reduce your dosage at bedtime if you awaken during the night to go to the bathroom.   To reduce the need for nighttime bathroom trips:   Avoid drinking fluids for several hours before going to bed   Empty your bladder before going to bed   Men can keep a urinal at the bedside   © 1555-3123 [facility], [address] [place] [address], [place] [address]. All rights reserved. This information is not intended as a substitute for professional medical care. Always follow your healthcare professional's instructions.

## 2023-07-20 NOTE — PLAN OF CARE
Patient reports blurred vision at time but MD is aware. Patient otherwise doing well today. Tolerated infusion well with no adverse reactions. Patient to return tomorrow for IVFs and udenyca injection.

## 2023-07-21 ENCOUNTER — INFUSION (OUTPATIENT)
Dept: INFUSION THERAPY | Facility: HOSPITAL | Age: 73
End: 2023-07-21
Attending: INTERNAL MEDICINE
Payer: MEDICARE

## 2023-07-21 VITALS
TEMPERATURE: 97 F | RESPIRATION RATE: 16 BRPM | DIASTOLIC BLOOD PRESSURE: 73 MMHG | OXYGEN SATURATION: 99 % | SYSTOLIC BLOOD PRESSURE: 133 MMHG | HEART RATE: 49 BPM

## 2023-07-21 DIAGNOSIS — C34.32 MALIGNANT NEOPLASM OF LOWER LOBE OF LEFT LUNG: Primary | ICD-10-CM

## 2023-07-21 PROCEDURE — 96372 THER/PROPH/DIAG INJ SC/IM: CPT

## 2023-07-21 PROCEDURE — 25000003 PHARM REV CODE 250: Performed by: INTERNAL MEDICINE

## 2023-07-21 PROCEDURE — 63600175 PHARM REV CODE 636 W HCPCS: Mod: JZ,JG | Performed by: INTERNAL MEDICINE

## 2023-07-21 PROCEDURE — 96360 HYDRATION IV INFUSION INIT: CPT

## 2023-07-21 PROCEDURE — 96361 HYDRATE IV INFUSION ADD-ON: CPT

## 2023-07-21 RX ORDER — HEPARIN 100 UNIT/ML
500 SYRINGE INTRAVENOUS
Status: DISCONTINUED | OUTPATIENT
Start: 2023-07-21 | End: 2023-07-21 | Stop reason: HOSPADM

## 2023-07-21 RX ADMIN — SODIUM CHLORIDE 1000 ML: 9 INJECTION, SOLUTION INTRAVENOUS at 10:07

## 2023-07-21 RX ADMIN — PEGFILGRASTIM-CBQV 6 MG: 6 INJECTION, SOLUTION SUBCUTANEOUS at 11:07

## 2023-07-21 RX ADMIN — HEPARIN 500 UNITS: 100 SYRINGE at 11:07

## 2023-07-21 NOTE — PLAN OF CARE
Problem: Adult Inpatient Plan of Care  Goal: Plan of Care Review  Outcome: Ongoing, Progressing  Flowsheets (Taken 7/21/2023 1022)  Plan of Care Reviewed With: patient  Goal: Patient-Specific Goal (Individualized)  Outcome: Ongoing, Progressing  Flowsheets (Taken 7/21/2023 1022)  Anxieties, Fears or Concerns: none  Individualized Care Needs: feet up, blanket, pillow  Goal: Optimal Comfort and Wellbeing  Outcome: Ongoing, Progressing  Intervention: Provide Person-Centered Care  Flowsheets (Taken 7/21/2023 1022)  Trust Relationship/Rapport:   care explained   questions encouraged   choices provided   reassurance provided   emotional support provided   thoughts/feelings acknowledged   empathic listening provided   questions answered     Problem: Fall Injury Risk  Goal: Absence of Fall and Fall-Related Injury  Outcome: Ongoing, Progressing  Intervention: Identify and Manage Contributors  Flowsheets (Taken 7/21/2023 1022)  Self-Care Promotion: BADL personal routines maintained  Medication Review/Management: medications reviewed  Intervention: Promote Injury-Free Environment  Flowsheets (Taken 7/21/2023 1022)  Safety Promotion/Fall Prevention:   in recliner, wheels locked   nonskid shoes/socks when out of bed

## 2023-07-21 NOTE — DISCHARGE INSTRUCTIONS
THANKS FOR ALLOWING ME TO CARE FOR YOU TODAY!!! ~Bella          THANKS FOR CHOOSING [facility]!!!      [facility]  [address] [facility]  [id number] [place] [address] Drive  [phone] phone     [phone] fax  Hours of Operation: Monday- Friday 8:00am- 5:00pm  After hours phone  [phone]  Hematology / Oncology Physicians on call      FOUZIA Hebert Dr., Dr., NP Sydney Prescott, GINNA Taylor, MAGGIE Marina    Please call with any concerns regarding your appointment today.

## 2023-08-07 ENCOUNTER — HOSPITAL ENCOUNTER (OUTPATIENT)
Dept: RADIOLOGY | Facility: HOSPITAL | Age: 73
Discharge: HOME | End: 2023-08-07
Attending: INTERNAL MEDICINE
Payer: MEDICARE

## 2023-08-07 ENCOUNTER — INFUSION (OUTPATIENT)
Dept: INFUSION THERAPY | Facility: HOSPITAL | Age: 73
End: 2023-08-07
Attending: INTERNAL MEDICINE
Payer: MEDICARE

## 2023-08-07 VITALS
HEART RATE: 90 BPM | DIASTOLIC BLOOD PRESSURE: 73 MMHG | RESPIRATION RATE: 16 BRPM | SYSTOLIC BLOOD PRESSURE: 120 MMHG | TEMPERATURE: 97 F | OXYGEN SATURATION: 99 %

## 2023-08-07 DIAGNOSIS — C34.32 MALIGNANT NEOPLASM OF LOWER LOBE OF LEFT LUNG: Primary | ICD-10-CM

## 2023-08-07 DIAGNOSIS — C34.90 MALIGNANT NEOPLASM OF UNSPECIFIED PART OF UNSPECIFIED BRONCHUS OR LUNG: ICD-10-CM

## 2023-08-07 PROCEDURE — 25000003 PHARM REV CODE 250: Performed by: INTERNAL MEDICINE

## 2023-08-07 PROCEDURE — 78815 PET IMAGE W/CT SKULL-THIGH: CPT | Mod: 26,PS,, | Performed by: RADIOLOGY

## 2023-08-07 PROCEDURE — A9552 F18 FDG: HCPCS

## 2023-08-07 PROCEDURE — A4216 STERILE WATER/SALINE, 10 ML: HCPCS | Performed by: INTERNAL MEDICINE

## 2023-08-07 PROCEDURE — 78815 NM PET CT ROUTINE: ICD-10-PCS | Mod: 26,PS,, | Performed by: RADIOLOGY

## 2023-08-07 PROCEDURE — 96523 IRRIG DRUG DELIVERY DEVICE: CPT

## 2023-08-07 PROCEDURE — 63600175 PHARM REV CODE 636 W HCPCS: Performed by: INTERNAL MEDICINE

## 2023-08-07 RX ORDER — SODIUM CHLORIDE 0.9 % (FLUSH) 0.9 %
10 SYRINGE (ML) INJECTION
OUTPATIENT
Start: 2023-08-07

## 2023-08-07 RX ORDER — SODIUM CHLORIDE 0.9 % (FLUSH) 0.9 %
10 SYRINGE (ML) INJECTION
Status: DISCONTINUED | OUTPATIENT
Start: 2023-08-07 | End: 2023-08-07 | Stop reason: HOSPADM

## 2023-08-07 RX ORDER — HEPARIN 100 UNIT/ML
500 SYRINGE INTRAVENOUS
OUTPATIENT
Start: 2023-08-07

## 2023-08-07 RX ORDER — HEPARIN 100 UNIT/ML
500 SYRINGE INTRAVENOUS
Status: DISCONTINUED | OUTPATIENT
Start: 2023-08-07 | End: 2023-08-07 | Stop reason: HOSPADM

## 2023-08-07 RX ADMIN — HEPARIN 500 UNITS: 100 SYRINGE at 09:08

## 2023-08-07 RX ADMIN — Medication 10 ML: at 09:08

## 2023-08-07 NOTE — NURSING
"Pt here for Mediport Flush. __r____ chestwall mediport accessed with a 20g 1" luna via sterile technique.  Excellent blood return noted.  Flushed with 10ml NS and 5 ml heparin solution.  Needle D/C, site without redness, swelling, or drainage noted.  Dressing applied.  Patient tolerated well.  Patient to return to clinic on 8/10_____           "

## 2023-08-10 ENCOUNTER — LAB VISIT (OUTPATIENT)
Dept: LAB | Facility: HOSPITAL | Age: 73
End: 2023-08-10
Attending: INTERNAL MEDICINE
Payer: MEDICARE

## 2023-08-10 ENCOUNTER — OFFICE VISIT (OUTPATIENT)
Dept: HEMATOLOGY/ONCOLOGY | Facility: CLINIC | Age: 73
End: 2023-08-10
Payer: MEDICARE

## 2023-08-10 VITALS
TEMPERATURE: 97 F | OXYGEN SATURATION: 98 % | BODY MASS INDEX: 27.63 KG/M2 | RESPIRATION RATE: 18 BRPM | DIASTOLIC BLOOD PRESSURE: 75 MMHG | WEIGHT: 165.81 LBS | HEIGHT: 65 IN | HEART RATE: 80 BPM | SYSTOLIC BLOOD PRESSURE: 127 MMHG

## 2023-08-10 DIAGNOSIS — C34.90 MALIGNANT NEOPLASM OF UNSPECIFIED PART OF UNSPECIFIED BRONCHUS OR LUNG: ICD-10-CM

## 2023-08-10 DIAGNOSIS — C34.90 MALIGNANT NEOPLASM OF UNSPECIFIED PART OF UNSPECIFIED BRONCHUS OR LUNG: Primary | ICD-10-CM

## 2023-08-10 DIAGNOSIS — E83.42 HYPOMAGNESEMIA: ICD-10-CM

## 2023-08-10 DIAGNOSIS — D70.1 CHEMOTHERAPY INDUCED NEUTROPENIA: ICD-10-CM

## 2023-08-10 DIAGNOSIS — E87.6 HYPOKALEMIA: ICD-10-CM

## 2023-08-10 DIAGNOSIS — Z09 ENCOUNTER FOR FOLLOW-UP EXAMINATION AFTER COMPLETED TREATMENT FOR CONDITIONS OTHER THAN MALIGNANT NEOPLASM: ICD-10-CM

## 2023-08-10 DIAGNOSIS — T45.1X5A CHEMOTHERAPY INDUCED NEUTROPENIA: ICD-10-CM

## 2023-08-10 LAB
ALBUMIN SERPL BCP-MCNC: 3.6 G/DL (ref 3.5–5.2)
ALP SERPL-CCNC: 92 U/L (ref 55–135)
ALT SERPL W/O P-5'-P-CCNC: <5 U/L (ref 10–44)
ANION GAP SERPL CALC-SCNC: 12 MMOL/L (ref 8–16)
ANISOCYTOSIS BLD QL SMEAR: SLIGHT
AST SERPL-CCNC: 21 U/L (ref 10–40)
BASOPHILS NFR BLD: 0 % (ref 0–1.9)
BILIRUB SERPL-MCNC: 0.4 MG/DL (ref 0.1–1)
BUN SERPL-MCNC: 9 MG/DL (ref 8–23)
BURR CELLS BLD QL SMEAR: ABNORMAL
CALCIUM SERPL-MCNC: 8.8 MG/DL (ref 8.7–10.5)
CHLORIDE SERPL-SCNC: 104 MMOL/L (ref 95–110)
CO2 SERPL-SCNC: 29 MMOL/L (ref 23–29)
CREAT SERPL-MCNC: 1.2 MG/DL (ref 0.5–1.4)
DIFFERENTIAL METHOD: ABNORMAL
EOSINOPHIL NFR BLD: 3 % (ref 0–8)
ERYTHROCYTE [DISTWIDTH] IN BLOOD BY AUTOMATED COUNT: 18.3 % (ref 11.5–14.5)
EST. GFR  (NO RACE VARIABLE): 47 ML/MIN/1.73 M^2
FERRITIN SERPL-MCNC: 496 NG/ML (ref 20–300)
GLUCOSE SERPL-MCNC: 96 MG/DL (ref 70–110)
HCT VFR BLD AUTO: 24.2 % (ref 37–48.5)
HGB BLD-MCNC: 7.9 G/DL (ref 12–16)
IMM GRANULOCYTES # BLD AUTO: ABNORMAL K/UL (ref 0–0.04)
IMM GRANULOCYTES NFR BLD AUTO: ABNORMAL % (ref 0–0.5)
IRON SERPL-MCNC: 84 UG/DL (ref 30–160)
LYMPHOCYTES NFR BLD: 20 % (ref 18–48)
MAGNESIUM SERPL-MCNC: 1.1 MG/DL (ref 1.6–2.6)
MCH RBC QN AUTO: 32.5 PG (ref 27–31)
MCHC RBC AUTO-ENTMCNC: 32.6 G/DL (ref 32–36)
MCV RBC AUTO: 100 FL (ref 82–98)
MONOCYTES NFR BLD: 12 % (ref 4–15)
NEUTROPHILS NFR BLD: 61 % (ref 38–73)
NEUTS BAND NFR BLD MANUAL: 4 %
NRBC BLD-RTO: 0 /100 WBC
PLATELET # BLD AUTO: 183 K/UL (ref 150–450)
PLATELET BLD QL SMEAR: ABNORMAL
PMV BLD AUTO: 9.5 FL (ref 9.2–12.9)
POTASSIUM SERPL-SCNC: 3.2 MMOL/L (ref 3.5–5.1)
PROT SERPL-MCNC: 6.4 G/DL (ref 6–8.4)
RBC # BLD AUTO: 2.43 M/UL (ref 4–5.4)
SATURATED IRON: 25 % (ref 20–50)
SODIUM SERPL-SCNC: 145 MMOL/L (ref 136–145)
STOMATOCYTES BLD QL SMEAR: PRESENT
TOTAL IRON BINDING CAPACITY: 337 UG/DL (ref 250–450)
TRANSFERRIN SERPL-MCNC: 228 MG/DL (ref 200–375)
TSH SERPL DL<=0.005 MIU/L-ACNC: 2.28 UIU/ML (ref 0.4–4)
WBC # BLD AUTO: 5.36 K/UL (ref 3.9–12.7)

## 2023-08-10 PROCEDURE — 3288F PR FALLS RISK ASSESSMENT DOCUMENTED: ICD-10-PCS | Mod: CPTII,S$GLB,, | Performed by: INTERNAL MEDICINE

## 2023-08-10 PROCEDURE — 85007 BL SMEAR W/DIFF WBC COUNT: CPT | Performed by: INTERNAL MEDICINE

## 2023-08-10 PROCEDURE — 80053 COMPREHEN METABOLIC PANEL: CPT | Performed by: INTERNAL MEDICINE

## 2023-08-10 PROCEDURE — 84466 ASSAY OF TRANSFERRIN: CPT | Performed by: INTERNAL MEDICINE

## 2023-08-10 PROCEDURE — 1101F PR PT FALLS ASSESS DOC 0-1 FALLS W/OUT INJ PAST YR: ICD-10-PCS | Mod: CPTII,S$GLB,, | Performed by: INTERNAL MEDICINE

## 2023-08-10 PROCEDURE — 3074F SYST BP LT 130 MM HG: CPT | Mod: CPTII,S$GLB,, | Performed by: INTERNAL MEDICINE

## 2023-08-10 PROCEDURE — 83735 ASSAY OF MAGNESIUM: CPT | Performed by: INTERNAL MEDICINE

## 2023-08-10 PROCEDURE — 99999 PR PBB SHADOW E&M-EST. PATIENT-LVL IV: ICD-10-PCS | Mod: PBBFAC,,, | Performed by: INTERNAL MEDICINE

## 2023-08-10 PROCEDURE — 82728 ASSAY OF FERRITIN: CPT | Performed by: INTERNAL MEDICINE

## 2023-08-10 PROCEDURE — 84443 ASSAY THYROID STIM HORMONE: CPT | Performed by: INTERNAL MEDICINE

## 2023-08-10 PROCEDURE — 1126F PR PAIN SEVERITY QUANTIFIED, NO PAIN PRESENT: ICD-10-PCS | Mod: CPTII,S$GLB,, | Performed by: INTERNAL MEDICINE

## 2023-08-10 PROCEDURE — 99215 OFFICE O/P EST HI 40 MIN: CPT | Mod: S$GLB,,, | Performed by: INTERNAL MEDICINE

## 2023-08-10 PROCEDURE — 36415 COLL VENOUS BLD VENIPUNCTURE: CPT | Performed by: INTERNAL MEDICINE

## 2023-08-10 PROCEDURE — 1159F MED LIST DOCD IN RCRD: CPT | Mod: CPTII,S$GLB,, | Performed by: INTERNAL MEDICINE

## 2023-08-10 PROCEDURE — 99999 PR PBB SHADOW E&M-EST. PATIENT-LVL IV: CPT | Mod: PBBFAC,,, | Performed by: INTERNAL MEDICINE

## 2023-08-10 PROCEDURE — 99215 PR OFFICE/OUTPT VISIT, EST, LEVL V, 40-54 MIN: ICD-10-PCS | Mod: S$GLB,,, | Performed by: INTERNAL MEDICINE

## 2023-08-10 PROCEDURE — 3078F DIAST BP <80 MM HG: CPT | Mod: CPTII,S$GLB,, | Performed by: INTERNAL MEDICINE

## 2023-08-10 PROCEDURE — 3288F FALL RISK ASSESSMENT DOCD: CPT | Mod: CPTII,S$GLB,, | Performed by: INTERNAL MEDICINE

## 2023-08-10 PROCEDURE — 1101F PT FALLS ASSESS-DOCD LE1/YR: CPT | Mod: CPTII,S$GLB,, | Performed by: INTERNAL MEDICINE

## 2023-08-10 PROCEDURE — 3078F PR MOST RECENT DIASTOLIC BLOOD PRESSURE < 80 MM HG: ICD-10-PCS | Mod: CPTII,S$GLB,, | Performed by: INTERNAL MEDICINE

## 2023-08-10 PROCEDURE — 85027 COMPLETE CBC AUTOMATED: CPT | Performed by: INTERNAL MEDICINE

## 2023-08-10 PROCEDURE — 3074F PR MOST RECENT SYSTOLIC BLOOD PRESSURE < 130 MM HG: ICD-10-PCS | Mod: CPTII,S$GLB,, | Performed by: INTERNAL MEDICINE

## 2023-08-10 PROCEDURE — 1159F PR MEDICATION LIST DOCUMENTED IN MEDICAL RECORD: ICD-10-PCS | Mod: CPTII,S$GLB,, | Performed by: INTERNAL MEDICINE

## 2023-08-10 PROCEDURE — 1126F AMNT PAIN NOTED NONE PRSNT: CPT | Mod: CPTII,S$GLB,, | Performed by: INTERNAL MEDICINE

## 2023-08-10 RX ORDER — POTASSIUM CHLORIDE 20 MEQ/1
20 TABLET, EXTENDED RELEASE ORAL DAILY
Qty: 7 TABLET | Refills: 1 | Status: SHIPPED | OUTPATIENT
Start: 2023-08-10 | End: 2024-02-27

## 2023-08-10 RX ORDER — LANOLIN ALCOHOL/MO/W.PET/CERES
400 CREAM (GRAM) TOPICAL DAILY
Qty: 7 TABLET | Refills: 0 | Status: SHIPPED | OUTPATIENT
Start: 2023-08-10

## 2023-08-10 RX ORDER — DIPHENHYDRAMINE HYDROCHLORIDE 50 MG/ML
50 INJECTION INTRAMUSCULAR; INTRAVENOUS ONCE AS NEEDED
Status: CANCELLED | OUTPATIENT
Start: 2023-08-10

## 2023-08-10 RX ORDER — HEPARIN 100 UNIT/ML
500 SYRINGE INTRAVENOUS
Status: CANCELLED | OUTPATIENT
Start: 2023-08-10

## 2023-08-10 RX ORDER — EPINEPHRINE 0.3 MG/.3ML
0.3 INJECTION SUBCUTANEOUS ONCE AS NEEDED
Status: CANCELLED | OUTPATIENT
Start: 2023-08-10

## 2023-08-10 RX ORDER — SODIUM CHLORIDE 0.9 % (FLUSH) 0.9 %
10 SYRINGE (ML) INJECTION
Status: CANCELLED | OUTPATIENT
Start: 2023-08-10

## 2023-08-10 NOTE — PROGRESS NOTES
Subjective:      DATE OF VISIT: 8/10/23     ?  Patient ID:?Radha Lamas is a 73 y.o. female.?? MR#: [id number]     PRIMARY ONCOLOGIST: Dr. Brower    ? Primary Care Providers:  Ab Fletcher MD, MD (General)     CHIEF COMPLAINT: ?? follow-up, review PET scan, maintenance immunotherapy  ?   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   CURRENT TREATMENT:  adjuvant cisplatin and pemetrexed q. 3 weeks followed by atezolizumab    PAST TREATMENT:  Wedge resection  ?   ONCOLOGIC HISTORY:   ?   Oncology History   Malignant neoplasm of lower lobe of left lung - Squamous cell   4/15/2021 Initial Diagnosis    Malignant neoplasm of lower lobe of left lung - Squamous cell     5/25/2021 Cancer Staged    Staging form: Lung, AJCC 8th Edition  - Clinical: Stage IA1 (cT1a, cN0, cM0)      Cancer Staged    9mm non-keratinizing squamous cell carcinoma, no VPI, no LVI, margin at least 8mm.  Levels 9 and 11 = negative.  T1aN0     4/5/2023 Cancer Staged    Staging form: Lung, AJCC 8th Edition  - Pathologic stage from 4/5/2023: Stage IIB (pT2, pN1, cM0)     5/1/2023 - 7/21/2023 Chemotherapy    Treatment Summary   Plan Name: OP NSCLC PEMETREXED + CISPLATIN Q3W  Treatment Goal: Supportive  Status: Inactive  Start Date: 5/1/2023  End Date: 7/21/2023  Provider: Marissa Brower MD  Chemotherapy: CISplatin (Platinol) 75 mg/m2 = 138 mg in sodium chloride 0.9% 665 mL chemo infusion, 75 mg/m2 = 138 mg, Intravenous, Clinic/HOD 1 time, 4 of 4 cycles  Administration: 138 mg (5/12/2023), 138 mg (6/29/2023), 138 mg (7/20/2023), 138 mg (6/8/2023)  PEMEtrexed disodium (ALIMTA) 900 mg in sodium chloride 0.9% SolP 100 mL chemo infusion, 925 mg, Intravenous, Clinic/HOD 1 time, 4 of 4 cycles  Dose modification: 375 mg/m2 (original dose 500 mg/m2, Cycle 3, Reason: MD  Discretion, Comment: neutropenia )  Administration: 900 mg (5/12/2023), 700 mg (6/29/2023), 700 mg (7/20/2023), 700 mg (6/8/2023)     8/10/2023 -  Chemotherapy    Treatment Summary   Plan Name: OP ATEZOLIZUMAB Q3W  Treatment Goal: Supportive  Status: Active  Start Date: 8/10/2023 (Planned)  End Date: 7/11/2024 (Planned)  Provider: Marissa Brower MD  Chemotherapy: [No matching medication found in this treatment plan]     NSCLC of left lung (Resolved)   4/28/2021 Initial Diagnosis    NSCLC of left lung (Resolved)      Cancer Staged    9mm non-keratinizing squamous cell carcinoma, no VPI, no LVI, margin at least 8mm.  Levels 9 and 11 = negative.  T1aN0        Cancer Staging   Malignant neoplasm of lower lobe of left lung - Squamous cell  - Pathologic stage from 4/5/2023: Stage IIB (pT2, pN1, cM0) - Signed by Marissa Brower MD on 6/8/2023         HPI     Since her last chemotherapy session she is had progressive fatigue.  Graduation at [employer] planned tomorrow morning.  No fevers or chills.  She has had some constipation with chemotherapy.  He has been trying to stay well hydrated.  She is looking forward to graduation at [employer] tomorrow.  Review of Systems    ?   A comprehensive 14-point review of systems was reviewed with patient and was negative other than as specified above.   ?   PAST MEDICAL HISTORY:   Past Medical History:   Diagnosis Date    COPD (chronic obstructive pulmonary disease) 2013    Eczema     GERD (gastroesophageal reflux disease)     Hiatal hernia     History of torn meniscus of right knee 01/2011    Hypothyroid     Incidental pulmonary nodule, > 3mm and < 8mm - Lingula 8/12/2021    Lung cancer     Urinary incontinence in female     Mild , only takes mediciane with travel    ?     PAST SURGICAL HISTORY:   Past Surgical History:   Procedure Laterality Date    FLUOROSCOPY N/A 4/27/2023    Procedure: Fluoroscopy/Mediport placement;  Surgeon: Kodak Retana MD;  Location: [facility] CATH LAB;   Service: General;  Laterality: N/A;    INJECTION OF ANESTHETIC AGENT AROUND MULTIPLE INTERCOSTAL NERVES Left 3/20/2023    Procedure: BLOCK, NERVE, INTERCOSTAL, 2 OR MORE;  Surgeon: Refugio Medley MD;  Location: [facility] OR 2ND FLR;  Service: Thoracic;  Laterality: Left;    KNEE CARTILAGE SURGERY Right 01/2011    LAPAROSCOPIC LYSIS OF ADHESIONS Left 3/20/2023    Procedure: LYSIS, ADHESIONS, LAPAROSCOPIC;  Surgeon: Refugio Medley MD;  Location: [facility] OR 2ND FLR;  Service: Thoracic;  Laterality: Left;    ROBOT-ASSISTED LAPAROSCOPIC LYMPHADENECTOMY USING DA ROSEMARY XI Left 3/20/2023    Procedure: XI ROBOTIC LYMPHADENECTOMY;  Surgeon: Refugio Medley MD;  Location: [facility] OR 2ND FLR;  Service: Thoracic;  Laterality: Left;    THORACOSCOPIC WEDGE RESECTION OF LUNG Left 04/28/2021    Procedure: VATS, WITH WEDGE RESECTION, LUNG;  Surgeon: Clarke Sauceda MD;  Location: [facility] OR [facility] FLR;  Service: Thoracic;  Laterality: Left;  lymph node dissection     TUBAL LIGATION  1976    WEDGE RESECTION, LUNG, ROBOT-ASSISTED, USING VATS, USING DA ROSEMARY XI Left 3/20/2023    Procedure: XI ROBOTIC WEDGE RESECTION, LUNG (RATS); segmentectomy;  Surgeon: Refugio Medley MD;  Location: [facility] OR 2ND FLR;  Service: Thoracic;  Laterality: Left;      ?   ALLERGIES:   Allergies as of 08/10/2023    (No Known Allergies)      ?   MEDICATIONS:?   Outpatient Medications Marked as Taking for the 8/10/23 encounter (Office Visit) with Marissa Brower MD   Medication Sig Dispense Refill    albuterol (PROAIR HFA) 90 mcg/actuation inhaler Inhale 2 puffs into the lungs every 4 (four) hours as needed for Wheezing or Shortness of Breath. Rescue 54 g 4    calcium carbonate (OS-CYDNEY) 600 mg calcium (1,500 mg) Tab Take 600 mg by mouth.      dexAMETHasone (DECADRON) 4 MG Tab Take 2 tablets (8 mg total) by mouth once daily. on the day prior to chemotherapy then daily on days 2,3,4 of each chemotherapy cycle. 24 tablet 3     diphenhydrAMINE-aluminum-magnesium hydroxide-simethicone-LIDOcaine HCl 2% Swish and spit 15 mLs every 4 (four) hours as needed. 540 each 2    DUPIXENT  mg/2 mL PnIj Inject into the skin every 14 (fourteen) days.      folic acid (FOLVITE) 1 MG tablet Take 1 tablet (1 mg total) by mouth once daily. starting 1 week prior to pemetrexed and continuing for 21 days after stopping pemetrexed 30 tablet 5    levothyroxine (SYNTHROID) 75 MCG tablet Take 75 mcg by mouth once daily.      loratadine (CLARITIN) 10 mg tablet Take 10 mg by mouth once daily.      OLANZapine (ZYPREXA) 5 MG tablet Take 1 tablet (5 mg total) by mouth every evening. Take as directed on days 1-4 of your chemotherapy cycle. 16 tablet 0    omeprazole (PRILOSEC) 20 MG capsule Take 20 mg by mouth once daily.      ondansetron (ZOFRAN-ODT) 8 MG TbDL Take 1 tablet (8 mg total) by mouth every 8 (eight) hours as needed (nausea). 60 tablet 5    oxyCODONE (ROXICODONE) 5 MG immediate release tablet Take 1 tablet (5 mg total) by mouth every 4 (four) hours as needed for Pain. 41 tablet 0    umeclidinium-vilanteroL (ANORO ELLIPTA) 62.5-25 mcg/actuation DsDv USE 1 INHALATION DAILY (CONTROLLER) 180 each 3      ?   SOCIAL HISTORY:?   Social History     Tobacco Use    Smoking status: Former     Current packs/day: 0.00     Average packs/day: 1.5 packs/day for 45.0 years (67.5 ttl pk-yrs)     Types: Cigarettes     Start date:      Quit date:      Years since quittin.6    Smokeless tobacco: Never   Substance Use Topics    Alcohol use: No     Alcohol/week: 0.0 standard drinks of alcohol      ?      ?   FAMILY HISTORY:   family history includes Cancer in her brother, father, and sister; Heart failure in her mother; Hypertension in her father and mother.   ?        Objective:      Physical Exam      ?   Vitals:    08/10/23 0910   BP: 127/75   Pulse: 80   Resp: 18   Temp: 97.1 °F (36.2 °C)        ?   ECOG:?0   General appearance: Generally well appearing, in no  acute distress.   Head, eyes, ears, nose, and throat: moist mucous membranes.   Respiratory:  Normal work of breathing  Abdomen: nontender, nondistended.   Extremities: Warm, without edema.    Neurologic: Alert and oriented. Grossly normal strength, coordination, and gait.   Skin: No rashes, ecchymoses or petechial lesion.   Psychiatric:  Normal mood and affect.    ?   Laboratory:  ?   Lab Visit on 08/10/2023   Component Date Value Ref Range Status    WBC 08/10/2023 5.36  3.90 - 12.70 K/uL Final    RBC 08/10/2023 2.43 (L)  4.00 - 5.40 M/uL Final    Hemoglobin 08/10/2023 7.9 (L)  12.0 - 16.0 g/dL Final    Hematocrit 08/10/2023 24.2 (L)  37.0 - 48.5 % Final    MCV 08/10/2023 100 (H)  82 - 98 fL Final    MCH 08/10/2023 32.5 (H)  27.0 - 31.0 pg Final    MCHC 08/10/2023 32.6  32.0 - 36.0 g/dL Final    RDW 08/10/2023 18.3 (H)  11.5 - 14.5 % Final    Platelets 08/10/2023 183  150 - 450 K/uL Final    MPV 08/10/2023 9.5  9.2 - 12.9 fL Final    Immature Granulocytes 08/10/2023 CANCELED  0.0 - 0.5 % Final    Immature Grans (Abs) 08/10/2023 CANCELED  0.00 - 0.04 K/uL Final    nRBC 08/10/2023 0  0 /100 WBC Final    Gran % 08/10/2023 61.0  38.0 - 73.0 % Final    Lymph % 08/10/2023 20.0  18.0 - 48.0 % Final    Mono % 08/10/2023 12.0  4.0 - 15.0 % Final    Eosinophil % 08/10/2023 3.0  0.0 - 8.0 % Final    Basophil % 08/10/2023 0.0  0.0 - 1.9 % Final    Bands 08/10/2023 4.0  % Final    Platelet Estimate 08/10/2023 Appears normal   Final    [place] 08/10/2023 Slight   Final    [place] Cells 08/10/2023 Occasional   Final    Stomatocytes 08/10/2023 Present   Final    Differential Method 08/10/2023 Manual   Final    Sodium 08/10/2023 145  136 - 145 mmol/L Final    Potassium 08/10/2023 3.2 (L)  3.5 - 5.1 mmol/L Final    Chloride 08/10/2023 104  95 - 110 mmol/L Final    CO2 08/10/2023 29  23 - 29 mmol/L Final    Glucose 08/10/2023 96  70 - 110 mg/dL Final    BUN 08/10/2023 9  8 - 23 mg/dL Final    Creatinine 08/10/2023 1.2  0.5 - 1.4 mg/dL  Final    Calcium 08/10/2023 8.8  8.7 - 10.5 mg/dL Final    Total Protein 08/10/2023 6.4  6.0 - 8.4 g/dL Final    Albumin 08/10/2023 3.6  3.5 - 5.2 g/dL Final    Total Bilirubin 08/10/2023 0.4  0.1 - 1.0 mg/dL Final    Alkaline Phosphatase 08/10/2023 92  55 - 135 U/L Final    AST 08/10/2023 21  10 - 40 U/L Final    ALT 08/10/2023 <5 (L)  10 - 44 U/L Final    eGFR 08/10/2023 47 (A)  >60 mL/min/1.73 m^2 Final    Anion Gap 08/10/2023 12  8 - 16 mmol/L Final    Magnesium 08/10/2023 1.1 (L)  1.6 - 2.6 mg/dL Final      ?     ?   Imaging:  ?    No results found for this or any previous visit (from the past 2160 hour(s)).    No results found for this or any previous visit (from the past 2160 hour(s)).    Results for orders placed or performed during the hospital encounter of 08/07/23 (from the past 2160 hour(s))   NM PET CT Routine FDG    Impression    No concerning FDG avid lesion or detrimental change.  Resolution/improvement of previously noted chest findings as above.      Electronically signed by: Leo Acevedo MD  Date:    08/07/2023  Time:    10:20     NM PET CT ROUTINE     CLINICAL HISTORY:  Non-small cell lung cancer (NSCLC), metastatic, assess treatment response; Malignant neoplasm of unspecified part of unspecified bronchus or lung     TECHNIQUE:  12.13 mCi of F18-FDG was administered intravenously.  After an approximately 60 min distribution time, PET/CT images were acquired from the skull base to the mid thigh. Transmission images were acquired to correct for attenuation using a whole body low-dose CT scan without contrast.     COMPARISON:  PET-CT 03/29/2021 and 05/04/2023; CT chest 02/03/2023     FINDINGS:  Head/neck: Normal physiologic activity present.     Chest: No FDG avid axillary, mediastinal or hilar lymph nodes.  Previously described AP window and left upper lobe suture line findings have resolved.     Right lung unchanged.  Postsurgical section changes of the left lung demonstrate no detrimental  "change with areas of scarring/chronic atelectasis.  Left apical loculated pleural effusion has decreased in size with resolution of the previously noted pneumothorax component.     Abdomen/pelvis: Physiologic activity present without concerning FDG avid focus     Skeletal/subcutaneous structures: Suspicious FDG avid osseous lesion.     Impression:     No concerning FDG avid lesion or detrimental change.  Resolution/improvement of previously noted chest findings as above.        Electronically signed by: Leo Acevedo MD  Date:                                            08/07/2023  Time:                                           10:20  Note: Discussed with reading radiologist who had me know typo should read "no suspicious FDG avid osseous lesion" he notes addendum will be made.  Pathology:    Reviewed in epic     ?   Assessment/Plan:   Malignant neoplasm of unspecified part of unspecified bronchus or lung  -     Iron and TIBC; Future; Expected date: 08/10/2023  -     Ferritin; Future; Expected date: 08/10/2023    Chemotherapy induced neutropenia    Hypokalemia  -     potassium chloride SA (K-DUR,KLOR-CON) 20 MEQ tablet; Take 1 tablet (20 mEq total) by mouth once daily.  Dispense: 7 tablet; Refill: 1    Hypomagnesemia  -     magnesium oxide (MAG-OX) 400 mg (241.3 mg magnesium) tablet; Take 1 tablet (400 mg total) by mouth once daily.  Dispense: 7 tablet; Refill: 0    Other orders  -     atezolizumab (TECENTRIQ) 1,200 mg in sodium chloride 0.9% SolP 270 mL infusion  -     EPINEPHrine (EPIPEN) 0.3 mg/0.3 mL pen injection 0.3 mg  -     diphenhydrAMINE injection 50 mg  -     hydrocortisone sodium succinate injection 100 mg  -     sodium chloride 0.9% 250 mL flush bag  -     sodium chloride 0.9% flush 10 mL  -     heparin, porcine (PF) 100 unit/mL injection flush 500 Units  -     alteplase injection 2 mg               1. Malignant neoplasm of unspecified part of unspecified bronchus or lung    2. Chemotherapy induced " neutropenia    3. Hypokalemia    4. 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 Initial biopsy February 20, 2023 without neoplasm identified who after multiple disciplinary discussion proceeded with left lower lobe wedge resection, final pathology positive for 03/28/2022 for invasive moderately differentiated adenocarcinoma station 10 lymph node positive, pleural invasion noted, margins negative.    Recommended staging with MRI brain and PET now 1 month out from surgery MRI brain negative PET scan with avidity left hilar SUV 4 and chest wall.  Given recent surgery potential inflammation presented at tumor board recommended proceeding with adjuvant chemotherapy and follow-up on repeat imaging.  We discussed indication for adjuvant therapy including chemotherapy and immunotherapy per IMPOWER 010 showed benefit to adjuvant atezolizumab following chemotherapy given PDL1 positive disease 95%.  Mutational analysis negative for EGFR mutation.  Will reschedule if appropriate cisplatin and pemetrexed with adjuvant supplemental folic acid and vitamin B12.    Completed adjuvant chemotherapy with cisplatin and pemetrexed tolerated well aside from chemotherapy associated progressive anemia.  Will check iron indices and replete as needed.  Restaging PET with resolution of prior lymph node avidity.  Discussion with reading radiologist juan r noted and there are no suspicious osseous lesions.  Will proceed with immunotherapy.     Mild hypomagnesemia and hypokalemia supplements prescribed.      Advance Care Planning   Stage II malignancy asymptomatic will defer palliative care consultation at this time.       Follow-Up:   Route Chart for Scheduling    Med Onc Chart Routing      Follow up with physician 6 weeks.   Follow up with [facility] 3 weeks.   Infusion scheduling note   Atezolizumab q. 3 weeks   Injection scheduling note    Labs CBC, CMP and TSH   Scheduling:  Preferred lab:  Lab interval:     Imaging    Pharmacy appointment    Other referrals        Treatment Plan Information   OP ATEZOLIZUMAB Q3W   Marissa Brower MD   Upcoming Treatment Dates - OP ATEZOLIZUMAB Q3W    8/10/2023       Chemotherapy       atezolizumab (TECENTRIQ) 1,200 mg in sodium chloride 0.9% SolP 270 mL infusion  8/31/2023       Chemotherapy       atezolizumab (TECENTRIQ) 1,200 mg in sodium chloride 0.9% SolP 270 mL infusion  9/21/2023       Chemotherapy       atezolizumab (TECENTRIQ) 1,200 mg in sodium chloride 0.9% SolP 270 mL infusion  10/12/2023       Chemotherapy       atezolizumab (TECENTRIQ) 1,200 mg in sodium chloride 0.9% SolP 270 mL infusion    Therapy Plan Information  Flushes  heparin, porcine (PF) 100 unit/mL injection flush 500 Units  500 Units, Intravenous, Every visit  sodium chloride 0.9% flush 10 mL  10 mL, Intravenous, Every visit

## 2023-08-14 ENCOUNTER — INFUSION (OUTPATIENT)
Dept: INFUSION THERAPY | Facility: HOSPITAL | Age: 73
End: 2023-08-14
Attending: INTERNAL MEDICINE
Payer: MEDICARE

## 2023-08-14 VITALS
HEIGHT: 65 IN | DIASTOLIC BLOOD PRESSURE: 81 MMHG | OXYGEN SATURATION: 98 % | RESPIRATION RATE: 16 BRPM | SYSTOLIC BLOOD PRESSURE: 134 MMHG | HEART RATE: 73 BPM | BODY MASS INDEX: 27.58 KG/M2 | WEIGHT: 165.56 LBS | TEMPERATURE: 98 F

## 2023-08-14 DIAGNOSIS — C34.32 MALIGNANT NEOPLASM OF LOWER LOBE OF LEFT LUNG: Primary | ICD-10-CM

## 2023-08-14 PROCEDURE — 96413 CHEMO IV INFUSION 1 HR: CPT

## 2023-08-14 PROCEDURE — A4216 STERILE WATER/SALINE, 10 ML: HCPCS | Performed by: INTERNAL MEDICINE

## 2023-08-14 PROCEDURE — 25000003 PHARM REV CODE 250: Performed by: INTERNAL MEDICINE

## 2023-08-14 PROCEDURE — 63600175 PHARM REV CODE 636 W HCPCS: Performed by: INTERNAL MEDICINE

## 2023-08-14 RX ORDER — SODIUM CHLORIDE 0.9 % (FLUSH) 0.9 %
10 SYRINGE (ML) INJECTION
Status: DISCONTINUED | OUTPATIENT
Start: 2023-08-14 | End: 2023-08-14 | Stop reason: HOSPADM

## 2023-08-14 RX ORDER — HEPARIN 100 UNIT/ML
500 SYRINGE INTRAVENOUS
Status: DISCONTINUED | OUTPATIENT
Start: 2023-08-14 | End: 2023-08-14 | Stop reason: HOSPADM

## 2023-08-14 RX ADMIN — Medication 10 ML: at 02:08

## 2023-08-14 RX ADMIN — ATEZOLIZUMAB 1200 MG: 1200 INJECTION, SOLUTION INTRAVENOUS at 01:08

## 2023-08-14 RX ADMIN — HEPARIN 500 UNITS: 100 SYRINGE at 02:08

## 2023-08-14 RX ADMIN — SODIUM CHLORIDE: 9 INJECTION, SOLUTION INTRAVENOUS at 01:08

## 2023-08-14 NOTE — PLAN OF CARE
Patient reports feeling tired all the time. Tolerated first dose of tecentriq well with no adverse reactions. Patient to return in 3 weeks for next treatment.

## 2023-08-14 NOTE — DISCHARGE INSTRUCTIONS
.[facility]  [address] [address]  [address]  [phone] phone     [phone] fax  Hours of Operation: Monday- Friday 8:00am- 5:00pm  After hours phone  [phone]  Hematology / Oncology Physicians on call    Dr. Feliberto Styles      Nurse Practitioners:    Kaitlin Bradley, GINNA Mojica, GINNA Taylor, GINNA Curtis, GINNA Pham, PA      Please don't hesitate to call if you have any concerns.    .WAYS TO HELP PREVENT INFECTION        WASH YOUR HANDS OFTEN DURING THE DAY, ESPECIALLY BEFORE YOU EAT, AFTER USING THE BATHROOM, AND AFTER TOUCHING ANIMALS    STAY AWAY FROM PEOPLE WHO HAVE ILLNESSES YOU CAN CATCH; SUCH AS COLDS, FLU, CHICKEN POX    TRY TO AVOID CROWDS    STAY AWAY FROM CHILDREN WHO RECENTLY HAVE RECEIVED LIVE VIRUS VACCINES    MAINTAIN GOOD MOUTH CARE    DO NOT SQUEEZE OR SCRATCH PIMPLES    CLEAN CUTS & SCRAPES RIGHT AWAY AND DAILY UNTIL HEALED WITH WARM WATER, SOAP & AN ANTISEPTIC    AVOID CONTACT WITH LITTER BOXES, BIRD CAGES, & FISH TANKS    AVOID STANDING WATER, IE., BIRD BATHS, FLOWER POTS/VASES, OR HUMIDIFIERS    WEAR GLOVES WHEN GARDENING OR CLEANING UP AFTER OTHERS, ESPECIALLY BABIES & SMALL CHILDREN    DO NOT EAT RAW FISH, SEAFOOD, MEAT, OR EGGS     .FALL PREVENTION   Falls often occur due to slipping, tripping or losing your balance. Here are ways to reduce your risk of falling again.   Was there anything that caused your fall that can be fixed, removed or replaced?   Make your home safe by keeping walkways clear of objects you may trip over.   Use non-slip pads under rugs.   Do not walk in poorly lit areas.   Do not stand on chairs or wobbly ladders.   Use caution when reaching overhead or looking upward. This position can cause a loss of balance.   Be sure your shoes fit properly, have non-slip bottoms and are in good condition.   Be cautious when going up and down stairs, curbs,  and when walking on uneven sidewalks.   If your balance is poor, consider using a cane or walker.   If your fall was related to alcohol use, stop or limit alcohol intake.   If your fall was related to use of sleeping medicines, talk to your doctor about this. You may need to reduce your dosage at bedtime if you awaken during the night to go to the bathroom.   To reduce the need for nighttime bathroom trips:   Avoid drinking fluids for several hours before going to bed   Empty your bladder before going to bed   Men can keep a urinal at the bedside   © 1163-8665 [facility], [address] [place] [id number]. All rights reserved. This information is not intended as a substitute for professional medical care. Always follow your healthcare professional's instructions.

## 2023-08-15 ENCOUNTER — PATIENT MESSAGE (OUTPATIENT)
Dept: HEMATOLOGY/ONCOLOGY | Facility: CLINIC | Age: 73
End: 2023-08-15
Payer: MEDICARE

## 2023-09-05 ENCOUNTER — OFFICE VISIT (OUTPATIENT)
Dept: HEMATOLOGY/ONCOLOGY | Facility: CLINIC | Age: 73
End: 2023-09-05
Payer: MEDICARE

## 2023-09-05 ENCOUNTER — INFUSION (OUTPATIENT)
Dept: INFUSION THERAPY | Facility: HOSPITAL | Age: 73
End: 2023-09-05
Attending: INTERNAL MEDICINE
Payer: MEDICARE

## 2023-09-05 VITALS
WEIGHT: 163.38 LBS | SYSTOLIC BLOOD PRESSURE: 124 MMHG | TEMPERATURE: 97 F | DIASTOLIC BLOOD PRESSURE: 77 MMHG | BODY MASS INDEX: 27.22 KG/M2 | HEART RATE: 73 BPM | HEIGHT: 65 IN | OXYGEN SATURATION: 98 %

## 2023-09-05 VITALS
SYSTOLIC BLOOD PRESSURE: 132 MMHG | OXYGEN SATURATION: 98 % | TEMPERATURE: 98 F | HEIGHT: 65 IN | BODY MASS INDEX: 27.22 KG/M2 | WEIGHT: 163.38 LBS | RESPIRATION RATE: 16 BRPM | HEART RATE: 62 BPM | DIASTOLIC BLOOD PRESSURE: 67 MMHG

## 2023-09-05 DIAGNOSIS — C34.32 MALIGNANT NEOPLASM OF LOWER LOBE OF LEFT LUNG: Primary | ICD-10-CM

## 2023-09-05 DIAGNOSIS — D69.6 THROMBOCYTOPENIA: ICD-10-CM

## 2023-09-05 DIAGNOSIS — Z09 ENCOUNTER FOR FOLLOW-UP EXAMINATION AFTER COMPLETED TREATMENT FOR CONDITIONS OTHER THAN MALIGNANT NEOPLASM: ICD-10-CM

## 2023-09-05 DIAGNOSIS — C34.90 MALIGNANT NEOPLASM OF UNSPECIFIED PART OF UNSPECIFIED BRONCHUS OR LUNG: Primary | ICD-10-CM

## 2023-09-05 DIAGNOSIS — E66.3 OVERWEIGHT (BMI 25.0-29.9): ICD-10-CM

## 2023-09-05 DIAGNOSIS — C34.32 MALIGNANT NEOPLASM OF LOWER LOBE OF LEFT LUNG: ICD-10-CM

## 2023-09-05 PROCEDURE — 3078F PR MOST RECENT DIASTOLIC BLOOD PRESSURE < 80 MM HG: ICD-10-PCS | Mod: CPTII,S$GLB,, | Performed by: NURSE PRACTITIONER

## 2023-09-05 PROCEDURE — 1101F PT FALLS ASSESS-DOCD LE1/YR: CPT | Mod: CPTII,S$GLB,, | Performed by: NURSE PRACTITIONER

## 2023-09-05 PROCEDURE — 3288F PR FALLS RISK ASSESSMENT DOCUMENTED: ICD-10-PCS | Mod: CPTII,S$GLB,, | Performed by: NURSE PRACTITIONER

## 2023-09-05 PROCEDURE — 1160F PR REVIEW ALL MEDS BY PRESCRIBER/CLIN PHARMACIST DOCUMENTED: ICD-10-PCS | Mod: CPTII,S$GLB,, | Performed by: NURSE PRACTITIONER

## 2023-09-05 PROCEDURE — 99215 PR OFFICE/OUTPT VISIT, EST, LEVL V, 40-54 MIN: ICD-10-PCS | Mod: 25,S$GLB,, | Performed by: NURSE PRACTITIONER

## 2023-09-05 PROCEDURE — 1159F PR MEDICATION LIST DOCUMENTED IN MEDICAL RECORD: ICD-10-PCS | Mod: CPTII,S$GLB,, | Performed by: NURSE PRACTITIONER

## 2023-09-05 PROCEDURE — 3078F DIAST BP <80 MM HG: CPT | Mod: CPTII,S$GLB,, | Performed by: NURSE PRACTITIONER

## 2023-09-05 PROCEDURE — 99999 PR PBB SHADOW E&M-EST. PATIENT-LVL IV: CPT | Mod: PBBFAC,,, | Performed by: NURSE PRACTITIONER

## 2023-09-05 PROCEDURE — 96413 CHEMO IV INFUSION 1 HR: CPT

## 2023-09-05 PROCEDURE — 99999 PR PBB SHADOW E&M-EST. PATIENT-LVL IV: ICD-10-PCS | Mod: PBBFAC,,, | Performed by: NURSE PRACTITIONER

## 2023-09-05 PROCEDURE — 1126F AMNT PAIN NOTED NONE PRSNT: CPT | Mod: CPTII,S$GLB,, | Performed by: NURSE PRACTITIONER

## 2023-09-05 PROCEDURE — 1126F PR PAIN SEVERITY QUANTIFIED, NO PAIN PRESENT: ICD-10-PCS | Mod: CPTII,S$GLB,, | Performed by: NURSE PRACTITIONER

## 2023-09-05 PROCEDURE — 3288F FALL RISK ASSESSMENT DOCD: CPT | Mod: CPTII,S$GLB,, | Performed by: NURSE PRACTITIONER

## 2023-09-05 PROCEDURE — 1101F PR PT FALLS ASSESS DOC 0-1 FALLS W/OUT INJ PAST YR: ICD-10-PCS | Mod: CPTII,S$GLB,, | Performed by: NURSE PRACTITIONER

## 2023-09-05 PROCEDURE — 1160F RVW MEDS BY RX/DR IN RCRD: CPT | Mod: CPTII,S$GLB,, | Performed by: NURSE PRACTITIONER

## 2023-09-05 PROCEDURE — 1159F MED LIST DOCD IN RCRD: CPT | Mod: CPTII,S$GLB,, | Performed by: NURSE PRACTITIONER

## 2023-09-05 PROCEDURE — 25000003 PHARM REV CODE 250: Performed by: NURSE PRACTITIONER

## 2023-09-05 PROCEDURE — 3074F SYST BP LT 130 MM HG: CPT | Mod: CPTII,S$GLB,, | Performed by: NURSE PRACTITIONER

## 2023-09-05 PROCEDURE — 36592 COLLECT BLOOD FROM PICC: CPT

## 2023-09-05 PROCEDURE — 3074F PR MOST RECENT SYSTOLIC BLOOD PRESSURE < 130 MM HG: ICD-10-PCS | Mod: CPTII,S$GLB,, | Performed by: NURSE PRACTITIONER

## 2023-09-05 PROCEDURE — 63600175 PHARM REV CODE 636 W HCPCS: Performed by: NURSE PRACTITIONER

## 2023-09-05 PROCEDURE — 99215 OFFICE O/P EST HI 40 MIN: CPT | Mod: 25,S$GLB,, | Performed by: NURSE PRACTITIONER

## 2023-09-05 RX ORDER — HEPARIN 100 UNIT/ML
500 SYRINGE INTRAVENOUS
Status: DISCONTINUED | OUTPATIENT
Start: 2023-09-05 | End: 2023-09-05 | Stop reason: HOSPADM

## 2023-09-05 RX ORDER — SODIUM CHLORIDE 9 MG/ML
750 INJECTION, SOLUTION INTRAVENOUS
Status: COMPLETED | OUTPATIENT
Start: 2023-09-05 | End: 2023-09-05

## 2023-09-05 RX ORDER — HEPARIN 100 UNIT/ML
500 SYRINGE INTRAVENOUS
Status: CANCELLED | OUTPATIENT
Start: 2023-09-05

## 2023-09-05 RX ORDER — SODIUM CHLORIDE 9 MG/ML
INJECTION, SOLUTION INTRAVENOUS
Status: CANCELLED
Start: 2023-09-05

## 2023-09-05 RX ORDER — EPINEPHRINE 0.3 MG/.3ML
0.3 INJECTION SUBCUTANEOUS ONCE AS NEEDED
Status: CANCELLED | OUTPATIENT
Start: 2023-09-05

## 2023-09-05 RX ORDER — DIPHENHYDRAMINE HYDROCHLORIDE 50 MG/ML
50 INJECTION INTRAMUSCULAR; INTRAVENOUS ONCE AS NEEDED
Status: CANCELLED | OUTPATIENT
Start: 2023-09-05

## 2023-09-05 RX ORDER — SODIUM CHLORIDE 0.9 % (FLUSH) 0.9 %
10 SYRINGE (ML) INJECTION
Status: CANCELLED | OUTPATIENT
Start: 2023-09-05

## 2023-09-05 RX ADMIN — SODIUM CHLORIDE: 9 INJECTION, SOLUTION INTRAVENOUS at 09:09

## 2023-09-05 RX ADMIN — SODIUM CHLORIDE 1000 ML: 9 INJECTION, SOLUTION INTRAVENOUS at 09:09

## 2023-09-05 RX ADMIN — HEPARIN 500 UNITS: 100 SYRINGE at 10:09

## 2023-09-05 RX ADMIN — ATEZOLIZUMAB 1200 MG: 1200 INJECTION, SOLUTION INTRAVENOUS at 09:09

## 2023-09-05 NOTE — PROGRESS NOTES
Subjective:       Patient ID: Radha Lamas is a 73 y.o. female.    Chief Complaint: Review labs. 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 Initial biopsy 2023 without neoplasm identified who after multiple disciplinary discussion proceeded with left lower lobe wedge resection, final pathology positive for 2022 for invasive moderately differentiated adenocarcinoma station 10 lymph node positive, pleural invasion noted, margins negative.      MRI brain and PET 1 month s/p surgery MRI brain negative PET scan with avidity left hilar SUV 4 and chest wall.  Given recent surgery potential inflammation presented at tumor board recommended proceeding with adjuvant chemotherapy and follow-up on repeat imaging.      Completed adjuvant chemotherapy with cisplatin and pemetrexed tolerated well aside from chemotherapy associated progressive anemia. Restaging PET with resolution of prior lymph node avidity. Initiated Tecentriq immunotherapy 2023 planned Q 3 weeks.    Social History     Socioeconomic History    Marital status:    Tobacco Use    Smoking status: Former     Current packs/day: 0.00     Average packs/day: 1.5 packs/day for 45.0 years (67.5 ttl pk-yrs)     Types: Cigarettes     Start date:      Quit date: 2009     Years since quittin.6    Smokeless  tobacco: Never   Substance and Sexual Activity    Alcohol use: No     Alcohol/week: 0.0 standard drinks of alcohol    Drug use: No    Sexual activity: Not Currently     Partners: Male     Birth control/protection: Post-menopausal       Past Medical History:   Diagnosis Date    COPD (chronic obstructive pulmonary disease) 2013    Eczema     GERD (gastroesophageal reflux disease)     Hiatal hernia     History of torn meniscus of right knee 01/2011    Hypothyroid     Incidental pulmonary nodule, > 3mm and < 8mm - Lingula 8/12/2021    Lung cancer     Urinary incontinence in female     Mild , only takes mediciane with travel       Family History   Problem Relation Age of Onset    Heart failure Mother     Hypertension Mother     Cancer Father     Hypertension Father     Cancer Sister     Cancer Brother        Past Surgical History:   Procedure Laterality Date    FLUOROSCOPY N/A 4/27/2023    Procedure: Fluoroscopy/Mediport placement;  Surgeon: Kodak Retana MD;  Location: [facility] CATH LAB;  Service: General;  Laterality: N/A;    INJECTION OF ANESTHETIC AGENT AROUND MULTIPLE INTERCOSTAL NERVES Left 3/20/2023    Procedure: BLOCK, NERVE, INTERCOSTAL, 2 OR MORE;  Surgeon: Refugio Medley MD;  Location: [facility] OR [address] [place] [address];  Service: Thoracic;  Laterality: Left;    KNEE CARTILAGE SURGERY Right 01/2011    LAPAROSCOPIC LYSIS OF ADHESIONS Left 3/20/2023    Procedure: LYSIS, ADHESIONS, LAPAROSCOPIC;  Surgeon: Refugio Medley MD;  Location: [facility] OR [address] [place] [address];  Service: Thoracic;  Laterality: Left;    ROBOT-ASSISTED LAPAROSCOPIC LYMPHADENECTOMY USING DA ROSEMARY XI Left 3/20/2023    Procedure: XI ROBOTIC LYMPHADENECTOMY;  Surgeon: Refugio Medley MD;  Location: [facility] OR [address] [place] [address];  Service: Thoracic;  Laterality: Left;    THORACOSCOPIC WEDGE RESECTION OF LUNG Left 04/28/2021    Procedure: VATS, WITH WEDGE RESECTION, LUNG;  Surgeon: Clarke Sauceda MD;  Location: [facility] OR [address] [place] [address];  Service: Thoracic;   Laterality: Left;  lymph node dissection     TUBAL LIGATION  1976    WEDGE RESECTION, LUNG, ROBOT-ASSISTED, USING VATS, USING DA ROSEMARY XI Left 3/20/2023    Procedure: XI ROBOTIC WEDGE RESECTION, LUNG (RATS); segmentectomy;  Surgeon: Refugio Medley MD;  Location: [facility] OR [address] [place] [address];  Service: Thoracic;  Laterality: Left;       Review of Systems   Constitutional:  Negative for appetite change, chills, fatigue, fever and unexpected weight change.   HENT:  Negative for congestion, mouth sores, nosebleeds, sore throat, trouble swallowing and voice change.    Eyes:  Negative for visual disturbance.   Respiratory:  Negative for cough, chest tightness, shortness of breath and wheezing.    Cardiovascular:  Negative for chest pain and leg swelling.   Gastrointestinal:  Negative for abdominal distention, abdominal pain, blood in stool, constipation, diarrhea, nausea and vomiting.   Genitourinary:  Negative for difficulty urinating, dysuria and hematuria.   Musculoskeletal:  Negative for arthralgias, back pain and myalgias.   Skin:  Negative for pallor, rash and wound.   Neurological:  Negative for dizziness, syncope, weakness and headaches.   Hematological:  Negative for adenopathy. Does not bruise/bleed easily.   Psychiatric/Behavioral:  The patient is nervous/anxious.          Medication List with Changes/Refills   Current Medications    ALBUTEROL (PROAIR HFA) 90 MCG/ACTUATION INHALER    Inhale 2 puffs into the lungs every 4 (four) hours as needed for Wheezing or Shortness of Breath. Rescue    CALCIUM CARBONATE (OS-CYDNEY) 600 MG CALCIUM (1,500 MG) TAB    Take 600 mg by mouth.    DEXAMETHASONE (DECADRON) 4 MG TAB    Take 2 tablets (8 mg total) by mouth once daily. on the day prior to chemotherapy then daily on days 2,3,4 of each chemotherapy cycle.    DIPHENHYDRAMINE-ALUMINUM-MAGNESIUM HYDROXIDE-SIMETHICONE-LIDOCAINE HCL 2%    Swish and spit 15 mLs every 4 (four) hours as needed.    DUPIXENT  MG/2 ML PNIJ    Inject  into the skin every 14 (fourteen) days.    FOLIC ACID (FOLVITE) 1 MG TABLET    Take 1 tablet (1 mg total) by mouth once daily. starting 1 week prior to pemetrexed and continuing for 21 days after stopping pemetrexed    GABAPENTIN (NEURONTIN) 300 MG CAPSULE    Take 1 capsule (300 mg total) by mouth every evening.    LEVOTHYROXINE (SYNTHROID) 75 MCG TABLET    Take 75 mcg by mouth once daily.    LORATADINE (CLARITIN) 10 MG TABLET    Take 10 mg by mouth once daily.    MAGNESIUM OXIDE (MAG-OX) 400 MG (241.3 MG MAGNESIUM) TABLET    Take 1 tablet (400 mg total) by mouth once daily.    OLANZAPINE (ZYPREXA) 5 MG TABLET    Take 1 tablet (5 mg total) by mouth every evening. Take as directed on days 1-4 of your chemotherapy cycle.    OMEPRAZOLE (PRILOSEC) 20 MG CAPSULE    Take 20 mg by mouth once daily.    ONDANSETRON (ZOFRAN-ODT) 8 MG TBDL    Take 1 tablet (8 mg total) by mouth every 8 (eight) hours as needed (nausea).    OXYCODONE (ROXICODONE) 5 MG IMMEDIATE RELEASE TABLET    Take 1 tablet (5 mg total) by mouth every 4 (four) hours as needed for Pain.    POTASSIUM CHLORIDE SA (K-DUR,KLOR-CON) 20 MEQ TABLET    Take 1 tablet (20 mEq total) by mouth once daily.    TOLTERODINE (DETROL LA) 4 MG 24 HR CAPSULE    Take 1 capsule (4 mg total) by mouth once daily.    UMECLIDINIUM-VILANTEROL (ANORO ELLIPTA) 62.5-25 MCG/ACTUATION DSDV    USE 1 INHALATION DAILY (CONTROLLER)     Objective:     Vitals:    09/05/23 0828   BP: 124/77   Pulse: 73   Temp: 97.1 °F (36.2 °C)     Lab Results   Component Value Date    WBC 5.15 09/05/2023    HGB 9.9 (L) 09/05/2023    HCT 30.6 (L) 09/05/2023    MCV 99 (H) 09/05/2023     (L) 09/05/2023       BMP  Lab Results   Component Value Date     09/05/2023    K 3.5 09/05/2023     09/05/2023    CO2 28 09/05/2023    BUN 12 09/05/2023    CREATININE 1.3 09/05/2023    CALCIUM 9.6 09/05/2023    ANIONGAP 12 09/05/2023    EGFRNORACEVR 43 (A) 09/05/2023       Lab Results   Component Value Date    ALT 6  (L) 09/05/2023    AST 34 09/05/2023    ALKPHOS 79 09/05/2023    BILITOT 0.7 09/05/2023         Physical Exam  Vitals reviewed.   Constitutional:       Appearance: She is well-developed.   HENT:      Head: Normocephalic.      Right Ear: External ear normal.      Left Ear: External ear normal.      Nose: Nose normal.   Eyes:      General: Lids are normal. No scleral icterus.        Right eye: No discharge.         Left eye: No discharge.      Conjunctiva/sclera: Conjunctivae normal.   Neck:      Thyroid: No thyroid mass.   Cardiovascular:      Rate and Rhythm: Normal rate and regular rhythm.      Heart sounds: Normal heart sounds.   Pulmonary:      Effort: Pulmonary effort is normal. No respiratory distress.      Breath sounds: Normal breath sounds. No wheezing or rales.   Abdominal:      General: There is no distension.   Genitourinary:     Comments: deferred  Musculoskeletal:         General: Normal range of motion.      Cervical back: Normal range of motion.   Skin:     General: Skin is warm and dry.   Neurological:      Mental Status: She is alert and oriented to person, place, and time.   Psychiatric:         Speech: Speech normal.         Behavior: Behavior normal. Behavior is cooperative.         Thought Content: Thought content normal.        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 Note new thrombocytopenia. AMANDA reduced eGFR    Proceed with Tecentriq. Add 750 mL NS IVFs over 1 hour. Repeat BMP after fluids. Repeat CBC, blue top platelet count. F/u 3 weeks with Cbc, Cmp, TSH. Tecentriq  planned            Endocrine    Overweight (BMI 25.0-29.9)     Encourage healthy nutrition along with portion control. Encourage daily exercise          Other Visit Diagnoses       Malignant neoplasm of unspecified part of unspecified bronchus or lung    -  Primary    Relevant Orders    Basic Metabolic Panel    CBC Auto Differential    Comprehensive Metabolic Panel    TSH    Thrombocytopenia        Relevant Orders    CBC Auto Differential    Platelet Count, Blue Top    Encounter for follow-up examination after completed treatment for conditions other than malignant neoplasm        Relevant Orders    TSH              Plan:     Malignant neoplasm of unspecified part of unspecified bronchus or lung  -     Basic Metabolic Panel; Future; Expected date: 09/05/2023  -     CBC Auto Differential; Future; Expected date: 09/05/2023  -     Comprehensive Metabolic Panel; Future; Expected date: 09/05/2023  -     TSH; Future; Expected date: 09/05/2023    Thrombocytopenia  -     CBC Auto Differential; Future; Expected date: 09/05/2023  -     Platelet Count, Blue Top; Future; Expected date: 09/05/2023    Malignant neoplasm of lower lobe of left lung - Squamous cell  -     Cancel: 0.9%  NaCl infusion  -     Cancel: atezolizumab (TECENTRIQ) 1,200 mg in sodium chloride 0.9% SolP 270 mL infusion  -     Cancel: sodium chloride 0.9% 250 mL flush bag  -     Cancel: heparin, porcine (PF) 100 unit/mL injection flush 500 Units    Overweight (BMI 25.0-29.9)    Encounter for follow-up examination after completed treatment for conditions other than malignant neoplasm  -     TSH; Future; Expected date: 09/05/2023    Other orders  -     EPINEPHrine (EPIPEN) 0.3 mg/0.3 mL pen injection 0.3 mg  -     diphenhydrAMINE injection 50 mg  -     hydrocortisone sodium succinate injection 100 mg  -     sodium chloride 0.9% flush 10 mL  -     alteplase injection 2 mg          Med Onc Chart Routing      Follow up with physician 3 weeks.   Follow up with [facility]     Infusion scheduling note   tecentriq   Injection scheduling note    Labs CBC, CMP and TSH   Scheduling:  Preferred lab:  Lab interval:     Imaging None      Pharmacy appointment No pharmacy appointment needed      Other referrals     No additional referrals needed           KRISTEN RogersP-C

## 2023-09-05 NOTE — NURSING
Labs also drawn from port using aeseptic technique per protocol. following treatment per additional orders from provider.

## 2023-09-05 NOTE — ASSESSMENT & PLAN NOTE
Cancer Staging   Malignant neoplasm of lower lobe of left lung - Squamous cell  Staging form: Lung, AJCC 8th Edition  - Clinical: Stage IA1 (cT1a, cN0, cM0) - Signed by Ronak Dao MD on 5/25/2021  - Pathologic stage from 4/5/2023: Stage IIB (pT2, pN1, cM0) - Signed by Marissa Brower MD on 6/8/2023    Labs reviewed. Note new thrombocytopenia. AMANDA reduced eGFR    Proceed with Tecentriq. Add 750 mL NS IVFs over 1 hour. Repeat BMP after fluids. Repeat CBC, blue top platelet count. F/u 3 weeks with Cbc, Cmp, TSH. Tecentriq planned

## 2023-09-05 NOTE — PLAN OF CARE
Problem: Adult Inpatient Plan of Care  Goal: Plan of Care Review  Outcome: Ongoing, Progressing  Flowsheets (Taken 9/5/2023 1019)  Plan of Care Reviewed With:   patient   spouse  Goal: Patient-Specific Goal (Individualized)  Outcome: Ongoing, Progressing  Flowsheets (Taken 9/5/2023 1019)  Anxieties, Fears or Concerns: none  Individualized Care Needs: feet up, blanket  Goal: Optimal Comfort and Wellbeing  Outcome: Ongoing, Progressing  Intervention: Provide Person-Centered Care  Flowsheets (Taken 9/5/2023 1019)  Trust Relationship/Rapport:   care explained   questions encouraged   choices provided   questions answered   thoughts/feelings acknowledged     Problem: Fall Injury Risk  Goal: Absence of Fall and Fall-Related Injury  Outcome: Ongoing, Progressing  Intervention: Identify and Manage Contributors  Flowsheets (Taken 9/5/2023 1019)  Self-Care Promotion: BADL personal routines maintained  Medication Review/Management: medications reviewed  Intervention: Promote Injury-Free Environment  Flowsheets (Taken 9/5/2023 1019)  Safety Promotion/Fall Prevention:   in recliner, wheels locked   nonskid shoes/socks when out of bed   medications reviewed   family to remain at bedside

## 2023-09-05 NOTE — DISCHARGE INSTRUCTIONS
THANKS FOR ALLOWING ME TO CARE FOR YOU TODAY!!! ~Bella          THANKS FOR CHOOSING [facility]!!!      [facility]  [address] [facility]  [id number] [place] [address] Drive  [phone] phone     [phone] fax  Hours of Operation: Monday- Friday 8:00am- 5:00pm  After hours phone  [phone]  Hematology / Oncology Physicians on call      FOUZIA Hebert Dr., Dr., NP Sydney Prescott, GINNA Taylor, MAGGIE Marina    Please call with any concerns regarding your appointment today.

## 2023-09-25 NOTE — PROGRESS NOTES
HEMATOLOGY / ONCOLOGY   CLINIC NOTE     ONCOLOGICAL HISTORY:     Diagnosis:  - LLL NSCLC/SQ Stage I   s/p wedge resection 04/28/2021  - LLL NSCLC/Adenocarcinoma Stage IIB (pT2 pN1a cMx), PD-L1 95%, BRAF V600.E mutation  s/p wedge resection 3/20/2023      Treatment History:  - Adjuvant cisplatin and pemetrexed q. 3 weeks x 4 (5/12/2023 - 7/20/2023)    Current Treatment:   - atezolizumab (8/14/2023 -     Subjective:       Chief Complaint: Lung Cancer and Immunotherapy      HPI    Radha Lamas  73 y.o.  female with past medical history significant for NSCLC on Tecentriq     Patient with previous history of non-small-cell lung cancer / squamous cell in the left lower lobe treated with wedge resection in April 2021 was noted to have abnormal finding on surveillance image.  Initial biopsy was negative, followed by wedge resection on 03/28/2023, diagnosed with adenocarcinoma involving station 10 lymph node with pleural invasion.  Patient was treated with adjuvant chemotherapy with cisplatin and pemetrexed, now being followed by Tecentriq maintenance.     Restaging PET on 8/7/2023 with resolution of prior lymph node avidity.      Interval History:     Patient here for follow-up and next cycle of treatment.  Denies any nausea, vomiting, diarrhea, fevers, rashes , neuropathy or  any other concerns.  Denies any bleeding      Oncology History   Malignant neoplasm of lower lobe of left lung - Squamous cell   4/15/2021 Initial Diagnosis    Malignant neoplasm of lower lobe of left lung - Squamous cell     5/25/2021 Cancer Staged    Staging form: Lung, AJCC 8th Edition  - Clinical: Stage IA1 (cT1a, cN0, cM0)      Cancer Staged    9mm non-keratinizing squamous cell carcinoma, no VPI, no LVI, margin at least 8mm.  Levels 9 and 11 = negative.  T1aN0     4/5/2023 Cancer Staged    Staging form: Lung, AJCC 8th Edition  - Pathologic stage from 4/5/2023: Stage IIB (pT2, pN1, cM0)     5/1/2023 - 7/21/2023 Chemotherapy     Treatment Summary   Plan Name: OP NSCLC PEMETREXED + CISPLATIN Q3W  Treatment Goal: Supportive  Status: Inactive  Start Date: 5/1/2023  End Date: 7/21/2023  Provider: Marissa Brower MD  Chemotherapy: CISplatin (Platinol) 75 mg/m2 = 138 mg in sodium chloride 0.9% 665 mL chemo infusion, 75 mg/m2 = 138 mg, Intravenous, Clinic/HOD 1 time, 4 of 4 cycles  Administration: 138 mg (5/12/2023), 138 mg (6/29/2023), 138 mg (7/20/2023), 138 mg (6/8/2023)  PEMEtrexed disodium (ALIMTA) 900 mg in sodium chloride 0.9% SolP 100 mL chemo infusion, 925 mg, Intravenous, Clinic/HOD 1 time, 4 of 4 cycles  Dose modification: 375 mg/m2 (original dose 500 mg/m2, Cycle 3, Reason: MD Discretion, Comment: neutropenia )  Administration: 900 mg (5/12/2023), 700 mg (6/29/2023), 700 mg (7/20/2023), 700 mg (6/8/2023)     8/14/2023 -  Chemotherapy    Treatment Summary   Plan Name: OP ATEZOLIZUMAB Q3W  Treatment Goal: Supportive  Status: Active  Start Date: 8/14/2023  End Date: 7/16/2024 (Planned)  Provider: Marissa Brower MD  Chemotherapy: [No matching medication found in this treatment plan]     NSCLC of left lung (Resolved)   4/28/2021 Initial Diagnosis    NSCLC of left lung (Resolved)      Cancer Staged    9mm non-keratinizing squamous cell carcinoma, no VPI, no LVI, margin at least 8mm.  Levels 9 and 11 = negative.  T1aN0         Past Medical History:   Diagnosis Date    COPD (chronic obstructive pulmonary disease) 2013    Eczema     GERD (gastroesophageal reflux disease)     Hiatal hernia     History of torn meniscus of right knee 01/2011    Hypothyroid     Incidental pulmonary nodule, > 3mm and < 8mm - Lingula 8/12/2021    Lung cancer     Urinary incontinence in female     Mild , only takes mediciane with travel      Past Surgical History:   Procedure Laterality Date    FLUOROSCOPY N/A 4/27/2023    Procedure: Fluoroscopy/Mediport placement;  Surgeon: Kodak Retana MD;  Location: [facility] CATH LAB;  Service: General;  Laterality:  N/A;    INJECTION OF ANESTHETIC AGENT AROUND MULTIPLE INTERCOSTAL NERVES Left 3/20/2023    Procedure: BLOCK, NERVE, INTERCOSTAL, 2 OR MORE;  Surgeon: Refugio Medley MD;  Location: [facility] OR 2ND FLR;  Service: Thoracic;  Laterality: Left;    KNEE CARTILAGE SURGERY Right 2011    LAPAROSCOPIC LYSIS OF ADHESIONS Left 3/20/2023    Procedure: LYSIS, ADHESIONS, LAPAROSCOPIC;  Surgeon: Refugio Medley MD;  Location: [facility] OR [facility] FLR;  Service: Thoracic;  Laterality: Left;    ROBOT-ASSISTED LAPAROSCOPIC LYMPHADENECTOMY USING DA ROSEMARY XI Left 3/20/2023    Procedure: XI ROBOTIC LYMPHADENECTOMY;  Surgeon: Refugio Medley MD;  Location: [facility] OR [facility] FLR;  Service: Thoracic;  Laterality: Left;    THORACOSCOPIC WEDGE RESECTION OF LUNG Left 2021    Procedure: VATS, WITH WEDGE RESECTION, LUNG;  Surgeon: Clarke Sauceda MD;  Location: [facility] OR [facility] FLR;  Service: Thoracic;  Laterality: Left;  lymph node dissection     TUBAL LIGATION  1976    WEDGE RESECTION, LUNG, ROBOT-ASSISTED, USING VATS, USING DA ROSEMARY XI Left 3/20/2023    Procedure: XI ROBOTIC WEDGE RESECTION, LUNG (RATS); segmentectomy;  Surgeon: Refugio Medley MD;  [facility];  Service: Thoracic;  Laterality: Left;     Social History     Socioeconomic History    Marital status:    Tobacco Use    Smoking status: Former     Current packs/day: 0.00     Average packs/day: 1.5 packs/day for 45.0 years (67.5 ttl pk-yrs)     Types: Cigarettes     Start date:      Quit date:      Years since quittin.7    Smokeless tobacco: Never   Substance and Sexual Activity    Alcohol use: No     Alcohol/week: 0.0 standard drinks of alcohol    Drug use: No    Sexual activity: Not Currently     Partners: Male     Birth control/protection: Post-menopausal      Family History   Problem Relation Age of Onset    Heart failure Mother     Hypertension Mother     Cancer Father     Hypertension Father     Cancer Sister     Cancer Brother            Review of patient's allergies indicates:  No Known Allergies   Review of Systems   Constitutional: Negative.  Negative for activity change, chills, fatigue and fever.   HENT: Negative.     Eyes: Negative.    Respiratory:  Negative for cough and shortness of breath.    Cardiovascular:  Negative for chest pain and leg swelling.   Gastrointestinal:  Negative for constipation, diarrhea, nausea and vomiting.   Endocrine: Negative.    Genitourinary: Negative.    Musculoskeletal:  Negative for arthralgias and myalgias.   Integumentary:  Negative.   Allergic/Immunologic: Negative.    Neurological:  Negative for light-headedness, numbness and headaches.   Hematological: Negative.    Psychiatric/Behavioral: Negative.           Objective:        Vitals:    09/26/23 0800   BP: 120/77   Pulse: 83   Temp: 97.5 °F (36.4 °C)        Physical Exam  Constitutional:       General: She is not in acute distress.     Appearance: She is well-developed. She is not ill-appearing.   HENT:      Head: Normocephalic and atraumatic.      Mouth/Throat:      Mouth: Mucous membranes are moist.   Eyes:      Extraocular Movements: Extraocular movements intact.      Conjunctiva/sclera: Conjunctivae normal.   Cardiovascular:      Rate and Rhythm: Normal rate and regular rhythm.      Heart sounds: Normal heart sounds.   Pulmonary:      Effort: Pulmonary effort is normal. No respiratory distress.      Breath sounds: Normal breath sounds. No wheezing.   Abdominal:      Palpations: Abdomen is soft.   Musculoskeletal:         General: Normal range of motion.      Cervical back: Normal range of motion and neck supple.   Skin:     General: Skin is warm.   Neurological:      Mental Status: She is alert and oriented to person, place, and time. Mental status is at baseline.      Cranial Nerves: No cranial nerve deficit.   Psychiatric:         Mood and Affect: Mood normal.           LABS / IMAGING      - 08/07/2023 PET: No concerning FDG avid lesion or  detrimental change.  Resolution/improvement of previously noted chest findings as above.        Assessment:     ECOG SCORE    1 - Restricted in strenuous activity-ambulatory and able to carry out work of a light nature       LLL NSCLC/ADC IIB (pT2 pN1a cMx)  - History of stage I squamous cell carcinoma moderately differentiated left lower lung status post wedge resection 04/28/2021  - Abnormality seen on surveillance for history of squamous cell carcinoma. Initial biopsy February 20, 2023 without neoplasm identified , thus had left lower lobe wedge resection after multiple disciplinary discussion, final pathology positive on 03/28/2022 for invasive moderately differentiated adenocarcinoma station 10 lymph node positive, pleural invasion noted, margins negative.    - Restaging MRI brain negative and PET scan with left hilar avidity SUV 4 and chest wall. Given recent surgery potential inflammation presented at tumor board recommended proceeding with adjuvant chemotherapy and follow-up on repeat imaging. Started adjuvant therapy with chemotherapy and immunotherapy per [facility] 010 given PDL1 positive disease 95%.  Mutational analysis negative for EGFR mutation. Noted BRAF mutation.   - Completed adjuvant chemotherapy with cisplatin and pemetrexed tolerated well aside from chemotherapy associated progressive anemia.    - Restaging PET on 8/7/2023 with resolution of prior lymph node avidity.    - Started immunotherapy with atezolizumab (8/14/2023 -         Plan:         - labs reviewed, continue with atezolizumab and closely monitor for thrombocytopenia (grade 1 toxicity)  - repeat PET scan after 11/7/2023   - MD / LABS / TREATMENT VISIT - 3 WEEKS         Med Onc Chart Routing      Follow up with physician 3 weeks.   Follow up with [facility]    Infusion scheduling note   Atezolizumab today and in 3 weeks   Injection scheduling note    Labs CBC, CMP, magnesium and TSH   Scheduling:  Preferred lab:  Lab interval:  Labs 1 day  before next visit   Imaging    Pharmacy appointment    Other referrals                    The patient was seen, interviewed and examined. Pertinent lab and radiology studies were reviewed. Pt instructed to call should develop concerning signs/symptoms or have further questions.       Portions of the record may have been created with voice recognition software. Occasional wrong-word or sound-a-like substitutions may have occurred due to the inherent limitations of voice recognition software. Read the chart carefully and recognize, using context, where substitutions have occurred.    Roberto Styles MD  Hematology / Oncology

## 2023-09-26 ENCOUNTER — OFFICE VISIT (OUTPATIENT)
Dept: HEMATOLOGY/ONCOLOGY | Facility: CLINIC | Age: 73
End: 2023-09-26
Payer: MEDICARE

## 2023-09-26 ENCOUNTER — INFUSION (OUTPATIENT)
Dept: INFUSION THERAPY | Facility: HOSPITAL | Age: 73
End: 2023-09-26
Attending: INTERNAL MEDICINE
Payer: MEDICARE

## 2023-09-26 VITALS
BODY MASS INDEX: 27.2 KG/M2 | SYSTOLIC BLOOD PRESSURE: 141 MMHG | OXYGEN SATURATION: 98 % | TEMPERATURE: 98 F | OXYGEN SATURATION: 97 % | HEIGHT: 65 IN | HEART RATE: 79 BPM | WEIGHT: 163.25 LBS | HEIGHT: 65 IN | TEMPERATURE: 98 F | SYSTOLIC BLOOD PRESSURE: 120 MMHG | RESPIRATION RATE: 16 BRPM | DIASTOLIC BLOOD PRESSURE: 79 MMHG | BODY MASS INDEX: 27.2 KG/M2 | HEART RATE: 83 BPM | DIASTOLIC BLOOD PRESSURE: 77 MMHG | WEIGHT: 163.25 LBS

## 2023-09-26 DIAGNOSIS — Z51.12 ENCOUNTER FOR ANTINEOPLASTIC IMMUNOTHERAPY: Primary | ICD-10-CM

## 2023-09-26 DIAGNOSIS — C34.90 MALIGNANT NEOPLASM OF UNSPECIFIED PART OF UNSPECIFIED BRONCHUS OR LUNG: ICD-10-CM

## 2023-09-26 DIAGNOSIS — C34.32 MALIGNANT NEOPLASM OF LOWER LOBE OF LEFT LUNG: Primary | ICD-10-CM

## 2023-09-26 DIAGNOSIS — D69.6 THROMBOCYTOPENIA: ICD-10-CM

## 2023-09-26 PROCEDURE — 3078F DIAST BP <80 MM HG: CPT | Mod: CPTII,S$GLB,, | Performed by: INTERNAL MEDICINE

## 2023-09-26 PROCEDURE — 99215 OFFICE O/P EST HI 40 MIN: CPT | Mod: S$GLB,,, | Performed by: INTERNAL MEDICINE

## 2023-09-26 PROCEDURE — 99215 PR OFFICE/OUTPT VISIT, EST, LEVL V, 40-54 MIN: ICD-10-PCS | Mod: S$GLB,,, | Performed by: INTERNAL MEDICINE

## 2023-09-26 PROCEDURE — 63600175 PHARM REV CODE 636 W HCPCS: Performed by: INTERNAL MEDICINE

## 2023-09-26 PROCEDURE — 3074F PR MOST RECENT SYSTOLIC BLOOD PRESSURE < 130 MM HG: ICD-10-PCS | Mod: CPTII,S$GLB,, | Performed by: INTERNAL MEDICINE

## 2023-09-26 PROCEDURE — 99999 PR PBB SHADOW E&M-EST. PATIENT-LVL IV: CPT | Mod: PBBFAC,,, | Performed by: INTERNAL MEDICINE

## 2023-09-26 PROCEDURE — 3288F PR FALLS RISK ASSESSMENT DOCUMENTED: ICD-10-PCS | Mod: CPTII,S$GLB,, | Performed by: INTERNAL MEDICINE

## 2023-09-26 PROCEDURE — 1101F PT FALLS ASSESS-DOCD LE1/YR: CPT | Mod: CPTII,S$GLB,, | Performed by: INTERNAL MEDICINE

## 2023-09-26 PROCEDURE — 96413 CHEMO IV INFUSION 1 HR: CPT

## 2023-09-26 PROCEDURE — 3288F FALL RISK ASSESSMENT DOCD: CPT | Mod: CPTII,S$GLB,, | Performed by: INTERNAL MEDICINE

## 2023-09-26 PROCEDURE — 1101F PR PT FALLS ASSESS DOC 0-1 FALLS W/OUT INJ PAST YR: ICD-10-PCS | Mod: CPTII,S$GLB,, | Performed by: INTERNAL MEDICINE

## 2023-09-26 PROCEDURE — 3074F SYST BP LT 130 MM HG: CPT | Mod: CPTII,S$GLB,, | Performed by: INTERNAL MEDICINE

## 2023-09-26 PROCEDURE — 25000003 PHARM REV CODE 250: Performed by: INTERNAL MEDICINE

## 2023-09-26 PROCEDURE — 3078F PR MOST RECENT DIASTOLIC BLOOD PRESSURE < 80 MM HG: ICD-10-PCS | Mod: CPTII,S$GLB,, | Performed by: INTERNAL MEDICINE

## 2023-09-26 PROCEDURE — 1126F AMNT PAIN NOTED NONE PRSNT: CPT | Mod: CPTII,S$GLB,, | Performed by: INTERNAL MEDICINE

## 2023-09-26 PROCEDURE — 99999 PR PBB SHADOW E&M-EST. PATIENT-LVL IV: ICD-10-PCS | Mod: PBBFAC,,, | Performed by: INTERNAL MEDICINE

## 2023-09-26 PROCEDURE — 1126F PR PAIN SEVERITY QUANTIFIED, NO PAIN PRESENT: ICD-10-PCS | Mod: CPTII,S$GLB,, | Performed by: INTERNAL MEDICINE

## 2023-09-26 RX ORDER — SODIUM CHLORIDE 0.9 % (FLUSH) 0.9 %
10 SYRINGE (ML) INJECTION
Status: CANCELLED | OUTPATIENT
Start: 2023-09-26

## 2023-09-26 RX ORDER — DIPHENHYDRAMINE HYDROCHLORIDE 50 MG/ML
50 INJECTION INTRAMUSCULAR; INTRAVENOUS ONCE AS NEEDED
Status: CANCELLED | OUTPATIENT
Start: 2023-09-26

## 2023-09-26 RX ORDER — HEPARIN 100 UNIT/ML
500 SYRINGE INTRAVENOUS
Status: CANCELLED | OUTPATIENT
Start: 2023-09-26

## 2023-09-26 RX ORDER — HEPARIN 100 UNIT/ML
500 SYRINGE INTRAVENOUS
Status: DISCONTINUED | OUTPATIENT
Start: 2023-09-26 | End: 2023-09-26 | Stop reason: HOSPADM

## 2023-09-26 RX ORDER — EPINEPHRINE 0.3 MG/.3ML
0.3 INJECTION SUBCUTANEOUS ONCE AS NEEDED
Status: CANCELLED | OUTPATIENT
Start: 2023-09-26

## 2023-09-26 RX ADMIN — ATEZOLIZUMAB 1200 MG: 1200 INJECTION, SOLUTION INTRAVENOUS at 08:09

## 2023-09-26 RX ADMIN — HEPARIN 500 UNITS: 100 SYRINGE at 09:09

## 2023-09-26 RX ADMIN — SODIUM CHLORIDE: 9 INJECTION, SOLUTION INTRAVENOUS at 08:09

## 2023-09-26 NOTE — PLAN OF CARE
Problem: Adult Inpatient Plan of Care  Goal: Plan of Care Review  Outcome: Ongoing, Progressing  Flowsheets (Taken 9/26/2023 0846)  Plan of Care Reviewed With: patient  Goal: Patient-Specific Goal (Individualized)  Outcome: Ongoing, Progressing  Flowsheets (Taken 9/26/2023 0846)  Anxieties, Fears or Concerns: patient voices no concerns at this time  Individualized Care Needs: feet elevated, warm blanket provided and snacks offered  Goal: Optimal Comfort and Wellbeing  Outcome: Ongoing, Progressing     Problem: Fall Injury Risk  Goal: Absence of Fall and Fall-Related Injury  Outcome: Ongoing, Progressing     Problem: Anemia (Chemotherapy Effects)  Goal: Anemia Symptom Improvement  Outcome: Ongoing, Progressing     Problem: Neutropenia (Chemotherapy Effects)  Goal: Absence of Infection  Outcome: Ongoing, Progressing

## 2023-09-26 NOTE — DISCHARGE INSTRUCTIONS
..[facility]  [address] [facility]  [address]  [phone] phone     [phone] fax  Hours of Operation: Monday- Friday 8:00am- 5:00pm  After hours phone  [phone]  Hematology / Oncology Physicians on call    Dr. Feliberto Adkins      Nurse Practitioners:    Alejandra Lopez, GINNA Bradley, GINNA Mojica, GINNA Taylor, GINNA Curtis, GINNA Pham, PA      Please don't hesitate to call if you have any concerns.    .FALL PREVENTION   Falls often occur due to slipping, tripping or losing your balance. Here are ways to reduce your risk of falling again.   Was there anything that caused your fall that can be fixed, removed or replaced?   Make your home safe by keeping walkways clear of objects you may trip over.   Use non-slip pads under rugs.   Do not walk in poorly lit areas.   Do not stand on chairs or wobbly ladders.   Use caution when reaching overhead or looking upward. This position can cause a loss of balance.   Be sure your shoes fit properly, have non-slip bottoms and are in good condition.   Be cautious when going up and down stairs, curbs, and when walking on uneven sidewalks.   If your balance is poor, consider using a cane or walker.   If your fall was related to alcohol use, stop or limit alcohol intake.   If your fall was related to use of sleeping medicines, talk to your doctor about this. You may need to reduce your dosage at bedtime if you awaken during the night to go to the bathroom.   To reduce the need for nighttime bathroom trips:   Avoid drinking fluids for several hours before going to bed   Empty your bladder before going to bed   Men can keep a urinal at the bedside   © 1578-6898 [employer], [address] [place] [address], [place] [address]. All rights reserved. This information is not intended as a substitute for professional medical care. Always follow your healthcare  professional's instructions.  .WAYS TO HELP PREVENT INFECTION        WASH YOUR HANDS OFTEN DURING THE DAY, ESPECIALLY BEFORE YOU EAT, AFTER USING THE BATHROOM, AND AFTER TOUCHING ANIMALS    STAY AWAY FROM PEOPLE WHO HAVE ILLNESSES YOU CAN CATCH; SUCH AS COLDS, FLU, CHICKEN POX    TRY TO AVOID CROWDS    STAY AWAY FROM CHILDREN WHO RECENTLY HAVE RECEIVED LIVE VIRUS VACCINES    MAINTAIN GOOD MOUTH CARE    DO NOT SQUEEZE OR SCRATCH PIMPLES    CLEAN CUTS & SCRAPES RIGHT AWAY AND DAILY UNTIL HEALED WITH WARM WATER, SOAP & AN ANTISEPTIC    AVOID CONTACT WITH LITTER BOXES, BIRD CAGES, & FISH TANKS    AVOID STANDING WATER, IE., BIRD BATHS, FLOWER POTS/VASES, OR HUMIDIFIERS    WEAR GLOVES WHEN GARDENING OR CLEANING UP AFTER OTHERS, ESPECIALLY BABIES & SMALL CHILDREN    DO NOT EAT RAW FISH, SEAFOOD, MEAT, OR EGGS

## 2023-10-02 ENCOUNTER — OFFICE VISIT (OUTPATIENT)
Dept: OPHTHALMOLOGY | Facility: CLINIC | Age: 73
End: 2023-10-02
Payer: MEDICARE

## 2023-10-02 DIAGNOSIS — Z83.511 FAMILY HISTORY OF OPEN-ANGLE GLAUCOMA: ICD-10-CM

## 2023-10-02 DIAGNOSIS — H52.7 REFRACTION DISORDER: ICD-10-CM

## 2023-10-02 DIAGNOSIS — H25.13 CATARACT, NUCLEAR SCLEROTIC SENILE, BILATERAL: Primary | ICD-10-CM

## 2023-10-02 PROCEDURE — 92014 COMPRE OPH EXAM EST PT 1/>: CPT | Mod: S$GLB,,, | Performed by: OPTOMETRIST

## 2023-10-02 PROCEDURE — 92014 PR EYE EXAM, EST PATIENT,COMPREHESV: ICD-10-PCS | Mod: S$GLB,,, | Performed by: OPTOMETRIST

## 2023-10-02 PROCEDURE — 92015 PR REFRACTION: ICD-10-PCS | Mod: S$GLB,,, | Performed by: OPTOMETRIST

## 2023-10-02 PROCEDURE — 1159F PR MEDICATION LIST DOCUMENTED IN MEDICAL RECORD: ICD-10-PCS | Mod: CPTII,S$GLB,, | Performed by: OPTOMETRIST

## 2023-10-02 PROCEDURE — 99999 PR PBB SHADOW E&M-EST. PATIENT-LVL III: CPT | Mod: PBBFAC,,, | Performed by: OPTOMETRIST

## 2023-10-02 PROCEDURE — 1159F MED LIST DOCD IN RCRD: CPT | Mod: CPTII,S$GLB,, | Performed by: OPTOMETRIST

## 2023-10-02 PROCEDURE — 99999 PR PBB SHADOW E&M-EST. PATIENT-LVL III: ICD-10-PCS | Mod: PBBFAC,,, | Performed by: OPTOMETRIST

## 2023-10-02 PROCEDURE — 92015 DETERMINE REFRACTIVE STATE: CPT | Mod: S$GLB,,, | Performed by: OPTOMETRIST

## 2023-10-02 NOTE — PROGRESS NOTES
HPI    Lost glasses   Vision change since Chemo treatment at near.  Patient wearing old glasses.  Last eye exam 11/21/2022 TRF.  Update glasses RX.  Last edited by Kayla Charles MA on 10/2/2023  8:30 AM.            Assessment /Plan     For exam results, see Encounter Report.    Cataract, nuclear sclerotic senile, bilateral    Family history of open-angle glaucoma    Refraction disorder      Moderate cataracts OU, not surgical  Follow annually.    No evidence of glaucoma    Dispense Final Rx for glasses.  RTC 1 year  Discussed above and answered questions.

## 2023-10-16 NOTE — PROGRESS NOTES
[facility] MEDICAL ONCOLOGY - FOLLOW UP VISIT    Reason for visit: Follow up for stage IIB squamous cell carcinoma      Oncology History   Malignant neoplasm of lower lobe of left lung - Squamous cell   4/15/2021 Initial Diagnosis    Malignant neoplasm of lower lobe of left lung - Squamous cell     5/25/2021 Cancer Staged    Staging form: Lung, AJCC 8th Edition  - Clinical: Stage IA1 (cT1a, cN0, cM0)      Cancer Staged    9mm non-keratinizing squamous cell carcinoma, no VPI, no LVI, margin at least 8mm.  Levels 9 and 11 = negative.  T1aN0     4/5/2023 Cancer Staged    Staging form: Lung, AJCC 8th Edition  - Pathologic stage from 4/5/2023: Stage IIB (pT2, pN1, cM0)     5/1/2023 - 7/21/2023 Chemotherapy    Treatment Summary   Plan Name: OP NSCLC PEMETREXED + CISPLATIN Q3W  Treatment Goal: Supportive  Status: Inactive  Start Date: 5/1/2023  End Date: 7/21/2023  Provider: Marissa Brower MD  Chemotherapy: CISplatin (Platinol) 75 mg/m2 = 138 mg in sodium chloride 0.9% 665 mL chemo infusion, 75 mg/m2 = 138 mg, Intravenous, Clinic/HOD 1 time, 4 of 4 cycles  Administration: 138 mg (5/12/2023), 138 mg (6/29/2023), 138 mg (7/20/2023), 138 mg (6/8/2023)  PEMEtrexed disodium (ALIMTA) 900 mg in sodium chloride 0.9% SolP 100 mL chemo infusion, 925 mg, Intravenous, Clinic/HOD 1 time, 4 of 4 cycles  Dose modification: 375 mg/m2 (original dose 500 mg/m2, Cycle 3, Reason: MD Discretion, Comment: neutropenia )  Administration: 900 mg (5/12/2023), 700 mg (6/29/2023), 700 mg (7/20/2023), 700 mg (6/8/2023)     8/14/2023 -  Chemotherapy    Treatment Summary   Plan Name: OP ATEZOLIZUMAB Q3W  Treatment Goal: Supportive  Status: Active  Start Date: 8/14/2023  End Date: 7/16/2024 (Planned)  Provider: Marissa Brower MD  Chemotherapy: [No matching medication found in this treatment plan]     NSCLC of left lung (Resolved)   4/28/2021 Initial Diagnosis    NSCLC of left lung (Resolved)       Cancer Staged    9mm non-keratinizing squamous cell carcinoma, no VPI, no LVI, margin at least 8mm.  Levels 9 and 11 = negative.  T1aN0        NANI NSCLC/ADC IIB (pT2 pN1a cMx)  - History of stage I squamous cell carcinoma moderately differentiated left lower lung status post wedge resection 04/28/2021  - Abnormality seen on surveillance for history of squamous cell carcinoma. Initial biopsy February 20, 2023 without neoplasm identified , thus had left lower lobe wedge resection after multiple disciplinary discussion, final pathology positive on 03/28/2022 for invasive moderately differentiated adenocarcinoma station 10 lymph node positive, pleural invasion noted, margins negative.    - Restaging MRI brain negative and PET scan with left hilar avidity SUV 4 and chest wall. Given recent surgery potential inflammation presented at tumor board recommended proceeding with adjuvant chemotherapy and follow-up on repeat imaging. Started adjuvant therapy with chemotherapy and immunotherapy per IMPOWER 010 given PDL1 positive disease 95%.  Mutational analysis negative for EGFR mutation. Noted BRAF mutation.   - Completed adjuvant chemotherapy with cisplatin and pemetrexed tolerated well aside from chemotherapy associated progressive anemia.    - Restaging PET on 8/7/2023 with resolution of prior lymph node avidity.    - Started immunotherapy with atezolizumab (8/14/2023 -     HPI:     Radha Lamas is a 72 yo woman w/ pmh significant for COPD and two primary LLL lung cancers as below. She presents today for follow up.   - Stage I squamous cell carcinoma of the of the LLL, initially dx'd 4/28/21, s/p wedge resection (4/28/21)  - Stage IIB (pT2, pN1a, cMx) adenocarcinoma of the NANI (PD-L1 95%, BRAF V600E mutated), initially dx'd 3/28/23, s/p wedge resection 3/28/23, adjuvant cisplatin/pemetrexed x 4 cycles (5/112/23-7/20/23), and currently on adjuvant atezolizumab (8/14/23 - present) who presents for follow up.     Last  clinic 9/26/23, continue with atezolizumab and monitor for TCP (G1). Repeat PET after 11/7/23. RTC in 3 weeks.     Interval History:  - 9/26/23: C3D1 atezolizumab    Pt describes worsening SOB. She says that she her breathing was stable during the chemotherapy, at which time she was walking 1.5 miles the second and third weeks of her therapy. Since starting her atezolizumab, she says that her breathing has worsened. She says that her chest hurts when she can't breathe, that she cannot talk and walk anymore, and that her daily inhaler does not seem to help anymore. She feels that this has been worsening for since September. She denies a new or worsening cough. She also describes fatigue which has been worsening generalized fatigue which has been occurring over the same amount of time. She describes being warn out with basic household tasks which were previously not a problem. She is able to speak complete sentences while at rest.     I have reviewed and updated the patient's past medical, surgical, family and social histories.      Past Medical History:   Past Medical History:   Diagnosis Date    COPD (chronic obstructive pulmonary disease) 2013    Eczema     GERD (gastroesophageal reflux disease)     Hiatal hernia     History of torn meniscus of right knee 01/2011    Hypothyroid     Incidental pulmonary nodule, > 3mm and < 8mm - Lingula 8/12/2021    Lung cancer     Urinary incontinence in female     Mild , only takes mediciane with travel        Allergies:   Review of patient's allergies indicates:  No Known Allergies     Medications:   Current Outpatient Medications   Medication Sig Dispense Refill    albuterol (PROAIR HFA) 90 mcg/actuation inhaler Inhale 2 puffs into the lungs every 4 (four) hours as needed for Wheezing or Shortness of Breath. Rescue 54 g 4    calcium carbonate (OS-CYDNEY) 600 mg calcium (1,500 mg) Tab Take 600 mg by mouth.      dexAMETHasone (DECADRON) 4 MG Tab Take 2 tablets (8 mg total) by mouth  once daily. on the day prior to chemotherapy then daily on days 2,3,4 of each chemotherapy cycle. (Patient not taking: Reported on 9/26/2023) 24 tablet 3    diphenhydrAMINE-aluminum-magnesium hydroxide-simethicone-LIDOcaine HCl 2% Swish and spit 15 mLs every 4 (four) hours as needed. (Patient not taking: Reported on 9/26/2023) 540 each 2    DUPIXENT  mg/2 mL PnIj Inject into the skin every 14 (fourteen) days.      folic acid (FOLVITE) 1 MG tablet Take 1 tablet (1 mg total) by mouth once daily. starting 1 week prior to pemetrexed and continuing for 21 days after stopping pemetrexed (Patient not taking: Reported on 9/26/2023) 30 tablet 5    gabapentin (NEURONTIN) 300 MG capsule Take 1 capsule (300 mg total) by mouth every evening. 30 capsule 11    levothyroxine (SYNTHROID) 75 MCG tablet Take 75 mcg by mouth once daily.      loratadine (CLARITIN) 10 mg tablet Take 10 mg by mouth once daily.      magnesium oxide (MAG-OX) 400 mg (241.3 mg magnesium) tablet Take 1 tablet (400 mg total) by mouth once daily. (Patient not taking: Reported on 9/26/2023) 7 tablet 0    OLANZapine (ZYPREXA) 5 MG tablet Take 1 tablet (5 mg total) by mouth every evening. Take as directed on days 1-4 of your chemotherapy cycle. (Patient not taking: Reported on 9/26/2023) 16 tablet 0    omeprazole (PRILOSEC) 20 MG capsule Take 20 mg by mouth once daily.      ondansetron (ZOFRAN-ODT) 8 MG TbDL Take 1 tablet (8 mg total) by mouth every 8 (eight) hours as needed (nausea). (Patient not taking: Reported on 9/26/2023) 60 tablet 5    oxyCODONE (ROXICODONE) 5 MG immediate release tablet Take 1 tablet (5 mg total) by mouth every 4 (four) hours as needed for Pain. (Patient not taking: Reported on 9/26/2023) 41 tablet 0    potassium chloride SA (K-DUR,KLOR-CON) 20 MEQ tablet Take 1 tablet (20 mEq total) by mouth once daily. (Patient not taking: Reported on 9/26/2023) 7 tablet 1    tolterodine (DETROL LA) 4 MG 24 hr capsule Take 1 capsule (4 mg total) by  mouth once daily. (Patient taking differently: Take 4 mg by mouth daily as needed.) 90 capsule 4    umeclidinium-vilanteroL (ANORO ELLIPTA) 62.5-25 mcg/actuation DsDv USE 1 INHALATION DAILY (CONTROLLER) (Patient not taking: Reported on 9/26/2023) 180 each 3     No current facility-administered medications for this visit.     Facility-Administered Medications Ordered in Other Visits   Medication Dose Route Frequency Provider Last Rate Last Admin    prochlorperazine injection Soln 5 mg  5 mg Intravenous Q30 Min PRN Adrien Vail MD              ROS:  Review of Systems   A complete 12-point review of systems was reviewed and is negative except as mentioned above.       Physical Exam:       There were no vitals taken for this visit.               Physical Exam  Constitutional:       Appearance: Normal appearance.   HENT:      Head: Normocephalic and atraumatic.   Eyes:      Extraocular Movements: Extraocular movements intact.      Conjunctiva/sclera: Conjunctivae normal.      Pupils: Pupils are equal, round, and reactive to light.   Cardiovascular:      Rate and Rhythm: Normal rate and regular rhythm.      Heart sounds: No murmur heard.     No friction rub. No gallop.   Pulmonary:      Effort: Pulmonary effort is normal.      Breath sounds: No wheezing, rhonchi or rales.   Musculoskeletal:         General: Normal range of motion.      Right lower leg: No edema.      Left lower leg: No edema.   Skin:     General: Skin is warm and dry.   Neurological:      Mental Status: She is alert and oriented to person, place, and time.   Psychiatric:         Mood and Affect: Mood normal.         Thought Content: Thought content normal.         Judgment: Judgment normal.           Labs:   No results found for this or any previous visit (from the past 48 hour(s)).     Imaging:   NM PET CT Routine FDG  Addendum: Correction: No suspicious FDG avid osseous lesion.     Electronically signed by: Leo Acevedo MD    Date:    08/10/2023   Time:    09:42  Narrative: EXAMINATION:  NM PET CT ROUTINE    CLINICAL HISTORY:  Non-small cell lung cancer (NSCLC), metastatic, assess treatment response; Malignant neoplasm of unspecified part of unspecified bronchus or lung    TECHNIQUE:  12.13 mCi of F18-FDG was administered intravenously.  After an approximately 60 min distribution time, PET/CT images were acquired from the skull base to the mid thigh. Transmission images were acquired to correct for attenuation using a whole body low-dose CT scan without contrast.    COMPARISON:  PET-CT 03/29/2021 and 05/04/2023; CT chest 02/03/2023    FINDINGS:  Head/neck: Normal physiologic activity present.    Chest: No FDG avid axillary, mediastinal or hilar lymph nodes.  Previously described AP window and left upper lobe suture line findings have resolved.    Right lung unchanged.  Postsurgical section changes of the left lung demonstrate no detrimental change with areas of scarring/chronic atelectasis.  Left apical loculated pleural effusion has decreased in size with resolution of the previously noted pneumothorax component.    Abdomen/pelvis: Physiologic activity present without concerning FDG avid focus    Skeletal/subcutaneous structures: Suspicious FDG avid osseous lesion.  Impression: No concerning FDG avid lesion or detrimental change.  Resolution/improvement of previously noted chest findings as above.    Electronically signed by: Leo Acevedo MD  Date:    08/07/2023  Time:    10:20            Path:   1. Left lung, wedge resection: Invasive adenocarcinoma, predominantly solid   pattern, measuring 9 mm (0.9 cm) in greatest dimension.          Tumor is located 3 mm (0.3 cm) from the blue inked/stapled parenchymal   surgical margin.          Tumor extends into the elastic layer of the visceral pleura.          See synoptic report in comment section for further details.   2. Lymph node, left level 11, excision: 1 benign fragmented lymph  node with   sinus histiocytosis and anthracotic pigment, negative for metastatic   carcinoma (0/1).   3. Lymph node, left level 10, excision: 1 lymph node, positive for metastatic   carcinoma with crush artifact dense fibrosis and focal necrosis (1/1).          Confirmed with positive immunohistochemical stain with cytokeratin   AE1/AE3 with satisfactory positive and negative controls.   4. Lymph node, left level 12, excision: 1 benign lymph node with sinus   histiocytosis, negative for metastatic carcinoma (0/1).   5. Lymph node, left level 9, excision: 1 benign fragmented lymph node with   sinus histiocytosis and anthracotic pigment, negative for metastatic   carcinoma (0/1).   6. Lymph node, level 7, excision: 1 benign lymph node with sinus   histiocytosis, negative for metastatic carcinoma (0/1).   7.  Lymph node, level 5, excision: 1 benign fragmented lymph node with sinus   histiocytosis and anthracotic pigment, negative for metastatic carcinoma   (0/1).   8. Lung, lingula, anatomic lingulectomy: Lung with congested vasculature.          No residual carcinoma is identified.          Bronchial and vascular margin is negative for malignancy.          See synoptic report in comment section for further details.       Assessment and Plan:     Radha Lamas is a 72 yo woman w/ pmh significant for COPD and two primary LLL lung cancers as below. She presents today for follow up.   - Stage I squamous cell carcinoma of the of the LLL, initially dx'd 4/28/21, s/p wedge resection (4/28/21)  - Stage IIB (pT2, pN1a, cMx) adenocarcinoma of the NANI (PD-L1 95%, BRAF V600E mutated), initially dx'd 3/28/23, s/p wedge resection 3/28/23, adjuvant cisplatin/pemetrexed x 4 cycles (5/112/23-7/20/23), and currently on adjuvant atezolizumab (8/14/23 - present) who presents for follow up.     Stage IIB Adenocarcinoma of NNAI  Concern for IO-Induced Pneumonitis  ECOG PS 1 (limited by SOB). Currently on adjuvant atezolizumab following  adjuvant cisplatin/pemetrexed. Pt describes progressively worsening PÉREZ and fatigue over the past 1-2 months (see HPI). Her most recent imaging (PET, 8/07/23) is from prior to initiation of immunotherapy. Her symptoms and time course are concerning for possible IO-induced pneumonitis, possibly G2. Will therefore hold atezolizumab today and obtain CT C to evaluate further. Low threshold to start steroids.     PLAN:   -- Hold atezolizumab today  -- CT C non-con ASAP (given eGFR of 39)  -- RTC after CT C, low threshold to start steroids in interim      The above information has been reviewed with the patient and all questions have been answered to their apparent satisfaction.  They understand that they can call the clinic with any questions.    Orion Arce MD  Hematology/Oncology  [facility]        Med Onc Chart Routing      Follow up with physician . CT C ASAP, RTC right afterwards, prefers to follow at Cancer Center   Follow up with [facility]    Infusion scheduling note    Injection scheduling note    Labs    Imaging   CT chest non-contrast ASAP, concern for pneumonitis   Pharmacy appointment    Other referrals

## 2023-10-17 ENCOUNTER — OFFICE VISIT (OUTPATIENT)
Dept: HEMATOLOGY/ONCOLOGY | Facility: CLINIC | Age: 73
End: 2023-10-17
Payer: MEDICARE

## 2023-10-17 ENCOUNTER — LAB VISIT (OUTPATIENT)
Dept: LAB | Facility: HOSPITAL | Age: 73
End: 2023-10-17
Attending: INTERNAL MEDICINE
Payer: MEDICARE

## 2023-10-17 VITALS
HEIGHT: 65 IN | OXYGEN SATURATION: 99 % | HEART RATE: 90 BPM | RESPIRATION RATE: 18 BRPM | SYSTOLIC BLOOD PRESSURE: 116 MMHG | TEMPERATURE: 98 F | WEIGHT: 156.06 LBS | DIASTOLIC BLOOD PRESSURE: 73 MMHG | BODY MASS INDEX: 26 KG/M2

## 2023-10-17 DIAGNOSIS — C34.32 MALIGNANT NEOPLASM OF LOWER LOBE OF LEFT LUNG: ICD-10-CM

## 2023-10-17 DIAGNOSIS — C34.32 MALIGNANT NEOPLASM OF LOWER LOBE OF LEFT LUNG: Primary | ICD-10-CM

## 2023-10-17 DIAGNOSIS — R06.00 DYSPNEA, UNSPECIFIED TYPE: ICD-10-CM

## 2023-10-17 DIAGNOSIS — Z09 ENCOUNTER FOR FOLLOW-UP EXAMINATION AFTER COMPLETED TREATMENT FOR CONDITIONS OTHER THAN MALIGNANT NEOPLASM: ICD-10-CM

## 2023-10-17 DIAGNOSIS — C34.90 MALIGNANT NEOPLASM OF UNSPECIFIED PART OF UNSPECIFIED BRONCHUS OR LUNG: ICD-10-CM

## 2023-10-17 LAB
BASOPHILS # BLD AUTO: 0.02 K/UL (ref 0–0.2)
BASOPHILS NFR BLD: 0.5 % (ref 0–1.9)
DIFFERENTIAL METHOD: ABNORMAL
EOSINOPHIL # BLD AUTO: 0.2 K/UL (ref 0–0.5)
EOSINOPHIL NFR BLD: 4.4 % (ref 0–8)
ERYTHROCYTE [DISTWIDTH] IN BLOOD BY AUTOMATED COUNT: 13.4 % (ref 11.5–14.5)
HCT VFR BLD AUTO: 29.4 % (ref 37–48.5)
HGB BLD-MCNC: 9.4 G/DL (ref 12–16)
IMM GRANULOCYTES # BLD AUTO: 0.02 K/UL (ref 0–0.04)
IMM GRANULOCYTES NFR BLD AUTO: 0.5 % (ref 0–0.5)
LYMPHOCYTES # BLD AUTO: 1 K/UL (ref 1–4.8)
LYMPHOCYTES NFR BLD: 23.4 % (ref 18–48)
MAGNESIUM SERPL-MCNC: 1.8 MG/DL (ref 1.6–2.6)
MCH RBC QN AUTO: 30.1 PG (ref 27–31)
MCHC RBC AUTO-ENTMCNC: 32 G/DL (ref 32–36)
MCV RBC AUTO: 94 FL (ref 82–98)
MONOCYTES # BLD AUTO: 0.4 K/UL (ref 0.3–1)
MONOCYTES NFR BLD: 9.9 % (ref 4–15)
NEUTROPHILS # BLD AUTO: 2.7 K/UL (ref 1.8–7.7)
NEUTROPHILS NFR BLD: 61.3 % (ref 38–73)
NRBC BLD-RTO: 0 /100 WBC
PLATELET # BLD AUTO: 201 K/UL (ref 150–450)
PMV BLD AUTO: 9.5 FL (ref 9.2–12.9)
RBC # BLD AUTO: 3.12 M/UL (ref 4–5.4)
TSH SERPL DL<=0.005 MIU/L-ACNC: 0.94 UIU/ML (ref 0.4–4)
WBC # BLD AUTO: 4.35 K/UL (ref 3.9–12.7)

## 2023-10-17 PROCEDURE — 3074F PR MOST RECENT SYSTOLIC BLOOD PRESSURE < 130 MM HG: ICD-10-PCS | Mod: CPTII,S$GLB,, | Performed by: HOSPITALIST

## 2023-10-17 PROCEDURE — 1159F PR MEDICATION LIST DOCUMENTED IN MEDICAL RECORD: ICD-10-PCS | Mod: CPTII,S$GLB,, | Performed by: HOSPITALIST

## 2023-10-17 PROCEDURE — 1159F MED LIST DOCD IN RCRD: CPT | Mod: CPTII,S$GLB,, | Performed by: HOSPITALIST

## 2023-10-17 PROCEDURE — 85025 COMPLETE CBC W/AUTO DIFF WBC: CPT | Performed by: INTERNAL MEDICINE

## 2023-10-17 PROCEDURE — 1126F AMNT PAIN NOTED NONE PRSNT: CPT | Mod: CPTII,S$GLB,, | Performed by: HOSPITALIST

## 2023-10-17 PROCEDURE — 1101F PT FALLS ASSESS-DOCD LE1/YR: CPT | Mod: CPTII,S$GLB,, | Performed by: HOSPITALIST

## 2023-10-17 PROCEDURE — 99214 OFFICE O/P EST MOD 30 MIN: CPT | Mod: S$GLB,,, | Performed by: HOSPITALIST

## 2023-10-17 PROCEDURE — 3288F PR FALLS RISK ASSESSMENT DOCUMENTED: ICD-10-PCS | Mod: CPTII,S$GLB,, | Performed by: HOSPITALIST

## 2023-10-17 PROCEDURE — 3078F DIAST BP <80 MM HG: CPT | Mod: CPTII,S$GLB,, | Performed by: HOSPITALIST

## 2023-10-17 PROCEDURE — 99999 PR PBB SHADOW E&M-EST. PATIENT-LVL IV: CPT | Mod: PBBFAC,,, | Performed by: HOSPITALIST

## 2023-10-17 PROCEDURE — 99214 PR OFFICE/OUTPT VISIT, EST, LEVL IV, 30-39 MIN: ICD-10-PCS | Mod: S$GLB,,, | Performed by: HOSPITALIST

## 2023-10-17 PROCEDURE — 99999 PR PBB SHADOW E&M-EST. PATIENT-LVL IV: ICD-10-PCS | Mod: PBBFAC,,, | Performed by: HOSPITALIST

## 2023-10-17 PROCEDURE — 1101F PR PT FALLS ASSESS DOC 0-1 FALLS W/OUT INJ PAST YR: ICD-10-PCS | Mod: CPTII,S$GLB,, | Performed by: HOSPITALIST

## 2023-10-17 PROCEDURE — 84443 ASSAY THYROID STIM HORMONE: CPT | Performed by: INTERNAL MEDICINE

## 2023-10-17 PROCEDURE — 3074F SYST BP LT 130 MM HG: CPT | Mod: CPTII,S$GLB,, | Performed by: HOSPITALIST

## 2023-10-17 PROCEDURE — 3288F FALL RISK ASSESSMENT DOCD: CPT | Mod: CPTII,S$GLB,, | Performed by: HOSPITALIST

## 2023-10-17 PROCEDURE — 36415 COLL VENOUS BLD VENIPUNCTURE: CPT | Performed by: INTERNAL MEDICINE

## 2023-10-17 PROCEDURE — 1126F PR PAIN SEVERITY QUANTIFIED, NO PAIN PRESENT: ICD-10-PCS | Mod: CPTII,S$GLB,, | Performed by: HOSPITALIST

## 2023-10-17 PROCEDURE — 83735 ASSAY OF MAGNESIUM: CPT | Performed by: INTERNAL MEDICINE

## 2023-10-17 PROCEDURE — 3078F PR MOST RECENT DIASTOLIC BLOOD PRESSURE < 80 MM HG: ICD-10-PCS | Mod: CPTII,S$GLB,, | Performed by: HOSPITALIST

## 2023-10-18 ENCOUNTER — PATIENT MESSAGE (OUTPATIENT)
Dept: HEMATOLOGY/ONCOLOGY | Facility: CLINIC | Age: 73
End: 2023-10-18
Payer: MEDICARE

## 2023-10-23 ENCOUNTER — TELEPHONE (OUTPATIENT)
Dept: HEMATOLOGY/ONCOLOGY | Facility: CLINIC | Age: 73
End: 2023-10-23
Payer: MEDICARE

## 2023-10-23 DIAGNOSIS — J44.9 COPD, MODERATE: ICD-10-CM

## 2023-10-23 RX ORDER — UMECLIDINIUM BROMIDE AND VILANTEROL TRIFENATATE 62.5; 25 UG/1; UG/1
POWDER RESPIRATORY (INHALATION)
Qty: 180 EACH | Refills: 3 | Status: SHIPPED | OUTPATIENT
Start: 2023-10-23

## 2023-10-23 NOTE — NURSING
1527pm: Outgoing call to pt regarding in-basket msg from Dr. Arce for pt appts to be moved sooner. Spoke with pt. Pt CT scan was rescheduled to 10/24 at 1030 am at . Pt wants to keep Dr wood at 430 pm and does not wish to do virtual visit. Pt verbalized understanding. Pt plan to report to appt as scheduled.   Oncology Navigation   Intake  Internal / External Referral: Internal (Dr. Refugio Medley)  Initial Nurse Navigator Contact: 23  Date Worked: 10/23/23  Multiple appointments: No  Start of Treatment: 23     Treatment  Current Status: Active  Date Presented to Tumor Board: 23  Presentation Notes: Discuss left VATS anatomic resection for growing NANI nodule    Surgery: Planned  Surgical Oncologist: Jasmyne  Consult Date: 03/15/23    Medical Oncologist: Dr. Marissa Brower  Consult Date: 23  Chemotherapy: Planned  Chemotherapy Regimen: Alimta Cisplatin  Immunotherapy: Planned  Immunotherapy Name: Tecentriq       Procedures: Port / PICC  MRI Schedule Date: 23 (brain w wo contrast)  PET Scan Schedule Date: 23  Port / PICC Schedule Date: 23  Other Schedule Date: 23 (audiogram)    General Referrals: Social Work  Social Work: notified via in-basket msg  Social Work Referral Date: 23          Support Systems: Spouse/significant other  Barriers of Care: Transportation  Transportation Barriers: Patient lives in [place]     Acuity  Stage: 1  Systemic Treatment - predicted or initiated: Chemotherapy Regimen with Multiple drugs (+1)  Treatment Tolerability: Has not started treatment yet/treatment fully completed and side effects resolved  ECO  Comorbidities in Medical History: 2  Hospitalization Within the Past Month: 0   Needed: 0  Support: 0  Transportation: 0  History of noncompliance/frequent no shows and cancellations: 0  Verbalizes the need for more education: 1  Navigation Acuity: 4     Follow Up  No follow-ups on file.

## 2023-10-23 NOTE — TELEPHONE ENCOUNTER
ONN contacted pt per MD request to r/s CT earlier or day later so that he can have results available to review with pt. Able to offer CT @ 1030 and MD @ 430 10/24. Pt is agreeable with appts and has no add'l questions at this time.

## 2023-10-23 NOTE — PROGRESS NOTES
[facility] MEDICAL ONCOLOGY - FOLLOW UP VISIT    Reason for visit: Follow up for stage IIB squamous cell carcinoma      Oncology History   Malignant neoplasm of lower lobe of left lung - Squamous cell   4/15/2021 Initial Diagnosis    Malignant neoplasm of lower lobe of left lung - Squamous cell     5/25/2021 Cancer Staged    Staging form: Lung, AJCC 8th Edition  - Clinical: Stage IA1 (cT1a, cN0, cM0)      Cancer Staged    9mm non-keratinizing squamous cell carcinoma, no VPI, no LVI, margin at least 8mm.  Levels 9 and 11 = negative.  T1aN0     4/5/2023 Cancer Staged    Staging form: Lung, AJCC 8th Edition  - Pathologic stage from 4/5/2023: Stage IIB (pT2, pN1, cM0)     5/1/2023 - 7/21/2023 Chemotherapy    Treatment Summary   Plan Name: OP NSCLC PEMETREXED + CISPLATIN Q3W  Treatment Goal: Supportive  Status: Inactive  Start Date: 5/1/2023  End Date: 7/21/2023  Provider: Marissa Brower MD  Chemotherapy: CISplatin (Platinol) 75 mg/m2 = 138 mg in sodium chloride 0.9% 665 mL chemo infusion, 75 mg/m2 = 138 mg, Intravenous, Clinic/HOD 1 time, 4 of 4 cycles  Administration: 138 mg (5/12/2023), 138 mg (6/29/2023), 138 mg (7/20/2023), 138 mg (6/8/2023)  PEMEtrexed disodium (ALIMTA) 900 mg in sodium chloride 0.9% SolP 100 mL chemo infusion, 925 mg, Intravenous, Clinic/HOD 1 time, 4 of 4 cycles  Dose modification: 375 mg/m2 (original dose 500 mg/m2, Cycle 3, Reason: MD Discretion, Comment: neutropenia )  Administration: 900 mg (5/12/2023), 700 mg (6/29/2023), 700 mg (7/20/2023), 700 mg (6/8/2023)     8/14/2023 -  Chemotherapy    Treatment Summary   Plan Name: OP ATEZOLIZUMAB Q3W  Treatment Goal: Supportive  Status: Active  Start Date: 8/14/2023  End Date: 7/16/2024 (Planned)  Provider: Marissa Brower MD  Chemotherapy: [No matching medication found in this treatment plan]     NSCLC of left lung (Resolved)   4/28/2021 Initial Diagnosis    NSCLC of left lung (Resolved)       Cancer Staged    9mm non-keratinizing squamous cell carcinoma, no VPI, no LVI, margin at least 8mm.  Levels 9 and 11 = negative.  T1aN0        NANI NSCLC/ADC IIB (pT2 pN1a cMx)  - History of stage I squamous cell carcinoma moderately differentiated left lower lung status post wedge resection 04/28/2021  - Abnormality seen on surveillance for history of squamous cell carcinoma. Initial biopsy February 20, 2023 without neoplasm identified , thus had left lower lobe wedge resection after multiple disciplinary discussion, final pathology positive on 03/28/2022 for invasive moderately differentiated adenocarcinoma station 10 lymph node positive, pleural invasion noted, margins negative.    - Restaging MRI brain negative and PET scan with left hilar avidity SUV 4 and chest wall. Given recent surgery potential inflammation presented at tumor board recommended proceeding with adjuvant chemotherapy and follow-up on repeat imaging. Started adjuvant therapy with chemotherapy and immunotherapy per IMPOWER 010 given PDL1 positive disease 95%.  Mutational analysis negative for EGFR mutation. Noted BRAF mutation.   - Completed adjuvant chemotherapy with cisplatin and pemetrexed tolerated well aside from chemotherapy associated progressive anemia.    - Restaging PET on 8/7/2023 with resolution of prior lymph node avidity.    - Started immunotherapy with atezolizumab (8/14/2023 -     HPI:     Radha Lamas is a 72 yo woman w/ pmh significant for COPD and two primary LLL lung cancers as below. She presents today for follow up.   - Stage I squamous cell carcinoma of the of the LLL, initially dx'd 4/28/21, s/p wedge resection (4/28/21)  - Stage IIB (pT2, pN1a, cMx) adenocarcinoma of the NANI (PD-L1 95%, BRAF V600E mutated), initially dx'd 3/28/23, s/p wedge resection 3/28/23, adjuvant cisplatin/pemetrexed x 4 cycles (5/11/23-7/20/23), and currently on adjuvant atezolizumab (8/14/23 - present) who presents for follow up.     Last  clinic 10/17/23, concern for possible IO mediated pneumonitis.     Interval History:   - 10/24/23: CT C non-con, postsurgical change of NANI and LLL pulmonary resection; subtle nodularity present along L major fissure, no definitvely changed since PET; AP window LN 9 mm similar to prior PET; small focus of reticulonodular density within anterior L lung base, nonspecific; new 6 mm basilar solid LLL nodule; nrew 5 mm posterior RLL groudn glass nodule; new 4mm LLL nodule suspected endobronchial which may correlate with secretions    Pt states that she feels the same as last week. She continues to endorse PÉREZ with decreasing exertional burdens.  She otherwise denies any new or bothersome symptoms at this time.  She is eager to know the results of her CT scan.      I have reviewed and updated the patient's past medical, surgical, family and social histories.      Past Medical History:   Past Medical History:   Diagnosis Date    COPD (chronic obstructive pulmonary disease) 2013    Eczema     GERD (gastroesophageal reflux disease)     Hiatal hernia     History of torn meniscus of right knee 01/2011    Hypothyroid     Incidental pulmonary nodule, > 3mm and < 8mm - Lingula 8/12/2021    Lung cancer     Urinary incontinence in female     Mild , only takes mediciane with travel        Allergies:   Review of patient's allergies indicates:  No Known Allergies     Medications:   Current Outpatient Medications   Medication Sig Dispense Refill    albuterol (PROAIR HFA) 90 mcg/actuation inhaler Inhale 2 puffs into the lungs every 4 (four) hours as needed for Wheezing or Shortness of Breath. Rescue 54 g 4    calcium carbonate (OS-CYDNEY) 600 mg calcium (1,500 mg) Tab Take 600 mg by mouth.      dexAMETHasone (DECADRON) 4 MG Tab Take 2 tablets (8 mg total) by mouth once daily. on the day prior to chemotherapy then daily on days 2,3,4 of each chemotherapy cycle. (Patient not taking: Reported on 9/26/2023) 24 tablet 3     diphenhydrAMINE-aluminum-magnesium hydroxide-simethicone-LIDOcaine HCl 2% Swish and spit 15 mLs every 4 (four) hours as needed. (Patient not taking: Reported on 9/26/2023) 540 each 2    DUPIXENT  mg/2 mL PnIj Inject into the skin every 14 (fourteen) days.      folic acid (FOLVITE) 1 MG tablet Take 1 tablet (1 mg total) by mouth once daily. starting 1 week prior to pemetrexed and continuing for 21 days after stopping pemetrexed (Patient not taking: Reported on 9/26/2023) 30 tablet 5    gabapentin (NEURONTIN) 300 MG capsule Take 1 capsule (300 mg total) by mouth every evening. 30 capsule 11    levothyroxine (SYNTHROID) 75 MCG tablet Take 75 mcg by mouth once daily.      loratadine (CLARITIN) 10 mg tablet Take 10 mg by mouth once daily.      magnesium oxide (MAG-OX) 400 mg (241.3 mg magnesium) tablet Take 1 tablet (400 mg total) by mouth once daily. (Patient not taking: Reported on 9/26/2023) 7 tablet 0    OLANZapine (ZYPREXA) 5 MG tablet Take 1 tablet (5 mg total) by mouth every evening. Take as directed on days 1-4 of your chemotherapy cycle. (Patient not taking: Reported on 9/26/2023) 16 tablet 0    omeprazole (PRILOSEC) 20 MG capsule Take 20 mg by mouth once daily.      ondansetron (ZOFRAN-ODT) 8 MG TbDL Take 1 tablet (8 mg total) by mouth every 8 (eight) hours as needed (nausea). (Patient not taking: Reported on 9/26/2023) 60 tablet 5    oxyCODONE (ROXICODONE) 5 MG immediate release tablet Take 1 tablet (5 mg total) by mouth every 4 (four) hours as needed for Pain. (Patient not taking: Reported on 9/26/2023) 41 tablet 0    potassium chloride SA (K-DUR,KLOR-CON) 20 MEQ tablet Take 1 tablet (20 mEq total) by mouth once daily. (Patient not taking: Reported on 9/26/2023) 7 tablet 1    tolterodine (DETROL LA) 4 MG 24 hr capsule Take 1 capsule (4 mg total) by mouth once daily. (Patient taking differently: Take 4 mg by mouth daily as needed.) 90 capsule 4    umeclidinium-vilanteroL (ANORO ELLIPTA) 62.5-25  mcg/actuation DsDv USE 1 INHALATION DAILY (CONTROLLER) 180 each 3     No current facility-administered medications for this visit.     Facility-Administered Medications Ordered in Other Visits   Medication Dose Route Frequency Provider Last Rate Last Admin    prochlorperazine injection Soln 5 mg  5 mg Intravenous Q30 Min PRN Adrien Vail MD              ROS:  Review of Systems   A complete 12-point review of systems was reviewed and is negative except as mentioned above.       Physical Exam:       There were no vitals taken for this visit.               Physical Exam  Constitutional:       Appearance: Normal appearance.   HENT:      Head: Normocephalic and atraumatic.   Eyes:      Extraocular Movements: Extraocular movements intact.      Conjunctiva/sclera: Conjunctivae normal.      Pupils: Pupils are equal, round, and reactive to light.   Cardiovascular:      Rate and Rhythm: Normal rate and regular rhythm.      Heart sounds: No murmur heard.     No friction rub. No gallop.   Pulmonary:      Effort: Pulmonary effort is normal.      Breath sounds: No wheezing, rhonchi or rales.   Musculoskeletal:         General: Normal range of motion.      Right lower leg: No edema.      Left lower leg: No edema.   Skin:     General: Skin is warm and dry.   Neurological:      Mental Status: She is alert and oriented to person, place, and time.   Psychiatric:         Mood and Affect: Mood normal.         Thought Content: Thought content normal.         Judgment: Judgment normal.           Labs:   No results found for this or any previous visit (from the past 48 hour(s)).     Imaging:   NM PET CT Routine FDG  Addendum: Correction: No suspicious FDG avid osseous lesion.     Electronically signed by: Leo Acevedo MD   Date:    08/10/2023   Time:    09:42  Narrative: EXAMINATION:  NM PET CT ROUTINE    CLINICAL HISTORY:  Non-small cell lung cancer (NSCLC), metastatic, assess treatment response; Malignant neoplasm of unspecified  part of unspecified bronchus or lung    TECHNIQUE:  12.13 mCi of F18-FDG was administered intravenously.  After an approximately 60 min distribution time, PET/CT images were acquired from the skull base to the mid thigh. Transmission images were acquired to correct for attenuation using a whole body low-dose CT scan without contrast.    COMPARISON:  PET-CT 03/29/2021 and 05/04/2023; CT chest 02/03/2023    FINDINGS:  Head/neck: Normal physiologic activity present.    Chest: No FDG avid axillary, mediastinal or hilar lymph nodes.  Previously described AP window and left upper lobe suture line findings have resolved.    Right lung unchanged.  Postsurgical section changes of the left lung demonstrate no detrimental change with areas of scarring/chronic atelectasis.  Left apical loculated pleural effusion has decreased in size with resolution of the previously noted pneumothorax component.    Abdomen/pelvis: Physiologic activity present without concerning FDG avid focus    Skeletal/subcutaneous structures: Suspicious FDG avid osseous lesion.  Impression: No concerning FDG avid lesion or detrimental change.  Resolution/improvement of previously noted chest findings as above.    Electronically signed by: Leo Acevedo MD  Date:    08/07/2023  Time:    10:20            Path:   1. Left lung, wedge resection: Invasive adenocarcinoma, predominantly solid   pattern, measuring 9 mm (0.9 cm) in greatest dimension.          Tumor is located 3 mm (0.3 cm) from the blue inked/stapled parenchymal   surgical margin.          Tumor extends into the elastic layer of the visceral pleura.          See synoptic report in comment section for further details.   2. Lymph node, left level 11, excision: 1 benign fragmented lymph node with   sinus histiocytosis and anthracotic pigment, negative for metastatic   carcinoma (0/1).   3. Lymph node, left level 10, excision: 1 lymph node, positive for metastatic   carcinoma with crush artifact  dense fibrosis and focal necrosis (1/1).          Confirmed with positive immunohistochemical stain with cytokeratin   AE1/AE3 with satisfactory positive and negative controls.   4. Lymph node, left level 12, excision: 1 benign lymph node with sinus   histiocytosis, negative for metastatic carcinoma (0/1).   5. Lymph node, left level 9, excision: 1 benign fragmented lymph node with   sinus histiocytosis and anthracotic pigment, negative for metastatic   carcinoma (0/1).   6. Lymph node, level 7, excision: 1 benign lymph node with sinus   histiocytosis, negative for metastatic carcinoma (0/1).   7.  Lymph node, level 5, excision: 1 benign fragmented lymph node with sinus   histiocytosis and anthracotic pigment, negative for metastatic carcinoma   (0/1).   8. Lung, lingula, anatomic lingulectomy: Lung with congested vasculature.          No residual carcinoma is identified.          Bronchial and vascular margin is negative for malignancy.          See synoptic report in comment section for further details.       Assessment and Plan:     Radha Lamas is a 72 yo woman w/ pmh significant for COPD and two primary LLL lung cancers as below. She presents today for follow up.   - Stage I squamous cell carcinoma of the of the LLL, initially dx'd 4/28/21, s/p wedge resection (4/28/21)  - Stage IIB (pT2, pN1a, cMx) adenocarcinoma of the NANI (PD-L1 95%, BRAF V600E mutated), initially dx'd 3/28/23, s/p wedge resection 3/28/23, adjuvant cisplatin/pemetrexed x 4 cycles (5/11/23-7/20/23), and currently on adjuvant atezolizumab (8/14/23 - present) who presents for follow up.     Stage IIB Adenocarcinoma of NANI  Concern for IO-Induced Pneumonitis  ECOG PS 1 (limited by SOB). Currently on adjuvant atezolizumab following adjuvant cisplatin/pemetrexed. Pt describes progressively worsening PÉREZ and fatigue over the past 1-2 months (see below), imaging not consistent w/ IO pneumonitis. Most recent imaging (CT C, 10/24/23) demonstrates  postsurgical changes, new 6 mm left lower lobe nodule, 5 mm right lower lobe nodule, and 4 mm left lower lobe nodule, and otherwise stable findings compared to prior PET-CT (notably, has stable 9 mm AP window lymph node) .  These lesions are too small to properly characterize and several follow on repeat imaging in 3 months.  Favor resuming atezolizumab therapy given low concern for pneumonitis on imaging.    PLAN:   -- Resume atezolizumab therapy  -- RTC w/ next cycle of atezolizumab    Dyspnea on Exertion  Patient describes worsening dyspnea on exertion over the past approximately 2 months.  She is able to walk progressively shorter distances before becoming short of breath.  She denies any new or worsening cough, or chest pain when dyspneic. She also describes fatigue worsening over the same time.  Initially concerned for immunotherapy induced pneumonitis, however CT chest (11/24/2023) does not demonstrate characteristic findings for this.  Etiology remains unclear at this time.  No pleural edema and no orthopnea indicate possible CHF, and does not have anginal symptoms. No tachycardia to indicate pulmonary embolism, and notably patient has already received definitive surgery for cancer over 6 months ago. Patient does have a history of COPD and notes that her inhaler feels less effective than previously.   Will message patient's pulmonology team for further evaluation. Will also order echocardiogram (low concern for IO myocarditis but will evaluate for completion). In the interim, favor that it is safe to resume immunotherapy as above.  -- Message sent to pulmonology  -- Echocardiogram ordered       Depressed Renal Function  Patient's eGFR dipped below 60 following her 4th dose of cisplatin/pemetrexed and has remained depressed since (notably, this was prior to her 1st dose of atezolizumab).  It has remained low for 2.5 months (1st noted on 08/10/2023).  This time line is inconsistent with cisplatin induced  nephropathy as I would anticipate recovery of renal function by this time, however it is possible to develop a CKD following cisplatin therapy.  Will obtain urine studies at next visit with low threshold for renal consult.  -- Spot urine protein, spot urine creatinine, urine lytes at next visit        The above information has been reviewed with the patient and all questions have been answered to their apparent satisfaction.  They understand that they can call the clinic with any questions.    Orion Arce MD  Hematology/Oncology  [facility] [place]        Med Onc Chart Routing      Follow up with physician . Resume atezolizumab, RTC with first cycle that is rescheduled.   Follow up with [facility]    Infusion scheduling note   Resume atezolizumab infusions.   Injection scheduling note    Labs CBC, CMP, free T4 and TSH   Scheduling:  Preferred lab:  Lab interval:     Imaging ECHO      Pharmacy appointment    Other referrals

## 2023-10-24 ENCOUNTER — HOSPITAL ENCOUNTER (OUTPATIENT)
Dept: RADIOLOGY | Facility: HOSPITAL | Age: 73
Discharge: HOME | End: 2023-10-24
Attending: HOSPITALIST
Payer: MEDICARE

## 2023-10-24 ENCOUNTER — OFFICE VISIT (OUTPATIENT)
Dept: HEMATOLOGY/ONCOLOGY | Facility: CLINIC | Age: 73
End: 2023-10-24
Payer: MEDICARE

## 2023-10-24 VITALS
BODY MASS INDEX: 27.07 KG/M2 | DIASTOLIC BLOOD PRESSURE: 58 MMHG | HEIGHT: 65 IN | SYSTOLIC BLOOD PRESSURE: 94 MMHG | HEART RATE: 79 BPM | TEMPERATURE: 97 F | WEIGHT: 162.5 LBS | OXYGEN SATURATION: 98 %

## 2023-10-24 DIAGNOSIS — R06.00 DYSPNEA, UNSPECIFIED TYPE: ICD-10-CM

## 2023-10-24 DIAGNOSIS — C34.32 MALIGNANT NEOPLASM OF LOWER LOBE OF LEFT LUNG: Primary | ICD-10-CM

## 2023-10-24 DIAGNOSIS — N28.9 RENAL DYSFUNCTION: ICD-10-CM

## 2023-10-24 DIAGNOSIS — C80.1 CANCER: Primary | ICD-10-CM

## 2023-10-24 PROCEDURE — 71250 CT THORAX DX C-: CPT | Mod: 26,,, | Performed by: STUDENT IN AN ORGANIZED HEALTH CARE EDUCATION/TRAINING PROGRAM

## 2023-10-24 PROCEDURE — 99999 PR PBB SHADOW E&M-EST. PATIENT-LVL IV: ICD-10-PCS | Mod: PBBFAC,,, | Performed by: HOSPITALIST

## 2023-10-24 PROCEDURE — 3078F DIAST BP <80 MM HG: CPT | Mod: CPTII,S$GLB,, | Performed by: HOSPITALIST

## 2023-10-24 PROCEDURE — 71250 CT CHEST WITHOUT CONTRAST: ICD-10-PCS | Mod: 26,,, | Performed by: STUDENT IN AN ORGANIZED HEALTH CARE EDUCATION/TRAINING PROGRAM

## 2023-10-24 PROCEDURE — 3074F PR MOST RECENT SYSTOLIC BLOOD PRESSURE < 130 MM HG: ICD-10-PCS | Mod: CPTII,S$GLB,, | Performed by: HOSPITALIST

## 2023-10-24 PROCEDURE — 1126F PR PAIN SEVERITY QUANTIFIED, NO PAIN PRESENT: ICD-10-PCS | Mod: CPTII,S$GLB,, | Performed by: HOSPITALIST

## 2023-10-24 PROCEDURE — 99215 OFFICE O/P EST HI 40 MIN: CPT | Mod: S$GLB,,, | Performed by: HOSPITALIST

## 2023-10-24 PROCEDURE — 1101F PT FALLS ASSESS-DOCD LE1/YR: CPT | Mod: CPTII,S$GLB,, | Performed by: HOSPITALIST

## 2023-10-24 PROCEDURE — 71250 CT THORAX DX C-: CPT | Mod: TC

## 2023-10-24 PROCEDURE — 3078F PR MOST RECENT DIASTOLIC BLOOD PRESSURE < 80 MM HG: ICD-10-PCS | Mod: CPTII,S$GLB,, | Performed by: HOSPITALIST

## 2023-10-24 PROCEDURE — 1159F PR MEDICATION LIST DOCUMENTED IN MEDICAL RECORD: ICD-10-PCS | Mod: CPTII,S$GLB,, | Performed by: HOSPITALIST

## 2023-10-24 PROCEDURE — 3288F FALL RISK ASSESSMENT DOCD: CPT | Mod: CPTII,S$GLB,, | Performed by: HOSPITALIST

## 2023-10-24 PROCEDURE — 1126F AMNT PAIN NOTED NONE PRSNT: CPT | Mod: CPTII,S$GLB,, | Performed by: HOSPITALIST

## 2023-10-24 PROCEDURE — 1159F MED LIST DOCD IN RCRD: CPT | Mod: CPTII,S$GLB,, | Performed by: HOSPITALIST

## 2023-10-24 PROCEDURE — 3074F SYST BP LT 130 MM HG: CPT | Mod: CPTII,S$GLB,, | Performed by: HOSPITALIST

## 2023-10-24 PROCEDURE — 99999 PR PBB SHADOW E&M-EST. PATIENT-LVL IV: CPT | Mod: PBBFAC,,, | Performed by: HOSPITALIST

## 2023-10-24 PROCEDURE — 1101F PR PT FALLS ASSESS DOC 0-1 FALLS W/OUT INJ PAST YR: ICD-10-PCS | Mod: CPTII,S$GLB,, | Performed by: HOSPITALIST

## 2023-10-24 PROCEDURE — 3288F PR FALLS RISK ASSESSMENT DOCUMENTED: ICD-10-PCS | Mod: CPTII,S$GLB,, | Performed by: HOSPITALIST

## 2023-10-24 PROCEDURE — 99215 PR OFFICE/OUTPT VISIT, EST, LEVL V, 40-54 MIN: ICD-10-PCS | Mod: S$GLB,,, | Performed by: HOSPITALIST

## 2023-10-24 NOTE — Clinical Note
Hi all,   I am seeing Ms. Lamas for her lung cancer.  She describes worsening dyspnea on exertion with a past 1.5-2 months and I was initially concerned for immunotherapy induced pneumonitis, however CT scan does not have a typical findings of this.  I am not certain what to make of the dyspnea (no orthopnea, no pulmonary edema, no chest pain, no tachycardia [has already received definitive therapy for her cancer and so less concern for pulmonary embolism]) though she does note that her inhaler seems less effective. Gonna grab an echo just to rule out something like immunotherapy induced myocarditis.   Wondering if you have any thoughts or could evaluate her to help root out the cause?  Thanks! Alex

## 2023-10-26 DIAGNOSIS — J44.9 COPD, MODERATE: Primary | ICD-10-CM

## 2023-10-30 ENCOUNTER — TELEPHONE (OUTPATIENT)
Dept: INFUSION THERAPY | Facility: HOSPITAL | Age: 73
End: 2023-10-30
Payer: MEDICARE

## 2023-10-30 DIAGNOSIS — C34.32 MALIGNANT NEOPLASM OF LOWER LOBE OF LEFT LUNG: Primary | ICD-10-CM

## 2023-10-30 DIAGNOSIS — C79.9 NEOPLASM, METASTATIC: ICD-10-CM

## 2023-11-08 ENCOUNTER — CLINICAL SUPPORT (OUTPATIENT)
Dept: PULMONOLOGY | Facility: CLINIC | Age: 73
End: 2023-11-08
Payer: MEDICARE

## 2023-11-08 ENCOUNTER — OFFICE VISIT (OUTPATIENT)
Dept: PULMONOLOGY | Facility: CLINIC | Age: 73
End: 2023-11-08
Payer: MEDICARE

## 2023-11-08 VITALS
DIASTOLIC BLOOD PRESSURE: 72 MMHG | HEART RATE: 78 BPM | WEIGHT: 162.5 LBS | OXYGEN SATURATION: 98 % | SYSTOLIC BLOOD PRESSURE: 158 MMHG | HEIGHT: 65 IN | BODY MASS INDEX: 27.07 KG/M2 | RESPIRATION RATE: 18 BRPM | WEIGHT: 162.5 LBS | HEIGHT: 65 IN | BODY MASS INDEX: 27.07 KG/M2

## 2023-11-08 DIAGNOSIS — R06.02 SOB (SHORTNESS OF BREATH) ON EXERTION: ICD-10-CM

## 2023-11-08 DIAGNOSIS — C34.32 MALIGNANT NEOPLASM OF LOWER LOBE OF LEFT LUNG: ICD-10-CM

## 2023-11-08 DIAGNOSIS — I70.0 AORTIC ATHEROSCLEROSIS: ICD-10-CM

## 2023-11-08 DIAGNOSIS — Z87.891 FORMER HEAVY CIGARETTE SMOKER (20-39 PER DAY): ICD-10-CM

## 2023-11-08 DIAGNOSIS — E66.3 OVERWEIGHT (BMI 25.0-29.9): ICD-10-CM

## 2023-11-08 DIAGNOSIS — J44.9 COPD, MODERATE: ICD-10-CM

## 2023-11-08 DIAGNOSIS — J44.9 COPD, MODERATE: Primary | ICD-10-CM

## 2023-11-08 LAB
BRPFT: ABNORMAL
FEF 25 75 CHG: 4.3 %
FEF 25 75 LLN: 0.75
FEF 25 75 POST REF: 28.6 %
FEF 25 75 PRE REF: 27.4 %
FEF 25 75 REF: 1.71
FET100 CHG: 2.9 %
FEV1 CHG: -2 %
FEV1 FVC CHG: -1.8 %
FEV1 FVC LLN: 63
FEV1 FVC POST REF: 68.6 %
FEV1 FVC PRE REF: 69.9 %
FEV1 FVC REF: 77
FEV1 LLN: 1.5
FEV1 POST REF: 64.3 %
FEV1 PRE REF: 65.6 %
FEV1 REF: 2.11
FVC CHG: -0.1 %
FVC LLN: 1.97
FVC POST REF: 92.7 %
FVC PRE REF: 92.9 %
FVC REF: 2.76
PEF CHG: -22.4 %
PEF LLN: 3.63
PEF POST REF: 60.3 %
PEF PRE REF: 77.8 %
PEF REF: 5.39
POST FEF 25 75: 0.49 L/S (ref 0.75–2.68)
POST FET 100: 11.82 SEC
POST FEV1 FVC: 53.05 % (ref 63.28–91.37)
POST FEV1: 1.36 L (ref 1.5–2.72)
POST FVC: 2.56 L (ref 1.97–3.55)
POST PEF: 3.25 L/S (ref 3.63–7.16)
PRE FEF 25 75: 0.47 L/S (ref 0.75–2.68)
PRE FET 100: 11.49 SEC
PRE FEV1 FVC: 54.04 % (ref 63.28–91.37)
PRE FEV1: 1.38 L (ref 1.5–2.72)
PRE FVC: 2.56 L (ref 1.97–3.55)
PRE PEF: 4.2 L/S (ref 3.63–7.16)

## 2023-11-08 PROCEDURE — 94762 PR NONINVASV OXYGEN SATUR, CONT: ICD-10-PCS | Mod: 59,S$GLB,, | Performed by: INTERNAL MEDICINE

## 2023-11-08 PROCEDURE — 94060 EVALUATION OF WHEEZING: CPT | Mod: 59,S$GLB,, | Performed by: INTERNAL MEDICINE

## 2023-11-08 PROCEDURE — 3288F PR FALLS RISK ASSESSMENT DOCUMENTED: ICD-10-PCS | Mod: CPTII,S$GLB,, | Performed by: NURSE PRACTITIONER

## 2023-11-08 PROCEDURE — 3077F SYST BP >= 140 MM HG: CPT | Mod: CPTII,S$GLB,, | Performed by: NURSE PRACTITIONER

## 2023-11-08 PROCEDURE — 3078F PR MOST RECENT DIASTOLIC BLOOD PRESSURE < 80 MM HG: ICD-10-PCS | Mod: CPTII,S$GLB,, | Performed by: NURSE PRACTITIONER

## 2023-11-08 PROCEDURE — 1101F PT FALLS ASSESS-DOCD LE1/YR: CPT | Mod: CPTII,S$GLB,, | Performed by: NURSE PRACTITIONER

## 2023-11-08 PROCEDURE — 1101F PR PT FALLS ASSESS DOC 0-1 FALLS W/OUT INJ PAST YR: ICD-10-PCS | Mod: CPTII,S$GLB,, | Performed by: NURSE PRACTITIONER

## 2023-11-08 PROCEDURE — 94060 PR EVAL OF BRONCHOSPASM: ICD-10-PCS | Mod: 59,S$GLB,, | Performed by: INTERNAL MEDICINE

## 2023-11-08 PROCEDURE — 99215 PR OFFICE/OUTPT VISIT, EST, LEVL V, 40-54 MIN: ICD-10-PCS | Mod: 25,S$GLB,, | Performed by: NURSE PRACTITIONER

## 2023-11-08 PROCEDURE — 99999 PR PBB SHADOW E&M-EST. PATIENT-LVL II: CPT | Mod: PBBFAC,,,

## 2023-11-08 PROCEDURE — 1160F PR REVIEW ALL MEDS BY PRESCRIBER/CLIN PHARMACIST DOCUMENTED: ICD-10-PCS | Mod: CPTII,S$GLB,, | Performed by: NURSE PRACTITIONER

## 2023-11-08 PROCEDURE — 3077F PR MOST RECENT SYSTOLIC BLOOD PRESSURE >= 140 MM HG: ICD-10-PCS | Mod: CPTII,S$GLB,, | Performed by: NURSE PRACTITIONER

## 2023-11-08 PROCEDURE — 99215 OFFICE O/P EST HI 40 MIN: CPT | Mod: 25,S$GLB,, | Performed by: NURSE PRACTITIONER

## 2023-11-08 PROCEDURE — 3288F FALL RISK ASSESSMENT DOCD: CPT | Mod: CPTII,S$GLB,, | Performed by: NURSE PRACTITIONER

## 2023-11-08 PROCEDURE — 99999 PR PBB SHADOW E&M-EST. PATIENT-LVL IV: ICD-10-PCS | Mod: PBBFAC,,, | Performed by: NURSE PRACTITIONER

## 2023-11-08 PROCEDURE — 94762 N-INVAS EAR/PLS OXIMTRY CONT: CPT | Mod: 59,S$GLB,, | Performed by: INTERNAL MEDICINE

## 2023-11-08 PROCEDURE — 94618 PULMONARY STRESS TESTING: ICD-10-PCS | Mod: S$GLB,,, | Performed by: INTERNAL MEDICINE

## 2023-11-08 PROCEDURE — 1160F RVW MEDS BY RX/DR IN RCRD: CPT | Mod: CPTII,S$GLB,, | Performed by: NURSE PRACTITIONER

## 2023-11-08 PROCEDURE — 94618 PULMONARY STRESS TESTING: CPT | Mod: S$GLB,,, | Performed by: INTERNAL MEDICINE

## 2023-11-08 PROCEDURE — 1159F PR MEDICATION LIST DOCUMENTED IN MEDICAL RECORD: ICD-10-PCS | Mod: CPTII,S$GLB,, | Performed by: NURSE PRACTITIONER

## 2023-11-08 PROCEDURE — 99999 PR PBB SHADOW E&M-EST. PATIENT-LVL II: ICD-10-PCS | Mod: PBBFAC,,,

## 2023-11-08 PROCEDURE — 99999 PR PBB SHADOW E&M-EST. PATIENT-LVL IV: CPT | Mod: PBBFAC,,, | Performed by: NURSE PRACTITIONER

## 2023-11-08 PROCEDURE — 1159F MED LIST DOCD IN RCRD: CPT | Mod: CPTII,S$GLB,, | Performed by: NURSE PRACTITIONER

## 2023-11-08 PROCEDURE — 3078F DIAST BP <80 MM HG: CPT | Mod: CPTII,S$GLB,, | Performed by: NURSE PRACTITIONER

## 2023-11-08 RX ORDER — ALBUTEROL SULFATE 90 UG/1
2 AEROSOL, METERED RESPIRATORY (INHALATION) EVERY 4 HOURS PRN
Qty: 18 G | Refills: 11 | Status: SHIPPED | OUTPATIENT
Start: 2023-11-08

## 2023-11-08 NOTE — PROCEDURES
"[facility] - Pulmonary Function  Six Minute Walk     SUMMARY     Ordering Provider: Gage CHACKO   Interpreting Provider: Curtis ANDRADE  Performing nurse/tech/RT: LS RRT  Diagnosis: COPD  Height: 5' 5" (165.1 cm)  Weight: 73.7 kg (162 lb 7.7 oz)  BMI (Calculated): 27   Patient Race:             Phase Oxygen Assessment Supplemental O2 Heart   Rate Blood Pressure Edmundo Dyspnea Scale Rating   Resting 97 % Room Air 82 bpm 127/77 1   Exercise        Minute        1 93 % Room Air 99 bpm     2 92 % Room Air 104 bpm     3 92 % Room Air 106 bpm     4 94 % Room Air 107 bpm     5 94 % Room Air 107 bpm     6  94 % Room Air 98 bpm 152/70 3   Recovery        Minute        1 96 % Room Air 90 bpm     2 97 % Room Air 92 bpm     3 98 % Room Air 82 bpm     4 98 % Room Air 78 bpm 158/72 1     Six Minute Walk Summary  6MWT Status: completed without stopping  Patient Reported: No complaints     Interpretation:  Did the patient stop or pause?: No                                         Total Time Walked (Calculated): 360 seconds  Final Partial Lap Distance (feet): 175 feet  Total Distance Meters (Calculated): 419.1 meters  Predicted Distance Meters (Calculated): 413.09 meters  Percentage of Predicted (Calculated): 101.45  Peak VO2 (Calculated): 16.55  Mets: 4.73  Has The Patient Had a Previous Six Minute Walk Test?: Yes       Previous 6MWT Results  Has The Patient Had a Previous Six Minute Walk Test?: Yes  Date of Previous Test: 04/28/22  Total Time Walked: 360 seconds  Total Distance (meters): 365  Predicted Distance (meters): 422 meters  Percentage of Predicted: 86  Percent Change (Calculated): -0.15    "

## 2023-11-08 NOTE — ASSESSMENT & PLAN NOTE
Continue to encourage exercise and dietary modification to obtain normal weight.   Has walking program daily  Wt Readings from Last 9 Encounters:   11/08/23 73.7 kg (162 lb 7.7 oz)   11/08/23 73.7 kg (162 lb 7.7 oz)   10/24/23 73.7 kg (162 lb 7.7 oz)   10/17/23 70.8 kg (156 lb 1.4 oz)   09/26/23 74 kg (163 lb 4 oz)   09/26/23 74 kg (163 lb 4 oz)   09/05/23 74.1 kg (163 lb 5.8 oz)   09/05/23 74.1 kg (163 lb 5.8 oz)   08/14/23 75.1 kg (165 lb 9.1 oz)   Body mass index is 27.04 kg/m².

## 2023-11-08 NOTE — ASSESSMENT & PLAN NOTE
Patient reports shortness of breath and fatigue with exertion began after starting atezolizumab, which was began August 2023.  SOB with exertion has resolved over past 5 weeks, patient reports since off Atezolizumab shortness of breath has resolved about 2 weeks after stopping Atezolizumab. Over past 3 weeks she has resumed her walking program, no shortness of breath.   10/24/2023 CT chest no pneumonia or acute findings.  11/8/2023 spirometry stable moderate COPD   11/8/2023 6MWD No desaturations requiring supplemental oxygen at rest or exertion.  Continue Anoro daily and  Albuterol inhaler before exertional activity.    Proceed with Overnight oximetry on room air evaluate for nocturnal hypoxemia, if abnormal patient is agreeable to obstructive sleep apnea evaluation with PSG, if Overnight oximetry is normal she is not willing for other sleep testing.    Follow up as scheduled May 2024 review cpft  Continued follow up with oncology for lung cancer   11/8/2323 Overnight oximetry on room air is normal.

## 2023-11-08 NOTE — PROGRESS NOTES
Subjective:      Patient ID: Radha Lamas is a 73 y.o. female.    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 female presents with her  to pulmonary clinic for follow up requested by Oncology related to complaint of shortness of breath with exertion with review Spirometry/6MWD    She is followed in pulmonary related to diagnosis of COPD review chest xray/CPFT.     Patient presents stating shortness of breath with exertion has resolved.  No cough, no mucous, no hemoptysis, no wheezing. No long shortness of breath.    Patient reports shortness of breath and fatigue with exertion began after starting atezolizumab with dozing 8/14/2023, 9/5/2023, 9/26/2023.   SOB with exertion has resolved over past 5 weeks, patient reports since off Atezolizumab shortness of breath resolved about 2 weeks after stopping Atezolizumab. Over past 3 weeks she has resumed her walking program, no shortness of breath.   10/24/2023 CT chest no pneumonia or acute findings.  11/8/2023 spirometry stable moderate COPD   11/8/2023 6MWD No desaturations requiring supplemental oxygen at rest or exertion.  Echo is scheduled for 11/10/2023 evaluate for IO myocarditis ordered by Dr. Arce, oncologist.   Proceed with Overnight oximetry on room air evaluate for nocturnal hypoxemia, if abnormal patient is agreeable to obstructive sleep apnea evaluation with PSG, if Overnight oximetry is normal she is not willing for other sleep testing.      Current  treatment regimen:  ANORO. Albuterol inhaler. Use of albuterol before house work or yard work.     Smoking history former cigarette smoker quit in  with 68 pack year smoking history. Smoked 1.5 packs x 45 years.     Two primary LLL lung cancers   - Stage I squamous cell carcinoma of the of the LLL, initially dx'd 21, s/p wedge resection (21)  - Stage IIB (pT2, pN1a, cMx) adenocarcinoma of the NANI (PD-L1 95%, BRAF V600E mutated), initially dx'd 3/28/23, s/p wedge resection 3/28/23, adjuvant cisplatin/pemetrexed x 4 cycles (-23), and currently on adjuvant atezolizumab (23 - present).     2023 PET-CT No concerning FDG avid lesion or detrimental change.  Resolution/improvement of previously noted chest findings.     2023 PET-CT Postoperative changes left lung with a very small loculated hydropneumothorax in the upper left hemithorax (mainly fluid with minimal air). Small approximate 1 cm hypermetabolic node at the left hilum adjacent to staple line.  Subcentimeter minimally hypermetabolic nearby AP window lymph node, increased from prior.  These could be inflammatory in nature though close follow-up is advised particularly for the left hilar hypermetabolic focus.  The examination elsewhere is unremarkable with no additional foci of abnormal uptake.    Patient has family history of cancer in sister lung cancer diagnosis age 55 years with metastasis brain,  2018.   Father with lung cancer at 67 yrs,  age 68 years. Sister and father both smokers.   Brother 69 year old with new diagnosis of rectal cancer, finished chemo and radiation.    COPD Questionnaire  How often do you cough?: A little of the time  How often do you have phlegm (mucus) in your chest?: Some of the time  How often does your chest feel tight?: Never  When you walk up a hill or one flight of stairs, how often are you breathless?: All of the time  How often are you limited doing any activities at home?:  Never  How often are you confident leaving the house despite your lung condition?: All of the time  How often do you sleep soundly?: All of the time  How often do you have energy?: Most of the time  Total score: 11     Previous Report Reviewed: historical medical records, office notes and radiology reports      Past Medical History: The following portions of the patient's history were reviewed and updated as appropriate:   She  has a past surgical history that includes Knee cartilage surgery (Right, 01/2011); Thoracoscopic wedge resection of lung (Left, 04/28/2021); Tubal ligation (1976); Wedge resection, lung, robot-assisted, using VATS, using da Kaci Xi (Left, 3/20/2023); Laparoscopic lysis of adhesions (Left, 3/20/2023); Robot-assisted laparoscopic lymphadenectomy using da Kaci Xi (Left, 3/20/2023); Injection of anesthetic agent around multiple intercostal nerves (Left, 3/20/2023); and Fluoroscopy (N/A, 4/27/2023).  Her family history includes Cancer in her brother, father, and sister; Heart failure in her mother; Hypertension in her father and mother; Macular degeneration in her mother; Retinal detachment in her mother.  She  reports that she quit smoking about 14 years ago. Her smoking use included cigarettes. She started smoking about 59 years ago. She has a 67.5 pack-year smoking history. She has never used smokeless tobacco. She reports that she does not drink alcohol and does not use drugs.  She has a current medication list which includes the following prescription(s): albuterol, calcium carbonate, dexamethasone, diphenhydrAMINE-aluminum-magnesium hydroxide-simethicone-LIDOcaine HCl 2%, dupixent pen, folic acid, gabapentin, levothyroxine, loratadine, magnesium oxide, olanzapine, omeprazole, ondansetron, oxycodone, potassium chloride sa, tolterodine, and anoro ellipta, and the following Facility-Administered Medications: prochlorperazine.  She has No Known Allergies.    Review of Systems   Constitutional:  " Negative for fever, chills, weight loss, weight gain, activity change, appetite change, fatigue and night sweats.   HENT:  Negative for postnasal drip, rhinorrhea, sinus pressure, voice change and congestion.    Eyes:  Negative for redness and itching.   Respiratory:  Negative for snoring, cough, sputum production, chest tightness, shortness of breath, wheezing, orthopnea, asthma nighttime symptoms, dyspnea on extertion, use of rescue inhaler and somnolence.    Cardiovascular: Negative.  Negative for chest pain, palpitations and leg swelling.   Genitourinary:  Negative for difficulty urinating and hematuria.   Endocrine:  Negative for cold intolerance and heat intolerance.    Musculoskeletal:  Negative for arthralgias, gait problem, joint swelling and myalgias.   Skin: Negative.    Gastrointestinal:  Negative for nausea, vomiting, abdominal pain and acid reflux.   Neurological:  Negative for dizziness, weakness, light-headedness and headaches.   Hematological:  Negative for adenopathy. No excessive bruising.   All other systems reviewed and are negative.     Objective:   BP (!) 158/72   Pulse 78   Resp 18   Ht 5' 5" (1.651 m)   Wt 73.7 kg (162 lb 7.7 oz)   SpO2 98%   BMI 27.04 kg/m²   Physical Exam  Vitals reviewed.   Constitutional:       General: She is not in acute distress.     Appearance: She is well-developed. She is not ill-appearing or toxic-appearing.   HENT:      Head: Normocephalic.      Right Ear: External ear normal.      Left Ear: External ear normal.      Nose: Nose normal.      Mouth/Throat:      Pharynx: No oropharyngeal exudate.   Eyes:      Conjunctiva/sclera: Conjunctivae normal.   Cardiovascular:      Rate and Rhythm: Normal rate and regular rhythm.      Heart sounds: Normal heart sounds.   Pulmonary:      Effort: Pulmonary effort is normal.      Breath sounds: Normal breath sounds. No stridor.   Abdominal:      Palpations: Abdomen is soft.   Musculoskeletal:         General: Normal range " of motion.      Cervical back: Normal range of motion and neck supple.   Lymphadenopathy:      Cervical: No cervical adenopathy.   Skin:     General: Skin is warm and dry.   Neurological:      Mental Status: She is alert and oriented to person, place, and time.   Psychiatric:         Behavior: Behavior normal. Behavior is cooperative.         Thought Content: Thought content normal.         Judgment: Judgment normal.       Personal Diagnostic Review    CT Chest Without Contrast  Narrative: EXAMINATION:  CT CHEST WITHOUT CONTRAST    CLINICAL HISTORY:  Concern for pneumonitis. Pt has had diminished renal function recently, awaiting repeat Cr. If improved, okay for contrast.; Dyspnea, unspecified    TECHNIQUE:  Low dose axial images, sagittal and coronal reformations were obtained from the thoracic inlet to the lung bases. Contrast was not administered.  All CT scans at this facility are performed using dose optimization techniques including the following: automated exposure control; adjustment of the mA and/or kV; use of iterative reconstruction technique.    COMPARISON:  Nuclear medicine PET-CT 08/07/2023 and 05/04/2023, CT chest without contrast 02/03/2023    FINDINGS:  Base of Neck: No significant abnormality.    Thoracic soft tissues: Right internal jugular chest port is noted with catheter terminating at the superior cavoatrial junction.    Aorta: Left-sided aortic arch.  There is mild aortic atherosclerosis without aneurysm.    Heart: Normal size.  No significant pericardial fluid.    Pulmonary vasculature: Pulmonary arteries distribute normally.    [place]/Mediastinum: At the AP window, there is a lymph node measuring 9 mm in short axis.  This correlates with the hypermetabolic node seen on 05/04/2023, which demonstrated resolution in activity on 08/07/2023.    Airways: No obvious hilar lymphadenopathy.    Lungs/Pleura: There is volume loss on the left due to prior left upper lobe and lower lobe resections  resulting in mild leftward mediastinal shift.  Findings are similar to prior PET-CT.  There is a 5 mm ground-glass nodule within the right lower lobe (axial series 4, image 232), new since CT 02/03/2023.  There is a 6 mm pulmonary nodule within the basilar left lower lobe (axial series 4, image 340).  There is tiny nodularity along the left fissure measuring up to 5 mm, not definitely changed since comparison PET-CT.  There is a 2 mm left lung pulmonary nodule anteriorly at the level of the main pulmonary artery (axial series 4, image 187).  Small focus of reticulonodular density present within the anterior left lung base demonstrating somewhat tree-in-bud configuration (axial series 4, image 305).  Findings are nonspecific and may correlate with infectious or non infectious inflammatory sequela versus scarring.  There is a 4 mm nodular density within the left lower lobe (axial series 4, image 180), suspected to correlate with endobronchial focus/secretion.  Remaining pulmonary findings are unchanged, noting focus of atelectatic change within the anterior medial middle lobe (not hypermetabolic on prior PET-CT).    Esophagus: Normal.    Upper Abdomen: Multiple hypodense hepatic lesions, some demonstrating attenuation compatible with cysts while others are too small to definitively characterize.  Findings are similar to prior exams.    Bones: No acute fracture. No suspicious lytic or sclerotic lesions.  There is degenerative change of the spine with multilevel vacuum disc phenomena.  Impression: Postsurgical change of left upper and lower lobe pulmonary resection with associated stable scarring.  Subtle nodularity present along the left major fissure, not definitely changed since most recent PET-CT.  Recommend attention on follow-up.    AP window lymph node measuring 9 mm appears similar to PET-CT in noncontrast appearance to PET-CT 05/04/2023 noting this node demonstrated hypermetabolic activity at that time which  had resolved in the interim on 08/07/2023.    Small focus of reticulonodular density within the anterior left lung base.  Findings are nonspecific and may correlate with infectious or inflammatory sequela versus scarring.    New pulmonary nodules as follows: 6 mm basilar solid left lower lobe nodule (axial series 4, image 340), 5 mm posterior right lower lobe ground-glass nodule (axial series 4, image 232), and 4 mm left lower lobe nodule suspected endobronchial which may correlate with secretions (axial series 4, image 180).    Electronically signed by: Aislinn Norris  Date:    10/24/2023  Time:    13:09    11/8/2023 Normal overnight oximetry.  Patient does not meet criteria for supplemental oxygen administration    11/8/2023 Spirometry shows moderate obstruction. Spirometry remains unimproved following bronchodilator.    11/8/2023 6MWD No desaturations requiring supplemental oxygen at rest or exertion.     Phase Oxygen Assessment Supplemental O2 Heart   Rate Blood Pressure Edmundo Dyspnea Scale Rating   Resting 97 % Room Air 82 bpm 127/77 1   Exercise             Minute             1 93 % Room Air 99 bpm       2 92 % Room Air 104 bpm       3 92 % Room Air 106 bpm       4 94 % Room Air 107 bpm       5 94 % Room Air 107 bpm       6  94 % Room Air 98 bpm 152/70 3   Recovery             Minute             1 96 % Room Air 90 bpm       2 97 % Room Air 92 bpm       3 98 % Room Air 82 bpm       4 98 % Room Air 78 bpm 158/72 1      Six Minute Walk Summary  6MWT Status: completed without stopping  Patient Reported: No complaints             Interpretation:  Did the patient stop or pause?: No  Total Time Walked (Calculated): 360 seconds  Final Partial Lap Distance (feet): 175 feet  Total Distance Meters (Calculated): 419.1 meters  Predicted Distance Meters (Calculated): 413.09 meters  Percentage of Predicted (Calculated): 101.45  Peak VO2 (Calculated): 16.55  Mets: 4.73  Has The Patient Had a Previous Six Minute Walk Test?:  Yes     Previous 6MWT Results  Has The Patient Had a Previous Six Minute Walk Test?: Yes  Date of Previous Test: 04/28/22  Total Time Walked: 360 seconds  Total Distance (meters): 365  Predicted Distance (meters): 422 meters  Percentage of Predicted: 86  Percent Change (Calculated): -0.15    5/11/2023 CPFT spirometry reveals moderate obstruction.  Lung volume determination is normal.  Diffusing capacity is moderately decreased.    4/28/2022  CPFT Spirometry shows moderate obstruction. Lung volume determination is normal. Airway mechanics are abnormal showing increased airway resistance and decreased conductance. DLCO is mildly decreased. MVV is moderately decreased.     3/10/2021 1 year follow up CT chest low dose screening revealed 4B - Suspicious - Left lower lobe nodule has increased in size from 4 mm to 11 mm when compared to the prior study dated 03/10/2020.  - possible next steps  Chest CT, tissue sampling and-or PET/CT.      2/11/2019 CPFT   Acceptable and reproducible spirometry data.     FEV1/ FVC decreased to 62% indicating obstruction.     FEV1 reduced to 69% indicating moderate obstruction.     FVC normal at 86%.     No significant bronchodilation noted after bronchodilator administration.     FEF 25-75% decreased to 34% indicating small airways flow obstruction.     Flow volume curve shows obstructive pattern.     Spirometry showing moderate obstruction.     Lung volume shows increased TLC to 120% indicating hyper inflation, increased RV and RV/ TLC indicating the presence of air trapping.     DLCO mildly reduced to 70%.     Moderate obstruction, air trapping, mild DLCO reduction.  Clinical correlation recommended.     6/24/2013 CPFT  Mild obstruction. Airflow is not clearly improved following bronchodilation. Clinical improvement following bronchodilator  therapy may occur in the absence of spirometric improvement.    4/28/2022 6MWD No desaturations requiring supplemental oxygen at rest or  exertion.  Oxygen Assessment Supplemental O2 Heart   Rate Blood Pressure Edmundo Dyspnea Scale Rating    Resting 95 % Room Air 75 bpm 115/61 0   Exercise             Minute             1 96 % Room Air 105 bpm       2 95 % Room Air 105 bpm       3 95 % Room Air 104 bpm       4 96 % Room Air 104 bpm       5 96 % Room Air 102 bpm       6  96 % Room Air 105 bpm 131/61 2   Recovery             Minute             1 98 % Room Air 78 bpm       2 99 % Room Air 74 bpm       3 98 % Room Air 77 bpm       4 98 % Room Air 77 bpm 121/74 0      Six Minute Walk Summary  6MWT Status: completed without stopping  Patient Reported: Dyspnea         Interpretation:  Did the patient stop or pause?: No  Total Time Walked (Calculated): 360 seconds  Final Partial Lap Distance (feet): 0 feet  Total Distance Meters (Calculated): 365.76 meters  Predicted Distance Meters (Calculated): 422.53 meters  Percentage of Predicted (Calculated): 86.56  Peak VO2 (Calculated): 14.95  Mets: 4.27  Has The Patient Had a Previous Six Minute Walk Test?: Yes     Previous 6MWT Results  Has The Patient Had a Previous Six Minute Walk Test?: Yes  Date of Previous Test: 04/15/21  Total Time Walked: 360 seconds  Total Distance (meters): 441.96  Predicted Distance (meters): 416.28 meters  Percentage of Predicted: 106.17  Percent Change (Calculated): 0.17    Assessment:     1. COPD, moderate    2. SOB (shortness of breath) on exertion Inactive   3. Former heavy cigarette smoker (20-39 per day)    4. Aortic atherosclerosis    5. Overweight (BMI 25.0-29.9)    6. 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     Answer:   Other     Order Specific Question:   Symptoms:     Answer:   Other     Comments:   sob exertion     Order Specific  Question:   Oxygen Settings:     Answer:   NA     Order Specific Question:   BiPAP Settings:     Answer:   NA     Order Specific Question:   CPAP Settings:     Answer:   NA     Order Specific Question:   Room Air:     Answer:   Yes     Plan:   Discussed diagnosis, its evaluation, treatment and usual course. All questions answered.  Problem List Items Addressed This Visit       Overweight (BMI 25.0-29.9)     Continue to encourage exercise and dietary modification to obtain normal weight.   Has walking program daily  Wt Readings from Last 9 Encounters:   11/08/23 73.7 kg (162 lb 7.7 oz)   11/08/23 73.7 kg (162 lb 7.7 oz)   10/24/23 73.7 kg (162 lb 7.7 oz)   10/17/23 70.8 kg (156 lb 1.4 oz)   09/26/23 74 kg (163 lb 4 oz)   09/26/23 74 kg (163 lb 4 oz)   09/05/23 74.1 kg (163 lb 5.8 oz)   09/05/23 74.1 kg (163 lb 5.8 oz)   08/14/23 75.1 kg (165 lb 9.1 oz)   Body mass index is 27.04 kg/m².           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 Medley. History of stage I squamous cell carcinoma moderately differentiated left lower lung status post wedge resection 04/28/2021           COPD, moderate - Primary     Patient reports shortness of breath and fatigue with exertion began after starting atezolizumab, which was began August 2023.  SOB with exertion has resolved over past 5 weeks, patient reports since off Atezolizumab shortness of breath has resolved about 2 weeks after stopping Atezolizumab. Over past 3 weeks she has resumed her walking program, no shortness of breath.   10/24/2023 CT chest no pneumonia or acute findings.  11/8/2023 spirometry stable moderate COPD   11/8/2023 6MWD No desaturations requiring supplemental oxygen at rest or exertion.  Continue Anoro daily and  Albuterol inhaler  before exertional activity.    Proceed with Overnight oximetry on room air evaluate for nocturnal hypoxemia, if abnormal patient is agreeable to obstructive sleep apnea evaluation with PSG, if Overnight oximetry is normal she is not willing for other sleep testing.    Follow up as scheduled May 2024 review cpft  Continued follow up with oncology for lung cancer   11/8/2323 Overnight oximetry on room air is normal.             Relevant Medications    albuterol (PROVENTIL/VENTOLIN HFA) 90 mcg/actuation inhaler    Other Relevant Orders    PULSE OXIMETRY OVERNIGHT (Completed)    Aortic atherosclerosis     Follow heart healthy low fat diet. regular exercise.           Other Visit Diagnoses       SOB (shortness of breath) on exertion   (Inactive)      exertional sob w/fatigue began after starting atezolizumab. SOB resolved x 5 weeks since off Atezolizumab. 11/8/23 Normal 6mwd/overnight oximetry            I spent a total of 53 minutes on the day of the visit.  This includes face to face time and non-face to face time preparing to see the patient (eg, review of tests), obtaining and/or reviewing separately obtained history, documenting clinical information in the electronic or other health record, independently interpreting results and communicating results to the patient/family/caregiver, or care coordinator.    Follow up in about 6 months (around 5/9/2024) for COPD cpft as scheduled .     Cc: Dr. Orion Arce, oncologist   Dr. Quintero, pulmonologist

## 2023-11-08 NOTE — ASSESSMENT & PLAN NOTE
67.5 pk/year smoker quit in 2009  CT follow up with oncology   ONCOLOGIC DIAGNOSIS: Stage IIB, pT1a, pN1a cMx lung adenocarcinoma left lower lung, Dr. Medley. 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

## 2023-11-09 NOTE — PROCEDURES
[facility]  [address] * [place] [address]  Telephone: [phone]  Test date: 23 Start: 23 21:28:33 Radha Lamas  Doctor: Reginald Almonte End: 23 03:44:49 [id number]  Oximetry: Summary Report  Comments: room air.  Recording time: 06:16:16 Highest pulse: 88 Highest SpO2: 99%  Excluded samplin:07:12 Lowest pulse: 54 Lowest SpO2: 84%  Total valid samplin:09:04 Mean pulse: 66 Mean SpO2: 91.8%  1 S.D.: 5.1 1 S.D.: 2.0  Time with SpO2<90: 0:31:28, 8.5%  Time with SpO2<80: 0:00:00, 0.0%  Time with SpO2<70: 0:00:00, 0.0%  Time with SpO2<60: 0:00:00, 0.0%  Time with SpO2<89: 0:11:28, 3.1%  Time with SpO2 =>90: 5:37:36, 91.5%  Time with SpO2=>80 & <90: 0:31:28, 8.5%  Time with SpO2=>70 & <80: 0:00:00, 0.0%  Time with SpO2=>60 & <70: 0:00:00, 0.0%  The longest continuous time with saturation <=88 was 00:00:52, which started at  23 22:37:33.  A desaturation event was defined as a decrease of saturation by 4 or more.  3 events were excluded due to artifact.  There were 19 desaturation events over 3 minutes duration.  There were 84 desaturation events of less than 3 minutes duration during which:  The mean high was 95.4%. The mean low was 89.5%.  The number of these events that were:  > 0 & <10 seconds: 3 > 0 seconds: 84  =>10 & <20 seconds: 8 =>10 seconds: 81  =>20 & <30 seconds: 24 =>20 seconds: 73  =>30 & <40 seconds: 9 =>30 seconds: 49  =>40 & <50 seconds: 10 =>40 seconds: 40  =>50 & <60 seconds: 5 =>50 seconds: 30  =>60 seconds: 25 =>60 seconds: 25  The mean length of desaturation events that were >=10 sec & <=3 mins was: 49.6 sec.  Desaturation event index (events >=10 sec per sampled hour): 13.2  Desaturation event index (events >= 0 sec per sampled hour): 13.7  Â© 5678-3038 [employer] Oximetry version Standard AM8168.

## 2023-11-10 ENCOUNTER — HOSPITAL ENCOUNTER (OUTPATIENT)
Dept: CARDIOLOGY | Facility: HOSPITAL | Age: 73
Discharge: HOME | End: 2023-11-10
Attending: HOSPITALIST
Payer: MEDICARE

## 2023-11-10 VITALS
HEIGHT: 65 IN | WEIGHT: 162 LBS | SYSTOLIC BLOOD PRESSURE: 158 MMHG | DIASTOLIC BLOOD PRESSURE: 72 MMHG | BODY MASS INDEX: 26.99 KG/M2

## 2023-11-10 DIAGNOSIS — R06.00 DYSPNEA, UNSPECIFIED TYPE: ICD-10-CM

## 2023-11-10 LAB
AORTIC ROOT ANNULUS: 2.94 CM
ASCENDING AORTA: 2.69 CM
AV INDEX (PROSTH): 0.88
AV MEAN GRADIENT: 2 MMHG
AV PEAK GRADIENT: 5 MMHG
AV VALVE AREA BY VELOCITY RATIO: 3.05 CM²
AV VALVE AREA: 2.97 CM²
AV VELOCITY RATIO: 0.91
BSA FOR ECHO PROCEDURE: 1.84 M2
CV ECHO LV RWT: 0.74 CM
DOP CALC AO PEAK VEL: 1.07 M/S
DOP CALC AO VTI: 19.5 CM
DOP CALC LVOT AREA: 3.4 CM2
DOP CALC LVOT DIAMETER: 2.07 CM
DOP CALC LVOT PEAK VEL: 0.97 M/S
DOP CALC LVOT STROKE VOLUME: 57.85 CM3
DOP CALC RVOT PEAK VEL: 0.65 M/S
DOP CALC RVOT VTI: 14 CM
DOP CALCLVOT PEAK VEL VTI: 17.2 CM
E WAVE DECELERATION TIME: 325.43 MSEC
E/A RATIO: 0.55
E/E' RATIO: 10.2 M/S
ECHO LV POSTERIOR WALL: 1.11 CM (ref 0.6–1.1)
FRACTIONAL SHORTENING: 31 % (ref 28–44)
INTERVENTRICULAR SEPTUM: 1.22 CM (ref 0.6–1.1)
IVC DIAMETER: 1.78 CM
IVRT: 99.9 MSEC
LA MAJOR: 4.54 CM
LA MINOR: 4.77 CM
LA WIDTH: 3.6 CM
LEFT ATRIUM SIZE: 3.19 CM
LEFT ATRIUM VOLUME INDEX MOD: 16.7 ML/M2
LEFT ATRIUM VOLUME INDEX: 25.1 ML/M2
LEFT ATRIUM VOLUME MOD: 30.3 CM3
LEFT ATRIUM VOLUME: 45.41 CM3
LEFT INTERNAL DIMENSION IN SYSTOLE: 2.07 CM (ref 2.1–4)
LEFT VENTRICLE DIASTOLIC VOLUME INDEX: 19.17 ML/M2
LEFT VENTRICLE DIASTOLIC VOLUME: 34.7 ML
LEFT VENTRICLE MASS INDEX: 57 G/M2
LEFT VENTRICLE SYSTOLIC VOLUME INDEX: 7.6 ML/M2
LEFT VENTRICLE SYSTOLIC VOLUME: 13.82 ML
LEFT VENTRICULAR INTERNAL DIMENSION IN DIASTOLE: 2.99 CM (ref 3.5–6)
LEFT VENTRICULAR MASS: 103.63 G
LV LATERAL E/E' RATIO: 10.2 M/S
LV SEPTAL E/E' RATIO: 10.2 M/S
LVOT MG: 2 MMHG
LVOT MV: 0.67 CM/S
MV PEAK A VEL: 0.92 M/S
MV PEAK E VEL: 0.51 M/S
MV STENOSIS PRESSURE HALF TIME: 94.37 MS
MV VALVE AREA P 1/2 METHOD: 2.33 CM2
PISA TR MAX VEL: 2.3 M/S
PV MEAN GRADIENT: 1 MMHG
PV MV: 0.6 M/S
PV PEAK GRADIENT: 3 MMHG
PV PEAK VELOCITY: 0.84 M/S
RA MAJOR: 4.03 CM
RA PRESSURE ESTIMATED: 3 MMHG
RA WIDTH: 3.34 CM
RIGHT VENTRICULAR END-DIASTOLIC DIMENSION: 2.85 CM
RV TB RVSP: 5 MMHG
SINUS: 2.67 CM
STJ: 2.65 CM
TDI LATERAL: 0.05 M/S
TDI SEPTAL: 0.05 M/S
TDI: 0.05 M/S
TR MAX PG: 21 MMHG
TRICUSPID ANNULAR PLANE SYSTOLIC EXCURSION: 1.54 CM
TV REST PULMONARY ARTERY PRESSURE: 24 MMHG
Z-SCORE OF LEFT VENTRICULAR DIMENSION IN END DIASTOLE: -5.16
Z-SCORE OF LEFT VENTRICULAR DIMENSION IN END SYSTOLE: -3.18

## 2023-11-10 PROCEDURE — 93306 TTE W/DOPPLER COMPLETE: CPT

## 2023-11-10 PROCEDURE — 93306 ECHO (CUPID ONLY): ICD-10-PCS | Mod: 26,,, | Performed by: INTERNAL MEDICINE

## 2023-11-10 PROCEDURE — 93306 TTE W/DOPPLER COMPLETE: CPT | Mod: 26,,, | Performed by: INTERNAL MEDICINE

## 2023-11-14 ENCOUNTER — INFUSION (OUTPATIENT)
Dept: INFUSION THERAPY | Facility: HOSPITAL | Age: 73
End: 2023-11-14
Attending: INTERNAL MEDICINE
Payer: MEDICARE

## 2023-11-14 ENCOUNTER — OFFICE VISIT (OUTPATIENT)
Dept: HEMATOLOGY/ONCOLOGY | Facility: CLINIC | Age: 73
End: 2023-11-14
Payer: MEDICARE

## 2023-11-14 VITALS
OXYGEN SATURATION: 97 % | DIASTOLIC BLOOD PRESSURE: 81 MMHG | BODY MASS INDEX: 27.13 KG/M2 | TEMPERATURE: 97 F | WEIGHT: 163 LBS | HEART RATE: 74 BPM | SYSTOLIC BLOOD PRESSURE: 120 MMHG

## 2023-11-14 VITALS
TEMPERATURE: 98 F | SYSTOLIC BLOOD PRESSURE: 122 MMHG | DIASTOLIC BLOOD PRESSURE: 70 MMHG | RESPIRATION RATE: 16 BRPM | OXYGEN SATURATION: 98 % | HEART RATE: 66 BPM

## 2023-11-14 DIAGNOSIS — N18.9 CHRONIC KIDNEY DISEASE, UNSPECIFIED CKD STAGE: ICD-10-CM

## 2023-11-14 DIAGNOSIS — G62.9 NEUROPATHY: ICD-10-CM

## 2023-11-14 DIAGNOSIS — C34.32 MALIGNANT NEOPLASM OF LOWER LOBE OF LEFT LUNG: Primary | ICD-10-CM

## 2023-11-14 DIAGNOSIS — R53.83 FATIGUE: ICD-10-CM

## 2023-11-14 LAB
CREAT UR-MCNC: 103.6 MG/DL (ref 15–325)
CREAT UR-MCNC: 103.6 MG/DL (ref 15–325)
PROT UR-MCNC: <7 MG/DL (ref 0–15)
PROT/CREAT UR: NORMAL MG/G{CREAT} (ref 0–0.2)
SODIUM UR-SCNC: 145 MMOL/L (ref 20–250)

## 2023-11-14 PROCEDURE — 96413 CHEMO IV INFUSION 1 HR: CPT

## 2023-11-14 PROCEDURE — 84300 ASSAY OF URINE SODIUM: CPT | Performed by: HOSPITALIST

## 2023-11-14 PROCEDURE — 84156 ASSAY OF PROTEIN URINE: CPT | Performed by: HOSPITALIST

## 2023-11-14 PROCEDURE — 99999 PR PBB SHADOW E&M-EST. PATIENT-LVL IV: ICD-10-PCS | Mod: PBBFAC,,, | Performed by: HOSPITALIST

## 2023-11-14 PROCEDURE — 3074F PR MOST RECENT SYSTOLIC BLOOD PRESSURE < 130 MM HG: ICD-10-PCS | Mod: CPTII,S$GLB,, | Performed by: HOSPITALIST

## 2023-11-14 PROCEDURE — 3079F DIAST BP 80-89 MM HG: CPT | Mod: CPTII,S$GLB,, | Performed by: HOSPITALIST

## 2023-11-14 PROCEDURE — 1126F PR PAIN SEVERITY QUANTIFIED, NO PAIN PRESENT: ICD-10-PCS | Mod: CPTII,S$GLB,, | Performed by: HOSPITALIST

## 2023-11-14 PROCEDURE — 1159F PR MEDICATION LIST DOCUMENTED IN MEDICAL RECORD: ICD-10-PCS | Mod: CPTII,S$GLB,, | Performed by: HOSPITALIST

## 2023-11-14 PROCEDURE — 1101F PR PT FALLS ASSESS DOC 0-1 FALLS W/OUT INJ PAST YR: ICD-10-PCS | Mod: CPTII,S$GLB,, | Performed by: HOSPITALIST

## 2023-11-14 PROCEDURE — 3288F FALL RISK ASSESSMENT DOCD: CPT | Mod: CPTII,S$GLB,, | Performed by: HOSPITALIST

## 2023-11-14 PROCEDURE — 63600175 PHARM REV CODE 636 W HCPCS: Performed by: HOSPITALIST

## 2023-11-14 PROCEDURE — 3288F PR FALLS RISK ASSESSMENT DOCUMENTED: ICD-10-PCS | Mod: CPTII,S$GLB,, | Performed by: HOSPITALIST

## 2023-11-14 PROCEDURE — 1126F AMNT PAIN NOTED NONE PRSNT: CPT | Mod: CPTII,S$GLB,, | Performed by: HOSPITALIST

## 2023-11-14 PROCEDURE — 1159F MED LIST DOCD IN RCRD: CPT | Mod: CPTII,S$GLB,, | Performed by: HOSPITALIST

## 2023-11-14 PROCEDURE — 25000003 PHARM REV CODE 250: Performed by: HOSPITALIST

## 2023-11-14 PROCEDURE — 3074F SYST BP LT 130 MM HG: CPT | Mod: CPTII,S$GLB,, | Performed by: HOSPITALIST

## 2023-11-14 PROCEDURE — 99999 PR PBB SHADOW E&M-EST. PATIENT-LVL IV: CPT | Mod: PBBFAC,,, | Performed by: HOSPITALIST

## 2023-11-14 PROCEDURE — 1101F PT FALLS ASSESS-DOCD LE1/YR: CPT | Mod: CPTII,S$GLB,, | Performed by: HOSPITALIST

## 2023-11-14 PROCEDURE — 99215 PR OFFICE/OUTPT VISIT, EST, LEVL V, 40-54 MIN: ICD-10-PCS | Mod: S$GLB,,, | Performed by: HOSPITALIST

## 2023-11-14 PROCEDURE — 3079F PR MOST RECENT DIASTOLIC BLOOD PRESSURE 80-89 MM HG: ICD-10-PCS | Mod: CPTII,S$GLB,, | Performed by: HOSPITALIST

## 2023-11-14 PROCEDURE — 99215 OFFICE O/P EST HI 40 MIN: CPT | Mod: S$GLB,,, | Performed by: HOSPITALIST

## 2023-11-14 RX ORDER — SODIUM CHLORIDE 0.9 % (FLUSH) 0.9 %
10 SYRINGE (ML) INJECTION
Status: CANCELLED | OUTPATIENT
Start: 2023-11-14

## 2023-11-14 RX ORDER — DIPHENHYDRAMINE HYDROCHLORIDE 50 MG/ML
50 INJECTION INTRAMUSCULAR; INTRAVENOUS ONCE AS NEEDED
Status: CANCELLED | OUTPATIENT
Start: 2023-11-14

## 2023-11-14 RX ORDER — HEPARIN 100 UNIT/ML
500 SYRINGE INTRAVENOUS
Status: CANCELLED | OUTPATIENT
Start: 2023-11-14

## 2023-11-14 RX ORDER — HEPARIN 100 UNIT/ML
500 SYRINGE INTRAVENOUS
Status: DISCONTINUED | OUTPATIENT
Start: 2023-11-14 | End: 2023-11-14 | Stop reason: HOSPADM

## 2023-11-14 RX ORDER — EPINEPHRINE 0.3 MG/.3ML
0.3 INJECTION SUBCUTANEOUS ONCE AS NEEDED
Status: CANCELLED | OUTPATIENT
Start: 2023-11-14

## 2023-11-14 RX ADMIN — HEPARIN 500 UNITS: 100 SYRINGE at 02:11

## 2023-11-14 RX ADMIN — ATEZOLIZUMAB 1200 MG: 1200 INJECTION, SOLUTION INTRAVENOUS at 01:11

## 2023-11-14 RX ADMIN — SODIUM CHLORIDE: 9 INJECTION, SOLUTION INTRAVENOUS at 01:11

## 2023-11-14 NOTE — PROGRESS NOTES
[facility] MEDICAL ONCOLOGY - FOLLOW UP VISIT    Reason for visit: Follow up for stage IIB squamous cell carcinoma      Oncology History   Malignant neoplasm of lower lobe of left lung - Squamous cell   4/15/2021 Initial Diagnosis    Malignant neoplasm of lower lobe of left lung - Squamous cell     5/25/2021 Cancer Staged    Staging form: Lung, AJCC 8th Edition  - Clinical: Stage IA1 (cT1a, cN0, cM0)      Cancer Staged    9mm non-keratinizing squamous cell carcinoma, no VPI, no LVI, margin at least 8mm.  Levels 9 and 11 = negative.  T1aN0     4/5/2023 Cancer Staged    Staging form: Lung, AJCC 8th Edition  - Pathologic stage from 4/5/2023: Stage IIB (pT2, pN1, cM0)     5/1/2023 - 7/21/2023 Chemotherapy    Treatment Summary   Plan Name: OP NSCLC PEMETREXED + CISPLATIN Q3W  Treatment Goal: Supportive  Status: Inactive  Start Date: 5/1/2023  End Date: 7/21/2023  Provider: Marissa Brower MD  Chemotherapy: CISplatin (Platinol) 75 mg/m2 = 138 mg in sodium chloride 0.9% 665 mL chemo infusion, 75 mg/m2 = 138 mg, Intravenous, Clinic/HOD 1 time, 4 of 4 cycles  Administration: 138 mg (5/12/2023), 138 mg (6/29/2023), 138 mg (7/20/2023), 138 mg (6/8/2023)  PEMEtrexed disodium (ALIMTA) 900 mg in sodium chloride 0.9% SolP 100 mL chemo infusion, 925 mg, Intravenous, Clinic/HOD 1 time, 4 of 4 cycles  Dose modification: 375 mg/m2 (original dose 500 mg/m2, Cycle 3, Reason: MD Discretion, Comment: neutropenia )  Administration: 900 mg (5/12/2023), 700 mg (6/29/2023), 700 mg (7/20/2023), 700 mg (6/8/2023)     8/14/2023 -  Chemotherapy    Treatment Summary   Plan Name: OP ATEZOLIZUMAB Q3W  Treatment Goal: Supportive  Status: Active  Start Date: 8/14/2023  End Date: 7/16/2024 (Planned)  Provider: Marissa Brower MD  Chemotherapy: [No matching medication found in this treatment plan]     NSCLC of left lung (Resolved)   4/28/2021 Initial Diagnosis    NSCLC of left lung (Resolved)       Cancer Staged    9mm non-keratinizing squamous cell carcinoma, no VPI, no LVI, margin at least 8mm.  Levels 9 and 11 = negative.  T1aN0        NANI NSCLC/ADC IIB (pT2 pN1a cMx)  - History of stage I squamous cell carcinoma moderately differentiated left lower lung status post wedge resection 04/28/2021  - Abnormality seen on surveillance for history of squamous cell carcinoma. Initial biopsy February 20, 2023 without neoplasm identified , thus had left lower lobe wedge resection after multiple disciplinary discussion, final pathology positive on 03/28/2022 for invasive moderately differentiated adenocarcinoma station 10 lymph node positive, pleural invasion noted, margins negative.    - Restaging MRI brain negative and PET scan with left hilar avidity SUV 4 and chest wall. Given recent surgery potential inflammation presented at tumor board recommended proceeding with adjuvant chemotherapy and follow-up on repeat imaging. Started adjuvant therapy with chemotherapy and immunotherapy per IMPOWER 010 given PDL1 positive disease 95%.  Mutational analysis negative for EGFR mutation. Noted BRAF mutation.   - Completed adjuvant chemotherapy with cisplatin and pemetrexed tolerated well aside from chemotherapy associated progressive anemia.    - Restaging PET on 8/7/2023 with resolution of prior lymph node avidity.    - Started immunotherapy with atezolizumab (8/14/2023 -     HPI:     Radha Lamas is a 72 yo woman w/ pmh significant for COPD and two primary LLL lung cancers as below. She presents today for follow up.   - Stage I squamous cell carcinoma of the of the LLL, initially dx'd 4/28/21, s/p wedge resection (4/28/21)  - Stage IIB (pT2, pN1a, cMx) adenocarcinoma of the NANI (PD-L1 95%, BRAF [id number] mutated), initially dx'd 3/28/23, s/p wedge resection 3/28/23, adjuvant cisplatin/pemetrexed x 4 cycles (5/11/23-7/20/23), and currently on adjuvant atezolizumab (8/14/23 - present) who presents for follow up.     Last  "clinic 10/17/23, resume atezo (last on 9/26/23), RTC w/ next cycle. Pulmonology messaged and echo ordered. Urine studies for depressed renal function.     Interval History:   - 10/25/23: Echocardiogram, EF 60-65%, normal diastolic function, mild tricuspid regurgitation, mild pulmonic valve regurgitation  - 11/08/23: Pulmonology visit, SOB w/ exertion was resolving about 2 weeks after stopping atezolizumab, pt resumed walking without issue. Spirometry w/ stable moderate COPD. Follow up in 6 months.     Pt states that her breathing was markedly improved the Friday after our last appointment. Her SOB has remain improved since, though notes this morning she had a bit of difficulty breathing again. She does note that she has developed a "deadening" in her feet and describes a sensation of "socks bunching" when she puts her shoes on (despite her socks not bunching). She says this is chiefly at night and has been occurring for the past two weeks. She notes some long standing decreased sensation in her fingertips which she relates to eczema. She denies other neurologic symptoms at this time.     I have reviewed and updated the patient's past medical, surgical, family and social histories.      Past Medical History:   Past Medical History:   Diagnosis Date    COPD (chronic obstructive pulmonary disease) 2013    Eczema     GERD (gastroesophageal reflux disease)     Hiatal hernia     History of torn meniscus of right knee 01/2011    Hypothyroid     Incidental pulmonary nodule, > 3mm and < 8mm - [facility] 8/12/2021    Lung cancer     Urinary incontinence in female     Mild , only takes mediciane with travel        Allergies:   Review of patient's allergies indicates:  No Known Allergies     Medications:   Current Outpatient Medications   Medication Sig Dispense Refill    albuterol (PROVENTIL/VENTOLIN HFA) 90 mcg/actuation inhaler Inhale 2 puffs into the lungs every 4 (four) hours as needed for Wheezing or Shortness of Breath. " Rescue 18 g 11    calcium carbonate (OS-CYDNEY) 600 mg calcium (1,500 mg) Tab Take 600 mg by mouth.      dexAMETHasone (DECADRON) 4 MG Tab Take 2 tablets (8 mg total) by mouth once daily. on the day prior to chemotherapy then daily on days 2,3,4 of each chemotherapy cycle. (Patient not taking: Reported on 9/26/2023) 24 tablet 3    diphenhydrAMINE-aluminum-magnesium hydroxide-simethicone-LIDOcaine HCl 2% Swish and spit 15 mLs every 4 (four) hours as needed. (Patient not taking: Reported on 9/26/2023) 540 each 2    DUPIXENT  mg/2 mL PnIj Inject into the skin every 14 (fourteen) days.      folic acid (FOLVITE) 1 MG tablet Take 1 tablet (1 mg total) by mouth once daily. starting 1 week prior to pemetrexed and continuing for 21 days after stopping pemetrexed (Patient not taking: Reported on 9/26/2023) 30 tablet 5    gabapentin (NEURONTIN) 300 MG capsule Take 1 capsule (300 mg total) by mouth every evening. 30 capsule 11    levothyroxine (SYNTHROID) 75 MCG tablet Take 75 mcg by mouth once daily.      loratadine (CLARITIN) 10 mg tablet Take 10 mg by mouth once daily.      magnesium oxide (MAG-OX) 400 mg (241.3 mg magnesium) tablet Take 1 tablet (400 mg total) by mouth once daily. (Patient not taking: Reported on 9/26/2023) 7 tablet 0    OLANZapine (ZYPREXA) 5 MG tablet Take 1 tablet (5 mg total) by mouth every evening. Take as directed on days 1-4 of your chemotherapy cycle. (Patient not taking: Reported on 9/26/2023) 16 tablet 0    omeprazole (PRILOSEC) 20 MG capsule Take 20 mg by mouth once daily.      ondansetron (ZOFRAN-ODT) 8 MG TbDL Take 1 tablet (8 mg total) by mouth every 8 (eight) hours as needed (nausea). (Patient not taking: Reported on 9/26/2023) 60 tablet 5    oxyCODONE (ROXICODONE) 5 MG immediate release tablet Take 1 tablet (5 mg total) by mouth every 4 (four) hours as needed for Pain. (Patient not taking: Reported on 9/26/2023) 41 tablet 0    potassium chloride SA (K-DUR,KLOR-CON) 20 MEQ tablet Take 1  tablet (20 mEq total) by mouth once daily. (Patient not taking: Reported on 9/26/2023) 7 tablet 1    tolterodine (DETROL LA) 4 MG 24 hr capsule Take 1 capsule (4 mg total) by mouth once daily. (Patient taking differently: Take 4 mg by mouth daily as needed.) 90 capsule 4    umeclidinium-vilanteroL (ANORO ELLIPTA) 62.5-25 mcg/actuation DsDv USE 1 INHALATION DAILY (CONTROLLER) 180 each 3     No current facility-administered medications for this visit.     Facility-Administered Medications Ordered in Other Visits   Medication Dose Route Frequency Provider Last Rate Last Admin    prochlorperazine injection Soln 5 mg  5 mg Intravenous Q30 Min PRN Adrien Vail MD              ROS:  Review of Systems   A complete 12-point review of systems was reviewed and is negative except as mentioned above.       Physical Exam:       There were no vitals taken for this visit.               Physical Exam  Constitutional:       Appearance: Normal appearance.   HENT:      Head: Normocephalic and atraumatic.   Eyes:      Extraocular Movements: Extraocular movements intact.      Conjunctiva/sclera: Conjunctivae normal.      Pupils: Pupils are equal, round, and reactive to light.   Cardiovascular:      Rate and Rhythm: Normal rate and regular rhythm.      Heart sounds: No murmur heard.     No friction rub. No gallop.   Pulmonary:      Effort: Pulmonary effort is normal.      Breath sounds: No wheezing, rhonchi or rales.   Musculoskeletal:         General: Normal range of motion.      Right lower leg: No edema.      Left lower leg: No edema.   Skin:     General: Skin is warm and dry.   Neurological:      Mental Status: She is alert and oriented to person, place, and time.   Psychiatric:         Mood and Affect: Mood normal.         Thought Content: Thought content normal.         Judgment: Judgment normal.           Labs:   No results found for this or any previous visit (from the past 48 hour(s)).     Imaging:   Echo    Left Ventricle:  The left ventricle is normal in size. Normal wall   thickness. There is concentric remodeling. Normal wall motion. There is   normal systolic function with a visually estimated ejection fraction of 60   - 65%. There is normal diastolic function.    Right Ventricle: Normal right ventricular cavity size. Wall thickness   is normal. Right ventricle wall motion  is normal. Systolic function is   borderline low.    Tricuspid Valve: There is mild regurgitation.    Pulmonic Valve: There is mild regurgitation.    Pulmonary Artery: The estimated pulmonary artery systolic pressure is   24 mmHg.    IVC/SVC: Normal venous pressure at 3 mmHg.            Path:   1. Left lung, wedge resection: Invasive adenocarcinoma, predominantly solid   pattern, measuring 9 mm (0.9 cm) in greatest dimension.          Tumor is located 3 mm (0.3 cm) from the blue inked/stapled parenchymal   surgical margin.          Tumor extends into the elastic layer of the visceral pleura.          See synoptic report in comment section for further details.   2. Lymph node, left level 11, excision: 1 benign fragmented lymph node with   sinus histiocytosis and anthracotic pigment, negative for metastatic   carcinoma (0/1).   3. Lymph node, left level 10, excision: 1 lymph node, positive for metastatic   carcinoma with crush artifact dense fibrosis and focal necrosis (1/1).          Confirmed with positive immunohistochemical stain with cytokeratin   AE1/AE3 with satisfactory positive and negative controls.   4. Lymph node, left level 12, excision: 1 benign lymph node with sinus   histiocytosis, negative for metastatic carcinoma (0/1).   5. Lymph node, left level 9, excision: 1 benign fragmented lymph node with   sinus histiocytosis and anthracotic pigment, negative for metastatic   carcinoma (0/1).   6. Lymph node, level 7, excision: 1 benign lymph node with sinus   histiocytosis, negative for metastatic carcinoma (0/1).   7.  Lymph node, level 5, excision: 1  benign fragmented lymph node with sinus   histiocytosis and anthracotic pigment, negative for metastatic carcinoma   (0/1).   8. Lung, lingula, anatomic lingulectomy: Lung with congested vasculature.          No residual carcinoma is identified.          Bronchial and vascular margin is negative for malignancy.          See synoptic report in comment section for further details.       Assessment and Plan:     Radha Lamas is a 72 yo woman w/ pmh significant for COPD and two primary LLL lung cancers as below. She presents today for follow up.   - Stage I squamous cell carcinoma of the of the LLL, initially dx'd 4/28/21, s/p wedge resection (4/28/21)  - Stage IIB (pT2, pN1a, cMx) adenocarcinoma of the NANI (PD-L1 95%, BRAF V600E mutated), initially dx'd 3/28/23, s/p wedge resection 3/28/23, adjuvant cisplatin/pemetrexed x 4 cycles (5/11/23-7/20/23), and currently on adjuvant atezolizumab (8/14/23 - present; stopped from 9/26/23-11/14/23 due to concern for pneumonitis) who presents for follow up.     Stage IIB Adenocarcinoma of NANI  Concern for IO Pneumonitis  ECOG PS 1. Currently on adjuvant atezolizumab following adjuvant cisplatin/pemetrexed, though held from 9/26/23-11/14/23 due to concern for pneumonitis. CT C (10/17/23) was unrevealing, PFTs are stable, and echocardiogram was without concerning findings. The patient's symptoms have resolved off of immunotherapy and she feels back to baseline today. It is unclear if there was a component of pneumonitis or fatigue related to immunotherapy; in either instance, the AEs would have been grade 1. Discussed treatment resumption at length with the patient, including that the goal of the adjuvant atezolizumab is decreased risk of recurrence and that, if she does not tolerate re-challenging with the medication, it can be discontinued permanently and she would be moved to active surveillance. The pt states that she would like to retry the atezolizumab.     PLAN:   -- Resume  "atezolizumab adjuvant therapy, next dose today (11/14/23)  -- Low threshold to stop atezolizumab going forward  -- RTC w/ C4      Depressed Renal Function  Patient's eGFR dipped below 60 following her 4th dose of cisplatin/pemetrexed and has remained depressed since (notably, this was prior to her 1st dose of atezolizumab).  It has remained low for 2.5 months (1st noted on 08/10/2023).  This time line is inconsistent with cisplatin induced nephropathy as I would anticipate recovery of renal function by this time, however it is possible to develop a CKD following cisplatin therapy.  Will obtain urine studies at next visit with low threshold for renal consult.  -- Spot urine protein, spot urine creatinine, urine lytes today  -- Referral to nephrology in place, pt states that she may utilize a nephrologist not in the [facility] system      Neuropathy  Newly developed neuropathic symptoms in the past two weeks. Pt describes "deadened" feet and also like she has "bunched socks" on her feet when she puts on her shoes. This is worse at night. She has some long-standing decreased sensation in her fingertips which she relates to eczema. She denies any problems like this previously, including when she received her chemotherapy. She denies any issues with proprioception / ambulation related to this. Exam is unremarkable. Uncertain etiology. The pt does not have a diagnosis of diabetes. The time course is not consistent with chemotherapy induced peripheral neuropathy. Immunotherapy can cause neuropathic issues, however this developed while the patient had been off of the atezolizumab for 4 weeks already (though this does not preclude this as a possibility).  -- Pt will see her podiatrist  -- Referral to neurology         The above information has been reviewed with the patient and all questions have been answered to their apparent satisfaction.  They understand that they can call the clinic with any questions.    Orion Arce, " MD  Hematology/Oncology  [facility]        Med Onc Chart Routing      Follow up with physician . With next cycle of atezolizumab (3 weeks)   Follow up with [facility]    Infusion scheduling note    Injection scheduling note    Labs CBC, CMP, free T4 and TSH   Scheduling:  Preferred lab:  Lab interval:  Urine sodium, urine creatinine, urine protein   Imaging    Pharmacy appointment    Other referrals

## 2023-12-05 ENCOUNTER — INFUSION (OUTPATIENT)
Dept: INFUSION THERAPY | Facility: HOSPITAL | Age: 73
End: 2023-12-05
Attending: INTERNAL MEDICINE
Payer: MEDICARE

## 2023-12-05 ENCOUNTER — OFFICE VISIT (OUTPATIENT)
Dept: HEMATOLOGY/ONCOLOGY | Facility: CLINIC | Age: 73
End: 2023-12-05
Payer: MEDICARE

## 2023-12-05 VITALS
OXYGEN SATURATION: 97 % | DIASTOLIC BLOOD PRESSURE: 67 MMHG | SYSTOLIC BLOOD PRESSURE: 123 MMHG | RESPIRATION RATE: 16 BRPM | HEART RATE: 66 BPM | HEART RATE: 69 BPM | BODY MASS INDEX: 27.56 KG/M2 | WEIGHT: 165.44 LBS | DIASTOLIC BLOOD PRESSURE: 75 MMHG | TEMPERATURE: 97 F | HEIGHT: 65 IN | TEMPERATURE: 98 F | OXYGEN SATURATION: 98 % | SYSTOLIC BLOOD PRESSURE: 134 MMHG

## 2023-12-05 DIAGNOSIS — N28.9 RENAL INSUFFICIENCY: Primary | ICD-10-CM

## 2023-12-05 DIAGNOSIS — R79.89 ELEVATED TSH: ICD-10-CM

## 2023-12-05 DIAGNOSIS — E66.3 OVERWEIGHT (BMI 25.0-29.9): ICD-10-CM

## 2023-12-05 DIAGNOSIS — C34.32 MALIGNANT NEOPLASM OF LOWER LOBE OF LEFT LUNG: ICD-10-CM

## 2023-12-05 DIAGNOSIS — D69.6 THROMBOCYTOPENIA: ICD-10-CM

## 2023-12-05 DIAGNOSIS — C34.32 MALIGNANT NEOPLASM OF LOWER LOBE OF LEFT LUNG: Primary | ICD-10-CM

## 2023-12-05 DIAGNOSIS — N28.9 RENAL INSUFFICIENCY: ICD-10-CM

## 2023-12-05 DIAGNOSIS — R94.6 ABNORMAL RESULTS OF THYROID FUNCTION STUDIES: ICD-10-CM

## 2023-12-05 LAB
ANION GAP SERPL CALC-SCNC: 10 MMOL/L (ref 8–16)
BUN SERPL-MCNC: 21 MG/DL (ref 8–23)
CALCIUM SERPL-MCNC: 8.9 MG/DL (ref 8.7–10.5)
CHLORIDE SERPL-SCNC: 105 MMOL/L (ref 95–110)
CO2 SERPL-SCNC: 27 MMOL/L (ref 23–29)
CREAT SERPL-MCNC: 1.4 MG/DL (ref 0.5–1.4)
EST. GFR  (NO RACE VARIABLE): 39 ML/MIN/1.73 M^2
GLUCOSE SERPL-MCNC: 97 MG/DL (ref 70–110)
POTASSIUM SERPL-SCNC: 3.5 MMOL/L (ref 3.5–5.1)
SODIUM SERPL-SCNC: 142 MMOL/L (ref 136–145)

## 2023-12-05 PROCEDURE — 3078F PR MOST RECENT DIASTOLIC BLOOD PRESSURE < 80 MM HG: ICD-10-PCS | Mod: CPTII,S$GLB,, | Performed by: NURSE PRACTITIONER

## 2023-12-05 PROCEDURE — 99214 OFFICE O/P EST MOD 30 MIN: CPT | Mod: 25,S$GLB,, | Performed by: NURSE PRACTITIONER

## 2023-12-05 PROCEDURE — 99999 PR PBB SHADOW E&M-EST. PATIENT-LVL IV: ICD-10-PCS | Mod: PBBFAC,,, | Performed by: NURSE PRACTITIONER

## 2023-12-05 PROCEDURE — 99999 PR PBB SHADOW E&M-EST. PATIENT-LVL IV: CPT | Mod: PBBFAC,,, | Performed by: NURSE PRACTITIONER

## 2023-12-05 PROCEDURE — 1101F PT FALLS ASSESS-DOCD LE1/YR: CPT | Mod: CPTII,S$GLB,, | Performed by: NURSE PRACTITIONER

## 2023-12-05 PROCEDURE — 1160F RVW MEDS BY RX/DR IN RCRD: CPT | Mod: CPTII,S$GLB,, | Performed by: NURSE PRACTITIONER

## 2023-12-05 PROCEDURE — 96413 CHEMO IV INFUSION 1 HR: CPT

## 2023-12-05 PROCEDURE — 3074F PR MOST RECENT SYSTOLIC BLOOD PRESSURE < 130 MM HG: ICD-10-PCS | Mod: CPTII,S$GLB,, | Performed by: NURSE PRACTITIONER

## 2023-12-05 PROCEDURE — 3074F SYST BP LT 130 MM HG: CPT | Mod: CPTII,S$GLB,, | Performed by: NURSE PRACTITIONER

## 2023-12-05 PROCEDURE — 3288F FALL RISK ASSESSMENT DOCD: CPT | Mod: CPTII,S$GLB,, | Performed by: NURSE PRACTITIONER

## 2023-12-05 PROCEDURE — 80048 BASIC METABOLIC PNL TOTAL CA: CPT | Performed by: NURSE PRACTITIONER

## 2023-12-05 PROCEDURE — 1101F PR PT FALLS ASSESS DOC 0-1 FALLS W/OUT INJ PAST YR: ICD-10-PCS | Mod: CPTII,S$GLB,, | Performed by: NURSE PRACTITIONER

## 2023-12-05 PROCEDURE — 1126F PR PAIN SEVERITY QUANTIFIED, NO PAIN PRESENT: ICD-10-PCS | Mod: CPTII,S$GLB,, | Performed by: NURSE PRACTITIONER

## 2023-12-05 PROCEDURE — 63600175 PHARM REV CODE 636 W HCPCS: Mod: JZ,JG | Performed by: NURSE PRACTITIONER

## 2023-12-05 PROCEDURE — 1159F MED LIST DOCD IN RCRD: CPT | Mod: CPTII,S$GLB,, | Performed by: NURSE PRACTITIONER

## 2023-12-05 PROCEDURE — 1159F PR MEDICATION LIST DOCUMENTED IN MEDICAL RECORD: ICD-10-PCS | Mod: CPTII,S$GLB,, | Performed by: NURSE PRACTITIONER

## 2023-12-05 PROCEDURE — 1126F AMNT PAIN NOTED NONE PRSNT: CPT | Mod: CPTII,S$GLB,, | Performed by: NURSE PRACTITIONER

## 2023-12-05 PROCEDURE — 1160F PR REVIEW ALL MEDS BY PRESCRIBER/CLIN PHARMACIST DOCUMENTED: ICD-10-PCS | Mod: CPTII,S$GLB,, | Performed by: NURSE PRACTITIONER

## 2023-12-05 PROCEDURE — 3288F PR FALLS RISK ASSESSMENT DOCUMENTED: ICD-10-PCS | Mod: CPTII,S$GLB,, | Performed by: NURSE PRACTITIONER

## 2023-12-05 PROCEDURE — 36591 DRAW BLOOD OFF VENOUS DEVICE: CPT

## 2023-12-05 PROCEDURE — 25000003 PHARM REV CODE 250: Performed by: NURSE PRACTITIONER

## 2023-12-05 PROCEDURE — 3078F DIAST BP <80 MM HG: CPT | Mod: CPTII,S$GLB,, | Performed by: NURSE PRACTITIONER

## 2023-12-05 PROCEDURE — 99214 PR OFFICE/OUTPT VISIT, EST, LEVL IV, 30-39 MIN: ICD-10-PCS | Mod: 25,S$GLB,, | Performed by: NURSE PRACTITIONER

## 2023-12-05 RX ORDER — SODIUM CHLORIDE 9 MG/ML
INJECTION, SOLUTION INTRAVENOUS
Status: CANCELLED
Start: 2023-12-05

## 2023-12-05 RX ORDER — DIPHENHYDRAMINE HYDROCHLORIDE 50 MG/ML
50 INJECTION INTRAMUSCULAR; INTRAVENOUS ONCE AS NEEDED
Status: CANCELLED | OUTPATIENT
Start: 2023-12-05

## 2023-12-05 RX ORDER — HEPARIN 100 UNIT/ML
500 SYRINGE INTRAVENOUS
Status: DISCONTINUED | OUTPATIENT
Start: 2023-12-05 | End: 2023-12-05 | Stop reason: HOSPADM

## 2023-12-05 RX ORDER — SODIUM CHLORIDE 0.9 % (FLUSH) 0.9 %
10 SYRINGE (ML) INJECTION
Status: CANCELLED | OUTPATIENT
Start: 2023-12-05

## 2023-12-05 RX ORDER — EPINEPHRINE 0.3 MG/.3ML
0.3 INJECTION SUBCUTANEOUS ONCE AS NEEDED
Status: CANCELLED | OUTPATIENT
Start: 2023-12-05

## 2023-12-05 RX ORDER — SODIUM CHLORIDE 9 MG/ML
INJECTION, SOLUTION INTRAVENOUS
Status: COMPLETED | OUTPATIENT
Start: 2023-12-05 | End: 2023-12-05

## 2023-12-05 RX ORDER — HEPARIN 100 UNIT/ML
500 SYRINGE INTRAVENOUS
Status: CANCELLED | OUTPATIENT
Start: 2023-12-05

## 2023-12-05 RX ADMIN — HEPARIN 500 UNITS: 100 SYRINGE at 02:12

## 2023-12-05 RX ADMIN — SODIUM CHLORIDE: 9 INJECTION, SOLUTION INTRAVENOUS at 01:12

## 2023-12-05 RX ADMIN — ATEZOLIZUMAB 1200 MG: 1200 INJECTION, SOLUTION INTRAVENOUS at 02:12

## 2023-12-05 NOTE — ASSESSMENT & PLAN NOTE
Mild thrombocytopenia. No S&S bleeding    Possible chemo effect. No nutritional deficiencies. Monitor

## 2023-12-05 NOTE — PROGRESS NOTES
Subjective:       Patient ID: Radha Lamas is a 73 y.o. female.    Chief Complaint: Review labs. Immunotherapy     Cancer Staging   Malignant neoplasm of lower lobe of left lung - Squamous cell  Staging form: Lung, AJCC 8th Edition  - Clinical: Stage IA1 (cT1a, cN0, cM0) - Signed by Ronak Dao MD on 5/25/2021  - Pathologic stage from 4/5/2023: Stage IIB (pT2, pN1, cM0) - Signed by Marissa Brower MD on 6/8/2023      HPI: 73 y.o female with lung cancer presenting today for cycle 5 Tecentriq maintenance immunotherapy.     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 Initial biopsy February 20, 2023 without neoplasm identified who after multiple disciplinary discussion proceeded with left lower lobe wedge resection, final pathology positive for 03/28/2022 for invasive moderately differentiated adenocarcinoma station 10 lymph node positive, pleural invasion noted, margins negative.      MRI brain and PET 1 month s/p surgery MRI brain negative PET scan with avidity left hilar SUV 4 and chest wall.  Given recent surgery potential inflammation presented at tumor board recommended proceeding with adjuvant chemotherapy and follow-up on repeat imaging.      Completed adjuvant chemotherapy with cisplatin and pemetrexed tolerated well aside from chemotherapy associated progressive anemia. Restaging PET with resolution of prior lymph node avidity. Initiated Tecentriq immunotherapy 8/14/2023 planned Q 3 weeks.       Patient with declining kidney function. Unclear etiology. She desires outside Nephrology referral. She also notes bilateral lower extremity neuropathy. Previously referred to podiatry and Neurology. Otherwise she feels well outside of fatigue.  Social History     Socioeconomic History    Marital status:     Tobacco Use    Smoking status: Former     Current packs/day: 0.00     Average packs/day: 1.5 packs/day for 45.0 years (67.5 ttl pk-yrs)     Types: Cigarettes     Start date:      Quit date:      Years since quittin.9    Smokeless tobacco: Never   Substance and Sexual Activity    Alcohol use: No     Alcohol/week: 0.0 standard drinks of alcohol    Drug use: No    Sexual activity: Not Currently     Partners: Male     Birth control/protection: Post-menopausal       Past Medical History:   Diagnosis Date    COPD (chronic obstructive pulmonary disease)     Eczema     GERD (gastroesophageal reflux disease)     Hiatal hernia     History of torn meniscus of right knee 2011    Hypothyroid     Incidental pulmonary nodule, > 3mm and < 8mm - Lingula 2021    Lung cancer     Urinary incontinence in female     Mild , only takes mediciane with travel       Family History   Problem Relation Age of Onset    Macular degeneration Mother     Retinal detachment Mother     Heart failure Mother     Hypertension Mother     Cancer Father     Hypertension Father     Cancer Sister     Cancer Brother        Past Surgical History:   Procedure Laterality Date    FLUOROSCOPY N/A 2023    Procedure: Fluoroscopy/Mediport placement;  Surgeon: Kodak Retana MD;  Location: [facility] CATH LAB;  Service: General;  Laterality: N/A;    INJECTION OF ANESTHETIC AGENT AROUND MULTIPLE INTERCOSTAL NERVES Left 3/20/2023    Procedure: BLOCK, NERVE, INTERCOSTAL, 2 OR MORE;  Surgeon: Refugio Medley MD;  Location: [facility] OR [address] [place] [address];  Service: Thoracic;  Laterality: Left;    KNEE CARTILAGE SURGERY Right 2011    LAPAROSCOPIC LYSIS OF ADHESIONS Left 3/20/2023    Procedure: LYSIS, ADHESIONS, LAPAROSCOPIC;  Surgeon: Refugio Medley MD;  Location: [facility] OR [address] [place] [address];  Service: Thoracic;  Laterality: Left;    ROBOT-ASSISTED LAPAROSCOPIC LYMPHADENECTOMY USING DA ROSEMARY XI Left 3/20/2023    Procedure: XI ROBOTIC LYMPHADENECTOMY;  Surgeon:  Refugio Medley MD;  Location: [address];  Service: Thoracic;  Laterality: Left;    THORACOSCOPIC WEDGE RESECTION OF LUNG Left 04/28/2021    Procedure: VATS, WITH WEDGE RESECTION, LUNG;  Surgeon: Clarke Sauceda MD;  Location: [facility] OR [address] [place] [address];  Service: Thoracic;  Laterality: Left;  lymph node dissection     TUBAL LIGATION  1976    WEDGE RESECTION, LUNG, ROBOT-ASSISTED, USING VATS, USING DA ORSEMARY XI Left 3/20/2023    Procedure: XI ROBOTIC WEDGE RESECTION, LUNG (RATS); segmentectomy;  Surgeon: Refugio Medely MD;  Location: [address];  Service: Thoracic;  Laterality: Left;       Review of Systems   Constitutional:  Positive for fatigue. Negative for activity change, appetite change, chills, fever and unexpected weight change.   HENT:  Negative for congestion, mouth sores, nosebleeds, sore throat, trouble swallowing and voice change.    Eyes:  Negative for visual disturbance.   Respiratory:  Negative for cough, chest tightness, shortness of breath and wheezing.    Cardiovascular:  Negative for chest pain, palpitations and leg swelling.   Gastrointestinal:  Negative for abdominal distention, abdominal pain, blood in stool, constipation, diarrhea, nausea and vomiting.   Genitourinary:  Negative for difficulty urinating, dysuria and hematuria.   Musculoskeletal:  Negative for arthralgias, back pain and myalgias.   Skin:  Negative for pallor, rash and wound.   Neurological:  Negative for dizziness, syncope, weakness and headaches.        BLE neuropathy    Hematological:  Negative for adenopathy. Does not bruise/bleed easily.   Psychiatric/Behavioral:  The patient is nervous/anxious.          Medication List with Changes/Refills   Current Medications    ALBUTEROL (PROVENTIL/VENTOLIN HFA) 90 MCG/ACTUATION INHALER    Inhale 2 puffs into the lungs every 4 (four) hours as needed for Wheezing or Shortness of Breath. Rescue    CALCIUM CARBONATE (OS-CYDNEY) 600 MG CALCIUM (1,500 MG) TAB    Take 600 mg by  mouth.    DEXAMETHASONE (DECADRON) 4 MG TAB    Take 2 tablets (8 mg total) by mouth once daily. on the day prior to chemotherapy then daily on days 2,3,4 of each chemotherapy cycle.    DIPHENHYDRAMINE-ALUMINUM-MAGNESIUM HYDROXIDE-SIMETHICONE-LIDOCAINE HCL 2%    Swish and spit 15 mLs every 4 (four) hours as needed.    DUPIXENT  MG/2 ML PNIJ    Inject into the skin every 14 (fourteen) days.    FOLIC ACID (FOLVITE) 1 MG TABLET    Take 1 tablet (1 mg total) by mouth once daily. starting 1 week prior to pemetrexed and continuing for 21 days after stopping pemetrexed    GABAPENTIN (NEURONTIN) 300 MG CAPSULE    Take 1 capsule (300 mg total) by mouth every evening.    LEVOTHYROXINE (SYNTHROID) 75 MCG TABLET    Take 75 mcg by mouth once daily.    LORATADINE (CLARITIN) 10 MG TABLET    Take 10 mg by mouth once daily.    MAGNESIUM OXIDE (MAG-OX) 400 MG (241.3 MG MAGNESIUM) TABLET    Take 1 tablet (400 mg total) by mouth once daily.    OLANZAPINE (ZYPREXA) 5 MG TABLET    Take 1 tablet (5 mg total) by mouth every evening. Take as directed on days 1-4 of your chemotherapy cycle.    OMEPRAZOLE (PRILOSEC) 20 MG CAPSULE    Take 20 mg by mouth once daily.    ONDANSETRON (ZOFRAN-ODT) 8 MG TBDL    Take 1 tablet (8 mg total) by mouth every 8 (eight) hours as needed (nausea).    OXYCODONE (ROXICODONE) 5 MG IMMEDIATE RELEASE TABLET    Take 1 tablet (5 mg total) by mouth every 4 (four) hours as needed for Pain.    POTASSIUM CHLORIDE SA (K-DUR,KLOR-CON) 20 MEQ TABLET    Take 1 tablet (20 mEq total) by mouth once daily.    TOLTERODINE (DETROL LA) 4 MG 24 HR CAPSULE    Take 1 capsule (4 mg total) by mouth once daily.    UMECLIDINIUM-VILANTEROL (ANORO ELLIPTA) 62.5-25 MCG/ACTUATION DSDV    USE 1 INHALATION DAILY (CONTROLLER)     Objective:     Vitals:    12/05/23 1313   BP: 123/75   Pulse: 69   Temp: 97.1 °F (36.2 °C)     Lab Results   Component Value Date    WBC 4.86 12/05/2023    HGB 11.1 (L) 12/05/2023    HCT 33.4 (L) 12/05/2023     MCV 92 12/05/2023     (L) 12/05/2023       BMP  Lab Results   Component Value Date     12/05/2023    K 3.6 12/05/2023     12/05/2023    CO2 29 12/05/2023    BUN 23 12/05/2023    CREATININE 1.6 (H) 12/05/2023    CALCIUM 9.8 12/05/2023    ANIONGAP 12 12/05/2023    EGFRNORACEVR 33 (A) 12/05/2023       Lab Results   Component Value Date    ALT 9 (L) 12/05/2023    AST 30 12/05/2023    ALKPHOS 77 12/05/2023    BILITOT 0.5 12/05/2023         Physical Exam  Vitals reviewed.   Constitutional:       Appearance: She is well-developed.   HENT:      Head: Normocephalic.      Right Ear: External ear normal.      Left Ear: External ear normal.      Nose: Nose normal.   Eyes:      General: Lids are normal. No scleral icterus.        Right eye: No discharge.         Left eye: No discharge.      Conjunctiva/sclera: Conjunctivae normal.   Neck:      Thyroid: No thyroid mass.   Cardiovascular:      Rate and Rhythm: Normal rate and regular rhythm.      Heart sounds: Normal heart sounds.   Pulmonary:      Effort: Pulmonary effort is normal. No respiratory distress.      Breath sounds: Normal breath sounds. No wheezing or rales.   Abdominal:      General: There is no distension.   Genitourinary:     Comments: deferred  Musculoskeletal:         General: Normal range of motion.      Cervical back: Normal range of motion.   Skin:     General: Skin is warm and dry.   Neurological:      Mental Status: She is alert and oriented to person, place, and time.   Psychiatric:         Speech: Speech normal.         Behavior: Behavior normal. Behavior is cooperative.         Thought Content: Thought content normal.        Assessment:     Problem List Items Addressed This Visit          Hematology    Thrombocytopenia     Mild thrombocytopenia. No S&S bleeding    Possible chemo effect. No nutritional deficiencies. 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 Note gradual rise in creatinine. Referral placed to Nephrology. TSH mildly elevated. Free T4 wnl    Proceed with Tecentriq. Add 5000 mL NS IVFs over 30 minutes. Repeat BMP after fluids. F/u 3 weeks with Cbc, Cmp, TSH. Tecentriq planned            Endocrine    Overweight (BMI 25.0-29.9)     Encourage healthy nutrition along with portion control. Encourage daily exercise          Other Visit Diagnoses       Renal insufficiency    -  Primary    Relevant Orders    Ambulatory referral/consult to Nephrology    Basic Metabolic Panel              Plan:     Renal insufficiency  -     Ambulatory referral/consult to Nephrology; Future; Expected date: 12/12/2023  -     Basic Metabolic Panel; Future; Expected date: 12/05/2023    Malignant neoplasm of lower lobe of left lung - Squamous cell    Overweight (BMI 25.0-29.9)    Thrombocytopenia    Other orders  -     Cancel: atezolizumab (TECENTRIQ) 1,200 mg in sodium chloride 0.9% SolP 270 mL infusion  -     EPINEPHrine (EPIPEN) 0.3 mg/0.3 mL pen injection 0.3 mg  -     diphenhydrAMINE injection 50 mg  -     hydrocortisone sodium succinate injection 100 mg  -     sodium chloride 0.9% 250 mL flush bag  -     sodium chloride 0.9% flush 10 mL  -     Cancel: heparin, porcine (PF) 100 unit/mL injection flush 500 Units  -     alteplase injection 2 mg  -     Cancel: 0.9%  NaCl infusion          Med Onc Chart Routing      Follow up with physician 3 weeks. Dr. Daily   Follow up with [facility]    Infusion scheduling note   tecentriq   Injection scheduling note    Labs CBC, CMP, free T4 and TSH   Scheduling:  Preferred lab:  Lab interval:     Imaging None      Pharmacy  appointment No pharmacy appointment needed      Other referrals       No additional referrals needed           Marianela Mojica, KRISTENP-C

## 2023-12-05 NOTE — NURSING
After 10ml blood waste, labs drawn, verified labels with 2 pt identifiers, and sent to lab.  Flushed with 10ml NS and 5ml Heparin per protocol.  Baez needle dc'd and bandaid applied.  Site healthy.

## 2023-12-05 NOTE — ASSESSMENT & PLAN NOTE
Cancer Staging   Malignant neoplasm of lower lobe of left lung - Squamous cell  Staging form: Lung, AJCC 8th Edition  - Clinical: Stage IA1 (cT1a, cN0, cM0) - Signed by Ronak Dao MD on 5/25/2021  - Pathologic stage from 4/5/2023: Stage IIB (pT2, pN1, cM0) - Signed by Marissa Brower MD on 6/8/2023    Labs reviewed overall stable. Note gradual rise in creatinine. Referral placed to Nephrology. TSH mildly elevated. Free T4 wnl    Proceed with Tecentriq. Add 5000 mL NS IVFs over 30 minutes. Repeat BMP after fluids. F/u 3 weeks with Cbc, Cmp, TSH. Tecentriq planned

## 2023-12-06 ENCOUNTER — TELEPHONE (OUTPATIENT)
Dept: HEMATOLOGY/ONCOLOGY | Facility: CLINIC | Age: 73
End: 2023-12-06
Payer: MEDICARE

## 2023-12-06 NOTE — TELEPHONE ENCOUNTER
Let patient know we did receive the new fax number and that Marianela Mojica NP sent the referral.

## 2023-12-06 NOTE — TELEPHONE ENCOUNTER
----- Message from Latisha Saucedo LPN sent at 12/6/2023  9:59 AM CST -----  Regarding: FW: patient call back    ----- Message -----  From: Alba Marte  Sent: 12/6/2023   9:30 AM CST  To: [facility] Clinical Support Staff  Subject: patient call back                                Type: Patient Call Back    Who called: Self     What is the request in detail: Calling to give fax number for referral to [facility]. [facility] [phone]. Please refax to this number.     Can the clinic reply by [facility]? No     Would the patient rather a call back or a response via [facility]? Call     Best call back number: [phone]

## 2023-12-27 ENCOUNTER — OFFICE VISIT (OUTPATIENT)
Dept: HEMATOLOGY/ONCOLOGY | Facility: CLINIC | Age: 73
End: 2023-12-27
Payer: MEDICARE

## 2023-12-27 ENCOUNTER — INFUSION (OUTPATIENT)
Dept: INFUSION THERAPY | Facility: HOSPITAL | Age: 73
End: 2023-12-27
Attending: INTERNAL MEDICINE
Payer: MEDICARE

## 2023-12-27 VITALS
BODY MASS INDEX: 27.05 KG/M2 | SYSTOLIC BLOOD PRESSURE: 126 MMHG | RESPIRATION RATE: 16 BRPM | WEIGHT: 162.38 LBS | DIASTOLIC BLOOD PRESSURE: 58 MMHG | HEART RATE: 66 BPM | HEIGHT: 65 IN | OXYGEN SATURATION: 98 % | TEMPERATURE: 98 F

## 2023-12-27 VITALS
WEIGHT: 162.38 LBS | HEIGHT: 65 IN | TEMPERATURE: 97 F | DIASTOLIC BLOOD PRESSURE: 74 MMHG | HEART RATE: 63 BPM | BODY MASS INDEX: 27.05 KG/M2 | OXYGEN SATURATION: 99 % | SYSTOLIC BLOOD PRESSURE: 122 MMHG

## 2023-12-27 DIAGNOSIS — C34.32 MALIGNANT NEOPLASM OF LOWER LOBE OF LEFT LUNG: Primary | ICD-10-CM

## 2023-12-27 DIAGNOSIS — D69.6 THROMBOCYTOPENIA: Primary | ICD-10-CM

## 2023-12-27 DIAGNOSIS — C34.32 MALIGNANT NEOPLASM OF LOWER LOBE OF LEFT LUNG: ICD-10-CM

## 2023-12-27 DIAGNOSIS — E66.3 OVERWEIGHT (BMI 25.0-29.9): ICD-10-CM

## 2023-12-27 PROCEDURE — 1159F PR MEDICATION LIST DOCUMENTED IN MEDICAL RECORD: ICD-10-PCS | Mod: CPTII,S$GLB,, | Performed by: NURSE PRACTITIONER

## 2023-12-27 PROCEDURE — 3288F FALL RISK ASSESSMENT DOCD: CPT | Mod: CPTII,S$GLB,, | Performed by: NURSE PRACTITIONER

## 2023-12-27 PROCEDURE — 25000003 PHARM REV CODE 250: Performed by: NURSE PRACTITIONER

## 2023-12-27 PROCEDURE — A4216 STERILE WATER/SALINE, 10 ML: HCPCS | Performed by: NURSE PRACTITIONER

## 2023-12-27 PROCEDURE — 1126F PR PAIN SEVERITY QUANTIFIED, NO PAIN PRESENT: ICD-10-PCS | Mod: CPTII,S$GLB,, | Performed by: NURSE PRACTITIONER

## 2023-12-27 PROCEDURE — 1101F PR PT FALLS ASSESS DOC 0-1 FALLS W/OUT INJ PAST YR: ICD-10-PCS | Mod: CPTII,S$GLB,, | Performed by: NURSE PRACTITIONER

## 2023-12-27 PROCEDURE — 1159F MED LIST DOCD IN RCRD: CPT | Mod: CPTII,S$GLB,, | Performed by: NURSE PRACTITIONER

## 2023-12-27 PROCEDURE — 3078F DIAST BP <80 MM HG: CPT | Mod: CPTII,S$GLB,, | Performed by: NURSE PRACTITIONER

## 2023-12-27 PROCEDURE — 3288F PR FALLS RISK ASSESSMENT DOCUMENTED: ICD-10-PCS | Mod: CPTII,S$GLB,, | Performed by: NURSE PRACTITIONER

## 2023-12-27 PROCEDURE — 99999 PR PBB SHADOW E&M-EST. PATIENT-LVL IV: ICD-10-PCS | Mod: PBBFAC,,, | Performed by: NURSE PRACTITIONER

## 2023-12-27 PROCEDURE — 63600175 PHARM REV CODE 636 W HCPCS: Performed by: NURSE PRACTITIONER

## 2023-12-27 PROCEDURE — 3074F SYST BP LT 130 MM HG: CPT | Mod: CPTII,S$GLB,, | Performed by: NURSE PRACTITIONER

## 2023-12-27 PROCEDURE — 99214 PR OFFICE/OUTPT VISIT, EST, LEVL IV, 30-39 MIN: ICD-10-PCS | Mod: 25,S$GLB,, | Performed by: NURSE PRACTITIONER

## 2023-12-27 PROCEDURE — 96413 CHEMO IV INFUSION 1 HR: CPT

## 2023-12-27 PROCEDURE — 1160F PR REVIEW ALL MEDS BY PRESCRIBER/CLIN PHARMACIST DOCUMENTED: ICD-10-PCS | Mod: CPTII,S$GLB,, | Performed by: NURSE PRACTITIONER

## 2023-12-27 PROCEDURE — 1101F PT FALLS ASSESS-DOCD LE1/YR: CPT | Mod: CPTII,S$GLB,, | Performed by: NURSE PRACTITIONER

## 2023-12-27 PROCEDURE — 3078F PR MOST RECENT DIASTOLIC BLOOD PRESSURE < 80 MM HG: ICD-10-PCS | Mod: CPTII,S$GLB,, | Performed by: NURSE PRACTITIONER

## 2023-12-27 PROCEDURE — 99214 OFFICE O/P EST MOD 30 MIN: CPT | Mod: 25,S$GLB,, | Performed by: NURSE PRACTITIONER

## 2023-12-27 PROCEDURE — 99999 PR PBB SHADOW E&M-EST. PATIENT-LVL IV: CPT | Mod: PBBFAC,,, | Performed by: NURSE PRACTITIONER

## 2023-12-27 PROCEDURE — 1160F RVW MEDS BY RX/DR IN RCRD: CPT | Mod: CPTII,S$GLB,, | Performed by: NURSE PRACTITIONER

## 2023-12-27 PROCEDURE — 1126F AMNT PAIN NOTED NONE PRSNT: CPT | Mod: CPTII,S$GLB,, | Performed by: NURSE PRACTITIONER

## 2023-12-27 PROCEDURE — 3074F PR MOST RECENT SYSTOLIC BLOOD PRESSURE < 130 MM HG: ICD-10-PCS | Mod: CPTII,S$GLB,, | Performed by: NURSE PRACTITIONER

## 2023-12-27 RX ORDER — HEPARIN 100 UNIT/ML
500 SYRINGE INTRAVENOUS
Status: CANCELLED | OUTPATIENT
Start: 2023-12-27

## 2023-12-27 RX ORDER — SODIUM CHLORIDE 0.9 % (FLUSH) 0.9 %
10 SYRINGE (ML) INJECTION
Status: CANCELLED | OUTPATIENT
Start: 2023-12-27

## 2023-12-27 RX ORDER — HEPARIN 100 UNIT/ML
500 SYRINGE INTRAVENOUS
Status: DISCONTINUED | OUTPATIENT
Start: 2023-12-27 | End: 2023-12-27 | Stop reason: HOSPADM

## 2023-12-27 RX ORDER — EPINEPHRINE 0.3 MG/.3ML
0.3 INJECTION SUBCUTANEOUS ONCE AS NEEDED
Status: CANCELLED | OUTPATIENT
Start: 2023-12-27

## 2023-12-27 RX ORDER — DIPHENHYDRAMINE HYDROCHLORIDE 50 MG/ML
50 INJECTION INTRAMUSCULAR; INTRAVENOUS ONCE AS NEEDED
Status: CANCELLED | OUTPATIENT
Start: 2023-12-27

## 2023-12-27 RX ORDER — SODIUM CHLORIDE 0.9 % (FLUSH) 0.9 %
10 SYRINGE (ML) INJECTION
Status: DISCONTINUED | OUTPATIENT
Start: 2023-12-27 | End: 2023-12-27 | Stop reason: HOSPADM

## 2023-12-27 RX ADMIN — ATEZOLIZUMAB 1200 MG: 1200 INJECTION, SOLUTION INTRAVENOUS at 10:12

## 2023-12-27 RX ADMIN — SODIUM CHLORIDE: 9 INJECTION, SOLUTION INTRAVENOUS at 10:12

## 2023-12-27 RX ADMIN — HEPARIN 500 UNITS: 100 SYRINGE at 11:12

## 2023-12-27 RX ADMIN — Medication 10 ML: at 11:12

## 2023-12-27 NOTE — PLAN OF CARE
Problem: Adult Inpatient Plan of Care  Goal: Plan of Care Review  Outcome: Ongoing, Progressing  Flowsheets (Taken 12/27/2023 1054)  Plan of Care Reviewed With:   patient   spouse  Goal: Patient-Specific Goal (Individualized)  Outcome: Ongoing, Progressing  Flowsheets (Taken 12/27/2023 1045)  Anxieties, Fears or Concerns: none verbalized  Individualized Care Needs: pillow, snack, reclining position,  at side     Problem: Infection  Goal: Absence of Infection Signs and Symptoms  Outcome: Ongoing, Progressing  Intervention: Prevent or Manage Infection  Flowsheets (Taken 12/27/2023 1045)  Infection Management: aseptic technique maintained

## 2023-12-27 NOTE — ASSESSMENT & PLAN NOTE
Cancer Staging   Malignant neoplasm of lower lobe of left lung - Squamous cell  Staging form: Lung, AJCC 8th Edition  - Clinical: Stage IA1 (cT1a, cN0, cM0) - Signed by Ronak Dao MD on 5/25/2021  - Pathologic stage from 4/5/2023: Stage IIB (pT2, pN1, cM0) - Signed by Marissa Brower MD on 6/8/2023    Labs reviewed overall stable. Note elevated creatinine appears to have stabilized. Referral placed to Nephrology, upcoming appointment. Encouraged adequate hydration 64 oz daily. Avoid nephrotoxic medications    Proceed with cycle 6 Tecentriq.  F/u 3 weeks with Cbc, Cmp, TSH. Tecentriq planned

## 2023-12-27 NOTE — PROGRESS NOTES
Subjective:       Patient ID: Radha Lamas is a 73 y.o. female.    Chief Complaint: Review labs. 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 Initial biopsy February 20, 2023 without neoplasm identified who after multiple disciplinary discussion proceeded with left lower lobe wedge resection, final pathology positive for 03/28/2022 for invasive moderately differentiated adenocarcinoma station 10 lymph node positive, pleural invasion noted, margins negative.      MRI brain and PET 1 month s/p surgery MRI brain negative PET scan with avidity left hilar SUV 4 and chest wall.  Given recent surgery potential inflammation presented at tumor board recommended proceeding with adjuvant chemotherapy and follow-up on repeat imaging.      Completed adjuvant chemotherapy with cisplatin and pemetrexed tolerated well aside from chemotherapy associated progressive anemia. Restaging PET with resolution of prior lymph node avidity. Initiated Tecentriq immunotherapy 8/14/2023 planned Q 3 weeks.       Patient with declining kidney function. Unclear etiology. She desires outside Nephrology referral. She reports Nephrology appt in March 2024.   Social History     Socioeconomic History    Marital status:    Tobacco Use    Smoking status: Former     Current packs/day: 0.00     Average packs/day: 1.5  packs/day for 45.0 years (67.5 ttl pk-yrs)     Types: Cigarettes     Start date:      Quit date: 2009     Years since quittin.9    Smokeless tobacco: Never   Substance and Sexual Activity    Alcohol use: No     Alcohol/week: 0.0 standard drinks of alcohol    Drug use: No    Sexual activity: Not Currently     Partners: Male     Birth control/protection: Post-menopausal     Social Determinants of Health     Financial Resource Strain: Low Risk  (2023)    Overall Financial Resource Strain (CARDIA)     Difficulty of Paying Living Expenses: Not hard at all   Food Insecurity: No Food Insecurity (2023)    Hunger Vital Sign     Worried About Running Out of Food in the Last Year: Never true     Ran Out of Food in the Last Year: Never true   Transportation Needs: No Transportation Needs (2023)    PRAPARE - Transportation     Lack of Transportation (Medical): No     Lack of Transportation (Non-Medical): No   Physical Activity: Sufficiently Active (2023)    Exercise Vital Sign     Days of Exercise per Week: 5 days     Minutes of Exercise per Session: 150+ min   Stress: No Stress Concern Present (2023)    [place] [employer] - Occupational Stress Questionnaire     Feeling of Stress : Not at all   Social Connections: Unknown (2023)    Social Connection and Isolation Panel [NHANES]     Frequency of Communication with Friends and Family: Three times a week     Frequency of Social Gatherings with Friends and Family: More than three times a week     Active Member of Clubs or Organizations: No     Marital Status:    Housing Stability: Unknown (2023)    Housing Stability Vital Sign     Unable to Pay for Housing in the Last Year: No     Unstable Housing in the Last Year: No       Past Medical History:   Diagnosis Date    COPD (chronic obstructive pulmonary disease)     Eczema     GERD (gastroesophageal reflux disease)     Hiatal hernia     History of torn  meniscus of right knee 01/2011    Hypothyroid     Incidental pulmonary nodule, > 3mm and < 8mm - [place] 8/12/2021    Lung cancer     Urinary incontinence in female     Mild , only takes mediciane with travel       Family History   Problem Relation Age of Onset    Macular degeneration Mother     Retinal detachment Mother     Heart failure Mother     Hypertension Mother     Cancer Father     Hypertension Father     Cancer Sister     Cancer Brother        Past Surgical History:   Procedure Laterality Date    FLUOROSCOPY N/A 4/27/2023    Procedure: Fluoroscopy/Mediport placement;  Surgeon: Kodak Retana MD;  Location: [facility] CATH LAB;  Service: General;  Laterality: N/A;    INJECTION OF ANESTHETIC AGENT AROUND MULTIPLE INTERCOSTAL NERVES Left 3/20/2023    Procedure: BLOCK, NERVE, INTERCOSTAL, 2 OR MORE;  Surgeon: Refugio Medley MD;  [facility];  Service: Thoracic;  Laterality: Left;    KNEE CARTILAGE SURGERY Right 01/2011    LAPAROSCOPIC LYSIS OF ADHESIONS Left 3/20/2023    Procedure: LYSIS, ADHESIONS, LAPAROSCOPIC;  Surgeon: Refugio Medley MD;  [facility];  Service: Thoracic;  Laterality: Left;    ROBOT-ASSISTED LAPAROSCOPIC LYMPHADENECTOMY USING DA ROSEMARY XI Left 3/20/2023    Procedure: XI ROBOTIC LYMPHADENECTOMY;  Surgeon: Refugio Medley MD;  Location: [facility] OR [facility] FLR;  Service: Thoracic;  Laterality: Left;    THORACOSCOPIC WEDGE RESECTION OF LUNG Left 04/28/2021    Procedure: VATS, WITH WEDGE RESECTION, LUNG;  Surgeon: Clarke Sauceda MD;  Location: [facility] OR [facility] FLR;  Service: Thoracic;  Laterality: Left;  lymph node dissection     TUBAL LIGATION  1976    WEDGE RESECTION, LUNG, ROBOT-ASSISTED, USING VATS, USING DA ROSEMARY XI Left 3/20/2023    Procedure: XI ROBOTIC WEDGE RESECTION, LUNG (RATS); segmentectomy;  Surgeon: Refugio Medley MD;  Location: [facility] OR [facility] FLR;  Service: Thoracic;  Laterality: Left;       Review of Systems   Constitutional:  Negative for activity  change, appetite change, chills, fatigue, fever and unexpected weight change.   HENT:  Negative for congestion, mouth sores, nosebleeds, sore throat, trouble swallowing and voice change.    Eyes:  Negative for visual disturbance.   Respiratory:  Negative for cough, chest tightness, shortness of breath and wheezing.    Cardiovascular:  Negative for chest pain, palpitations and leg swelling.   Gastrointestinal:  Negative for abdominal distention, abdominal pain, blood in stool, constipation, diarrhea, nausea and vomiting.   Genitourinary:  Negative for difficulty urinating, dysuria and hematuria.   Musculoskeletal:  Negative for arthralgias, back pain and myalgias.   Skin:  Negative for pallor, rash and wound.   Neurological:  Negative for dizziness, syncope, weakness and headaches.        BLE neuropathy    Hematological:  Negative for adenopathy. Does not bruise/bleed easily.   Psychiatric/Behavioral:  The patient is nervous/anxious.          Medication List with Changes/Refills   Current Medications    ALBUTEROL (PROVENTIL/VENTOLIN HFA) 90 MCG/ACTUATION INHALER    Inhale 2 puffs into the lungs every 4 (four) hours as needed for Wheezing or Shortness of Breath. Rescue    CALCIUM CARBONATE (OS-CYDNEY) 600 MG CALCIUM (1,500 MG) TAB    Take 600 mg by mouth.    DEXAMETHASONE (DECADRON) 4 MG TAB    Take 2 tablets (8 mg total) by mouth once daily. on the day prior to chemotherapy then daily on days 2,3,4 of each chemotherapy cycle.    DIPHENHYDRAMINE-ALUMINUM-MAGNESIUM HYDROXIDE-SIMETHICONE-LIDOCAINE HCL 2%    Swish and spit 15 mLs every 4 (four) hours as needed.    DUPIXENT  MG/2 ML PNIJ    Inject into the skin every 14 (fourteen) days.    FOLIC ACID (FOLVITE) 1 MG TABLET    Take 1 tablet (1 mg total) by mouth once daily. starting 1 week prior to pemetrexed and continuing for 21 days after stopping pemetrexed    GABAPENTIN (NEURONTIN) 300 MG CAPSULE    Take 1 capsule (300 mg total) by mouth every evening.     LEVOTHYROXINE (SYNTHROID) 75 MCG TABLET    Take 75 mcg by mouth once daily.    LORATADINE (CLARITIN) 10 MG TABLET    Take 10 mg by mouth once daily.    MAGNESIUM OXIDE (MAG-OX) 400 MG (241.3 MG MAGNESIUM) TABLET    Take 1 tablet (400 mg total) by mouth once daily.    OLANZAPINE (ZYPREXA) 5 MG TABLET    Take 1 tablet (5 mg total) by mouth every evening. Take as directed on days 1-4 of your chemotherapy cycle.    OMEPRAZOLE (PRILOSEC) 20 MG CAPSULE    Take 20 mg by mouth once daily.    ONDANSETRON (ZOFRAN-ODT) 8 MG TBDL    Take 1 tablet (8 mg total) by mouth every 8 (eight) hours as needed (nausea).    OXYCODONE (ROXICODONE) 5 MG IMMEDIATE RELEASE TABLET    Take 1 tablet (5 mg total) by mouth every 4 (four) hours as needed for Pain.    POTASSIUM CHLORIDE SA (K-DUR,KLOR-CON) 20 MEQ TABLET    Take 1 tablet (20 mEq total) by mouth once daily.    TOLTERODINE (DETROL LA) 4 MG 24 HR CAPSULE    Take 1 capsule (4 mg total) by mouth once daily.    UMECLIDINIUM-VILANTEROL (ANORO ELLIPTA) 62.5-25 MCG/ACTUATION DSDV    USE 1 INHALATION DAILY (CONTROLLER)     Objective:     Vitals:    12/27/23 0918   BP: 122/74   Pulse: 63   Temp: 97.2 °F (36.2 °C)     Lab Results   Component Value Date    WBC 3.95 12/27/2023    HGB 10.7 (L) 12/27/2023    HCT 32.4 (L) 12/27/2023    MCV 91 12/27/2023     (L) 12/27/2023       BMP  Lab Results   Component Value Date     12/27/2023    K 3.5 12/27/2023     12/27/2023    CO2 28 12/27/2023    BUN 19 12/27/2023    CREATININE 1.5 (H) 12/27/2023    CALCIUM 9.6 12/27/2023    ANIONGAP 9 12/27/2023    EGFRNORACEVR 36 (A) 12/27/2023       Lab Results   Component Value Date    ALT 6 (L) 12/27/2023    AST 25 12/27/2023    ALKPHOS 67 12/27/2023    BILITOT 0.6 12/27/2023         Physical Exam  Vitals reviewed.   Constitutional:       Appearance: She is well-developed.   HENT:      Head: Normocephalic.      Right Ear: External ear normal.      Left Ear: External ear normal.      Nose: Nose normal.    Eyes:      General: Lids are normal. No scleral icterus.        Right eye: No discharge.         Left eye: No discharge.      Conjunctiva/sclera: Conjunctivae normal.   Neck:      Thyroid: No thyroid mass.   Cardiovascular:      Rate and Rhythm: Normal rate and regular rhythm.      Heart sounds: Normal heart sounds.   Pulmonary:      Effort: Pulmonary effort is normal. No respiratory distress.      Breath sounds: Normal breath sounds. No wheezing or rales.   Abdominal:      General: There is no distension.   Genitourinary:     Comments: deferred  Musculoskeletal:         General: Normal range of motion.      Cervical back: Normal range of motion.   Skin:     General: Skin is warm and dry.   Neurological:      Mental Status: She is alert and oriented to person, place, and time.   Psychiatric:         Speech: Speech normal.         Behavior: Behavior normal. Behavior is cooperative.         Thought Content: Thought content normal.        Assessment:     Problem List Items Addressed This Visit          Hematology    Thrombocytopenia - Primary     Mild thrombocytopenia. No S&S bleeding    Possible chemo effect. No nutritional deficiencies. 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 Note elevated creatinine appears to have stabilized. Referral placed to Nephrology, upcoming appointment. Encouraged adequate hydration 64 oz daily. Avoid nephrotoxic medications    Proceed with cycle 6 Tecentriq.  F/u 3 weeks with Cbc, Cmp, TSH. Tecentriq planned            Endocrine    Overweight (BMI 25.0-29.9)     Encourage healthy nutrition  along with portion control. 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 (Tuesday 1/2/2024) Dr. Daily   Follow up with [facility]    Infusion scheduling note   tecentriq   Injection scheduling note    Labs CBC, CMP and TSH   Scheduling:  Preferred lab:  Lab interval:     Imaging None      Pharmacy appointment No pharmacy appointment needed      Other referrals       No additional referrals needed           FLETCHER Rogers

## 2024-01-06 NOTE — PROGRESS NOTES
Les met with pt. at chairside during today's infusion appt time. Pt.'s spouse was also present. Jorger previously spoke with pt on the phone and re-introduced self. Jorger reviewed  new pt. folder containing sw checklist, resources and sw business card. Pt. reports to return swer call if any needs arise. Pt. inquired about Ensure today. Les provided pt. with samples of ensure products from [employer]. Pt. reports she is doing well so far. Jorger will remain available.     
SYMPTOMS OF DEPRESSIONFREQUENT FALLS

## 2024-01-16 ENCOUNTER — OFFICE VISIT (OUTPATIENT)
Dept: HEMATOLOGY/ONCOLOGY | Facility: CLINIC | Age: 74
End: 2024-01-16
Payer: MEDICARE

## 2024-01-16 ENCOUNTER — INFUSION (OUTPATIENT)
Dept: INFUSION THERAPY | Facility: HOSPITAL | Age: 74
End: 2024-01-16
Attending: INTERNAL MEDICINE
Payer: MEDICARE

## 2024-01-16 VITALS
HEIGHT: 65 IN | WEIGHT: 162.06 LBS | SYSTOLIC BLOOD PRESSURE: 116 MMHG | RESPIRATION RATE: 18 BRPM | DIASTOLIC BLOOD PRESSURE: 72 MMHG | BODY MASS INDEX: 27 KG/M2 | HEART RATE: 62 BPM | TEMPERATURE: 98 F | OXYGEN SATURATION: 96 %

## 2024-01-16 VITALS
OXYGEN SATURATION: 98 % | WEIGHT: 162.06 LBS | BODY MASS INDEX: 27 KG/M2 | HEART RATE: 61 BPM | SYSTOLIC BLOOD PRESSURE: 113 MMHG | DIASTOLIC BLOOD PRESSURE: 72 MMHG | TEMPERATURE: 98 F | HEIGHT: 65 IN

## 2024-01-16 DIAGNOSIS — E87.6 HYPOKALEMIA: Primary | ICD-10-CM

## 2024-01-16 DIAGNOSIS — C34.32 MALIGNANT NEOPLASM OF LOWER LOBE OF LEFT LUNG: Primary | ICD-10-CM

## 2024-01-16 DIAGNOSIS — C34.32 MALIGNANT NEOPLASM OF LOWER LOBE OF LEFT LUNG: ICD-10-CM

## 2024-01-16 DIAGNOSIS — G62.9 NEUROPATHY: ICD-10-CM

## 2024-01-16 DIAGNOSIS — E87.6 HYPOKALEMIA: ICD-10-CM

## 2024-01-16 DIAGNOSIS — N28.9 RENAL INSUFFICIENCY: ICD-10-CM

## 2024-01-16 PROCEDURE — 63600175 PHARM REV CODE 636 W HCPCS: Performed by: HOSPITALIST

## 2024-01-16 PROCEDURE — 3074F SYST BP LT 130 MM HG: CPT | Mod: CPTII,95,, | Performed by: HOSPITALIST

## 2024-01-16 PROCEDURE — 96413 CHEMO IV INFUSION 1 HR: CPT

## 2024-01-16 PROCEDURE — 25000003 PHARM REV CODE 250: Performed by: HOSPITALIST

## 2024-01-16 PROCEDURE — 99215 OFFICE O/P EST HI 40 MIN: CPT | Mod: 95,,, | Performed by: HOSPITALIST

## 2024-01-16 PROCEDURE — 3078F DIAST BP <80 MM HG: CPT | Mod: CPTII,95,, | Performed by: HOSPITALIST

## 2024-01-16 RX ORDER — DIPHENHYDRAMINE HYDROCHLORIDE 50 MG/ML
50 INJECTION INTRAMUSCULAR; INTRAVENOUS ONCE AS NEEDED
Status: CANCELLED | OUTPATIENT
Start: 2024-01-16

## 2024-01-16 RX ORDER — POTASSIUM CHLORIDE 20 MEQ/1
40 TABLET, EXTENDED RELEASE ORAL
Status: CANCELLED
Start: 2024-01-16

## 2024-01-16 RX ORDER — SODIUM CHLORIDE 0.9 % (FLUSH) 0.9 %
10 SYRINGE (ML) INJECTION
Status: CANCELLED | OUTPATIENT
Start: 2024-01-16

## 2024-01-16 RX ORDER — HEPARIN 100 UNIT/ML
500 SYRINGE INTRAVENOUS
Status: CANCELLED | OUTPATIENT
Start: 2024-01-16

## 2024-01-16 RX ORDER — EPINEPHRINE 0.3 MG/.3ML
0.3 INJECTION SUBCUTANEOUS ONCE AS NEEDED
Status: DISCONTINUED | OUTPATIENT
Start: 2024-01-16 | End: 2024-01-16 | Stop reason: HOSPADM

## 2024-01-16 RX ORDER — DIPHENHYDRAMINE HYDROCHLORIDE 50 MG/ML
50 INJECTION INTRAMUSCULAR; INTRAVENOUS ONCE AS NEEDED
Status: DISCONTINUED | OUTPATIENT
Start: 2024-01-16 | End: 2024-01-16 | Stop reason: HOSPADM

## 2024-01-16 RX ORDER — HEPARIN 100 UNIT/ML
500 SYRINGE INTRAVENOUS
Status: DISCONTINUED | OUTPATIENT
Start: 2024-01-16 | End: 2024-01-16 | Stop reason: HOSPADM

## 2024-01-16 RX ORDER — EPINEPHRINE 0.3 MG/.3ML
0.3 INJECTION SUBCUTANEOUS ONCE AS NEEDED
Status: CANCELLED | OUTPATIENT
Start: 2024-01-16

## 2024-01-16 RX ORDER — POTASSIUM CHLORIDE 20 MEQ/1
40 TABLET, EXTENDED RELEASE ORAL
Status: COMPLETED | OUTPATIENT
Start: 2024-01-16 | End: 2024-01-16

## 2024-01-16 RX ADMIN — HEPARIN 500 UNITS: 100 SYRINGE at 10:01

## 2024-01-16 RX ADMIN — POTASSIUM CHLORIDE 40 MEQ: 1500 TABLET, EXTENDED RELEASE ORAL at 09:01

## 2024-01-16 RX ADMIN — ATEZOLIZUMAB 1200 MG: 1200 INJECTION, SOLUTION INTRAVENOUS at 09:01

## 2024-01-16 NOTE — PLAN OF CARE
Problem: Adult Inpatient Plan of Care  Goal: Plan of Care Review  Outcome: Ongoing, Progressing  Flowsheets (Taken 1/16/2024 0819)  Plan of Care Reviewed With:   patient   spouse  Goal: Optimal Comfort and Wellbeing  Outcome: Ongoing, Progressing  Intervention: Provide Person-Centered Care  Flowsheets (Taken 1/16/2024 0819)  Trust Relationship/Rapport:   care explained   choices provided   emotional support provided   empathic listening provided   questions answered   questions encouraged   reassurance provided   thoughts/feelings acknowledged     Problem: Neutropenia (Chemotherapy Effects)  Goal: Absence of Infection  Outcome: Ongoing, Progressing  Intervention: Prevent Infection and Maximize Resistance  Flowsheets (Taken 1/16/2024 0819)  Infection Prevention:   equipment surfaces disinfected   hand hygiene promoted   personal protective equipment utilized   rest/sleep promoted

## 2024-01-16 NOTE — PROGRESS NOTES
[facility] MEDICAL ONCOLOGY - FOLLOW UP VISIT    Reason for visit: Follow up for stage IIB squamous cell carcinoma    The patient location is: [facility]    Visit type: Audiovisual    Each patient to who is provided medical services by telemedicine has been: (1) informed of the relationship between the physician and patient and the respective role of any other health care provider with respect to management of the patient; and (2) notified that he or she may decline to receive medical services by telemedicine and may withdraw from such care at any time.        Oncology History   Malignant neoplasm of lower lobe of left lung - Squamous cell   4/15/2021 Initial Diagnosis    Malignant neoplasm of lower lobe of left lung - Squamous cell     5/25/2021 Cancer Staged    Staging form: Lung, AJCC 8th Edition  - Clinical: Stage IA1 (cT1a, cN0, cM0)      Cancer Staged    9mm non-keratinizing squamous cell carcinoma, no VPI, no LVI, margin at least 8mm.  Levels 9 and 11 = negative.  T1aN0     4/5/2023 Cancer Staged    Staging form: Lung, AJCC 8th Edition  - Pathologic stage from 4/5/2023: Stage IIB (pT2, pN1, cM0)     5/1/2023 - 7/21/2023 Chemotherapy    Treatment Summary   Plan Name: OP NSCLC PEMETREXED + CISPLATIN Q3W  Treatment Goal: Supportive  Status: Inactive  Start Date: 5/1/2023  End Date: 7/21/2023  Provider: Marissa Brower MD  Chemotherapy: CISplatin (Platinol) 75 mg/m2 = 138 mg in sodium chloride 0.9% 665 mL chemo infusion, 75 mg/m2 = 138 mg, Intravenous, Clinic/HOD 1 time, 4 of 4 cycles  Administration: 138 mg (5/12/2023), 138 mg (6/29/2023), 138 mg (7/20/2023), 138 mg (6/8/2023)  PEMEtrexed disodium (ALIMTA) 900 mg in sodium chloride 0.9% SolP 100 mL chemo infusion, 925 mg, Intravenous, Clinic/HOD 1 time, 4 of 4 cycles  Dose modification: 375 mg/m2 (original dose 500 mg/m2, Cycle 3, Reason: MD Discretion, Comment: neutropenia ANC  280)  Administration: 900 mg (5/12/2023), 700 mg (6/29/2023), 700 mg (7/20/2023), 700 mg (6/8/2023)     8/14/2023 -  Chemotherapy    Treatment Summary   Plan Name: OP ATEZOLIZUMAB Q3W  Treatment Goal: Supportive  Status: Active  Start Date: 8/14/2023  End Date: 8/13/2024 (Planned)  Provider: Marissa Brower MD  Chemotherapy: [No matching medication found in this treatment plan]     NSCLC of left lung (Resolved)   4/28/2021 Initial Diagnosis    NSCLC of left lung (Resolved)      Cancer Staged    9mm non-keratinizing squamous cell carcinoma, no VPI, no LVI, margin at least 8mm.  Levels 9 and 11 = negative.  T1aN0        NANI NSCLC/ADC IIB (pT2 pN1a cMx)  - History of stage I squamous cell carcinoma moderately differentiated left lower lung status post wedge resection 04/28/2021  - Abnormality seen on surveillance for history of squamous cell carcinoma. Initial biopsy February 20, 2023 without neoplasm identified , thus had left lower lobe wedge resection after multiple disciplinary discussion, final pathology positive on 03/28/2022 for invasive moderately differentiated adenocarcinoma station 10 lymph node positive, pleural invasion noted, margins negative.    - Restaging MRI brain negative and PET scan with left hilar avidity SUV 4 and chest wall. Given recent surgery potential inflammation presented at tumor board recommended proceeding with adjuvant chemotherapy and follow-up on repeat imaging. Started adjuvant therapy with chemotherapy and immunotherapy per IMPOWER 010 given PDL1 positive disease 95%.  Mutational analysis negative for EGFR mutation. Noted BRAF mutation.   - Completed adjuvant chemotherapy with cisplatin and pemetrexed tolerated well aside from chemotherapy associated progressive anemia.    - Restaging PET on 8/7/2023 with resolution of prior lymph node avidity.    - Started immunotherapy with atezolizumab (8/14/2023 -     HPI:     Radha Lamas is a 74 yo woman w/ pmh significant for COPD and  "two primary LLL lung cancers as below. She presents today for follow up.     - Stage I squamous cell carcinoma of the of the LLL, initially dx'd 4/28/21, s/p wedge resection (4/28/21)    - Stage IIB (pT2, pN1a, cMx) adenocarcinoma of the NANI (PD-L1 95%, BRAF [id number] mutated), initially dx'd 3/28/23, s/p wedge resection 3/28/23, adjuvant cisplatin/pemetrexed x 4 cycles (5/11/23-7/20/23), and currently on adjuvant atezolizumab (8/14/23 - present; of note, delayn between C3 on 9/26/23 and C4 on 11/14/23 due to concern for pneumonitis)     Last clinic 12/27/23, presenting for C6 atezolizumab, elevated Cr stabilized. Referral to nephrology. Adequate hydration. RTC in 3 weeks w/ CBC, CMP, TSH, atezo planned.     Interval History:   - 12/27/23: C6 atezolizumab    Pt states that she is feeling well today. She notes that she is a little short of breath in the mornings in the mornings when she awakens and makes breakfast, but says that this improves and stays improved with her inhaler. She notes she was short winded with bringing her potted plants inside. She says that her energy level is good. She says that she saw a podiatrist who diagnosed her with neuropathy; she was told to walk as much as she could and she has been doing this. She says that this is the same as it was before. The PN is only in her feet. She describes tingling and it feels like her "socks are wadded up" in her shoes. She also says that if she tries to  her toes, her skin feels very tight. She notes infrequent pain. She says that her daughter says that she is shuffling. She says that neurology called her but she did not make an appointment. She confirms that this did not occur until after the chemotherapy was completed. She describes an infrequent "numb headache" all over the front of her head which she ascribes to her glasses; this happens every couple of days and is self-limited without medication. She says that she is not interested in starting " pharmacotherapy for the PN. She denies N/V, diarrhea, rash, or other new or bothersome symptoms.       I have reviewed and updated the patient's past medical, surgical, family and social histories.      Past Medical History:   Past Medical History:   Diagnosis Date    COPD (chronic obstructive pulmonary disease) 2013    Eczema     GERD (gastroesophageal reflux disease)     Hiatal hernia     History of torn meniscus of right knee 01/2011    Hypothyroid     Incidental pulmonary nodule, > 3mm and < 8mm - Lingula 8/12/2021    Lung cancer     Urinary incontinence in female     Mild , only takes mediciane with travel        Allergies:   Review of patient's allergies indicates:  No Known Allergies     Medications:   Current Outpatient Medications   Medication Sig Dispense Refill    albuterol (PROVENTIL/VENTOLIN HFA) 90 mcg/actuation inhaler Inhale 2 puffs into the lungs every 4 (four) hours as needed for Wheezing or Shortness of Breath. Rescue 18 g 11    calcium carbonate (OS-CYDNEY) 600 mg calcium (1,500 mg) Tab Take 600 mg by mouth.      dexAMETHasone (DECADRON) 4 MG Tab Take 2 tablets (8 mg total) by mouth once daily. on the day prior to chemotherapy then daily on days 2,3,4 of each chemotherapy cycle. 24 tablet 3    diphenhydrAMINE-aluminum-magnesium hydroxide-simethicone-LIDOcaine HCl 2% Swish and spit 15 mLs every 4 (four) hours as needed. 540 each 2    DUPIXENT  mg/2 mL PnIj Inject into the skin every 14 (fourteen) days.      folic acid (FOLVITE) 1 MG tablet Take 1 tablet (1 mg total) by mouth once daily. starting 1 week prior to pemetrexed and continuing for 21 days after stopping pemetrexed 30 tablet 5    gabapentin (NEURONTIN) 300 MG capsule Take 1 capsule (300 mg total) by mouth every evening. 30 capsule 11    levothyroxine (SYNTHROID) 75 MCG tablet Take 75 mcg by mouth once daily.      loratadine (CLARITIN) 10 mg tablet Take 10 mg by mouth once daily.      magnesium oxide (MAG-OX) 400 mg (241.3 mg  "magnesium) tablet Take 1 tablet (400 mg total) by mouth once daily. 7 tablet 0    OLANZapine (ZYPREXA) 5 MG tablet Take 1 tablet (5 mg total) by mouth every evening. Take as directed on days 1-4 of your chemotherapy cycle. 16 tablet 0    omeprazole (PRILOSEC) 20 MG capsule Take 20 mg by mouth once daily.      ondansetron (ZOFRAN-ODT) 8 MG TbDL Take 1 tablet (8 mg total) by mouth every 8 (eight) hours as needed (nausea). 60 tablet 5    oxyCODONE (ROXICODONE) 5 MG immediate release tablet Take 1 tablet (5 mg total) by mouth every 4 (four) hours as needed for Pain. 41 tablet 0    potassium chloride SA (K-DUR,KLOR-CON) 20 MEQ tablet Take 1 tablet (20 mEq total) by mouth once daily. 7 tablet 1    tolterodine (DETROL LA) 4 MG 24 hr capsule Take 1 capsule (4 mg total) by mouth once daily. (Patient taking differently: Take 4 mg by mouth daily as needed.) 90 capsule 4    umeclidinium-vilanteroL (ANORO ELLIPTA) 62.5-25 mcg/actuation DsDv USE 1 INHALATION DAILY (CONTROLLER) 180 each 3     No current facility-administered medications for this visit.     Facility-Administered Medications Ordered in Other Visits   Medication Dose Route Frequency Provider Last Rate Last Admin    prochlorperazine injection Soln 5 mg  5 mg Intravenous Q30 Min PRN Adrien Vail MD              ROS:  Review of Systems   A complete 12-point review of systems was reviewed and is negative except as mentioned above.       Physical Exam:       /72   Pulse 61   Temp 97.6 °F (36.4 °C)   Ht 5' 5" (1.651 m)   Wt 73.5 kg (162 lb 0.6 oz)   SpO2 98%   BMI 26.96 kg/m²                Physical Exam  Constitutional:       Appearance: Normal appearance.   HENT:      Head: Normocephalic and atraumatic.   Eyes:      Extraocular Movements: Extraocular movements intact.      Pupils: Pupils are equal, round, and reactive to light.   Neurological:      Mental Status: She is alert and oriented to person, place, and time.   Psychiatric:         Mood and Affect: " Mood normal.         Thought Content: Thought content normal.         Judgment: Judgment normal.           Labs:   Recent Results (from the past 48 hour(s))   CBC Oncology    Collection Time: 01/16/24  7:19 AM   Result Value Ref Range    WBC 3.79 (L) 3.90 - 12.70 K/uL    RBC 3.46 (L) 4.00 - 5.40 M/uL    Hemoglobin 10.3 (L) 12.0 - 16.0 g/dL    Hematocrit 31.6 (L) 37.0 - 48.5 %    MCV 91 82 - 98 fL    MCH 29.8 27.0 - 31.0 pg    MCHC 32.6 32.0 - 36.0 g/dL    RDW 13.5 11.5 - 14.5 %    Platelets 143 (L) 150 - 450 K/uL    MPV 10.0 9.2 - 12.9 fL    Gran # (ANC) 2.3 1.8 - 7.7 K/uL    Immature Grans (Abs) 0.02 0.00 - 0.04 K/uL        Imaging:   Echo    Left Ventricle: The left ventricle is normal in size. Normal wall   thickness. There is concentric remodeling. Normal wall motion. There is   normal systolic function with a visually estimated ejection fraction of 60   - 65%. There is normal diastolic function.    Right Ventricle: Normal right ventricular cavity size. Wall thickness   is normal. Right ventricle wall motion  is normal. Systolic function is   borderline low.    Tricuspid Valve: There is mild regurgitation.    Pulmonic Valve: There is mild regurgitation.    Pulmonary Artery: The estimated pulmonary artery systolic pressure is   24 mmHg.    IVC/SVC: Normal venous pressure at 3 mmHg.            Path:   1. Left lung, wedge resection: Invasive adenocarcinoma, predominantly solid   pattern, measuring 9 mm (0.9 cm) in greatest dimension.          Tumor is located 3 mm (0.3 cm) from the blue inked/stapled parenchymal   surgical margin.          Tumor extends into the elastic layer of the visceral pleura.          See synoptic report in comment section for further details.   2. Lymph node, left level 11, excision: 1 benign fragmented lymph node with   sinus histiocytosis and anthracotic pigment, negative for metastatic   carcinoma (0/1).   3. Lymph node, left level 10, excision: 1 lymph node, positive for metastatic    carcinoma with crush artifact dense fibrosis and focal necrosis (1/1).          Confirmed with positive immunohistochemical stain with cytokeratin   AE1/AE3 with satisfactory positive and negative controls.   4. Lymph node, left level 12, excision: 1 benign lymph node with sinus   histiocytosis, negative for metastatic carcinoma (0/1).   5. Lymph node, left level 9, excision: 1 benign fragmented lymph node with   sinus histiocytosis and anthracotic pigment, negative for metastatic   carcinoma (0/1).   6. Lymph node, level 7, excision: 1 benign lymph node with sinus   histiocytosis, negative for metastatic carcinoma (0/1).   7.  Lymph node, level 5, excision: 1 benign fragmented lymph node with sinus   histiocytosis and anthracotic pigment, negative for metastatic carcinoma   (0/1).   8. Lung, lingula, anatomic lingulectomy: Lung with congested vasculature.          No residual carcinoma is identified.          Bronchial and vascular margin is negative for malignancy.          See synoptic report in comment section for further details.       Assessment and Plan:     Radha Lamas is a 72 yo woman w/ pmh significant for COPD and two primary LLL lung cancers as below. She presents today for follow up.     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 Currently on adjuvant atezolizumab following adjuvant cisplatin/pemetrexed and tolerating without significant issue (initially with some worsening shortness of breath and fatigue, since improved and stable).  Most  "recent imaging (CT C, 10/24/23) demonstrated stable postsurgical changes with subtle nodularity along left major fissure not changed since PET-CT, stable 0.9 cm AP window lymph node, small reticulonodular density in anterior left lung base, and a few new sub-cm nodules deemed most likely related to secretions/inflammation.  Given good tolerance, we will continue with atezolizumab for total of 1 year and q3m imaging throughout.     Of note, there was a delay between cycle 3 (09/26/2023) and cycle 4 (11/14/2023) due to concern for pneumonitis, workup for which was largely unrevealing.    PLAN:   -- Proceed w/ atezolizumab C7 today (1/6/24), labs acceptable (Hgb close to baseline, eGFR stable) and treatment signed  -- Low threshold to stop atezolizumab going forward given initial concern for pneumonitis  -- CT C prior to RTC (last imaging on 10/24/23), non-contrast given renal function. If any concerning findings, may consider PET for further clarification  -- RTC and labs on 2/6/24, C8 on 2/6/24 or 2/7/24      Depressed Renal Function  Patient's eGFR dipped below 60 following her 4th dose of cisplatin/pemetrexed and has remained depressed since (notably, this was prior to her 1st dose of atezolizumab).  It has remained low for since it was first noted 08/10/2023. Urine protein/creatinib ratio 0.1 g/day on 11/14/23. This time line may be inconsistent with cisplatin induced nephropathy as recovery of renal function would be anticipated by this time, however it is possible to develop a CKD following cisplatin therapy which may be the etiology.   -- Nephrology appointment on 3/7/24 at non-[facility] practice, pt states she is on a wait list to be seen sooner.       Neuropathy  Pt describes "deadened" feet and also like she has "bunched socks" on her feet when she puts on her shoes which developed approximately 2 months after completion of chemotherapy. This is worse at night. She has some long-standing decreased sensation in " her fingertips which she relates to eczema. She denies any problems like this previously, including when she received her chemotherapy. She denies any issues with proprioception / ambulation related to this. Exam has been previously unremarkable. Uncertain etiology. The pt does not have a diagnosis of diabetes. The time course is not consistent with chemotherapy induced peripheral neuropathy. Immunotherapy can cause neuropathic issues, however this developed while the patient had been off of the atezolizumab for 4 weeks already (though this does not preclude this as a possibility).  Symptoms are currently stable in chiefly in her feet.  She has seen a podiatrist with recommendation to increase her walking.  She states she has not interested in pharmacotherapy at this time given mild and stable symptoms.  -- Continue to follow with podiatrist  -- Referral to neurology in place, patient did not make appointment.  Okay to hold at this time, low threshold for evaluation.      Hypokalemia  Potassium 3.2, previously around 3.5-3.6.   -- 40 mEq PO potassium today        The above information has been reviewed with the patient and all questions have been answered to their apparent satisfaction.  They understand that they can call the clinic with any questions.    Orion Arce MD  Hematology/Oncology  [facility]        Med Onc Chart Routing      Follow up with physician . CT C at least 1 day prior to RTC. RTC and labs on 2/6/24, okay to decouple and have treatment on 2/7/24.   Follow up with [facility]    Infusion scheduling note    Injection scheduling note    Labs    Imaging    Pharmacy appointment    Other referrals

## 2024-02-05 ENCOUNTER — HOSPITAL ENCOUNTER (OUTPATIENT)
Dept: RADIOLOGY | Facility: HOSPITAL | Age: 74
Discharge: HOME | End: 2024-02-05
Attending: HOSPITALIST
Payer: MEDICARE

## 2024-02-05 DIAGNOSIS — C34.32 MALIGNANT NEOPLASM OF LOWER LOBE OF LEFT LUNG: ICD-10-CM

## 2024-02-05 PROCEDURE — 71250 CT THORAX DX C-: CPT | Mod: 26,,, | Performed by: RADIOLOGY

## 2024-02-05 PROCEDURE — 71250 CT THORAX DX C-: CPT | Mod: TC

## 2024-02-06 ENCOUNTER — OFFICE VISIT (OUTPATIENT)
Dept: HEMATOLOGY/ONCOLOGY | Facility: CLINIC | Age: 74
End: 2024-02-06
Payer: MEDICARE

## 2024-02-06 ENCOUNTER — INFUSION (OUTPATIENT)
Dept: INFUSION THERAPY | Facility: HOSPITAL | Age: 74
End: 2024-02-06
Attending: INTERNAL MEDICINE
Payer: MEDICARE

## 2024-02-06 VITALS
SYSTOLIC BLOOD PRESSURE: 121 MMHG | DIASTOLIC BLOOD PRESSURE: 75 MMHG | BODY MASS INDEX: 26.85 KG/M2 | HEIGHT: 65 IN | TEMPERATURE: 98 F | HEART RATE: 70 BPM | WEIGHT: 161.19 LBS | OXYGEN SATURATION: 98 %

## 2024-02-06 VITALS
DIASTOLIC BLOOD PRESSURE: 68 MMHG | OXYGEN SATURATION: 98 % | BODY MASS INDEX: 26.85 KG/M2 | RESPIRATION RATE: 16 BRPM | HEART RATE: 73 BPM | HEIGHT: 65 IN | SYSTOLIC BLOOD PRESSURE: 115 MMHG | WEIGHT: 161.19 LBS | TEMPERATURE: 97 F

## 2024-02-06 DIAGNOSIS — C34.32 MALIGNANT NEOPLASM OF LOWER LOBE OF LEFT LUNG: Primary | ICD-10-CM

## 2024-02-06 DIAGNOSIS — E87.6 HYPOKALEMIA: ICD-10-CM

## 2024-02-06 DIAGNOSIS — G62.9 NEUROPATHY: ICD-10-CM

## 2024-02-06 DIAGNOSIS — N28.9 RENAL INSUFFICIENCY: ICD-10-CM

## 2024-02-06 PROCEDURE — 96413 CHEMO IV INFUSION 1 HR: CPT

## 2024-02-06 PROCEDURE — 1159F MED LIST DOCD IN RCRD: CPT | Mod: CPTII,S$GLB,, | Performed by: HOSPITALIST

## 2024-02-06 PROCEDURE — 25000003 PHARM REV CODE 250: Performed by: HOSPITALIST

## 2024-02-06 PROCEDURE — 99999 PR PBB SHADOW E&M-EST. PATIENT-LVL IV: CPT | Mod: PBBFAC,,, | Performed by: HOSPITALIST

## 2024-02-06 PROCEDURE — A4216 STERILE WATER/SALINE, 10 ML: HCPCS | Performed by: HOSPITALIST

## 2024-02-06 PROCEDURE — 1126F AMNT PAIN NOTED NONE PRSNT: CPT | Mod: CPTII,S$GLB,, | Performed by: HOSPITALIST

## 2024-02-06 PROCEDURE — 3074F SYST BP LT 130 MM HG: CPT | Mod: CPTII,S$GLB,, | Performed by: HOSPITALIST

## 2024-02-06 PROCEDURE — 99215 OFFICE O/P EST HI 40 MIN: CPT | Mod: S$GLB,,, | Performed by: HOSPITALIST

## 2024-02-06 PROCEDURE — 3288F FALL RISK ASSESSMENT DOCD: CPT | Mod: CPTII,S$GLB,, | Performed by: HOSPITALIST

## 2024-02-06 PROCEDURE — 63600175 PHARM REV CODE 636 W HCPCS: Mod: JZ,JG | Performed by: HOSPITALIST

## 2024-02-06 PROCEDURE — 3078F DIAST BP <80 MM HG: CPT | Mod: CPTII,S$GLB,, | Performed by: HOSPITALIST

## 2024-02-06 PROCEDURE — 1101F PT FALLS ASSESS-DOCD LE1/YR: CPT | Mod: CPTII,S$GLB,, | Performed by: HOSPITALIST

## 2024-02-06 RX ORDER — DIPHENHYDRAMINE HYDROCHLORIDE 50 MG/ML
50 INJECTION INTRAMUSCULAR; INTRAVENOUS ONCE AS NEEDED
Status: CANCELLED | OUTPATIENT
Start: 2024-02-06

## 2024-02-06 RX ORDER — SODIUM CHLORIDE 0.9 % (FLUSH) 0.9 %
10 SYRINGE (ML) INJECTION
Status: DISCONTINUED | OUTPATIENT
Start: 2024-02-06 | End: 2024-02-06 | Stop reason: HOSPADM

## 2024-02-06 RX ORDER — HEPARIN 100 UNIT/ML
500 SYRINGE INTRAVENOUS
Status: DISCONTINUED | OUTPATIENT
Start: 2024-02-06 | End: 2024-02-06 | Stop reason: HOSPADM

## 2024-02-06 RX ORDER — HEPARIN 100 UNIT/ML
500 SYRINGE INTRAVENOUS
Status: CANCELLED | OUTPATIENT
Start: 2024-02-06

## 2024-02-06 RX ORDER — EPINEPHRINE 0.3 MG/.3ML
0.3 INJECTION SUBCUTANEOUS ONCE AS NEEDED
Status: CANCELLED | OUTPATIENT
Start: 2024-02-06

## 2024-02-06 RX ORDER — SODIUM CHLORIDE 0.9 % (FLUSH) 0.9 %
10 SYRINGE (ML) INJECTION
Status: CANCELLED | OUTPATIENT
Start: 2024-02-06

## 2024-02-06 RX ADMIN — ATEZOLIZUMAB 1200 MG: 1200 INJECTION, SOLUTION INTRAVENOUS at 01:02

## 2024-02-06 RX ADMIN — SODIUM CHLORIDE: 9 INJECTION, SOLUTION INTRAVENOUS at 01:02

## 2024-02-06 RX ADMIN — HEPARIN 500 UNITS: 100 SYRINGE at 01:02

## 2024-02-06 RX ADMIN — Medication 10 ML: at 01:02

## 2024-02-06 NOTE — DISCHARGE INSTRUCTIONS
.[facility]  [address] [facility]  [address]  [phone] phone     [phone] fax  Hours of Operation: Monday- Friday 8:00am- 5:00pm  After hours phone  [phone]  Hematology / Oncology Physicians on call    Dr. Feliberto Styles      Nurse Practitioners:    Kaitlin Bradley, GINNA Mojica, GINNA Taylor, GINNA Curtis, GINNA Pham, PA      Please don't hesitate to call if you have any concerns.    .WAYS TO HELP PREVENT INFECTION        WASH YOUR HANDS OFTEN DURING THE DAY, ESPECIALLY BEFORE YOU EAT, AFTER USING THE BATHROOM, AND AFTER TOUCHING ANIMALS    STAY AWAY FROM PEOPLE WHO HAVE ILLNESSES YOU CAN CATCH; SUCH AS COLDS, FLU, CHICKEN POX    TRY TO AVOID CROWDS    STAY AWAY FROM CHILDREN WHO RECENTLY HAVE RECEIVED LIVE VIRUS VACCINES    MAINTAIN GOOD MOUTH CARE    DO NOT SQUEEZE OR SCRATCH PIMPLES    CLEAN CUTS & SCRAPES RIGHT AWAY AND DAILY UNTIL HEALED WITH WARM WATER, SOAP & AN ANTISEPTIC    AVOID CONTACT WITH LITTER BOXES, BIRD CAGES, & FISH TANKS    AVOID STANDING WATER, IE., BIRD BATHS, FLOWER POTS/VASES, OR HUMIDIFIERS    WEAR GLOVES WHEN GARDENING OR CLEANING UP AFTER OTHERS, ESPECIALLY BABIES & SMALL CHILDREN    DO NOT EAT RAW FISH, SEAFOOD, MEAT, OR EGGS   .FALL PREVENTION   Falls often occur due to slipping, tripping or losing your balance. Here are ways to reduce your risk of falling again.   Was there anything that caused your fall that can be fixed, removed or replaced?   Make your home safe by keeping walkways clear of objects you may trip over.   Use non-slip pads under rugs.   Do not walk in poorly lit areas.   Do not stand on chairs or wobbly ladders.   Use caution when reaching overhead or looking upward. This position can cause a loss of balance.   Be sure your shoes fit properly, have non-slip bottoms and are in good condition.   Be cautious when going up and down stairs, curbs,  and when walking on uneven sidewalks.   If your balance is poor, consider using a cane or walker.   If your fall was related to alcohol use, stop or limit alcohol intake.   If your fall was related to use of sleeping medicines, talk to your doctor about this. You may need to reduce your dosage at bedtime if you awaken during the night to go to the bathroom.   To reduce the need for nighttime bathroom trips:   Avoid drinking fluids for several hours before going to bed   Empty your bladder before going to bed   Men can keep a urinal at the bedside   © 6007-3646 [employer] \[employer] [place] [facility]\"", [address] [place] [id number]. All rights reserved. This information is not intended as a substitute for professional medical care. Always follow your healthcare professional's instructions.

## 2024-02-06 NOTE — PLAN OF CARE
Patient reports no complaints or concerns at this time. Tolerated infusion well with no adverse reactions. Patient to return in three weeks for next treatment.

## 2024-02-06 NOTE — PROGRESS NOTES
[facility] MEDICAL ONCOLOGY - FOLLOW UP VISIT    Reason for visit: Follow up for stage IIB squamous cell carcinoma      Oncology History   Malignant neoplasm of lower lobe of left lung - Squamous cell   4/15/2021 Initial Diagnosis    Malignant neoplasm of lower lobe of left lung - Squamous cell     5/25/2021 Cancer Staged    Staging form: Lung, AJCC 8th Edition  - Clinical: Stage IA1 (cT1a, cN0, cM0)      Cancer Staged    9mm non-keratinizing squamous cell carcinoma, no VPI, no LVI, margin at least 8mm.  Levels 9 and 11 = negative.  T1aN0     4/5/2023 Cancer Staged    Staging form: Lung, AJCC 8th Edition  - Pathologic stage from 4/5/2023: Stage IIB (pT2, pN1, cM0)     5/1/2023 - 7/21/2023 Chemotherapy    Treatment Summary   Plan Name: OP NSCLC PEMETREXED + CISPLATIN Q3W  Treatment Goal: Supportive  Status: Inactive  Start Date: 5/1/2023  End Date: 7/21/2023  Provider: Marissa Brower MD  Chemotherapy: CISplatin (Platinol) 75 mg/m2 = 138 mg in sodium chloride 0.9% 665 mL chemo infusion, 75 mg/m2 = 138 mg, Intravenous, Clinic/HOD 1 time, 4 of 4 cycles  Administration: 138 mg (5/12/2023), 138 mg (6/29/2023), 138 mg (7/20/2023), 138 mg (6/8/2023)  PEMEtrexed disodium (ALIMTA) 900 mg in sodium chloride 0.9% SolP 100 mL chemo infusion, 925 mg, Intravenous, Clinic/HOD 1 time, 4 of 4 cycles  Dose modification: 375 mg/m2 (original dose 500 mg/m2, Cycle 3, Reason: MD Discretion, Comment: neutropenia )  Administration: 900 mg (5/12/2023), 700 mg (6/29/2023), 700 mg (7/20/2023), 700 mg (6/8/2023)     8/14/2023 -  Chemotherapy    Treatment Summary   Plan Name: OP ATEZOLIZUMAB Q3W  Treatment Goal: Supportive  Status: Active  Start Date: 8/14/2023  End Date: 8/13/2024 (Planned)  Provider: Marissa Brower MD  Chemotherapy: [No matching medication found in this treatment plan]     NSCLC of left lung (Resolved)   4/28/2021 Initial Diagnosis    NSCLC of left lung  "(Resolved)      Cancer Staged    9mm non-keratinizing squamous cell carcinoma, no VPI, no LVI, margin at least 8mm.  Levels 9 and 11 = negative.  T1aN0        NANI NSCLC/ADC IIB (pT2 pN1a cMx)  - History of stage I squamous cell carcinoma moderately differentiated left lower lung status post wedge resection 04/28/2021  - Abnormality seen on surveillance for history of squamous cell carcinoma. Initial biopsy February 20, 2023 without neoplasm identified , thus had left lower lobe wedge resection after multiple disciplinary discussion, final pathology positive on 03/28/2022 for invasive moderately differentiated adenocarcinoma station 10 lymph node positive, pleural invasion noted, margins negative.    - Restaging MRI brain negative and PET scan with left hilar avidity SUV 4 and chest wall. Given recent surgery potential inflammation presented at tumor board recommended proceeding with adjuvant chemotherapy and follow-up on repeat imaging. Started adjuvant therapy with chemotherapy and immunotherapy per IMPOWER 010 given PDL1 positive disease 95%.  Mutational analysis negative for EGFR mutation. Noted BRAF mutation.   - Completed adjuvant chemotherapy with cisplatin and pemetrexed tolerated well aside from chemotherapy associated progressive anemia.    - Restaging PET on 8/7/2023 with resolution of prior lymph node avidity.    - Started immunotherapy with atezolizumab (8/14/2023 - present; delay from C3 on 9/26/23 and C4 on 11/14/23 due to concnern for pneumonitis)  - 2/5/24: CT C, "no detrimental interval change in appearance)    HPI:     Radha Lamas is a 72 yo woman w/ pmh significant for COPD and two primary LLL lung cancers as below. She presents today for follow up.     - Stage I squamous cell carcinoma of the of the LLL, initially dx'd 4/28/21, s/p wedge resection (4/28/21)    - Stage IIB (pT2, pN1a, cMx) adenocarcinoma of the NANI (PD-L1 95%, BRAF [id number] mutated), initially dx'd 3/28/23, s/p wedge resection " "3/28/23, adjuvant cisplatin/pemetrexed x 4 cycles (5/11/23-7/20/23), and currently on adjuvant atezolizumab (8/14/23 - present; of note, delayn between C3 on 9/26/23 and C4 on 11/14/23 due to concern for pneumonitis)     Last clinic 1/16/24, plan for atezo on 1/16/24, CT C prior to RTC, RTC on 2/6    Interval History:   - 1/16/24, C7D1 atezolizumab  - 2/5/24: CT C non-con, "no detrimental interval change in appearance"    Pt states that she is feeling well today. She says that her fatigue is mild but improved compared to previously. She notes some difficulty catching her breath this morning but denies other issues breathing and says this was temporary; she relates it to her heat being on in the house. She confirms that her breathing is significantly improved as compared to the Autumn. She confirms that she has not yet seen nephrology, but will see an outside nephrologist on 3/7/24 ([facility], Dr. Turner). She says that the "tight skin" and "bunched sock" sensation in her feet is stable. She denies rash, new/worsening cough (though notes she had a cold 2 weeks previously), or other new or bothersome symptoms today.       I have reviewed and updated the patient's past medical, surgical, family and social histories.      Past Medical History:   Past Medical History:   Diagnosis Date    COPD (chronic obstructive pulmonary disease) 2013    Eczema     GERD (gastroesophageal reflux disease)     Hiatal hernia     History of torn meniscus of right knee 01/2011    Hypothyroid     Incidental pulmonary nodule, > 3mm and < 8mm - [facility]ula 8/12/2021    Lung cancer     Urinary incontinence in female     Mild , only takes mediciane with travel        Allergies:   Review of patient's allergies indicates:  No Known Allergies     Medications:   Current Outpatient Medications   Medication Sig Dispense Refill    albuterol (PROVENTIL/VENTOLIN HFA) 90 mcg/actuation inhaler Inhale 2 puffs into the lungs every 4 (four) hours as " needed for Wheezing or Shortness of Breath. Rescue 18 g 11    calcium carbonate (OS-CYDNEY) 600 mg calcium (1,500 mg) Tab Take 600 mg by mouth.      dexAMETHasone (DECADRON) 4 MG Tab Take 2 tablets (8 mg total) by mouth once daily. on the day prior to chemotherapy then daily on days 2,3,4 of each chemotherapy cycle. 24 tablet 3    diphenhydrAMINE-aluminum-magnesium hydroxide-simethicone-LIDOcaine HCl 2% Swish and spit 15 mLs every 4 (four) hours as needed. 540 each 2    DUPIXENT  mg/2 mL PnIj Inject into the skin every 14 (fourteen) days.      folic acid (FOLVITE) 1 MG tablet Take 1 tablet (1 mg total) by mouth once daily. starting 1 week prior to pemetrexed and continuing for 21 days after stopping pemetrexed 30 tablet 5    gabapentin (NEURONTIN) 300 MG capsule Take 1 capsule (300 mg total) by mouth every evening. 30 capsule 11    levothyroxine (SYNTHROID) 75 MCG tablet Take 75 mcg by mouth once daily.      loratadine (CLARITIN) 10 mg tablet Take 10 mg by mouth once daily.      magnesium oxide (MAG-OX) 400 mg (241.3 mg magnesium) tablet Take 1 tablet (400 mg total) by mouth once daily. 7 tablet 0    OLANZapine (ZYPREXA) 5 MG tablet Take 1 tablet (5 mg total) by mouth every evening. Take as directed on days 1-4 of your chemotherapy cycle. 16 tablet 0    omeprazole (PRILOSEC) 20 MG capsule Take 20 mg by mouth once daily.      ondansetron (ZOFRAN-ODT) 8 MG TbDL Take 1 tablet (8 mg total) by mouth every 8 (eight) hours as needed (nausea). 60 tablet 5    oxyCODONE (ROXICODONE) 5 MG immediate release tablet Take 1 tablet (5 mg total) by mouth every 4 (four) hours as needed for Pain. 41 tablet 0    potassium chloride SA (K-DUR,KLOR-CON) 20 MEQ tablet Take 1 tablet (20 mEq total) by mouth once daily. 7 tablet 1    tolterodine (DETROL LA) 4 MG 24 hr capsule Take 1 capsule (4 mg total) by mouth once daily. (Patient taking differently: Take 4 mg by mouth daily as needed.) 90 capsule 4    umeclidinium-vilanteroL (ANORO  ELLIPTA) 62.5-25 mcg/actuation DsDv USE 1 INHALATION DAILY (CONTROLLER) 180 each 3     No current facility-administered medications for this visit.     Facility-Administered Medications Ordered in Other Visits   Medication Dose Route Frequency Provider Last Rate Last Admin    prochlorperazine injection Soln 5 mg  5 mg Intravenous Q30 Min PRN Adrien Vail MD              ROS:  Review of Systems   A complete 12-point review of systems was reviewed and is negative except as mentioned above.       Physical Exam:       There were no vitals taken for this visit.               Physical Exam  Constitutional:       Appearance: Normal appearance.   HENT:      Head: Normocephalic and atraumatic.   Eyes:      Extraocular Movements: Extraocular movements intact.      Conjunctiva/sclera: Conjunctivae normal.      Pupils: Pupils are equal, round, and reactive to light.   Cardiovascular:      Rate and Rhythm: Normal rate and regular rhythm.      Heart sounds: No murmur heard.     No friction rub. No gallop.   Pulmonary:      Effort: Pulmonary effort is normal.      Breath sounds: No wheezing, rhonchi or rales.   Musculoskeletal:         General: Normal range of motion.      Right lower leg: No edema.      Left lower leg: No edema.   Skin:     General: Skin is warm and dry.   Neurological:      Mental Status: She is alert and oriented to person, place, and time.   Psychiatric:         Mood and Affect: Mood normal.         Thought Content: Thought content normal.         Judgment: Judgment normal.           Labs:   Recent Results (from the past 48 hour(s))   CBC Oncology    Collection Time: 02/05/24  8:38 AM   Result Value Ref Range    WBC 4.27 3.90 - 12.70 K/uL    RBC 3.54 (L) 4.00 - 5.40 M/uL    Hemoglobin 10.6 (L) 12.0 - 16.0 g/dL    Hematocrit 32.4 (L) 37.0 - 48.5 %    MCV 92 82 - 98 fL    MCH 29.9 27.0 - 31.0 pg    MCHC 32.7 32.0 - 36.0 g/dL    RDW 13.3 11.5 - 14.5 %    Platelets 166 150 - 450 K/uL    MPV 9.7 9.2 - 12.9 fL     Gran # (ANC) 2.6 1.8 - 7.7 K/uL    Immature Grans (Abs) 0.04 0.00 - 0.04 K/uL   Comprehensive Metabolic Panel    Collection Time: 02/05/24  8:38 AM   Result Value Ref Range    Sodium 142 136 - 145 mmol/L    Potassium 3.9 3.5 - 5.1 mmol/L    Chloride 105 95 - 110 mmol/L    CO2 26 23 - 29 mmol/L    Glucose 74 70 - 110 mg/dL    BUN 19 8 - 23 mg/dL    Creatinine 1.5 (H) 0.5 - 1.4 mg/dL    Calcium 9.4 8.7 - 10.5 mg/dL    Total Protein 7.0 6.0 - 8.4 g/dL    Albumin 3.8 3.5 - 5.2 g/dL    Total Bilirubin 0.7 0.1 - 1.0 mg/dL    Alkaline Phosphatase 67 55 - 135 U/L    AST 22 10 - 40 U/L    ALT <5 (L) 10 - 44 U/L    eGFR 36 (A) >60 mL/min/1.73 m^2    Anion Gap 11 8 - 16 mmol/L   TSH    Collection Time: 02/05/24  8:38 AM   Result Value Ref Range    TSH 3.240 0.400 - 4.000 uIU/mL        Imaging:   CT Chest Without Contrast  Narrative: EXAMINATION:  CT CHEST WITHOUT CONTRAST    CLINICAL HISTORY:  Lung cancer on treatment;    TECHNIQUE:  Standard chest CT protocol was performed without IV contrast.    COMPARISON:  10/24/2023    FINDINGS:  The size of heart is within normal limits.  There is no evidence of clinically significant coronary atherosclerosis.  There are surgical changes in the left lung.  There is no evidence of an acute pulmonary process.  There is no pneumothorax or pleural effusion.  There is no abnormal lymphadenopathy based on CT size criteria.  There are several cysts scattered throughout the liver.  There are mild degenerative changes in the thoracic spine.  Impression: There has been no detrimental interval change in appearance.    All CT scans at this facility use dose modulation, iterative reconstruction, and/or weight base dosing when appropriate to reduce radiation dose when appropriate to reduce radiation dose to as low as reasonably achievable.    Electronically signed by: Tristan Childress MD  Date:    02/05/2024  Time:    09:15            Path:   1. Left lung, wedge resection: Invasive adenocarcinoma,  predominantly solid   pattern, measuring 9 mm (0.9 cm) in greatest dimension.          Tumor is located 3 mm (0.3 cm) from the blue inked/stapled parenchymal   surgical margin.          Tumor extends into the elastic layer of the visceral pleura.          See synoptic report in comment section for further details.   2. Lymph node, left level 11, excision: 1 benign fragmented lymph node with   sinus histiocytosis and anthracotic pigment, negative for metastatic   carcinoma (0/1).   3. Lymph node, left level 10, excision: 1 lymph node, positive for metastatic   carcinoma with crush artifact dense fibrosis and focal necrosis (1/1).          Confirmed with positive immunohistochemical stain with cytokeratin   AE1/AE3 with satisfactory positive and negative controls.   4. Lymph node, left level 12, excision: 1 benign lymph node with sinus   histiocytosis, negative for metastatic carcinoma (0/1).   5. Lymph node, left level 9, excision: 1 benign fragmented lymph node with   sinus histiocytosis and anthracotic pigment, negative for metastatic   carcinoma (0/1).   6. Lymph node, level 7, excision: 1 benign lymph node with sinus   histiocytosis, negative for metastatic carcinoma (0/1).   7.  Lymph node, level 5, excision: 1 benign fragmented lymph node with sinus   histiocytosis and anthracotic pigment, negative for metastatic carcinoma   (0/1).   8. Lung, lingula, anatomic lingulectomy: Lung with congested vasculature.          No residual carcinoma is identified.          Bronchial and vascular margin is negative for malignancy.          See synoptic report in comment section for further details.       Assessment and Plan:     Radha Lamas is a 74 yo woman w/ pmh significant for COPD and two primary LLL lung cancers as below. She presents today for follow up.     - Stage I squamous cell carcinoma of the of the LLL, initially dx'd 4/28/21, s/p wedge resection (4/28/21)    - Stage IIB (pT2, pN1a, cMx) adenocarcinoma of the  NANI (PD-L1 95%, BRAF V600E mutated), initially dx'd 3/28/23, s/p wedge resection 3/28/23, adjuvant cisplatin/pemetrexed x 4 cycles (5/11/23-7/20/23), and currently on adjuvant atezolizumab (8/14/23 - present; of note, delay between C3 on 9/26/23 and C4 on 11/14/23 due to concern for pneumonitis)     Stage IIB Adenocarcinoma of NANI  ECOG PS 1. Currently on adjuvant atezolizumab following adjuvant cisplatin/pemetrexed and tolerating without significant issue (initially with some worsening shortness of breath and fatigue, since improved and stable).  Most recent imaging (CT C non-con, 2/5/24) without evidence of recurrent/residual disease.  Given good tolerance, we will continue with atezolizumab for total of 1 year and q3m imaging throughout.     Of note, there was a delay between cycle 3 (09/26/2023) and cycle 4 (11/14/2023) due to concern for pneumonitis, workup for which was largely unrevealing.    PLAN:   -- Proceed w/ atezolizumab C8 today (2/6/24), labs acceptable (Hgb close to baseline, eGFR stable) and treatment signed  -- Low threshold to stop atezolizumab going forward given initial concern for pneumonitis  -- CT C prior to RTC (last imaging on 10/24/23), non-contrast given renal function. If any concerning findings, may consider PET for further clarification  -- Labs on 2/26/24, RTC and C9 on 2/27/24      Depressed Renal Function  Patient's eGFR dipped below 60 following her 4th dose of cisplatin/pemetrexed and has remained depressed since (notably, this was prior to her 1st dose of atezolizumab).  It has remained low for since it was first noted 08/10/2023. Urine protein/creatinine ratio 0.1 g/day on 11/14/23. This time line may be inconsistent with cisplatin induced nephropathy as recovery of renal function would be anticipated by this time, however it is possible to develop a CKD following cisplatin therapy which may be the etiology. Does not appear consistent with an IrAE.  -- Nephrology appointment on  "3/7/24 at non-[facility] practice, pt states she is on a wait list to be seen sooner.       Neuropathy  Pt describes "deadened" feet and also like she has "bunched socks" on her feet when she puts on her shoes which developed approximately 2 months after completion of chemotherapy. This is worse at night. She has some long-standing decreased sensation in her fingertips which she relates to eczema. She denies any problems like this previously, including when she received her chemotherapy. She denies any issues with proprioception / ambulation related to this. Exam has been previously unremarkable. Uncertain etiology. The pt does not have a diagnosis of diabetes. The time course is not consistent with chemotherapy induced peripheral neuropathy. Immunotherapy can cause neuropathic issues, however this developed while the patient had been off of the atezolizumab for 4 weeks already (though this does not preclude this as a possibility).  Symptoms are currently stable in chiefly in her feet.  She has seen a podiatrist with recommendation to increase her walking.  She states she has not interested in pharmacotherapy at this time given mild and stable symptoms.  -- Continue to follow with podiatrist  -- Referral to neurology in place, patient did not make appointment.  Okay to hold at this time, low threshold for evaluation.      The above information has been reviewed with the patient and all questions have been answered to their apparent satisfaction.  They understand that they can call the clinic with any questions.    Orion Arce MD  Hematology/Oncology  [facility]        Med Onc Chart Routing      Follow up with physician . Labs 2/26/24, RTC and C8 on 2/27/24   Follow up with [facility]    Infusion scheduling note    Injection scheduling note    Labs    Imaging    Pharmacy appointment    Other referrals                         "

## 2024-02-09 ENCOUNTER — OFFICE VISIT (OUTPATIENT)
Dept: URGENT CARE | Facility: CLINIC | Age: 74
End: 2024-02-09
Payer: MEDICARE

## 2024-02-09 VITALS
BODY MASS INDEX: 26.82 KG/M2 | HEART RATE: 72 BPM | SYSTOLIC BLOOD PRESSURE: 118 MMHG | TEMPERATURE: 97 F | WEIGHT: 161 LBS | OXYGEN SATURATION: 97 % | DIASTOLIC BLOOD PRESSURE: 63 MMHG | RESPIRATION RATE: 18 BRPM | HEIGHT: 65 IN

## 2024-02-09 DIAGNOSIS — H92.02 OTALGIA, LEFT EAR: Primary | ICD-10-CM

## 2024-02-09 PROCEDURE — 99213 OFFICE O/P EST LOW 20 MIN: CPT | Mod: S$GLB,,,

## 2024-02-09 NOTE — PROGRESS NOTES
"Subjective:      Patient ID: Radha Lamas is a 74 y.o. female.    Vitals:  height is 5' 5" (1.651 m) and weight is 73 kg (161 lb). Her temperature is 97.4 °F (36.3 °C). Her blood pressure is 118/63 and her pulse is 72. Her respiration is 18 and oxygen saturation is 97%.     Chief Complaint: Otalgia    73 yo F here for evaluation of left ear pain ongoing for the last week. Improves with applying pressure over ear and placing cotton ball in EAC. No otorrhea. No fevers. No recent illness. No change in hearing. Pain does not radiaite. No neck pain or stiffness. No dizziness. No other acute complaints    Otalgia   There is pain in the left ear. This is a new problem. The current episode started in the past 7 days. The problem occurs constantly. The problem has been unchanged. There has been no fever. Pertinent negatives include no abdominal pain, coughing, diarrhea, ear discharge, headaches, hearing loss, neck pain, rash, rhinorrhea, sore throat or vomiting. She has tried nothing for the symptoms. The treatment provided no relief. There is no history of a chronic ear infection, hearing loss or a tympanostomy tube.       HENT:  Positive for ear pain. Negative for ear discharge, hearing loss and sore throat.    Neck: Negative for neck pain.   Respiratory:  Negative for cough.    Gastrointestinal:  Negative for abdominal pain, vomiting and diarrhea.   Skin:  Negative for rash.   Neurological:  Negative for headaches.      Objective:     Physical Exam   Constitutional: She is oriented to person, place, and time. She appears well-developed. She is cooperative.  Non-toxic appearance. She does not appear ill. No distress.   HENT:   Head: Normocephalic and atraumatic.   Ears:   Right Ear: Hearing, tympanic membrane, external ear and ear canal normal.   Left Ear: Hearing, tympanic membrane, external ear and ear canal normal.      Comments: No mastoid tenderness MAUREEN  No erythema, warmth, edema to suggest cellulitis   No " otorrhea or impacted cerumen  MAUREEN TM clear, no perforation  No pain with manipulation of tragus, pinna  Nose: Nose normal. No mucosal edema, rhinorrhea or nasal deformity. No epistaxis. Right sinus exhibits no maxillary sinus tenderness and no frontal sinus tenderness. Left sinus exhibits no maxillary sinus tenderness and no frontal sinus tenderness.   Mouth/Throat: Uvula is midline, oropharynx is clear and moist and mucous membranes are normal. No trismus in the jaw. Normal dentition. No uvula swelling. No oropharyngeal exudate, posterior oropharyngeal edema or posterior oropharyngeal erythema.   Eyes: Conjunctivae and lids are normal. No scleral icterus.   Neck: Trachea normal and phonation normal. Neck supple. No edema present. No erythema present. No neck rigidity present.   Cardiovascular: Normal rate, regular rhythm, normal heart sounds and normal pulses.   Pulmonary/Chest: Effort normal and breath sounds normal. No respiratory distress. She has no decreased breath sounds. She has no rhonchi.   Abdominal: Normal appearance.   Musculoskeletal: Normal range of motion.         General: No deformity. Normal range of motion.   Neurological: She is alert and oriented to person, place, and time. She exhibits normal muscle tone. Coordination normal.   Skin: Skin is warm, dry, intact, not diaphoretic and not pale.   Psychiatric: Her speech is normal and behavior is normal. Judgment and thought content normal.   Nursing note and vitals reviewed.      Assessment:     1. Otalgia, left ear        Plan:       Otalgia, left ear  -     Ambulatory referral/consult to ENT      Medical Decision Making:   Differential Diagnosis:   AOM, OE vs malignant OE, TM rupture, mastoiditis, perichondritis, herpes zoster oticus, labyrinthitis, meniere's, etc  Urgent Care Management:  Patient well appearing, NAD. Physical exam unremarkable. See documentation above. Will place ENT referral for further evaluation and management as warranted.  Patient in agreement. RTC and ED precautions reviewed. Patient safe for discharge.

## 2024-02-10 NOTE — PATIENT INSTRUCTIONS
General Referral to [facility]   You were referred to [facility] for follow-up care on your condition.    Please call [phone] ([place]) - OR - [phone] ([place])  to schedule your appointment.    Please go to the Emergency Department for any concerns or worsening of condition.  Please follow up with your primary care doctor or specialist in the next 48-72hrs as needed.    If you  smoke, please stop smoking.

## 2024-02-23 ENCOUNTER — HOSPITAL ENCOUNTER (OUTPATIENT)
Dept: RADIOLOGY | Facility: HOSPITAL | Age: 74
Discharge: HOME | End: 2024-02-23
Attending: SPECIALIST
Payer: MEDICARE

## 2024-02-23 DIAGNOSIS — Z12.31 BREAST CANCER SCREENING BY MAMMOGRAM: ICD-10-CM

## 2024-02-23 PROCEDURE — 77063 BREAST TOMOSYNTHESIS BI: CPT | Mod: 26,,, | Performed by: RADIOLOGY

## 2024-02-23 PROCEDURE — 77067 SCR MAMMO BI INCL CAD: CPT | Mod: 26,,, | Performed by: RADIOLOGY

## 2024-02-23 PROCEDURE — 77067 SCR MAMMO BI INCL CAD: CPT | Mod: TC

## 2024-02-26 ENCOUNTER — LAB VISIT (OUTPATIENT)
Dept: LAB | Facility: HOSPITAL | Age: 74
End: 2024-02-26
Attending: HOSPITALIST
Payer: MEDICARE

## 2024-02-26 DIAGNOSIS — C34.32 MALIGNANT NEOPLASM OF LOWER LOBE OF LEFT LUNG: ICD-10-CM

## 2024-02-26 DIAGNOSIS — C79.9 NEOPLASM, METASTATIC: ICD-10-CM

## 2024-02-26 LAB
ALBUMIN SERPL BCP-MCNC: 3.8 G/DL (ref 3.5–5.2)
ALP SERPL-CCNC: 65 U/L (ref 55–135)
ALT SERPL W/O P-5'-P-CCNC: 5 U/L (ref 10–44)
ANION GAP SERPL CALC-SCNC: 8 MMOL/L (ref 8–16)
AST SERPL-CCNC: 23 U/L (ref 10–40)
BILIRUB SERPL-MCNC: 0.6 MG/DL (ref 0.1–1)
BUN SERPL-MCNC: 14 MG/DL (ref 8–23)
CALCIUM SERPL-MCNC: 9.5 MG/DL (ref 8.7–10.5)
CHLORIDE SERPL-SCNC: 106 MMOL/L (ref 95–110)
CO2 SERPL-SCNC: 28 MMOL/L (ref 23–29)
CREAT SERPL-MCNC: 1.4 MG/DL (ref 0.5–1.4)
ERYTHROCYTE [DISTWIDTH] IN BLOOD BY AUTOMATED COUNT: 13.7 % (ref 11.5–14.5)
EST. GFR  (NO RACE VARIABLE): 39 ML/MIN/1.73 M^2
GLUCOSE SERPL-MCNC: 102 MG/DL (ref 70–110)
HCT VFR BLD AUTO: 33.9 % (ref 37–48.5)
HGB BLD-MCNC: 11 G/DL (ref 12–16)
IMM GRANULOCYTES # BLD AUTO: 0.01 K/UL (ref 0–0.04)
MCH RBC QN AUTO: 30.2 PG (ref 27–31)
MCHC RBC AUTO-ENTMCNC: 32.4 G/DL (ref 32–36)
MCV RBC AUTO: 93 FL (ref 82–98)
NEUTROPHILS # BLD AUTO: 2.9 K/UL (ref 1.8–7.7)
PLATELET # BLD AUTO: 158 K/UL (ref 150–450)
PMV BLD AUTO: 9.9 FL (ref 9.2–12.9)
POTASSIUM SERPL-SCNC: 3.3 MMOL/L (ref 3.5–5.1)
PROT SERPL-MCNC: 6.9 G/DL (ref 6–8.4)
RBC # BLD AUTO: 3.64 M/UL (ref 4–5.4)
SODIUM SERPL-SCNC: 142 MMOL/L (ref 136–145)
TSH SERPL DL<=0.005 MIU/L-ACNC: 2.26 UIU/ML (ref 0.4–4)
WBC # BLD AUTO: 4.85 K/UL (ref 3.9–12.7)

## 2024-02-26 PROCEDURE — 80053 COMPREHEN METABOLIC PANEL: CPT | Performed by: HOSPITALIST

## 2024-02-26 PROCEDURE — 84443 ASSAY THYROID STIM HORMONE: CPT | Performed by: HOSPITALIST

## 2024-02-26 PROCEDURE — 85027 COMPLETE CBC AUTOMATED: CPT | Performed by: HOSPITALIST

## 2024-02-26 PROCEDURE — 36415 COLL VENOUS BLD VENIPUNCTURE: CPT | Mod: PO | Performed by: HOSPITALIST

## 2024-02-27 ENCOUNTER — OFFICE VISIT (OUTPATIENT)
Dept: HEMATOLOGY/ONCOLOGY | Facility: CLINIC | Age: 74
End: 2024-02-27
Payer: MEDICARE

## 2024-02-27 VITALS
SYSTOLIC BLOOD PRESSURE: 106 MMHG | BODY MASS INDEX: 27.15 KG/M2 | HEART RATE: 67 BPM | TEMPERATURE: 98 F | DIASTOLIC BLOOD PRESSURE: 66 MMHG | HEIGHT: 65 IN | WEIGHT: 162.94 LBS | OXYGEN SATURATION: 98 %

## 2024-02-27 DIAGNOSIS — N39.3 STRESS INCONTINENCE OF URINE: ICD-10-CM

## 2024-02-27 DIAGNOSIS — N28.9 RENAL INSUFFICIENCY: ICD-10-CM

## 2024-02-27 DIAGNOSIS — R06.00 DYSPNEA, UNSPECIFIED TYPE: ICD-10-CM

## 2024-02-27 DIAGNOSIS — E87.6 HYPOKALEMIA: ICD-10-CM

## 2024-02-27 DIAGNOSIS — R06.02 SOB (SHORTNESS OF BREATH): Primary | ICD-10-CM

## 2024-02-27 DIAGNOSIS — C34.32 MALIGNANT NEOPLASM OF LOWER LOBE OF LEFT LUNG: Primary | ICD-10-CM

## 2024-02-27 DIAGNOSIS — J44.9 COPD, MODERATE: ICD-10-CM

## 2024-02-27 PROCEDURE — 3288F FALL RISK ASSESSMENT DOCD: CPT | Mod: CPTII,S$GLB,, | Performed by: HOSPITALIST

## 2024-02-27 PROCEDURE — 99215 OFFICE O/P EST HI 40 MIN: CPT | Mod: S$GLB,,, | Performed by: HOSPITALIST

## 2024-02-27 PROCEDURE — 1101F PT FALLS ASSESS-DOCD LE1/YR: CPT | Mod: CPTII,S$GLB,, | Performed by: HOSPITALIST

## 2024-02-27 PROCEDURE — 1126F AMNT PAIN NOTED NONE PRSNT: CPT | Mod: CPTII,S$GLB,, | Performed by: HOSPITALIST

## 2024-02-27 PROCEDURE — 3078F DIAST BP <80 MM HG: CPT | Mod: CPTII,S$GLB,, | Performed by: HOSPITALIST

## 2024-02-27 PROCEDURE — 99999 PR PBB SHADOW E&M-EST. PATIENT-LVL IV: CPT | Mod: PBBFAC,,, | Performed by: HOSPITALIST

## 2024-02-27 PROCEDURE — 1159F MED LIST DOCD IN RCRD: CPT | Mod: CPTII,S$GLB,, | Performed by: HOSPITALIST

## 2024-02-27 PROCEDURE — 3074F SYST BP LT 130 MM HG: CPT | Mod: CPTII,S$GLB,, | Performed by: HOSPITALIST

## 2024-02-27 RX ORDER — POTASSIUM CHLORIDE 20 MEQ/1
20 TABLET, EXTENDED RELEASE ORAL DAILY
Qty: 3 TABLET | Refills: 0 | Status: SHIPPED | OUTPATIENT
Start: 2024-02-27 | End: 2024-03-01

## 2024-02-27 NOTE — Clinical Note
Good afternoon,   I saw Ms. Lamas today. She is complaining of progressive dyspnea over the past few weeks. She had a similar episode previously which I worked up and there was no evidence of pneumonitis then. I'm going to get another CT C now just to make sure, but if it's negative for pneumonitis, I favor that this is related to her COPD. Do you have any thoughts, or do you think she could be evaluated by pulm sooner than May since she's having this issue more acutely?   Thanks! Alex

## 2024-02-27 NOTE — PROGRESS NOTES
[facility] MEDICAL ONCOLOGY - FOLLOW UP VISIT    Reason for visit: Follow up for stage IIB squamous cell carcinoma      Oncology History   Malignant neoplasm of lower lobe of left lung - Squamous cell   4/15/2021 Initial Diagnosis    Malignant neoplasm of lower lobe of left lung - Squamous cell     5/25/2021 Cancer Staged    Staging form: Lung, AJCC 8th Edition  - Clinical: Stage IA1 (cT1a, cN0, cM0)      Cancer Staged    9mm non-keratinizing squamous cell carcinoma, no VPI, no LVI, margin at least 8mm.  Levels 9 and 11 = negative.  T1aN0     4/5/2023 Cancer Staged    Staging form: Lung, AJCC 8th Edition  - Pathologic stage from 4/5/2023: Stage IIB (pT2, pN1, cM0)     5/1/2023 - 7/21/2023 Chemotherapy    Treatment Summary   Plan Name: OP NSCLC PEMETREXED + CISPLATIN Q3W  Treatment Goal: Supportive  Status: Inactive  Start Date: 5/1/2023  End Date: 7/21/2023  Provider: Marissa Brower MD  Chemotherapy: CISplatin (Platinol) 75 mg/m2 = 138 mg in sodium chloride 0.9% 665 mL chemo infusion, 75 mg/m2 = 138 mg, Intravenous, Clinic/HOD 1 time, 4 of 4 cycles  Administration: 138 mg (5/12/2023), 138 mg (6/29/2023), 138 mg (7/20/2023), 138 mg (6/8/2023)  PEMEtrexed disodium (ALIMTA) 900 mg in sodium chloride 0.9% SolP 100 mL chemo infusion, 925 mg, Intravenous, Clinic/HOD 1 time, 4 of 4 cycles  Dose modification: 375 mg/m2 (original dose 500 mg/m2, Cycle 3, Reason: MD Discretion, Comment: neutropenia )  Administration: 900 mg (5/12/2023), 700 mg (6/29/2023), 700 mg (7/20/2023), 700 mg (6/8/2023)     8/14/2023 -  Chemotherapy    Treatment Summary   Plan Name: OP ATEZOLIZUMAB Q3W  Treatment Goal: Supportive  Status: Active  Start Date: 8/14/2023  End Date: 8/13/2024 (Planned)  Provider: Marissa Brower MD  Chemotherapy: [No matching medication found in this treatment plan]     NSCLC of left lung (Resolved)   4/28/2021 Initial Diagnosis    NSCLC of left lung  "(Resolved)      Cancer Staged    9mm non-keratinizing squamous cell carcinoma, no VPI, no LVI, margin at least 8mm.  Levels 9 and 11 = negative.  T1aN0        NANI NSCLC/ADC IIB (pT2 pN1a cMx)  - History of stage I squamous cell carcinoma moderately differentiated left lower lung status post wedge resection 04/28/2021  - Abnormality seen on surveillance for history of squamous cell carcinoma. Initial biopsy February 20, 2023 without neoplasm identified , thus had left lower lobe wedge resection after multiple disciplinary discussion, final pathology positive on 03/28/2022 for invasive moderately differentiated adenocarcinoma station 10 lymph node positive, pleural invasion noted, margins negative.    - Restaging MRI brain negative and PET scan with left hilar avidity SUV 4 and chest wall. Given recent surgery potential inflammation presented at tumor board recommended proceeding with adjuvant chemotherapy and follow-up on repeat imaging. Started adjuvant therapy with chemotherapy and immunotherapy per IMPOWER 010 given PDL1 positive disease 95%.  Mutational analysis negative for EGFR mutation. Noted BRAF mutation.   - Completed adjuvant chemotherapy with cisplatin and pemetrexed tolerated well aside from chemotherapy associated progressive anemia.    - Restaging PET on 8/7/2023 with resolution of prior lymph node avidity.    - Started immunotherapy with atezolizumab (8/14/2023 - present; delay from C3 on 9/26/23 and C4 on 11/14/23 due to concnern for pneumonitis)  - 2/5/24: CT C, "no detrimental interval change in appearance"    HPI:     Radha Lamas is a 74 yo woman w/ pmh significant for COPD and two primary LLL lung cancers as below. She presents today for follow up.     - Stage I squamous cell carcinoma of the of the LLL, initially dx'd 4/28/21, s/p wedge resection (4/28/21)    - Stage IIB (pT2, pN1a, cMx) adenocarcinoma of the NANI (PD-L1 95%, BRAF [id number] mutated), initially dx'd 3/28/23, s/p wedge resection " 3/28/23, adjuvant cisplatin/pemetrexed x 4 cycles (5/11/23-7/20/23), and currently on adjuvant atezolizumab (8/14/23 - present; of note, delay between C3 on 9/26/23 and C4 on 11/14/23 due to concern for pneumonitis)     Last clinic 2/06/24, proceed w/ C8 (2/6/24), eGFR stable, CTC prior to RTC (last on 10/24/23). Labs on 2/26, RTC and C9 on 2/27/24. Nephrology appointment on 3/7/24.     Interval History:   - 2/6/24: C8D1 atezolizumab    Pt states that she feels that she is developing shortness of breath again. She says it is when she exerts herself, such as bringing in groceries, working in the yard, etc. She says that she almost loses her breath when doing these things. This has worsened over the past 3 weeks. Her daughter has noted that she has difficulty with their daily walks, and her  noticed it from the street this morning. It is not a severe as it was in the fall (see previous notes), but she says that it feels like it is getting there. She is using anoro ellipta every day. She has been using albuterol PRN and says that it helps some too, which gives her an extra hour. She denies any cough. She says that she feels tired all the time. She denies any other new or bothersome symptoms. She last saw pulmonology in 11/08/24.     I have reviewed and updated the patient's past medical, surgical, family and social histories.      Past Medical History:   Past Medical History:   Diagnosis Date    COPD (chronic obstructive pulmonary disease) 2013    Eczema     GERD (gastroesophageal reflux disease)     Hiatal hernia     History of torn meniscus of right knee 01/2011    Hypothyroid     Incidental pulmonary nodule, > 3mm and < 8mm - [facility]ula 8/12/2021    Lung cancer     Urinary incontinence in female     Mild , only takes mediciane with travel        Allergies:   Review of patient's allergies indicates:  No Known Allergies     Medications:   Current Outpatient Medications   Medication Sig Dispense Refill    albuterol  (PROVENTIL/VENTOLIN HFA) 90 mcg/actuation inhaler Inhale 2 puffs into the lungs every 4 (four) hours as needed for Wheezing or Shortness of Breath. Rescue 18 g 11    calcium carbonate (OS-CYDNEY) 600 mg calcium (1,500 mg) Tab Take 600 mg by mouth.      dexAMETHasone (DECADRON) 4 MG Tab Take 2 tablets (8 mg total) by mouth once daily. on the day prior to chemotherapy then daily on days 2,3,4 of each chemotherapy cycle. 24 tablet 3    diphenhydrAMINE-aluminum-magnesium hydroxide-simethicone-LIDOcaine HCl 2% Swish and spit 15 mLs every 4 (four) hours as needed. 540 each 2    DUPIXENT  mg/2 mL PnIj Inject into the skin every 14 (fourteen) days.      folic acid (FOLVITE) 1 MG tablet Take 1 tablet (1 mg total) by mouth once daily. starting 1 week prior to pemetrexed and continuing for 21 days after stopping pemetrexed 30 tablet 5    gabapentin (NEURONTIN) 300 MG capsule Take 1 capsule (300 mg total) by mouth every evening. 30 capsule 11    levothyroxine (SYNTHROID) 75 MCG tablet Take 75 mcg by mouth once daily.      loratadine (CLARITIN) 10 mg tablet Take 10 mg by mouth once daily.      magnesium oxide (MAG-OX) 400 mg (241.3 mg magnesium) tablet Take 1 tablet (400 mg total) by mouth once daily. 7 tablet 0    OLANZapine (ZYPREXA) 5 MG tablet Take 1 tablet (5 mg total) by mouth every evening. Take as directed on days 1-4 of your chemotherapy cycle. 16 tablet 0    omeprazole (PRILOSEC) 20 MG capsule Take 20 mg by mouth once daily.      ondansetron (ZOFRAN-ODT) 8 MG TbDL Take 1 tablet (8 mg total) by mouth every 8 (eight) hours as needed (nausea). 60 tablet 5    oxyCODONE (ROXICODONE) 5 MG immediate release tablet Take 1 tablet (5 mg total) by mouth every 4 (four) hours as needed for Pain. 41 tablet 0    potassium chloride SA (K-DUR,KLOR-CON) 20 MEQ tablet Take 1 tablet (20 mEq total) by mouth once daily. 7 tablet 1    tolterodine (DETROL LA) 4 MG 24 hr capsule Take 1 capsule (4 mg total) by mouth once daily. (Patient  taking differently: Take 4 mg by mouth daily as needed.) 90 capsule 4    umeclidinium-vilanteroL (ANORO ELLIPTA) 62.5-25 mcg/actuation DsDv USE 1 INHALATION DAILY (CONTROLLER) 180 each 3     No current facility-administered medications for this visit.     Facility-Administered Medications Ordered in Other Visits   Medication Dose Route Frequency Provider Last Rate Last Admin    prochlorperazine injection Soln 5 mg  5 mg Intravenous Q30 Min PRN Adrien Vail MD              ROS:  Review of Systems   A complete 12-point review of systems was reviewed and is negative except as mentioned above.       Physical Exam:       There were no vitals taken for this visit.               Physical Exam  Constitutional:       Appearance: Normal appearance.   HENT:      Head: Normocephalic and atraumatic.   Eyes:      Extraocular Movements: Extraocular movements intact.      Conjunctiva/sclera: Conjunctivae normal.      Pupils: Pupils are equal, round, and reactive to light.   Cardiovascular:      Rate and Rhythm: Normal rate and regular rhythm.      Heart sounds: No murmur heard.     No friction rub. No gallop.   Pulmonary:      Effort: Pulmonary effort is normal.      Breath sounds: No wheezing, rhonchi or rales.   Musculoskeletal:         General: Normal range of motion.      Right lower leg: No edema.      Left lower leg: No edema.   Skin:     General: Skin is warm and dry.   Neurological:      Mental Status: She is alert and oriented to person, place, and time.   Psychiatric:         Mood and Affect: Mood normal.         Thought Content: Thought content normal.         Judgment: Judgment normal.           Labs:   Recent Results (from the past 48 hour(s))   CBC Oncology    Collection Time: 02/26/24  2:02 PM   Result Value Ref Range    WBC 4.85 3.90 - 12.70 K/uL    RBC 3.64 (L) 4.00 - 5.40 M/uL    Hemoglobin 11.0 (L) 12.0 - 16.0 g/dL    Hematocrit 33.9 (L) 37.0 - 48.5 %    MCV 93 82 - 98 fL    MCH 30.2 27.0 - 31.0 pg    MCHC  32.4 32.0 - 36.0 g/dL    RDW 13.7 11.5 - 14.5 %    Platelets 158 150 - 450 K/uL    MPV 9.9 9.2 - 12.9 fL    Gran # (ANC) 2.9 1.8 - 7.7 K/uL    Immature Grans (Abs) 0.01 0.00 - 0.04 K/uL   Comprehensive Metabolic Panel    Collection Time: 02/26/24  2:02 PM   Result Value Ref Range    Sodium 142 136 - 145 mmol/L    Potassium 3.3 (L) 3.5 - 5.1 mmol/L    Chloride 106 95 - 110 mmol/L    CO2 28 23 - 29 mmol/L    Glucose 102 70 - 110 mg/dL    BUN 14 8 - 23 mg/dL    Creatinine 1.4 0.5 - 1.4 mg/dL    Calcium 9.5 8.7 - 10.5 mg/dL    Total Protein 6.9 6.0 - 8.4 g/dL    Albumin 3.8 3.5 - 5.2 g/dL    Total Bilirubin 0.6 0.1 - 1.0 mg/dL    Alkaline Phosphatase 65 55 - 135 U/L    AST 23 10 - 40 U/L    ALT 5 (L) 10 - 44 U/L    eGFR 39 (A) >60 mL/min/1.73 m^2    Anion Gap 8 8 - 16 mmol/L   TSH    Collection Time: 02/26/24  2:02 PM   Result Value Ref Range    TSH 2.256 0.400 - 4.000 uIU/mL        Imaging:   Mammo Digital Screening Bilat w/ [facility]man  Narrative: Result:  Mammo Digital Screening Bilat w/ [facility]man    History:  Patient is 74 y.o. and is seen for a screening mammogram.    Films Compared:  Prior images (if available) were compared.     Findings:  This procedure was performed using tomosynthesis.   Computer-aided detection was utilized in the interpretation of this   examination.    The breasts have scattered areas of fibroglandular density. There is no   evidence of suspicious masses, microcalcifications or architectural   distortion.  Impression:    No mammographic evidence of malignancy.    BI-RADS Category 1: Negative    Recommendation:  Routine screening mammogram in 1 year is recommended.    Your estimated lifetime risk of breast cancer (to age 85) based on   Tyrer-Cuzick risk assessment model is 1.69 %.  According to the American   Cancer Society, patients with a lifetime breast cancer risk of 20% or   higher might benefit from supplemental screening tests, such as screening   breast MRI.            Path:   1. Left lung,  wedge resection: Invasive adenocarcinoma, predominantly solid   pattern, measuring 9 mm (0.9 cm) in greatest dimension.          Tumor is located 3 mm (0.3 cm) from the blue inked/stapled parenchymal   surgical margin.          Tumor extends into the elastic layer of the visceral pleura.          See synoptic report in comment section for further details.   2. Lymph node, left level 11, excision: 1 benign fragmented lymph node with   sinus histiocytosis and anthracotic pigment, negative for metastatic   carcinoma (0/1).   3. Lymph node, left level 10, excision: 1 lymph node, positive for metastatic   carcinoma with crush artifact dense fibrosis and focal necrosis (1/1).          Confirmed with positive immunohistochemical stain with cytokeratin   AE1/AE3 with satisfactory positive and negative controls.   4. Lymph node, left level 12, excision: 1 benign lymph node with sinus   histiocytosis, negative for metastatic carcinoma (0/1).   5. Lymph node, left level 9, excision: 1 benign fragmented lymph node with   sinus histiocytosis and anthracotic pigment, negative for metastatic   carcinoma (0/1).   6. Lymph node, level 7, excision: 1 benign lymph node with sinus   histiocytosis, negative for metastatic carcinoma (0/1).   7.  Lymph node, level 5, excision: 1 benign fragmented lymph node with sinus   histiocytosis and anthracotic pigment, negative for metastatic carcinoma   (0/1).   8. Lung, lingula, anatomic lingulectomy: Lung with congested vasculature.          No residual carcinoma is identified.          Bronchial and vascular margin is negative for malignancy.          See synoptic report in comment section for further details.       Assessment and Plan:     Radha Lamas is a 72 yo woman w/ pmh significant for COPD and two primary LLL lung cancers as below. She presents today for follow up.     - Stage I squamous cell carcinoma of the of the LLL, initially dx'd 4/28/21, s/p wedge resection (4/28/21)    - Stage  IIB (pT2, pN1a, cMx) adenocarcinoma of the NANI (PD-L1 95%, BRAF V600E mutated), initially dx'd 3/28/23, s/p wedge resection 3/28/23, adjuvant cisplatin/pemetrexed x 4 cycles (5/11/23-7/20/23), and currently on adjuvant atezolizumab (8/14/23 - present; of note, delay between C3 on 9/26/23 and C4 on 11/14/23 due to concern for pneumonitis)     Stage IIB Adenocarcinoma of NANI  ECOG PS 1. Currently on adjuvant atezolizumab following adjuvant cisplatin/pemetrexed and tolerating without significant issue (initially with some worsening shortness of breath and fatigue).  Most recent imaging (CT C non-con, 2/5/24) without evidence of recurrent/residual disease. Pt newly with dyspnea (see below), will hold C9 pending workup. Ultimately, planning atezolizumab for total of 1 year and q3m imaging throughout.     Of note, there was a delay between cycle 3 (09/26/2023) and cycle 4 (11/14/2023) due to concern for pneumonitis, workup for which was largely unrevealing.    PLAN:   -- Hold atezolizumab C9 today (2/27/24) pending workup as below. Labs today notable for slightly uptrending Hgb, potassium 3.3, stable eGFR.  -- Low threshold to stop atezolizumab going forward given initial concern for pneumonitis  -- RTC and C9 tentatively on 3/5/24      Dypsnea  See HPI for description.  Patient has had worsening dyspnea over the past 3 weeks since last dose of atezolizumab.  Exam is without remarkable findings.  In the setting of ongoing immunotherapy, most rule out pneumonitis and so will obtain CT C now (last CT chest, 02/07/2024, was prior to onset of symptoms).  If scan is without evidence of pneumonitis, likely okay to proceed with next dose of atezolizumab and favor that symptoms are related instead to COPD.  We will also message pulmonology to this effect.  Of note, patient's workup for pneumonitis in the past for similar symptoms, CT at that time was without significant findings.  -- CT C now  -- Message sent to Cornelia  "Gahn  -- Hold atezolizumab as above      Hypokalemia  Potassium 3.3 today.   -- Potassium chloride 20 mEq x3 days  -- Pt to increase dietary sources of potassium      Urinary Stress Incontinence  Patient describes difficulty holding her urine.  She has discussed oxybutynin with her PCP who stated she should follow up with gynecologist.  Patient has not been to a gynecologist in multiple years.  -- Referral placed to gynecology, pt prefers a female provider near [place]      Depressed Renal Function  Patient's eGFR dipped below 60 following her 4th dose of cisplatin/pemetrexed and has remained depressed since (notably, this was prior to her 1st dose of atezolizumab).  It has remained low for since it was first noted 08/10/2023. Urine protein/creatinine ratio 0.1 g/day on 11/14/23. This time line may be inconsistent with cisplatin induced nephropathy as recovery of renal function would be anticipated by this time, however it is possible to develop a CKD following cisplatin therapy which may be the etiology. Does not appear consistent with an IrAE.  -- Nephrology appointment on 3/7/24 at non-[facility] practice, pt states she is on a wait list to be seen sooner.       Neuropathy  Pt describes "deadened" feet and also like she has "bunched socks" on her feet when she puts on her shoes which developed approximately 2 months after completion of chemotherapy. This is worse at night. She has some long-standing decreased sensation in her fingertips which she relates to eczema. She denies any problems like this previously, including when she received her chemotherapy. She denies any issues with proprioception / ambulation related to this. Exam has been previously unremarkable. Uncertain etiology. The pt does not have a diagnosis of diabetes. The time course is not consistent with chemotherapy induced peripheral neuropathy. Immunotherapy can cause neuropathic issues, however this developed while the patient had been off of the " atezolizumab for 4 weeks already (though this does not preclude this as a possibility).  Symptoms are currently stable in chiefly in her feet.  She has seen a podiatrist with recommendation to increase her walking.  She states she has not interested in pharmacotherapy at this time given mild and stable symptoms.  -- Continue to follow with podiatrist  -- Referral to neurology in place, patient did not make appointment.  Okay to hold at this time, low threshold for evaluation.      The above information has been reviewed with the patient and all questions have been answered to their apparent satisfaction.  They understand that they can call the clinic with any questions.    Orion Arce MD  Hematology/Oncology  [facility]        Med Onc Chart Routing      Follow up with physician . Hold treatment today (2/27). CT C within the next week. RTC and C9 on 3/5/24.   Follow up with [facility]    Infusion scheduling note    Injection scheduling note    Labs    Imaging    Pharmacy appointment    Other referrals         Gynecology

## 2024-02-28 NOTE — PROGRESS NOTES
Orders Placed This Encounter   Procedures    Fraction of  Nitric Oxide     Standing Status:   Future     Standing Expiration Date:   2025     Order Specific Question:   Release to patient     Answer:   Immediate    Stress test, pulmonary     Standing Status:   Future     Standing Expiration Date:   2025     Order Specific Question:   Reason for study     Answer:   Functional status     Order Specific Question:   Release to patient     Answer:   Immediate     1. SOB (shortness of breath)  Stress test, pulmonary      2. COPD, moderate  Fraction of  Nitric Oxide    Stress test, pulmonary    CANCELED: X-Ray Chest PA And Lateral

## 2024-03-01 ENCOUNTER — HOSPITAL ENCOUNTER (OUTPATIENT)
Dept: RADIOLOGY | Facility: HOSPITAL | Age: 74
Discharge: HOME | End: 2024-03-01
Attending: HOSPITALIST
Payer: MEDICARE

## 2024-03-01 DIAGNOSIS — R06.00 DYSPNEA, UNSPECIFIED TYPE: ICD-10-CM

## 2024-03-01 PROCEDURE — 71250 CT THORAX DX C-: CPT | Mod: TC

## 2024-03-01 PROCEDURE — 71250 CT THORAX DX C-: CPT | Mod: 26,,, | Performed by: RADIOLOGY

## 2024-03-04 ENCOUNTER — LAB VISIT (OUTPATIENT)
Dept: LAB | Facility: HOSPITAL | Age: 74
End: 2024-03-04
Attending: HOSPITALIST
Payer: MEDICARE

## 2024-03-04 DIAGNOSIS — C34.32 MALIGNANT NEOPLASM OF LOWER LOBE OF LEFT LUNG: ICD-10-CM

## 2024-03-04 DIAGNOSIS — C79.9 NEOPLASM, METASTATIC: ICD-10-CM

## 2024-03-04 LAB
ALBUMIN SERPL BCP-MCNC: 3.8 G/DL (ref 3.5–5.2)
ALP SERPL-CCNC: 67 U/L (ref 55–135)
ALT SERPL W/O P-5'-P-CCNC: <5 U/L (ref 10–44)
ANION GAP SERPL CALC-SCNC: 12 MMOL/L (ref 8–16)
AST SERPL-CCNC: 22 U/L (ref 10–40)
BILIRUB SERPL-MCNC: 0.6 MG/DL (ref 0.1–1)
BUN SERPL-MCNC: 19 MG/DL (ref 8–23)
CALCIUM SERPL-MCNC: 9.4 MG/DL (ref 8.7–10.5)
CHLORIDE SERPL-SCNC: 104 MMOL/L (ref 95–110)
CO2 SERPL-SCNC: 26 MMOL/L (ref 23–29)
CREAT SERPL-MCNC: 1.5 MG/DL (ref 0.5–1.4)
ERYTHROCYTE [DISTWIDTH] IN BLOOD BY AUTOMATED COUNT: 13.5 % (ref 11.5–14.5)
EST. GFR  (NO RACE VARIABLE): 36 ML/MIN/1.73 M^2
GLUCOSE SERPL-MCNC: 105 MG/DL (ref 70–110)
HCT VFR BLD AUTO: 32.9 % (ref 37–48.5)
HGB BLD-MCNC: 10.6 G/DL (ref 12–16)
IMM GRANULOCYTES # BLD AUTO: 0.02 K/UL (ref 0–0.04)
MCH RBC QN AUTO: 29.4 PG (ref 27–31)
MCHC RBC AUTO-ENTMCNC: 32.2 G/DL (ref 32–36)
MCV RBC AUTO: 91 FL (ref 82–98)
NEUTROPHILS # BLD AUTO: 3.9 K/UL (ref 1.8–7.7)
PLATELET # BLD AUTO: 153 K/UL (ref 150–450)
PMV BLD AUTO: 10.5 FL (ref 9.2–12.9)
POTASSIUM SERPL-SCNC: 3.7 MMOL/L (ref 3.5–5.1)
PROT SERPL-MCNC: 6.8 G/DL (ref 6–8.4)
RBC # BLD AUTO: 3.61 M/UL (ref 4–5.4)
SODIUM SERPL-SCNC: 142 MMOL/L (ref 136–145)
TSH SERPL DL<=0.005 MIU/L-ACNC: 2.23 UIU/ML (ref 0.4–4)
WBC # BLD AUTO: 5.72 K/UL (ref 3.9–12.7)

## 2024-03-04 PROCEDURE — 84443 ASSAY THYROID STIM HORMONE: CPT | Performed by: HOSPITALIST

## 2024-03-04 PROCEDURE — 85027 COMPLETE CBC AUTOMATED: CPT | Performed by: HOSPITALIST

## 2024-03-04 PROCEDURE — 80053 COMPREHEN METABOLIC PANEL: CPT | Performed by: HOSPITALIST

## 2024-03-04 PROCEDURE — 36415 COLL VENOUS BLD VENIPUNCTURE: CPT | Mod: PO | Performed by: HOSPITALIST

## 2024-03-04 NOTE — PROGRESS NOTES
[facility] MEDICAL ONCOLOGY - FOLLOW UP VISIT    Reason for visit: Follow up for stage IIB squamous cell carcinoma      Oncology History   Malignant neoplasm of lower lobe of left lung - Squamous cell   4/15/2021 Initial Diagnosis    Malignant neoplasm of lower lobe of left lung - Squamous cell     5/25/2021 Cancer Staged    Staging form: Lung, AJCC 8th Edition  - Clinical: Stage IA1 (cT1a, cN0, cM0)      Cancer Staged    9mm non-keratinizing squamous cell carcinoma, no VPI, no LVI, margin at least 8mm.  Levels 9 and 11 = negative.  T1aN0     4/5/2023 Cancer Staged    Staging form: Lung, AJCC 8th Edition  - Pathologic stage from 4/5/2023: Stage IIB (pT2, pN1, cM0)     5/1/2023 - 7/21/2023 Chemotherapy    Treatment Summary   Plan Name: OP NSCLC PEMETREXED + CISPLATIN Q3W  Treatment Goal: Supportive  Status: Inactive  Start Date: 5/1/2023  End Date: 7/21/2023  Provider: Marissa Brower MD  Chemotherapy: CISplatin (Platinol) 75 mg/m2 = 138 mg in sodium chloride 0.9% 665 mL chemo infusion, 75 mg/m2 = 138 mg, Intravenous, Clinic/HOD 1 time, 4 of 4 cycles  Administration: 138 mg (5/12/2023), 138 mg (6/29/2023), 138 mg (7/20/2023), 138 mg (6/8/2023)  PEMEtrexed disodium (ALIMTA) 900 mg in sodium chloride 0.9% SolP 100 mL chemo infusion, 925 mg, Intravenous, Clinic/HOD 1 time, 4 of 4 cycles  Dose modification: 375 mg/m2 (original dose 500 mg/m2, Cycle 3, Reason: MD Discretion, Comment: neutropenia )  Administration: 900 mg (5/12/2023), 700 mg (6/29/2023), 700 mg (7/20/2023), 700 mg (6/8/2023)     8/14/2023 -  Chemotherapy    Treatment Summary   Plan Name: OP ATEZOLIZUMAB Q3W  Treatment Goal: Supportive  Status: Active  Start Date: 8/14/2023  End Date: 8/13/2024 (Planned)  Provider: Marissa Brower MD  Chemotherapy: [No matching medication found in this treatment plan]     NSCLC of left lung (Resolved)   4/28/2021 Initial Diagnosis    NSCLC of left lung  "(Resolved)      Cancer Staged    9mm non-keratinizing squamous cell carcinoma, no VPI, no LVI, margin at least 8mm.  Levels 9 and 11 = negative.  T1aN0        NANI NSCLC/ADC IIB (pT2 pN1a cMx)  - History of stage I squamous cell carcinoma moderately differentiated left lower lung status post wedge resection 04/28/2021  - Abnormality seen on surveillance for history of squamous cell carcinoma. Initial biopsy February 20, 2023 without neoplasm identified , thus had left lower lobe wedge resection after multiple disciplinary discussion, final pathology positive on 03/28/2022 for invasive moderately differentiated adenocarcinoma station 10 lymph node positive, pleural invasion noted, margins negative.    - Restaging MRI brain negative and PET scan with left hilar avidity SUV 4 and chest wall. Given recent surgery potential inflammation presented at tumor board recommended proceeding with adjuvant chemotherapy and follow-up on repeat imaging. Started adjuvant therapy with chemotherapy and immunotherapy per IMPOWER 010 given PDL1 positive disease 95%.  Mutational analysis negative for EGFR mutation. Noted BRAF mutation.   - Completed adjuvant chemotherapy with cisplatin and pemetrexed tolerated well aside from chemotherapy associated progressive anemia.    - Restaging PET on 8/7/2023 with resolution of prior lymph node avidity.    - Started immunotherapy with atezolizumab (8/14/2023 - present; delay from C3 on 9/26/23 and C4 on 11/14/23 due to concnern for pneumonitis)  - 2/5/24: CT C, "no detrimental interval change in appearance"  - 3/01/24: CT C non-con, no acute pathology, remaining findings unchanged when compared to prior study    HPI:     Radha Lamas is a 72 yo woman w/ pmh significant for COPD and two primary L lung cancers as below. She presents today for follow up.     - Stage I squamous cell carcinoma of the of the LLL, initially dx'd 4/28/21, s/p wedge resection (4/28/21)    - Stage IIB (pT2, pN1a, cMx) " "adenocarcinoma of the NANI (PD-L1 95%, BRAF V600E mutated), initially dx'd 3/28/23, s/p wedge resection 3/28/23, adjuvant cisplatin/pemetrexed x 4 cycles (5/11/23-7/20/23), and currently on adjuvant atezolizumab (8/14/23 - present; of note, delay between C3 on 9/26/23 and C4 on 11/14/23 due to concern for pneumonitis)     Interval History:   - 2/28/24: Pulmonology, plan for pulmonary stress test  - 3/01/24: CT C, no acute pathology    Pt states that her breathing is unchanged compared to last week. She describes feeling "completely out of breath" after wrapping a hose. She notes that she hasn't been walking because she has been painting at her daughter's house, which she notes that she was able to tolerate. She confirms that she is going for pulmonary testing tomorrow (3/6/24).     I have reviewed and updated the patient's past medical, surgical, family and social histories.      Past Medical History:   Past Medical History:   Diagnosis Date    COPD (chronic obstructive pulmonary disease) 2013    Eczema     GERD (gastroesophageal reflux disease)     Hiatal hernia     History of torn meniscus of right knee 01/2011    Hypothyroid     Incidental pulmonary nodule, > 3mm and < 8mm - Lingula 8/12/2021    Lung cancer     Urinary incontinence in female     Mild , only takes mediciane with travel        Allergies:   Review of patient's allergies indicates:  No Known Allergies     Medications:   Current Outpatient Medications   Medication Sig Dispense Refill    albuterol (PROVENTIL/VENTOLIN HFA) 90 mcg/actuation inhaler Inhale 2 puffs into the lungs every 4 (four) hours as needed for Wheezing or Shortness of Breath. Rescue 18 g 11    calcium carbonate (OS-CYDNEY) 600 mg calcium (1,500 mg) Tab Take 600 mg by mouth.      dexAMETHasone (DECADRON) 4 MG Tab Take 2 tablets (8 mg total) by mouth once daily. on the day prior to chemotherapy then daily on days 2,3,4 of each chemotherapy cycle. 24 tablet 3    " diphenhydrAMINE-aluminum-magnesium hydroxide-simethicone-LIDOcaine HCl 2% Swish and spit 15 mLs every 4 (four) hours as needed. 540 each 2    DUPIXENT  mg/2 mL PnIj Inject into the skin every 14 (fourteen) days.      folic acid (FOLVITE) 1 MG tablet Take 1 tablet (1 mg total) by mouth once daily. starting 1 week prior to pemetrexed and continuing for 21 days after stopping pemetrexed 30 tablet 5    gabapentin (NEURONTIN) 300 MG capsule Take 1 capsule (300 mg total) by mouth every evening. 30 capsule 11    levothyroxine (SYNTHROID) 75 MCG tablet Take 75 mcg by mouth once daily.      loratadine (CLARITIN) 10 mg tablet Take 10 mg by mouth once daily.      magnesium oxide (MAG-OX) 400 mg (241.3 mg magnesium) tablet Take 1 tablet (400 mg total) by mouth once daily. 7 tablet 0    OLANZapine (ZYPREXA) 5 MG tablet Take 1 tablet (5 mg total) by mouth every evening. Take as directed on days 1-4 of your chemotherapy cycle. 16 tablet 0    omeprazole (PRILOSEC) 20 MG capsule Take 20 mg by mouth once daily.      ondansetron (ZOFRAN-ODT) 8 MG TbDL Take 1 tablet (8 mg total) by mouth every 8 (eight) hours as needed (nausea). 60 tablet 5    oxyCODONE (ROXICODONE) 5 MG immediate release tablet Take 1 tablet (5 mg total) by mouth every 4 (four) hours as needed for Pain. 41 tablet 0    tolterodine (DETROL LA) 4 MG 24 hr capsule Take 1 capsule (4 mg total) by mouth once daily. (Patient taking differently: Take 4 mg by mouth daily as needed.) 90 capsule 4    umeclidinium-vilanteroL (ANORO ELLIPTA) 62.5-25 mcg/actuation DsDv USE 1 INHALATION DAILY (CONTROLLER) 180 each 3     No current facility-administered medications for this visit.     Facility-Administered Medications Ordered in Other Visits   Medication Dose Route Frequency Provider Last Rate Last Admin    prochlorperazine injection Soln 5 mg  5 mg Intravenous Q30 Min PRN Adrien Vail MD              ROS:  Review of Systems   A complete 12-point review of systems was  reviewed and is negative except as mentioned above.       Physical Exam:       There were no vitals taken for this visit.               Physical Exam  Constitutional:       Appearance: Normal appearance.   HENT:      Head: Normocephalic and atraumatic.   Eyes:      Extraocular Movements: Extraocular movements intact.      Conjunctiva/sclera: Conjunctivae normal.      Pupils: Pupils are equal, round, and reactive to light.   Cardiovascular:      Rate and Rhythm: Normal rate and regular rhythm.      Heart sounds: No murmur heard.     No friction rub. No gallop.   Pulmonary:      Effort: Pulmonary effort is normal.      Breath sounds: No wheezing, rhonchi or rales.   Musculoskeletal:         General: Normal range of motion.      Right lower leg: No edema.      Left lower leg: No edema.   Skin:     General: Skin is warm and dry.   Neurological:      Mental Status: She is alert and oriented to person, place, and time.   Psychiatric:         Mood and Affect: Mood normal.         Thought Content: Thought content normal.         Judgment: Judgment normal.           Labs:   No results found for this or any previous visit (from the past 48 hour(s)).       Imaging:   CT Chest Without Contrast  Narrative: EXAMINATION:  CT CHEST WITHOUT CONTRAST    CLINICAL HISTORY:  Rule out pneumonitis; Dyspnea, unspecified    TECHNIQUE:  Low dose axial images, sagittal and coronal reformations were obtained from the thoracic inlet to the lung bases. Contrast was not administered.    COMPARISON:  02/05/2024    FINDINGS:  There are stable scarring/postoperative changes seen associated with the left lung.  There is a subpleural noncalcified pulmonary nodule seen within the left lung base that measures approximately 4 mm that is unchanged in appearance.  There are also a few subpleural areas of probable scarring within the right upper lung zone including series 4, image 84, series 4, image 91 and series 4, image 93 that are also stable.    Mild  vascular calcification seen involving the aorta.  There is no pericardial effusion.  No enlarged mediastinal, hilar or axillary lymph nodes are identified.  A right-sided MediPort catheter is noted with the tip seen within the distal SVC.    Stable appearance of a few hypodense lesions within the liver most consistent with cysts.    No suspicious appearing osseus abnormalities are identified.  Impression: 1. No acute pathology.  2. Remaining findings unchanged when compared to the prior study.    Electronically signed by: Lonnie Robin DO  Date:    03/01/2024  Time:    08:41            Path:   1. Left lung, wedge resection: Invasive adenocarcinoma, predominantly solid   pattern, measuring 9 mm (0.9 cm) in greatest dimension.          Tumor is located 3 mm (0.3 cm) from the blue inked/stapled parenchymal   surgical margin.          Tumor extends into the elastic layer of the visceral pleura.          See synoptic report in comment section for further details.   2. Lymph node, left level 11, excision: 1 benign fragmented lymph node with   sinus histiocytosis and anthracotic pigment, negative for metastatic   carcinoma (0/1).   3. Lymph node, left level 10, excision: 1 lymph node, positive for metastatic   carcinoma with crush artifact dense fibrosis and focal necrosis (1/1).          Confirmed with positive immunohistochemical stain with cytokeratin   AE1/AE3 with satisfactory positive and negative controls.   4. Lymph node, left level 12, excision: 1 benign lymph node with sinus   histiocytosis, negative for metastatic carcinoma (0/1).   5. Lymph node, left level 9, excision: 1 benign fragmented lymph node with   sinus histiocytosis and anthracotic pigment, negative for metastatic   carcinoma (0/1).   6. Lymph node, level 7, excision: 1 benign lymph node with sinus   histiocytosis, negative for metastatic carcinoma (0/1).   7.  Lymph node, level 5, excision: 1 benign fragmented lymph node with sinus   histiocytosis  and anthracotic pigment, negative for metastatic carcinoma   (0/1).   8. Lung, lingula, anatomic lingulectomy: Lung with congested vasculature.          No residual carcinoma is identified.          Bronchial and vascular margin is negative for malignancy.          See synoptic report in comment section for further details.       Assessment and Plan:     Radha Lamas is a 72 yo woman w/ pmh significant for COPD and two primary L lung cancers as below. She presents today for follow up.     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 Currently on adjuvant atezolizumab following adjuvant cisplatin/pemetrexed and tolerating without significant issue (initially with some worsening shortness of breath and fatigue).  Most recent imaging (CT C non-con, 3/1/24) without evidence of recurrent/residual disease or of pneumonitis (see below). Will proceed w/ C9 of treatment today (3/5/24). Ultimately, planning atezolizumab for total of 1 year and q3m imaging throughout.     Of note, there was a delay between cycle 3 (09/26/2023) and cycle 4 (11/14/2023) due to concern for pneumonitis, workup for which was largely unrevealing. There was a 1 week delay between C8 and C9 due to concern for pneumonitis.     PLAN:   -- Proceed w/ C9 atezolizumab today (3/5/24), labs acceptable (Cr stable) and treatment signed  -- Labs, RTC, and C10 on 3/26/24  -- Next CT C due around 6/5/24      Dypsnea  See note from 2/27/24. Symptoms are stable today. CT C from 3/5/24 is without evidence of  "pneumonitis. Favor that this represents worsening COPD. Given lack of pneumonitis on CT C, okay to proceed with treatment as above.   -- Pulmonology appointment on 3/6/24 w/ pulmonary testing      Hypokalemia  Resolve.  -- Pt to increase dietary sources of potassium      Urinary Stress Incontinence  Patient describes difficulty holding her urine.  She has discussed oxybutynin with her PCP who stated she should follow up with gynecologist.  Patient has not been to a gynecologist in multiple years.  -- Referral previously placed to gynecology, pt prefers a female provider near [place]      Depressed Renal Function  Patient's eGFR dipped below 60 following her 4th dose of cisplatin/pemetrexed and has remained depressed since (notably, this was prior to her 1st dose of atezolizumab).  It has remained low since it was first noted 08/10/2023. Urine protein/creatinine ratio 0.1 g/day on 11/14/23. This time line may be inconsistent with cisplatin induced nephropathy as recovery of renal function would be anticipated by this time, however it is possible to develop a CKD following cisplatin therapy which may be the etiology. Does not appear consistent with an IrAE.  -- Nephrology appointment on 3/7/24 at non-[facility] practice      Neuropathy  Pt describes "deadened" feet and also like she has "bunched socks" on her feet when she puts on her shoes which developed approximately 2 months after completion of chemotherapy. This is worse at night. She has some long-standing decreased sensation in her fingertips which she relates to eczema. She denies any problems like this previously, including when she received her chemotherapy. She denies any issues with proprioception / ambulation related to this. Exam has been previously unremarkable. Uncertain etiology. The pt does not have a diagnosis of diabetes. The time course is not consistent with chemotherapy induced peripheral neuropathy. Immunotherapy can cause neuropathic issues, " however this developed while the patient had been off of the atezolizumab for 4 weeks already (though this does not preclude this as a possibility).  Symptoms are currently stable and chiefly in her feet.  She has seen a podiatrist with recommendation to increase her walking.  She has not previously been interested in pharmacotherapy time given mild and stable symptoms.  -- Continue to follow with podiatrist  -- Referral to neurology in place, patient did not make appointment.  Okay to hold at this time, low threshold for evaluation.      The above information has been reviewed with the patient and all questions have been answered to their apparent satisfaction.  They understand that they can call the clinic with any questions.    Orion Arce MD  Hematology/Oncology  [facility]        Med Onc Chart Routing      Follow up with physician . Labs, RTC, and C10 on 3/26/24   Follow up with [facility]    Infusion scheduling note    Injection scheduling note    Labs CBC and CMP   Scheduling:  Preferred lab:  Lab interval:     Imaging    Pharmacy appointment    Other referrals

## 2024-03-05 ENCOUNTER — INFUSION (OUTPATIENT)
Dept: INFUSION THERAPY | Facility: HOSPITAL | Age: 74
End: 2024-03-05
Attending: INTERNAL MEDICINE
Payer: MEDICARE

## 2024-03-05 ENCOUNTER — OFFICE VISIT (OUTPATIENT)
Dept: HEMATOLOGY/ONCOLOGY | Facility: CLINIC | Age: 74
End: 2024-03-05
Payer: MEDICARE

## 2024-03-05 VITALS
WEIGHT: 161.38 LBS | OXYGEN SATURATION: 98 % | BODY MASS INDEX: 26.89 KG/M2 | HEART RATE: 81 BPM | SYSTOLIC BLOOD PRESSURE: 100 MMHG | HEIGHT: 65 IN | TEMPERATURE: 97 F | DIASTOLIC BLOOD PRESSURE: 68 MMHG

## 2024-03-05 VITALS
TEMPERATURE: 97 F | DIASTOLIC BLOOD PRESSURE: 63 MMHG | BODY MASS INDEX: 26.89 KG/M2 | OXYGEN SATURATION: 95 % | RESPIRATION RATE: 16 BRPM | WEIGHT: 161.38 LBS | HEART RATE: 71 BPM | HEIGHT: 65 IN | SYSTOLIC BLOOD PRESSURE: 104 MMHG

## 2024-03-05 DIAGNOSIS — E87.6 HYPOKALEMIA: ICD-10-CM

## 2024-03-05 DIAGNOSIS — N28.9 RENAL INSUFFICIENCY: ICD-10-CM

## 2024-03-05 DIAGNOSIS — C34.32 MALIGNANT NEOPLASM OF LOWER LOBE OF LEFT LUNG: Primary | ICD-10-CM

## 2024-03-05 DIAGNOSIS — R06.00 DYSPNEA, UNSPECIFIED TYPE: ICD-10-CM

## 2024-03-05 PROCEDURE — 1126F AMNT PAIN NOTED NONE PRSNT: CPT | Mod: CPTII,S$GLB,, | Performed by: HOSPITALIST

## 2024-03-05 PROCEDURE — A4216 STERILE WATER/SALINE, 10 ML: HCPCS | Performed by: HOSPITALIST

## 2024-03-05 PROCEDURE — 25000003 PHARM REV CODE 250: Performed by: HOSPITALIST

## 2024-03-05 PROCEDURE — 63600175 PHARM REV CODE 636 W HCPCS: Performed by: HOSPITALIST

## 2024-03-05 PROCEDURE — 1101F PT FALLS ASSESS-DOCD LE1/YR: CPT | Mod: CPTII,S$GLB,, | Performed by: HOSPITALIST

## 2024-03-05 PROCEDURE — 3074F SYST BP LT 130 MM HG: CPT | Mod: CPTII,S$GLB,, | Performed by: HOSPITALIST

## 2024-03-05 PROCEDURE — 96413 CHEMO IV INFUSION 1 HR: CPT

## 2024-03-05 PROCEDURE — 3288F FALL RISK ASSESSMENT DOCD: CPT | Mod: CPTII,S$GLB,, | Performed by: HOSPITALIST

## 2024-03-05 PROCEDURE — 99999 PR PBB SHADOW E&M-EST. PATIENT-LVL III: CPT | Mod: PBBFAC,,, | Performed by: HOSPITALIST

## 2024-03-05 PROCEDURE — 99215 OFFICE O/P EST HI 40 MIN: CPT | Mod: S$GLB,,, | Performed by: HOSPITALIST

## 2024-03-05 PROCEDURE — 3078F DIAST BP <80 MM HG: CPT | Mod: CPTII,S$GLB,, | Performed by: HOSPITALIST

## 2024-03-05 RX ORDER — SODIUM CHLORIDE 0.9 % (FLUSH) 0.9 %
10 SYRINGE (ML) INJECTION
Status: DISCONTINUED | OUTPATIENT
Start: 2024-03-05 | End: 2024-03-05 | Stop reason: HOSPADM

## 2024-03-05 RX ORDER — DIPHENHYDRAMINE HYDROCHLORIDE 50 MG/ML
50 INJECTION INTRAMUSCULAR; INTRAVENOUS ONCE AS NEEDED
Status: CANCELLED | OUTPATIENT
Start: 2024-03-05

## 2024-03-05 RX ORDER — SODIUM CHLORIDE 0.9 % (FLUSH) 0.9 %
10 SYRINGE (ML) INJECTION
Status: CANCELLED | OUTPATIENT
Start: 2024-03-05

## 2024-03-05 RX ORDER — EPINEPHRINE 0.3 MG/.3ML
0.3 INJECTION SUBCUTANEOUS ONCE AS NEEDED
Status: CANCELLED | OUTPATIENT
Start: 2024-03-05

## 2024-03-05 RX ORDER — HEPARIN 100 UNIT/ML
500 SYRINGE INTRAVENOUS
Status: DISCONTINUED | OUTPATIENT
Start: 2024-03-05 | End: 2024-03-05 | Stop reason: HOSPADM

## 2024-03-05 RX ORDER — HEPARIN 100 UNIT/ML
500 SYRINGE INTRAVENOUS
Status: CANCELLED | OUTPATIENT
Start: 2024-03-05

## 2024-03-05 RX ADMIN — Medication 10 ML: at 02:03

## 2024-03-05 RX ADMIN — HEPARIN 500 UNITS: 100 SYRINGE at 02:03

## 2024-03-05 RX ADMIN — ATEZOLIZUMAB 1200 MG: 1200 INJECTION, SOLUTION INTRAVENOUS at 01:03

## 2024-03-05 NOTE — DISCHARGE INSTRUCTIONS
.[facility]  [address] [facility] [address] [facility]  [address]  [phone] phone     [phone] fax  Hours of Operation: Monday- Friday 8:00am- 5:00pm  After hours phone  [phone]  Hematology / Oncology Physicians on call    Dr. Feliberto Menjivar        Nurse Practitioners:    Kaitlin Bradley, GINNA Mojica, GINNA Taylor, GINNA Curtis, GINNA Pham, PA      Please don't hesitate to call if you have any concerns.     .WAYS TO HELP PREVENT INFECTION        WASH YOUR HANDS OFTEN DURING THE DAY, ESPECIALLY BEFORE YOU EAT, AFTER USING THE BATHROOM, AND AFTER TOUCHING ANIMALS    STAY AWAY FROM PEOPLE WHO HAVE ILLNESSES YOU CAN CATCH; SUCH AS COLDS, FLU, CHICKEN POX    TRY TO AVOID CROWDS    STAY AWAY FROM CHILDREN WHO RECENTLY HAVE RECEIVED LIVE VIRUS VACCINES    MAINTAIN GOOD MOUTH CARE    DO NOT SQUEEZE OR SCRATCH PIMPLES    CLEAN CUTS & SCRAPES RIGHT AWAY AND DAILY UNTIL HEALED WITH WARM WATER, SOAP & AN ANTISEPTIC    AVOID CONTACT WITH LITTER BOXES, BIRD CAGES, & FISH TANKS    AVOID STANDING WATER, IE., BIRD BATHS, FLOWER POTS/VASES, OR HUMIDIFIERS    WEAR GLOVES WHEN GARDENING OR CLEANING UP AFTER OTHERS, ESPECIALLY BABIES & SMALL CHILDREN    DO NOT EAT RAW FISH, SEAFOOD, MEAT, OR EGGS     .FALL PREVENTION   Falls often occur due to slipping, tripping or losing your balance. Here are ways to reduce your risk of falling again.   Was there anything that caused your fall that can be fixed, removed or replaced?   Make your home safe by keeping walkways clear of objects you may trip over.   Use non-slip pads under rugs.   Do not walk in poorly lit areas.   Do not stand on chairs or wobbly ladders.   Use caution when reaching overhead or looking upward. This position can cause a loss of balance.   Be sure your shoes fit properly, have non-slip bottoms and are in good condition.   Be cautious when going up and down  Impression: Primary open-angle glaucoma, bilateral, mild stage: H40.1131. Plan: IOP stable, well controlled. Patient was diagnosed with Glaucoma x 1 year. Patient was being followed by Dr. Ary Baez.  Continue on Latanoprost QHS OU stairs, curbs, and when walking on uneven sidewalks.   If your balance is poor, consider using a cane or walker.   If your fall was related to alcohol use, stop or limit alcohol intake.   If your fall was related to use of sleeping medicines, talk to your doctor about this. You may need to reduce your dosage at bedtime if you awaken during the night to go to the bathroom.   To reduce the need for nighttime bathroom trips:   Avoid drinking fluids for several hours before going to bed   Empty your bladder before going to bed   Men can keep a urinal at the bedside   © 5435-7845 [facility], [address] [place] [address], [place] [address]. All rights reserved. This information is not intended as a substitute for professional medical care. Always follow your healthcare professional's instructions.

## 2024-03-05 NOTE — PLAN OF CARE
Patient reports slight SOB with exertion that resolves with rest. Tolerated infusion well with no adverse reactions. Patient to return in three weeks for next treatment.

## 2024-03-06 ENCOUNTER — CLINICAL SUPPORT (OUTPATIENT)
Dept: PULMONOLOGY | Facility: CLINIC | Age: 74
End: 2024-03-06
Payer: MEDICARE

## 2024-03-06 ENCOUNTER — OFFICE VISIT (OUTPATIENT)
Dept: PULMONOLOGY | Facility: CLINIC | Age: 74
End: 2024-03-06
Payer: MEDICARE

## 2024-03-06 VITALS
RESPIRATION RATE: 16 BRPM | DIASTOLIC BLOOD PRESSURE: 67 MMHG | BODY MASS INDEX: 27.1 KG/M2 | OXYGEN SATURATION: 99 % | HEART RATE: 89 BPM | SYSTOLIC BLOOD PRESSURE: 136 MMHG | HEIGHT: 65 IN | WEIGHT: 162.69 LBS

## 2024-03-06 VITALS — HEIGHT: 65 IN | BODY MASS INDEX: 27.1 KG/M2 | WEIGHT: 162.69 LBS

## 2024-03-06 DIAGNOSIS — J44.9 COPD, MODERATE: ICD-10-CM

## 2024-03-06 DIAGNOSIS — C34.32 MALIGNANT NEOPLASM OF LOWER LOBE OF LEFT LUNG: ICD-10-CM

## 2024-03-06 DIAGNOSIS — E66.3 OVERWEIGHT (BMI 25.0-29.9): ICD-10-CM

## 2024-03-06 DIAGNOSIS — J44.9 COPD, MODERATE: Primary | ICD-10-CM

## 2024-03-06 DIAGNOSIS — R06.02 SOB (SHORTNESS OF BREATH): ICD-10-CM

## 2024-03-06 DIAGNOSIS — Z87.891 FORMER HEAVY CIGARETTE SMOKER (20-39 PER DAY): ICD-10-CM

## 2024-03-06 DIAGNOSIS — I70.0 AORTIC ATHEROSCLEROSIS: ICD-10-CM

## 2024-03-06 PROCEDURE — 95012 NITRIC OXIDE EXP GAS DETER: CPT | Mod: S$GLB,,, | Performed by: INTERNAL MEDICINE

## 2024-03-06 PROCEDURE — 3075F SYST BP GE 130 - 139MM HG: CPT | Mod: CPTII,S$GLB,, | Performed by: NURSE PRACTITIONER

## 2024-03-06 PROCEDURE — 99999 PR PBB SHADOW E&M-EST. PATIENT-LVL I: CPT | Mod: PBBFAC,,,

## 2024-03-06 PROCEDURE — 1160F RVW MEDS BY RX/DR IN RCRD: CPT | Mod: CPTII,S$GLB,, | Performed by: NURSE PRACTITIONER

## 2024-03-06 PROCEDURE — 99999 PR PBB SHADOW E&M-EST. PATIENT-LVL IV: CPT | Mod: PBBFAC,,, | Performed by: NURSE PRACTITIONER

## 2024-03-06 PROCEDURE — 1159F MED LIST DOCD IN RCRD: CPT | Mod: CPTII,S$GLB,, | Performed by: NURSE PRACTITIONER

## 2024-03-06 PROCEDURE — 3288F FALL RISK ASSESSMENT DOCD: CPT | Mod: CPTII,S$GLB,, | Performed by: NURSE PRACTITIONER

## 2024-03-06 PROCEDURE — 3078F DIAST BP <80 MM HG: CPT | Mod: CPTII,S$GLB,, | Performed by: NURSE PRACTITIONER

## 2024-03-06 PROCEDURE — 99215 OFFICE O/P EST HI 40 MIN: CPT | Mod: 25,S$GLB,, | Performed by: NURSE PRACTITIONER

## 2024-03-06 PROCEDURE — 1101F PT FALLS ASSESS-DOCD LE1/YR: CPT | Mod: CPTII,S$GLB,, | Performed by: NURSE PRACTITIONER

## 2024-03-06 NOTE — PROCEDURES
Clinical Guide to Interpretation or FeNO Levels :    FeNO  (ppb) LOW INTERMEDIATE HIGH   ADULT VALUES < 25 25-50          > 50   Th2-driven Inflammation Unlikely Likely Significant     Patients FeNO level at this visit : __9__ (ppb)    Interpretation of FeNO measurement in adults:  [x] FENO is less than 25 ppb implies non eosinophilic airway inflammation or the absence of airway inflammation.    Comment: Low FENO (<25 ppb) in adult asthmatics with persistent symptoms suggests other etiologies for these symptoms and a lower likelihood of benefit from adding or increasing inhaled glucocorticoids.    [] FENO between 25 and 50 ppb in adults should be interpreted cautiously with reference to the clinical situation (eg, symptomatic, on or off therapy, current smoking).    [] FENO greater than 50 ppb in adults  suggests eosinophilic airway inflammation   Comment: High FENO (>50 ppb) in adult asthmatics even with atypical symptoms suggests glucocorticoid responsiveness. High FENO (>50 ppb) can help identify poor adherence or uncontrolled inflammation in asthma patients with otherwise seemingly controlled asthma.    Discussion:  A FENO less than 25 ppb in adults and less than 20 ppb in children younger than 12 years of age implies noneosinophilic airway inflammation or the absence of airway inflammation.  A FENO greater than 50 ppb in adults or greater than 35 ppb in children suggests eosinophilic airway inflammation.   Values of FENO between 25 and 50 ppb in adults (20 to 35 ppb in children) should be interpreted cautiously with reference to the clinical situation (eg, symptomatic, on or off therapy, current smoking).  A rising FENO with a greater than 20 percent change and more than 25 ppb (20 ppb in children) from a previously stable level suggests increasing eosinophilic airway inflammation, but there are wide inter-individual differences.  A decrease in FENO greater than 20 percent for values over 50 ppb or more than  10 ppb for values less than 50 ppb may be clinically important.  ?FENO in other respiratory diseases - Several other diseases are associated with altered levels of exhaled NO: low levels of FENO have been noted in cystic fibrosis, current smoking, pulmonary hypertension, hypothermia, primary ciliary dyskinesia, and bronchopulmonary dysplasia. Elevated FENO has been noted in atopy, nonasthmatic eosinophilic bronchitis, COPD exacerbations, noncystic fibrosis bronchiectasis, and viral upper respiratory infections.    REFERENCE:  ATS Board of Directors, December 2004, and by the ERS Executive Committee, June 2004. ATS/ERS Recommendations for Standardized Procedures for the Online and Offline Measurement of Exhaled Lower Respiratory Nitric Oxide and Nasal Nitric Oxide. Guideline 2005

## 2024-03-06 NOTE — Clinical Note
Hi Dr. Arce, work up for pulmonary cause of shortness of breath with activity unrevealing. Normal 6mwd and feno. No indication to add on ICS controller. See problem list addressed. Will see her back in May as planned. If not improved with stopping breakfast before walking and taking neb treatment  or albuterol before walking then I will plan cpx testing to further evaluate. Thank you

## 2024-03-06 NOTE — PROCEDURES
"[facility] - Pulmonary Function  Six Minute Walk     SUMMARY     Ordering Provider: GINNA Almonte   Interpreting Provider: Dr. Rojas  Performing nurse/tech/RT: ELZA Levi RRT  Diagnosis: Shortness of Breath  Height: 5' 5" (165.1 cm)  Weight: 73.8 kg (162 lb 11.2 oz)  BMI (Calculated): 27.1   Patient Race:             Phase Oxygen Assessment Supplemental O2 Heart   Rate Blood Pressure Edmundo Dyspnea Scale Rating   Resting 99 % Room Air 78 bpm 132/60 0   Exercise        Minute        1 95 % Room Air 98 bpm     2 94 % Room Air 110 bpm     3 93 % Room Air 111 bpm     4 95 % Room Air 111 bpm     5 93 % Room Air 111 bpm     6  94 % Room Air 109 bpm 136/62 3   Recovery        Minute        1 98 % Room Air 84 bpm     2 98 % Room Air 86 bpm     3 100 % Room Air 89 bpm     4 99 % Room Air 89 bpm 136/67 0     Six Minute Walk Summary  6MWT Status: completed without stopping  Patient Reported: Dyspnea     Interpretation:  Did the patient stop or pause?: No            Total Time Walked (Calculated): 360 seconds  Final Partial Lap Distance (feet): 100 feet  Total Distance Meters (Calculated): 396.24 meters  Predicted Distance Meters (Calculated): 407.08 meters  Percentage of Predicted (Calculated): 97.34  Peak VO2 (Calculated): 15.87  Mets: 4.53  Has The Patient Had a Previous Six Minute Walk Test?: Yes       Previous 6MWT Results  Has The Patient Had a Previous Six Minute Walk Test?: Yes  Date of Previous Test: 11/08/23  Total Time Walked: 360 seconds  Total Distance (meters): 419.1  Predicted Distance (meters): 413.09 meters  Percentage of Predicted: 101.45  Percent Change (Calculated): 0.05         CLINICAL INTERPRETATION:  Six minute walk distance is 396.24m (97.34 % predicted) with light dyspnea.  During exercise, there was no significant desaturation while breathing room air.  Blood pressure remained stable and Heart rate remained stable with walking.  The patient did not report non-pulmonary symptoms during exercise.  The " patient did complete the study, walking 360 seconds of the 360 second test.  Since the previous study in 11/08/2023, exercise capacity is unchanged.  Based upon age and body mass index, exercise capacity is normal.      [x] Mild exercise-induced hypoxemia described as an arterial oxygen saturation of 93-95% (or 3-4% less than at rest)  []  Moderate exercise-induced hypoxemia as 89-93%  []  Severe exercise induced hypoxemia as < 89% O2 saturation.  Medicare Criteria for oxygen prescription comments:   []  When arterial oxygen saturation is at or below 88% during exercise (severe exercise induced hypoxemia) then the patient falls under Medicare Group 1 criteria for supplemental oxygen

## 2024-03-07 NOTE — ASSESSMENT & PLAN NOTE
4/28/2021 History of resected early stage left lower lobe squamous cell cancer.  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   Continued management with oncology

## 2024-03-07 NOTE — ASSESSMENT & PLAN NOTE
Evaluation today for shortness of breath feno and 6mwd are unrevealing for COPD flare as cause of shortness of breath with exertion.   3/6/2024 6MWD No desaturations requiring supplemental oxygen at rest or exertion. Normal exercise capacity.   3/6/2024 FeNO 9, no inflammation of lungs suggested.    No indication to add ICS controller to COPD inhaler regimen. Continue ANORO daily and Albuterol inhaler or nebs before exercise.     Advised possible post prandial shortness of breath and for a trial of avoiding eating large meal just before exercise. Pt reports she does eat a good breakfast right before going to walk with her daughter, not hungry just eats because her  eats breakfast just before her scheduled walk. (Recommended 1/2 banana instead of big breakfast before exercise).   Also follow up with cardiologist, consideration for holter monitor determine cardiac status when feeling sensations of shortness of breath.   Will see her back in May as planned. If not improved with stopping breakfast before walking and taking neb treatment  or albuterol before walking then I will plan to consider cpx testing to further evaluate. Thank you

## 2024-03-07 NOTE — ASSESSMENT & PLAN NOTE
Continue to encourage exercise and dietary modification to obtain normal weight.   Has walking program daily  Wt Readings from Last 9 Encounters:   03/06/24 73.8 kg (162 lb 11.2 oz)   03/06/24 73.8 kg (162 lb 11.2 oz)   03/05/24 73.2 kg (161 lb 6 oz)   03/05/24 73.2 kg (161 lb 6 oz)   02/27/24 73.9 kg (162 lb 14.7 oz)   02/09/24 73 kg (161 lb)   02/06/24 73.1 kg (161 lb 2.5 oz)   02/06/24 73.1 kg (161 lb 2.5 oz)   01/16/24 73.5 kg (162 lb 0.6 oz)   Body mass index is 27.07 kg/m².

## 2024-03-25 ENCOUNTER — LAB VISIT (OUTPATIENT)
Dept: LAB | Facility: HOSPITAL | Age: 74
End: 2024-03-25
Attending: HOSPITALIST
Payer: MEDICARE

## 2024-03-25 DIAGNOSIS — C79.9 NEOPLASM, METASTATIC: ICD-10-CM

## 2024-03-25 DIAGNOSIS — C34.32 MALIGNANT NEOPLASM OF LOWER LOBE OF LEFT LUNG: ICD-10-CM

## 2024-03-25 LAB
ALBUMIN SERPL BCP-MCNC: 3.9 G/DL (ref 3.5–5.2)
ALP SERPL-CCNC: 69 U/L (ref 55–135)
ALT SERPL W/O P-5'-P-CCNC: <5 U/L (ref 10–44)
ANION GAP SERPL CALC-SCNC: 8 MMOL/L (ref 8–16)
AST SERPL-CCNC: 22 U/L (ref 10–40)
BILIRUB SERPL-MCNC: 0.6 MG/DL (ref 0.1–1)
BUN SERPL-MCNC: 17 MG/DL (ref 8–23)
CALCIUM SERPL-MCNC: 9.4 MG/DL (ref 8.7–10.5)
CHLORIDE SERPL-SCNC: 105 MMOL/L (ref 95–110)
CO2 SERPL-SCNC: 28 MMOL/L (ref 23–29)
CREAT SERPL-MCNC: 1.5 MG/DL (ref 0.5–1.4)
ERYTHROCYTE [DISTWIDTH] IN BLOOD BY AUTOMATED COUNT: 13.4 % (ref 11.5–14.5)
EST. GFR  (NO RACE VARIABLE): 36 ML/MIN/1.73 M^2
GLUCOSE SERPL-MCNC: 108 MG/DL (ref 70–110)
HCT VFR BLD AUTO: 34.7 % (ref 37–48.5)
HGB BLD-MCNC: 11.1 G/DL (ref 12–16)
IMM GRANULOCYTES # BLD AUTO: 0.01 K/UL (ref 0–0.04)
MCH RBC QN AUTO: 29.3 PG (ref 27–31)
MCHC RBC AUTO-ENTMCNC: 32 G/DL (ref 32–36)
MCV RBC AUTO: 92 FL (ref 82–98)
NEUTROPHILS # BLD AUTO: 2.4 K/UL (ref 1.8–7.7)
PLATELET # BLD AUTO: 147 K/UL (ref 150–450)
PMV BLD AUTO: 10 FL (ref 9.2–12.9)
POTASSIUM SERPL-SCNC: 4.1 MMOL/L (ref 3.5–5.1)
PROT SERPL-MCNC: 7 G/DL (ref 6–8.4)
RBC # BLD AUTO: 3.79 M/UL (ref 4–5.4)
SODIUM SERPL-SCNC: 141 MMOL/L (ref 136–145)
TSH SERPL DL<=0.005 MIU/L-ACNC: 1.86 UIU/ML (ref 0.4–4)
WBC # BLD AUTO: 4.03 K/UL (ref 3.9–12.7)

## 2024-03-25 PROCEDURE — 84443 ASSAY THYROID STIM HORMONE: CPT | Performed by: HOSPITALIST

## 2024-03-25 PROCEDURE — 80053 COMPREHEN METABOLIC PANEL: CPT | Performed by: HOSPITALIST

## 2024-03-25 PROCEDURE — 36415 COLL VENOUS BLD VENIPUNCTURE: CPT | Mod: PO | Performed by: HOSPITALIST

## 2024-03-25 PROCEDURE — 85027 COMPLETE CBC AUTOMATED: CPT | Performed by: HOSPITALIST

## 2024-03-25 NOTE — PROGRESS NOTES
[facility] MEDICAL ONCOLOGY - FOLLOW UP VISIT    Reason for visit: Follow up for stage IIB squamous cell carcinoma      Oncology History   Malignant neoplasm of lower lobe of left lung - Squamous cell   4/15/2021 Initial Diagnosis    Malignant neoplasm of lower lobe of left lung - Squamous cell     5/25/2021 Cancer Staged    Staging form: Lung, AJCC 8th Edition  - Clinical: Stage IA1 (cT1a, cN0, cM0)      Cancer Staged    9mm non-keratinizing squamous cell carcinoma, no VPI, no LVI, margin at least 8mm.  Levels 9 and 11 = negative.  T1aN0     4/5/2023 Cancer Staged    Staging form: Lung, AJCC 8th Edition  - Pathologic stage from 4/5/2023: Stage IIB (pT2, pN1, cM0)     5/1/2023 - 7/21/2023 Chemotherapy    Treatment Summary   Plan Name: OP NSCLC PEMETREXED + CISPLATIN Q3W  Treatment Goal: Supportive  Status: Inactive  Start Date: 5/1/2023  End Date: 7/21/2023  Provider: Marissa Brower MD  Chemotherapy: CISplatin (Platinol) 75 mg/m2 = 138 mg in sodium chloride 0.9% 665 mL chemo infusion, 75 mg/m2 = 138 mg, Intravenous, Clinic/HOD 1 time, 4 of 4 cycles  Administration: 138 mg (5/12/2023), 138 mg (6/29/2023), 138 mg (7/20/2023), 138 mg (6/8/2023)  PEMEtrexed disodium (ALIMTA) 900 mg in sodium chloride 0.9% SolP 100 mL chemo infusion, 925 mg, Intravenous, Clinic/HOD 1 time, 4 of 4 cycles  Dose modification: 375 mg/m2 (original dose 500 mg/m2, Cycle 3, Reason: MD Discretion, Comment: neutropenia )  Administration: 900 mg (5/12/2023), 700 mg (6/29/2023), 700 mg (7/20/2023), 700 mg (6/8/2023)     8/14/2023 -  Chemotherapy    Treatment Summary   Plan Name: OP ATEZOLIZUMAB Q3W  Treatment Goal: Supportive  Status: Active  Start Date: 8/14/2023  End Date: 8/13/2024 (Planned)  Provider: Marissa Brower MD  Chemotherapy: [No matching medication found in this treatment plan]     NSCLC of left lung (Resolved)   4/28/2021 Initial Diagnosis    NSCLC of left lung  "(Resolved)      Cancer Staged    9mm non-keratinizing squamous cell carcinoma, no VPI, no LVI, margin at least 8mm.  Levels 9 and 11 = negative.  T1aN0        NANI NSCLC/ADC IIB (pT2 pN1a cMx)  - History of stage I squamous cell carcinoma moderately differentiated left lower lung status post wedge resection 04/28/2021  - Abnormality seen on surveillance for history of squamous cell carcinoma. Initial biopsy February 20, 2023 without neoplasm identified , thus had left lower lobe wedge resection after multiple disciplinary discussion, final pathology positive on 03/28/2022 for invasive moderately differentiated adenocarcinoma station 10 lymph node positive, pleural invasion noted, margins negative.    - Restaging MRI brain negative and PET scan with left hilar avidity SUV 4 and chest wall. Given recent surgery potential inflammation presented at tumor board recommended proceeding with adjuvant chemotherapy and follow-up on repeat imaging. Started adjuvant therapy with chemotherapy and immunotherapy per IMPOWER 010 given PDL1 positive disease 95%.  Mutational analysis negative for EGFR mutation. Noted BRAF mutation.   - Completed adjuvant chemotherapy with cisplatin and pemetrexed tolerated well aside from chemotherapy associated progressive anemia.    - Restaging PET on 8/7/2023 with resolution of prior lymph node avidity.    - Started immunotherapy with atezolizumab (8/14/2023 - present; delay from C3 on 9/26/23 and C4 on 11/14/23 due to concnern for pneumonitis)  - 2/5/24: CT C, "no detrimental interval change in appearance"  - 3/01/24: CT C non-con, no acute pathology, remaining findings unchanged when compared to prior study    HPI:     Radha Lamas is a 74 yo woman w/ pmh significant for COPD and two primary L lung cancers as below. She presents today for follow up.     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 Labs, RTC, and C10 on 3/26. CT due around 6/5. Pulm for dyspnea. Nephrology on 3/07.     Interval History:   - 3/5/24: C9D1 atezolizumab  - 3/06/24: Pulmonology, discussed post-prandial dyspnea. RTC in May.     Pt states that she met with the outside nephrologist. She was told that her renal dysfunction was likely due to her prior receipt of cytotoxic therapy and not her current immunotherapy. She is planning to follow up in 4 months. Otherwise, she says she is feeling well. She says that her breathing is "good" but notes that she is not currently able to walk because of an old knee injury and feels that she would have difficulty with her breathing if she were to exercise as normal. She says that prior to the knee pain, she did try walking before eating and says that her SOB was better when that occurred. She feels that her fatigue is stable and remains improved as compared to when she was on chemotherapy. She denies new/worsening cough, rash, N/V, diarrhea, constipation, or other new or bothersome symptoms. She remains active and has been helping her daughter paint. She notes that she just hired a cleaning person which she is excited about.     I have reviewed and updated the patient's past medical, surgical, family and social histories.      Past Medical History:   Past Medical History:   Diagnosis Date    COPD (chronic obstructive pulmonary disease) 2013    Eczema     GERD (gastroesophageal reflux disease)     Hiatal hernia     History of torn meniscus of right knee 01/2011    Hypothyroid     Incidental pulmonary nodule, > 3mm and < 8mm - [facility] 8/12/2021    " Lung cancer     Urinary incontinence in female     Mild , only takes mediciane with travel        Allergies:   Review of patient's allergies indicates:  No Known Allergies     Medications:   Current Outpatient Medications   Medication Sig Dispense Refill    albuterol (PROVENTIL/VENTOLIN HFA) 90 mcg/actuation inhaler Inhale 2 puffs into the lungs every 4 (four) hours as needed for Wheezing or Shortness of Breath. Rescue 18 g 11    calcium carbonate (OS-CYDNEY) 600 mg calcium (1,500 mg) Tab Take 600 mg by mouth.      dexAMETHasone (DECADRON) 4 MG Tab Take 2 tablets (8 mg total) by mouth once daily. on the day prior to chemotherapy then daily on days 2,3,4 of each chemotherapy cycle. 24 tablet 3    diphenhydrAMINE-aluminum-magnesium hydroxide-simethicone-LIDOcaine HCl 2% Swish and spit 15 mLs every 4 (four) hours as needed. 540 each 2    DUPIXENT  mg/2 mL PnIj Inject into the skin every 14 (fourteen) days.      folic acid (FOLVITE) 1 MG tablet Take 1 tablet (1 mg total) by mouth once daily. starting 1 week prior to pemetrexed and continuing for 21 days after stopping pemetrexed 30 tablet 5    gabapentin (NEURONTIN) 300 MG capsule Take 1 capsule (300 mg total) by mouth every evening. 30 capsule 11    levothyroxine (SYNTHROID) 75 MCG tablet Take 75 mcg by mouth once daily.      loratadine (CLARITIN) 10 mg tablet Take 10 mg by mouth once daily.      magnesium oxide (MAG-OX) 400 mg (241.3 mg magnesium) tablet Take 1 tablet (400 mg total) by mouth once daily. 7 tablet 0    OLANZapine (ZYPREXA) 5 MG tablet Take 1 tablet (5 mg total) by mouth every evening. Take as directed on days 1-4 of your chemotherapy cycle. 16 tablet 0    omeprazole (PRILOSEC) 20 MG capsule Take 20 mg by mouth once daily.      ondansetron (ZOFRAN-ODT) 8 MG TbDL Take 1 tablet (8 mg total) by mouth every 8 (eight) hours as needed (nausea). 60 tablet 5    oxyCODONE (ROXICODONE) 5 MG immediate release tablet Take 1 tablet (5 mg total) by mouth every 4  (four) hours as needed for Pain. 41 tablet 0    tolterodine (DETROL LA) 4 MG 24 hr capsule Take 1 capsule (4 mg total) by mouth once daily. (Patient taking differently: Take 4 mg by mouth daily as needed.) 90 capsule 4    umeclidinium-vilanteroL (ANORO ELLIPTA) 62.5-25 mcg/actuation DsDv USE 1 INHALATION DAILY (CONTROLLER) 180 each 3     No current facility-administered medications for this visit.     Facility-Administered Medications Ordered in Other Visits   Medication Dose Route Frequency Provider Last Rate Last Admin    prochlorperazine injection Soln 5 mg  5 mg Intravenous Q30 Min PRN Adrien Vail MD              ROS:  Review of Systems   A complete 12-point review of systems was reviewed and is negative except as mentioned above.       Physical Exam:       There were no vitals taken for this visit.               Physical Exam  Constitutional:       Appearance: Normal appearance.   HENT:      Head: Normocephalic and atraumatic.   Eyes:      Extraocular Movements: Extraocular movements intact.      Conjunctiva/sclera: Conjunctivae normal.      Pupils: Pupils are equal, round, and reactive to light.   Cardiovascular:      Rate and Rhythm: Normal rate and regular rhythm.      Heart sounds: No murmur heard.     No friction rub. No gallop.   Pulmonary:      Effort: Pulmonary effort is normal.      Breath sounds: No wheezing, rhonchi or rales.   Musculoskeletal:         General: Normal range of motion.      Right lower leg: No edema.      Left lower leg: No edema.   Skin:     General: Skin is warm and dry.   Neurological:      Mental Status: She is alert and oriented to person, place, and time.   Psychiatric:         Mood and Affect: Mood normal.         Thought Content: Thought content normal.         Judgment: Judgment normal.           Labs:   No results found for this or any previous visit (from the past 48 hour(s)).       Imaging:   CT Chest Without Contrast  Narrative: EXAMINATION:  CT CHEST WITHOUT  CONTRAST    CLINICAL HISTORY:  Rule out pneumonitis; Dyspnea, unspecified    TECHNIQUE:  Low dose axial images, sagittal and coronal reformations were obtained from the thoracic inlet to the lung bases. Contrast was not administered.    COMPARISON:  02/05/2024    FINDINGS:  There are stable scarring/postoperative changes seen associated with the left lung.  There is a subpleural noncalcified pulmonary nodule seen within the left lung base that measures approximately 4 mm that is unchanged in appearance.  There are also a few subpleural areas of probable scarring within the right upper lung zone including series 4, image 84, series 4, image 91 and series 4, image 93 that are also stable.    Mild vascular calcification seen involving the aorta.  There is no pericardial effusion.  No enlarged mediastinal, hilar or axillary lymph nodes are identified.  A right-sided MediPort catheter is noted with the tip seen within the distal SVC.    Stable appearance of a few hypodense lesions within the liver most consistent with cysts.    No suspicious appearing osseus abnormalities are identified.  Impression: 1. No acute pathology.  2. Remaining findings unchanged when compared to the prior study.    Electronically signed by: Lonnie Robin DO  Date:    03/01/2024  Time:    08:41            Path:   1. Left lung, wedge resection: Invasive adenocarcinoma, predominantly solid   pattern, measuring 9 mm (0.9 cm) in greatest dimension.          Tumor is located 3 mm (0.3 cm) from the blue inked/stapled parenchymal   surgical margin.          Tumor extends into the elastic layer of the visceral pleura.          See synoptic report in comment section for further details.   2. Lymph node, left level 11, excision: 1 benign fragmented lymph node with   sinus histiocytosis and anthracotic pigment, negative for metastatic   carcinoma (0/1).   3. Lymph node, left level 10, excision: 1 lymph node, positive for metastatic   carcinoma with crush  artifact dense fibrosis and focal necrosis (1/1).          Confirmed with positive immunohistochemical stain with cytokeratin   AE1/AE3 with satisfactory positive and negative controls.   4. Lymph node, left level 12, excision: 1 benign lymph node with sinus   histiocytosis, negative for metastatic carcinoma (0/1).   5. Lymph node, left level 9, excision: 1 benign fragmented lymph node with   sinus histiocytosis and anthracotic pigment, negative for metastatic   carcinoma (0/1).   6. Lymph node, level 7, excision: 1 benign lymph node with sinus   histiocytosis, negative for metastatic carcinoma (0/1).   7.  Lymph node, level 5, excision: 1 benign fragmented lymph node with sinus   histiocytosis and anthracotic pigment, negative for metastatic carcinoma   (0/1).   8. Lung, lingula, anatomic lingulectomy: Lung with congested vasculature.          No residual carcinoma is identified.          Bronchial and vascular margin is negative for malignancy.          See synoptic report in comment section for further details.       Assessment and Plan:     Radha Lamas is a 74 yo woman w/ pmh significant for COPD and two primary L lung cancers as below. She presents today for follow up.     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 Currently on adjuvant atezolizumab following adjuvant cisplatin/pemetrexed and tolerating without significant issue (initially with some worsening shortness of breath and fatigue).  Most recent imaging (CT C non-con, 3/1/24) without evidence  of recurrent/residual disease or of pneumonitis (see below). Given good tolerance and response, will continue with immunotherapy at this time. Ultimately, planning atezolizumab for total of 1 year and q3m imaging throughout.     Of note, there was a delay between cycle 3 (09/26/2023) and cycle 4 (11/14/2023) due to concern for pneumonitis, workup for which was largely unrevealing. There was a 1 week delay between C8 and C9 due to concern for pneumonitis.     PLAN:   -- Proceed w/ C10 atezolizumab today (3/26/24), labs acceptable (Cr stable, Hgb slightly up) and treatment signed  -- Labs on 4/15/24, RTC and C11 on 4/16/24  -- Next CT C due around 6/5/24      Dypsnea  See note from 2/27/24. Symptoms are stable today. CT C from 3/5/24 is without evidence of pneumonitis. Favor that this represents worsening COPD. Pt currently being managed by pulmonology, may also be a component of post-prandial dyspnea. Given lack of pneumonitis on CT C, okay to proceed with treatment as above.   -- Continue to f/u w/ pulmonology      Depressed Renal Function  Patient's eGFR dipped below 60 following her 4th dose of cisplatin/pemetrexed and has remained depressed since (notably, this was prior to her 1st dose of atezolizumab).  It has remained low since it was first noted 08/10/2023. Urine protein/creatinine ratio 0.1 g/day on 11/14/23. This time line may be inconsistent with cisplatin induced nephropathy as recovery of renal function would be anticipated by this time, however it is possible to develop a CKD following cisplatin therapy which may be the etiology. Does not appear consistent with an IrAE. Pt has now established care w/ Dr. Turner.  -- Nephrology appointment on 3/7/24 with Dr. Turner at [facility] in [place], will request note from this encounter      Urinary Stress Incontinence  Patient describes difficulty holding her urine.  She has discussed oxybutynin with her PCP who stated she should follow up with  "gynecologist.  Patient has not been to a gynecologist in multiple years.  -- Referral previously placed to gynecology, pt prefers a female provider near [place]      Neuropathy  Pt describes "deadened" feet and also like she has "bunched socks" on her feet when she puts on her shoes which developed approximately 2 months after completion of chemotherapy. This is worse at night. She has some long-standing decreased sensation in her fingertips which she relates to eczema. She denies any problems like this previously, including when she received her chemotherapy. She denies any issues with proprioception / ambulation related to this. Exam has been previously unremarkable. Uncertain etiology. The pt does not have a diagnosis of diabetes. The time course is not consistent with chemotherapy induced peripheral neuropathy. Immunotherapy can cause neuropathic issues, however this developed while the patient had been off of the atezolizumab for 4 weeks already (though this does not preclude this as a possibility).  Symptoms are currently stable and chiefly in her feet.  She has seen a podiatrist with recommendation to increase her walking.  She has not previously been interested in pharmacotherapy time given mild and stable symptoms.  -- Continue to follow with podiatrist  -- Referral to neurology in place, patient did not make appointment.  Okay to hold at this time, low threshold for evaluation.      The above information has been reviewed with the patient and all questions have been answered to their apparent satisfaction.  They understand that they can call the clinic with any questions.    Orion Arce MD  Hematology/Oncology  [facility]        Med Onc Chart Routing      Follow up with physician . Labs on 4/15/24, RTC and C11 on 4/16/24   Follow up with [facility]    Infusion scheduling note    Injection scheduling note    Labs CBC, CMP, free T4 and TSH   Scheduling:  Preferred lab:  Lab interval:   "   Imaging    Pharmacy appointment    Other referrals

## 2024-03-26 ENCOUNTER — INFUSION (OUTPATIENT)
Dept: INFUSION THERAPY | Facility: HOSPITAL | Age: 74
End: 2024-03-26
Attending: INTERNAL MEDICINE
Payer: MEDICARE

## 2024-03-26 ENCOUNTER — OFFICE VISIT (OUTPATIENT)
Dept: HEMATOLOGY/ONCOLOGY | Facility: CLINIC | Age: 74
End: 2024-03-26
Payer: MEDICARE

## 2024-03-26 VITALS
TEMPERATURE: 97 F | SYSTOLIC BLOOD PRESSURE: 116 MMHG | WEIGHT: 162.13 LBS | OXYGEN SATURATION: 98 % | HEART RATE: 70 BPM | HEIGHT: 65 IN | BODY MASS INDEX: 27.01 KG/M2 | DIASTOLIC BLOOD PRESSURE: 70 MMHG

## 2024-03-26 VITALS
WEIGHT: 162.13 LBS | HEIGHT: 65 IN | SYSTOLIC BLOOD PRESSURE: 111 MMHG | BODY MASS INDEX: 27.01 KG/M2 | RESPIRATION RATE: 18 BRPM | DIASTOLIC BLOOD PRESSURE: 63 MMHG | HEART RATE: 62 BPM | TEMPERATURE: 97 F

## 2024-03-26 DIAGNOSIS — C34.32 MALIGNANT NEOPLASM OF LOWER LOBE OF LEFT LUNG: Primary | ICD-10-CM

## 2024-03-26 DIAGNOSIS — C79.9 NEOPLASM, METASTATIC: ICD-10-CM

## 2024-03-26 DIAGNOSIS — E87.6 HYPOKALEMIA: ICD-10-CM

## 2024-03-26 DIAGNOSIS — R06.00 DYSPNEA, UNSPECIFIED TYPE: ICD-10-CM

## 2024-03-26 DIAGNOSIS — G62.9 NEUROPATHY: ICD-10-CM

## 2024-03-26 DIAGNOSIS — N28.9 RENAL INSUFFICIENCY: ICD-10-CM

## 2024-03-26 PROCEDURE — 3288F FALL RISK ASSESSMENT DOCD: CPT | Mod: CPTII,S$GLB,, | Performed by: HOSPITALIST

## 2024-03-26 PROCEDURE — 99999 PR PBB SHADOW E&M-EST. PATIENT-LVL IV: CPT | Mod: PBBFAC,,, | Performed by: HOSPITALIST

## 2024-03-26 PROCEDURE — 3078F DIAST BP <80 MM HG: CPT | Mod: CPTII,S$GLB,, | Performed by: HOSPITALIST

## 2024-03-26 PROCEDURE — 3074F SYST BP LT 130 MM HG: CPT | Mod: CPTII,S$GLB,, | Performed by: HOSPITALIST

## 2024-03-26 PROCEDURE — 25000003 PHARM REV CODE 250: Performed by: HOSPITALIST

## 2024-03-26 PROCEDURE — 96413 CHEMO IV INFUSION 1 HR: CPT

## 2024-03-26 PROCEDURE — 1126F AMNT PAIN NOTED NONE PRSNT: CPT | Mod: CPTII,S$GLB,, | Performed by: HOSPITALIST

## 2024-03-26 PROCEDURE — 1101F PT FALLS ASSESS-DOCD LE1/YR: CPT | Mod: CPTII,S$GLB,, | Performed by: HOSPITALIST

## 2024-03-26 PROCEDURE — 99215 OFFICE O/P EST HI 40 MIN: CPT | Mod: S$GLB,,, | Performed by: HOSPITALIST

## 2024-03-26 PROCEDURE — 63600175 PHARM REV CODE 636 W HCPCS: Performed by: HOSPITALIST

## 2024-03-26 RX ORDER — EPINEPHRINE 0.3 MG/.3ML
0.3 INJECTION SUBCUTANEOUS ONCE AS NEEDED
Status: CANCELLED | OUTPATIENT
Start: 2024-03-26

## 2024-03-26 RX ORDER — DIPHENHYDRAMINE HYDROCHLORIDE 50 MG/ML
50 INJECTION INTRAMUSCULAR; INTRAVENOUS ONCE AS NEEDED
Status: CANCELLED | OUTPATIENT
Start: 2024-03-26

## 2024-03-26 RX ORDER — HEPARIN 100 UNIT/ML
500 SYRINGE INTRAVENOUS
Status: CANCELLED | OUTPATIENT
Start: 2024-03-26

## 2024-03-26 RX ORDER — HEPARIN 100 UNIT/ML
500 SYRINGE INTRAVENOUS
Status: DISCONTINUED | OUTPATIENT
Start: 2024-03-26 | End: 2024-03-26 | Stop reason: HOSPADM

## 2024-03-26 RX ORDER — SODIUM CHLORIDE 0.9 % (FLUSH) 0.9 %
10 SYRINGE (ML) INJECTION
Status: CANCELLED | OUTPATIENT
Start: 2024-03-26

## 2024-03-26 RX ADMIN — HEPARIN 500 UNITS: 100 SYRINGE at 10:03

## 2024-03-26 RX ADMIN — ATEZOLIZUMAB 1200 MG: 1200 INJECTION, SOLUTION INTRAVENOUS at 09:03

## 2024-03-26 NOTE — Clinical Note
Morning!  This patient had a visit with a nephrologist named Dr. Turner at [facility] in  on 3/07/24. Is it possible to get a copy of the note from this visit? I don't see it in care everywhere and I wasn't able to pull it up on her phone under the [employer] for that group.   Thanks!

## 2024-03-26 NOTE — PLAN OF CARE
Problem: Adult Inpatient Plan of Care  Goal: Plan of Care Review  Outcome: Ongoing, Progressing  Flowsheets (Taken 3/26/2024 0941)  Plan of Care Reviewed With:   patient   spouse  Goal: Patient-Specific Goal (Individualized)  Outcome: Ongoing, Progressing  Flowsheets (Taken 3/26/2024 0941)  Anxieties, Fears or Concerns: none  Individualized Care Needs: feet up  Goal: Optimal Comfort and Wellbeing  Outcome: Ongoing, Progressing  Intervention: Provide Person-Centered Care  Flowsheets (Taken 3/26/2024 0941)  Trust Relationship/Rapport:   care explained   reassurance provided   choices provided   thoughts/feelings acknowledged   emotional support provided   empathic listening provided   questions answered   questions encouraged

## 2024-03-26 NOTE — DISCHARGE INSTRUCTIONS
THANKS FOR ALLOWING ME TO CARE FOR YOU TODAY!!!!! ~AILEEN          THANKS FOR CHOOSING [facility]!!!          [facility]  [address] [facility]  [id number] [place] [address] Drive  [phone] phone     [phone] fax  Hours of Operation: Monday- Friday 8:00am- 5:00pm  After hours phone  [phone]  Hematology / Oncology Physicians on call      FOUZIA Jones Dr., NP Sydney Prescott, GINNA Taylor, MAGGIE Marina    Please call with any concerns regarding your appointment today.

## 2024-04-03 ENCOUNTER — PATIENT MESSAGE (OUTPATIENT)
Dept: PULMONOLOGY | Facility: CLINIC | Age: 74
End: 2024-04-03
Payer: MEDICARE

## 2024-04-15 ENCOUNTER — LAB VISIT (OUTPATIENT)
Dept: LAB | Facility: HOSPITAL | Age: 74
End: 2024-04-15
Attending: HOSPITALIST
Payer: MEDICARE

## 2024-04-15 DIAGNOSIS — C79.9 NEOPLASM, METASTATIC: ICD-10-CM

## 2024-04-15 DIAGNOSIS — C34.32 MALIGNANT NEOPLASM OF LOWER LOBE OF LEFT LUNG: ICD-10-CM

## 2024-04-15 LAB
ALBUMIN SERPL BCP-MCNC: 4 G/DL (ref 3.5–5.2)
ALP SERPL-CCNC: 69 U/L (ref 55–135)
ALT SERPL W/O P-5'-P-CCNC: 6 U/L (ref 10–44)
ANION GAP SERPL CALC-SCNC: 11 MMOL/L (ref 8–16)
AST SERPL-CCNC: 23 U/L (ref 10–40)
BILIRUB SERPL-MCNC: 0.8 MG/DL (ref 0.1–1)
BUN SERPL-MCNC: 19 MG/DL (ref 8–23)
CALCIUM SERPL-MCNC: 9.7 MG/DL (ref 8.7–10.5)
CHLORIDE SERPL-SCNC: 103 MMOL/L (ref 95–110)
CO2 SERPL-SCNC: 26 MMOL/L (ref 23–29)
CREAT SERPL-MCNC: 1.5 MG/DL (ref 0.5–1.4)
ERYTHROCYTE [DISTWIDTH] IN BLOOD BY AUTOMATED COUNT: 13.3 % (ref 11.5–14.5)
EST. GFR  (NO RACE VARIABLE): 36 ML/MIN/1.73 M^2
GLUCOSE SERPL-MCNC: 94 MG/DL (ref 70–110)
HCT VFR BLD AUTO: 33.7 % (ref 37–48.5)
HGB BLD-MCNC: 10.9 G/DL (ref 12–16)
IMM GRANULOCYTES # BLD AUTO: 0.02 K/UL (ref 0–0.04)
MCH RBC QN AUTO: 29.5 PG (ref 27–31)
MCHC RBC AUTO-ENTMCNC: 32.3 G/DL (ref 32–36)
MCV RBC AUTO: 91 FL (ref 82–98)
NEUTROPHILS # BLD AUTO: 2.9 K/UL (ref 1.8–7.7)
PLATELET # BLD AUTO: 143 K/UL (ref 150–450)
PMV BLD AUTO: 10.2 FL (ref 9.2–12.9)
POTASSIUM SERPL-SCNC: 4.1 MMOL/L (ref 3.5–5.1)
PROT SERPL-MCNC: 7 G/DL (ref 6–8.4)
RBC # BLD AUTO: 3.69 M/UL (ref 4–5.4)
SODIUM SERPL-SCNC: 140 MMOL/L (ref 136–145)
T4 FREE SERPL-MCNC: 1.14 NG/DL (ref 0.71–1.51)
TSH SERPL DL<=0.005 MIU/L-ACNC: 1.14 UIU/ML (ref 0.4–4)
WBC # BLD AUTO: 4.82 K/UL (ref 3.9–12.7)

## 2024-04-15 PROCEDURE — 36415 COLL VENOUS BLD VENIPUNCTURE: CPT | Mod: PO | Performed by: HOSPITALIST

## 2024-04-15 PROCEDURE — 85027 COMPLETE CBC AUTOMATED: CPT | Performed by: HOSPITALIST

## 2024-04-15 PROCEDURE — 84439 ASSAY OF FREE THYROXINE: CPT | Performed by: HOSPITALIST

## 2024-04-15 PROCEDURE — 80053 COMPREHEN METABOLIC PANEL: CPT | Performed by: HOSPITALIST

## 2024-04-15 PROCEDURE — 84443 ASSAY THYROID STIM HORMONE: CPT | Performed by: HOSPITALIST

## 2024-04-15 NOTE — Clinical Note
I put in tentative tx plan for cis pem but ? When via pathway defaults to 6 cycles not 4 for adjuvant nsclc resected? Also is there a pathway or way to put I for atezo maintenance per [id number] trial?
Please see note/AVS for follow-up
sounds. No wheezing or rales.   Musculoskeletal:         General: No deformity.      Cervical back: Normal range of motion and neck supple. No rigidity.   Skin:     General: Skin is warm and dry.      Findings: No rash.   Neurological:      General: No focal deficit present.      Mental Status: She is alert. Mental status is at baseline.      Motor: No weakness.   Psychiatric:      Comments: Extremely anxious, tearful            Assessment / Plan:      1. General medical exam  22 yo F here to establish care.    - CBC with Auto Differential; Future  - Comprehensive Metabolic Panel; Future  - Hemoglobin A1C; Future  - Lipid Panel; Future  - TSH; Future  - Vitamin D 25 Hydroxy; Future  - Iron and TIBC; Future  - citalopram (CELEXA) 10 MG tablet; Take 1 tablet by mouth daily  Dispense: 30 tablet; Refill: 2    2. Anxiety  Severe, uncontrolled anxiety.  Untreated.  Had extensive discussion regarding management.  Encouraged to start counseling.  Will screen for underlying/contributing factors and start low dose SSRI.  Also offered a short term medication until SSRI becomes effective, but patient declining.  States that she can manage as long as she does not need to leave the house.  Also reports history of iron deficiency- continue supplementation, advised increased hydration and trying compression stockings given possible orthostatic symptoms.  Will follow up in 1 month or earlier for acute issues.      - CBC with Auto Differential; Future  - Comprehensive Metabolic Panel; Future  - TSH; Future  - citalopram (CELEXA) 10 MG tablet; Take 1 tablet by mouth daily  Dispense: 30 tablet; Refill: 2          I have spent 31 minutes on this patient encounter.     Patient voiced understanding and agreement with plan.  All questions/concerns were addressed, risks/side effects of medications were reviewed.  Return precautions and after visit summary were provided.  Return in about 4 weeks (around 5/13/2024). or earlier as needed.

## 2024-04-16 ENCOUNTER — OFFICE VISIT (OUTPATIENT)
Dept: HEMATOLOGY/ONCOLOGY | Facility: CLINIC | Age: 74
End: 2024-04-16
Payer: MEDICARE

## 2024-04-16 ENCOUNTER — INFUSION (OUTPATIENT)
Dept: INFUSION THERAPY | Facility: HOSPITAL | Age: 74
End: 2024-04-16
Attending: INTERNAL MEDICINE
Payer: MEDICARE

## 2024-04-16 VITALS
OXYGEN SATURATION: 98 % | HEART RATE: 66 BPM | TEMPERATURE: 98 F | RESPIRATION RATE: 18 BRPM | DIASTOLIC BLOOD PRESSURE: 55 MMHG | SYSTOLIC BLOOD PRESSURE: 119 MMHG

## 2024-04-16 VITALS
BODY MASS INDEX: 26.69 KG/M2 | TEMPERATURE: 98 F | HEART RATE: 74 BPM | DIASTOLIC BLOOD PRESSURE: 68 MMHG | WEIGHT: 160.19 LBS | HEIGHT: 65 IN | SYSTOLIC BLOOD PRESSURE: 112 MMHG | OXYGEN SATURATION: 97 %

## 2024-04-16 DIAGNOSIS — G62.9 NEUROPATHY: ICD-10-CM

## 2024-04-16 DIAGNOSIS — N28.9 RENAL INSUFFICIENCY: ICD-10-CM

## 2024-04-16 DIAGNOSIS — R06.00 DYSPNEA, UNSPECIFIED TYPE: ICD-10-CM

## 2024-04-16 DIAGNOSIS — C34.32 MALIGNANT NEOPLASM OF LOWER LOBE OF LEFT LUNG: Primary | ICD-10-CM

## 2024-04-16 PROCEDURE — 1126F AMNT PAIN NOTED NONE PRSNT: CPT | Mod: CPTII,S$GLB,, | Performed by: HOSPITALIST

## 2024-04-16 PROCEDURE — 1101F PT FALLS ASSESS-DOCD LE1/YR: CPT | Mod: CPTII,S$GLB,, | Performed by: HOSPITALIST

## 2024-04-16 PROCEDURE — 96413 CHEMO IV INFUSION 1 HR: CPT

## 2024-04-16 PROCEDURE — 25000003 PHARM REV CODE 250: Performed by: HOSPITALIST

## 2024-04-16 PROCEDURE — 3078F DIAST BP <80 MM HG: CPT | Mod: CPTII,S$GLB,, | Performed by: HOSPITALIST

## 2024-04-16 PROCEDURE — 99215 OFFICE O/P EST HI 40 MIN: CPT | Mod: S$GLB,,, | Performed by: HOSPITALIST

## 2024-04-16 PROCEDURE — 1159F MED LIST DOCD IN RCRD: CPT | Mod: CPTII,S$GLB,, | Performed by: HOSPITALIST

## 2024-04-16 PROCEDURE — 3074F SYST BP LT 130 MM HG: CPT | Mod: CPTII,S$GLB,, | Performed by: HOSPITALIST

## 2024-04-16 PROCEDURE — 99999 PR PBB SHADOW E&M-EST. PATIENT-LVL IV: CPT | Mod: PBBFAC,,, | Performed by: HOSPITALIST

## 2024-04-16 PROCEDURE — 3288F FALL RISK ASSESSMENT DOCD: CPT | Mod: CPTII,S$GLB,, | Performed by: HOSPITALIST

## 2024-04-16 PROCEDURE — 63600175 PHARM REV CODE 636 W HCPCS: Mod: JZ,JG | Performed by: HOSPITALIST

## 2024-04-16 RX ORDER — EPINEPHRINE 0.3 MG/.3ML
0.3 INJECTION SUBCUTANEOUS ONCE AS NEEDED
Status: CANCELLED | OUTPATIENT
Start: 2024-04-16

## 2024-04-16 RX ORDER — SODIUM CHLORIDE 0.9 % (FLUSH) 0.9 %
10 SYRINGE (ML) INJECTION
Status: CANCELLED | OUTPATIENT
Start: 2024-04-16

## 2024-04-16 RX ORDER — HEPARIN 100 UNIT/ML
500 SYRINGE INTRAVENOUS
Status: DISCONTINUED | OUTPATIENT
Start: 2024-04-16 | End: 2024-04-16 | Stop reason: HOSPADM

## 2024-04-16 RX ORDER — DIPHENHYDRAMINE HYDROCHLORIDE 50 MG/ML
50 INJECTION INTRAMUSCULAR; INTRAVENOUS ONCE AS NEEDED
Status: CANCELLED | OUTPATIENT
Start: 2024-04-16

## 2024-04-16 RX ORDER — HEPARIN 100 UNIT/ML
500 SYRINGE INTRAVENOUS
Status: CANCELLED | OUTPATIENT
Start: 2024-04-16

## 2024-04-16 RX ADMIN — SODIUM CHLORIDE: 9 INJECTION, SOLUTION INTRAVENOUS at 10:04

## 2024-04-16 RX ADMIN — ATEZOLIZUMAB 1200 MG: 1200 INJECTION, SOLUTION INTRAVENOUS at 10:04

## 2024-04-16 RX ADMIN — HEPARIN 500 UNITS: 100 SYRINGE at 11:04

## 2024-04-16 NOTE — PLAN OF CARE
Problem: Adult Inpatient Plan of Care  Goal: Plan of Care Review  Outcome: Ongoing, Progressing  Flowsheets (Taken 4/16/2024 1030)  Plan of Care Reviewed With:   patient   spouse  Goal: Patient-Specific Goal (Individualized)  Outcome: Ongoing, Progressing  Flowsheets (Taken 4/16/2024 1032)  Anxieties, Fears or Concerns: none verbalized  Individualized Care Needs: warm blanket, reclining position, ice water, spouse at bedside     Problem: Infection  Goal: Absence of Infection Signs and Symptoms  Outcome: Ongoing, Progressing  Intervention: Prevent or Manage Infection  Flowsheets (Taken 4/16/2024 1032)  Infection Management: aseptic technique maintained

## 2024-04-16 NOTE — PROGRESS NOTES
[facility] MEDICAL ONCOLOGY - FOLLOW UP VISIT    Reason for visit: Follow up for stage IIB squamous cell carcinoma      Oncology History   Malignant neoplasm of lower lobe of left lung - Squamous cell   4/15/2021 Initial Diagnosis    Malignant neoplasm of lower lobe of left lung - Squamous cell     5/25/2021 Cancer Staged    Staging form: Lung, AJCC 8th Edition  - Clinical: Stage IA1 (cT1a, cN0, cM0)      Cancer Staged    9mm non-keratinizing squamous cell carcinoma, no VPI, no LVI, margin at least 8mm.  Levels 9 and 11 = negative.  T1aN0     4/5/2023 Cancer Staged    Staging form: Lung, AJCC 8th Edition  - Pathologic stage from 4/5/2023: Stage IIB (pT2, pN1, cM0)     5/1/2023 - 7/21/2023 Chemotherapy    Treatment Summary   Plan Name: OP NSCLC PEMETREXED + CISPLATIN Q3W  Treatment Goal: Supportive  Status: Inactive  Start Date: 5/1/2023  End Date: 7/21/2023  Provider: Marissa Brower MD  Chemotherapy: CISplatin (Platinol) 75 mg/m2 = 138 mg in sodium chloride 0.9% 665 mL chemo infusion, 75 mg/m2 = 138 mg, Intravenous, Clinic/HOD 1 time, 4 of 4 cycles  Administration: 138 mg (5/12/2023), 138 mg (6/29/2023), 138 mg (7/20/2023), 138 mg (6/8/2023)  PEMEtrexed disodium (ALIMTA) 900 mg in sodium chloride 0.9% SolP 100 mL chemo infusion, 925 mg, Intravenous, Clinic/HOD 1 time, 4 of 4 cycles  Dose modification: 375 mg/m2 (original dose 500 mg/m2, Cycle 3, Reason: MD Discretion, Comment: neutropenia )  Administration: 900 mg (5/12/2023), 700 mg (6/29/2023), 700 mg (7/20/2023), 700 mg (6/8/2023)     8/14/2023 -  Chemotherapy    Treatment Summary   Plan Name: OP ATEZOLIZUMAB Q3W  Treatment Goal: Supportive  Status: Active  Start Date: 8/14/2023  End Date: 8/20/2024 (Planned)  Provider: Marissa Brower MD  Chemotherapy: [No matching medication found in this treatment plan]     NSCLC of left lung (Resolved)   4/28/2021 Initial Diagnosis    NSCLC of left lung  "(Resolved)      Cancer Staged    9mm non-keratinizing squamous cell carcinoma, no VPI, no LVI, margin at least 8mm.  Levels 9 and 11 = negative.  T1aN0        NANI NSCLC/ADC IIB (pT2 pN1a cMx)  - History of stage I squamous cell carcinoma moderately differentiated left lower lung status post wedge resection 04/28/2021  - Abnormality seen on surveillance for history of squamous cell carcinoma. Initial biopsy February 20, 2023 without neoplasm identified , thus had left lower lobe wedge resection after multiple disciplinary discussion, final pathology positive on 03/28/2022 for invasive moderately differentiated adenocarcinoma station 10 lymph node positive, pleural invasion noted, margins negative.    - Restaging MRI brain negative and PET scan with left hilar avidity SUV 4 and chest wall. Given recent surgery potential inflammation presented at tumor board recommended proceeding with adjuvant chemotherapy and follow-up on repeat imaging. Started adjuvant therapy with chemotherapy and immunotherapy per IMPOWER 010 given PDL1 positive disease 95%.  Mutational analysis negative for EGFR mutation. Noted BRAF mutation.   - Completed adjuvant chemotherapy with cisplatin and pemetrexed tolerated well aside from chemotherapy associated progressive anemia.    - Restaging PET on 8/7/2023 with resolution of prior lymph node avidity.    - Started immunotherapy with atezolizumab (8/14/2023 - present; delay from C3 on 9/26/23 and C4 on 11/14/23 due to concnern for pneumonitis)  - 2/5/24: CT C, "no detrimental interval change in appearance"  - 3/01/24: CT C non-con, no acute pathology, remaining findings unchanged when compared to prior study    HPI:     Radha Lamas is a 74 yo woman w/ pmh significant for COPD and two primary L lung cancers as below. She presents today for follow up.     - Stage I squamous cell carcinoma of the of the LLL, initially dx'd 4/28/21, s/p wedge resection (4/28/21)    - Stage IIB (pT2, pN1a, cMx) " adenocarcinoma of the NANI (PD-L1 95%, BRAF V600E mutated), initially dx'd 3/28/23, s/p wedge resection 3/28/23, adjuvant cisplatin/pemetrexed x 4 cycles (5/11/23-7/20/23), and currently on adjuvant atezolizumab (8/14/23 - present; of note, delay between C3 on 9/26/23 and C4 on 11/14/23 due to concern for pneumonitis)     Last clinic 3/26/24, C10 atezolizumab on 3/26/24, RTC and C11 on 4/16/24, next CT due aroudn 6/5/24. Stable dyspnea, f/u w/ pulmonology. Seeing Dr. Turner, [facility] associates    Interval History:   - 3/26/24: C10 atezolizumab    Pt states that she tolerated her last cycle without significant issue. She feels there has been an improvement in post-prandial dyspnea, noting that she now walks before eating and is breathing much better. She denies N/V, diarrhea, constipation, rash, new/worsening SOB (feels this is stable), new/worsening cough, or new/worsening fatigue (stable).     Of note, she will see a new PCP tomorrow to discuss urinary stress incontinence.      Past Medical History:   Past Medical History:   Diagnosis Date    COPD (chronic obstructive pulmonary disease) 2013    Eczema     GERD (gastroesophageal reflux disease)     Hiatal hernia     History of torn meniscus of right knee 01/2011    Hypothyroid     Incidental pulmonary nodule, > 3mm and < 8mm - [place] 8/12/2021    Lung cancer     Urinary incontinence in female     Mild , only takes mediciane with travel        Allergies:   Review of patient's allergies indicates:  No Known Allergies     Medications:   Current Outpatient Medications   Medication Sig Dispense Refill    albuterol (PROVENTIL/VENTOLIN HFA) 90 mcg/actuation inhaler Inhale 2 puffs into the lungs every 4 (four) hours as needed for Wheezing or Shortness of Breath. Rescue 18 g 11    calcium carbonate (OS-CYDNEY) 600 mg calcium (1,500 mg) Tab Take 600 mg by mouth.      DUPIXENT  mg/2 mL PnIj Inject into the skin every 14 (fourteen) days.      levothyroxine  (SYNTHROID) 75 MCG tablet Take 75 mcg by mouth once daily.      loratadine (CLARITIN) 10 mg tablet Take 10 mg by mouth once daily.      omeprazole (PRILOSEC) 20 MG capsule Take 20 mg by mouth once daily.      ondansetron (ZOFRAN-ODT) 8 MG TbDL Take 1 tablet (8 mg total) by mouth every 8 (eight) hours as needed (nausea). 60 tablet 5    umeclidinium-vilanteroL (ANORO ELLIPTA) 62.5-25 mcg/actuation DsDv USE 1 INHALATION DAILY (CONTROLLER) 180 each 3    dexAMETHasone (DECADRON) 4 MG Tab Take 2 tablets (8 mg total) by mouth once daily. on the day prior to chemotherapy then daily on days 2,3,4 of each chemotherapy cycle. 24 tablet 3    diphenhydrAMINE-aluminum-magnesium hydroxide-simethicone-LIDOcaine HCl 2% Swish and spit 15 mLs every 4 (four) hours as needed. 540 each 2    folic acid (FOLVITE) 1 MG tablet Take 1 tablet (1 mg total) by mouth once daily. starting 1 week prior to pemetrexed and continuing for 21 days after stopping pemetrexed 30 tablet 5    gabapentin (NEURONTIN) 300 MG capsule Take 1 capsule (300 mg total) by mouth every evening. 30 capsule 11    magnesium oxide (MAG-OX) 400 mg (241.3 mg magnesium) tablet Take 1 tablet (400 mg total) by mouth once daily. 7 tablet 0    OLANZapine (ZYPREXA) 5 MG tablet Take 1 tablet (5 mg total) by mouth every evening. Take as directed on days 1-4 of your chemotherapy cycle. 16 tablet 0    oxyCODONE (ROXICODONE) 5 MG immediate release tablet Take 1 tablet (5 mg total) by mouth every 4 (four) hours as needed for Pain. 41 tablet 0    tolterodine (DETROL LA) 4 MG 24 hr capsule Take 1 capsule (4 mg total) by mouth once daily. (Patient taking differently: Take 4 mg by mouth daily as needed.) 90 capsule 4     No current facility-administered medications for this visit.     Facility-Administered Medications Ordered in Other Visits   Medication Dose Route Frequency Provider Last Rate Last Admin    prochlorperazine injection Soln 5 mg  5 mg Intravenous Q30 Min PRN El  "Adrien Adam MD              ROS:  Review of Systems   A complete 12-point review of systems was reviewed and is negative except as mentioned above.       Physical Exam:       /68   Pulse 74   Temp 97.6 °F (36.4 °C) (Temporal)   Ht 5' 5" (1.651 m)   Wt 72.7 kg (160 lb 2.6 oz)   SpO2 97%   BMI 26.65 kg/m²                Physical Exam  Constitutional:       Appearance: Normal appearance.   HENT:      Head: Normocephalic and atraumatic.   Eyes:      Extraocular Movements: Extraocular movements intact.      Conjunctiva/sclera: Conjunctivae normal.      Pupils: Pupils are equal, round, and reactive to light.   Cardiovascular:      Rate and Rhythm: Normal rate and regular rhythm.      Heart sounds: No murmur heard.     No friction rub. No gallop.   Pulmonary:      Effort: Pulmonary effort is normal.      Breath sounds: No wheezing, rhonchi or rales.   Musculoskeletal:         General: Normal range of motion.      Right lower leg: No edema.      Left lower leg: No edema.   Skin:     General: Skin is warm and dry.   Neurological:      Mental Status: She is alert and oriented to person, place, and time.   Psychiatric:         Mood and Affect: Mood normal.         Thought Content: Thought content normal.         Judgment: Judgment normal.         Labs:   Recent Results (from the past 48 hour(s))   CBC Oncology    Collection Time: 04/15/24 10:30 AM   Result Value Ref Range    WBC 4.82 3.90 - 12.70 K/uL    RBC 3.69 (L) 4.00 - 5.40 M/uL    Hemoglobin 10.9 (L) 12.0 - 16.0 g/dL    Hematocrit 33.7 (L) 37.0 - 48.5 %    MCV 91 82 - 98 fL    MCH 29.5 27.0 - 31.0 pg    MCHC 32.3 32.0 - 36.0 g/dL    RDW 13.3 11.5 - 14.5 %    Platelets 143 (L) 150 - 450 K/uL    MPV 10.2 9.2 - 12.9 fL    Gran # (ANC) 2.9 1.8 - 7.7 K/uL    Immature Grans (Abs) 0.02 0.00 - 0.04 K/uL   Comprehensive Metabolic Panel    Collection Time: 04/15/24 10:30 AM   Result Value Ref Range    Sodium 140 136 - 145 mmol/L    Potassium 4.1 3.5 - 5.1 mmol/L    " Chloride 103 95 - 110 mmol/L    CO2 26 23 - 29 mmol/L    Glucose 94 70 - 110 mg/dL    BUN 19 8 - 23 mg/dL    Creatinine 1.5 (H) 0.5 - 1.4 mg/dL    Calcium 9.7 8.7 - 10.5 mg/dL    Total Protein 7.0 6.0 - 8.4 g/dL    Albumin 4.0 3.5 - 5.2 g/dL    Total Bilirubin 0.8 0.1 - 1.0 mg/dL    Alkaline Phosphatase 69 55 - 135 U/L    AST 23 10 - 40 U/L    ALT 6 (L) 10 - 44 U/L    eGFR 36 (A) >60 mL/min/1.73 m^2    Anion Gap 11 8 - 16 mmol/L   TSH    Collection Time: 04/15/24 10:30 AM   Result Value Ref Range    TSH 1.135 0.400 - 4.000 uIU/mL   T4, FREE    Collection Time: 04/15/24 10:30 AM   Result Value Ref Range    Free T4 1.14 0.71 - 1.51 ng/dL          Imaging:   CT Chest Without Contrast  Narrative: EXAMINATION:  CT CHEST WITHOUT CONTRAST    CLINICAL HISTORY:  Rule out pneumonitis; Dyspnea, unspecified    TECHNIQUE:  Low dose axial images, sagittal and coronal reformations were obtained from the thoracic inlet to the lung bases. Contrast was not administered.    COMPARISON:  02/05/2024    FINDINGS:  There are stable scarring/postoperative changes seen associated with the left lung.  There is a subpleural noncalcified pulmonary nodule seen within the left lung base that measures approximately 4 mm that is unchanged in appearance.  There are also a few subpleural areas of probable scarring within the right upper lung zone including series 4, image 84, series 4, image 91 and series 4, image 93 that are also stable.    Mild vascular calcification seen involving the aorta.  There is no pericardial effusion.  No enlarged mediastinal, hilar or axillary lymph nodes are identified.  A right-sided MediPort catheter is noted with the tip seen within the distal SVC.    Stable appearance of a few hypodense lesions within the liver most consistent with cysts.    No suspicious appearing osseus abnormalities are identified.  Impression: 1. No acute pathology.  2. Remaining findings unchanged when compared to the prior  study.    Electronically signed by: Lonnie Robin DO  Date:    03/01/2024  Time:    08:41            Path:   1. Left lung, wedge resection: Invasive adenocarcinoma, predominantly solid   pattern, measuring 9 mm (0.9 cm) in greatest dimension.          Tumor is located 3 mm (0.3 cm) from the blue inked/stapled parenchymal   surgical margin.          Tumor extends into the elastic layer of the visceral pleura.          See synoptic report in comment section for further details.   2. Lymph node, left level 11, excision: 1 benign fragmented lymph node with   sinus histiocytosis and anthracotic pigment, negative for metastatic   carcinoma (0/1).   3. Lymph node, left level 10, excision: 1 lymph node, positive for metastatic   carcinoma with crush artifact dense fibrosis and focal necrosis (1/1).          Confirmed with positive immunohistochemical stain with cytokeratin   AE1/AE3 with satisfactory positive and negative controls.   4. Lymph node, left level 12, excision: 1 benign lymph node with sinus   histiocytosis, negative for metastatic carcinoma (0/1).   5. Lymph node, left level 9, excision: 1 benign fragmented lymph node with   sinus histiocytosis and anthracotic pigment, negative for metastatic   carcinoma (0/1).   6. Lymph node, level 7, excision: 1 benign lymph node with sinus   histiocytosis, negative for metastatic carcinoma (0/1).   7.  Lymph node, level 5, excision: 1 benign fragmented lymph node with sinus   histiocytosis and anthracotic pigment, negative for metastatic carcinoma   (0/1).   8. Lung, lingula, anatomic lingulectomy: Lung with congested vasculature.          No residual carcinoma is identified.          Bronchial and vascular margin is negative for malignancy.          See synoptic report in comment section for further details.       Assessment and Plan:     Radha Lamas is a 74 yo woman w/ pmh significant for COPD and two primary L lung cancers as below. She presents today for follow up.      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 Currently on adjuvant atezolizumab following adjuvant cisplatin/pemetrexed and tolerating without significant issue (initially with some worsening shortness of breath and fatigue).  Most recent imaging (CT C non-con, 3/1/24) without evidence of recurrent/residual disease or of pneumonitis (see below). Given good tolerance and response, will continue with immunotherapy at this time. Ultimately, planning atezolizumab for total of 1 year and q3m imaging throughout.     Of note, there was a delay between cycle 3 (09/26/2023) and cycle 4 (11/14/2023) due to concern for pneumonitis, workup for which was largely unrevealing. There was also a 1 week delay between C8 and C9 due to concern for pneumonitis, workup for which was again largely unrevealing.     PLAN:   -- Proceed w/ C11 atezolizumab today (4/16/24), labs acceptable (Cr stable) and treatment signed  -- Labs 5/6/24, RTC and C12 on 5/7/24  -- Next CT C due around 6/5/24      Dyspnea  See note from 2/27/24. Symptoms are stable today (noting improvement in post-prandial dyspnea). CT C from 3/5/24 is without evidence of pneumonitis. Favor that this represents worsening COPD. Pt currently being managed by pulmonology, may also be a component of post-prandial dyspnea. Given lack of pneumonitis on CT C, okay to proceed with treatment as above.   -- Continue to f/u w/ pulmonology, PFTs scheduled 5/09/24      Depressed Renal Function  Patient's eGFR dipped below 60  "following her 4th dose of cisplatin/pemetrexed and has remained depressed since (notably, this was prior to her 1st dose of atezolizumab).  It has remained low since it was first noted 08/10/2023. Urine protein/creatinine ratio 0.1 g/day on 11/14/23. This time line may be inconsistent with cisplatin induced nephropathy as recovery of renal function would be anticipated by this time, however it is possible to develop a CKD following cisplatin therapy which may be the etiology. Does not appear consistent with an IrAE. Given stability of renal function, okay to proceed with treatment as above. Pt has now established care w/ Dr. Turner.  -- Continued f/u w/ nephrology      Urinary Stress Incontinence  Patient describes difficulty holding her urine.  She has discussed oxybutynin with her PCP who stated she should follow up with gynecologist.  Patient has not been to a gynecologist in multiple years.  -- Referral previously placed to gynecology, pt prefers a female provider near [place]. Notes that she is instead visiting a new PCP tomorrow (4/17/24)      Neuropathy  Pt describes "deadened" feet and also like she has "bunched socks" on her feet when she puts on her shoes which developed approximately 2 months after completion of chemotherapy. This is worse at night. She has some long-standing decreased sensation in her fingertips which she relates to eczema. She denies any problems like this previously, including when she received her chemotherapy. She denies any issues with proprioception / ambulation related to this. Exam has been previously unremarkable. Uncertain etiology. The pt does not have a diagnosis of diabetes. The time course is not consistent with chemotherapy induced peripheral neuropathy. Immunotherapy can cause neuropathic issues, however this developed while the patient had been off of the atezolizumab for 4 weeks already (though this does not preclude this as a possibility).  Symptoms are currently stable " and chiefly in her feet.  She has seen a podiatrist with recommendation to increase her walking, which she has done.  She has not previously been interested in pharmacotherapy time given mild and stable symptoms. Given stability, will continue with treatment as above.   -- Continue to follow with podiatrist  -- Referral to neurology in place, patient did not make appointment.  Okay to hold at this time, low threshold for evaluation.      The above information has been reviewed with the patient and all questions have been answered to their apparent satisfaction.  They understand that they can call the clinic with any questions.    Orion Arce MD  Hematology/Oncology  [facility]        Med Onc Chart Routing      Follow up with physician . Labs 5/6/24, RTC and C12 on 5/7/24.   Follow up with [facility]    Infusion scheduling note    Injection scheduling note    Labs CBC, CMP, free T4 and TSH   Scheduling:  Preferred lab:  Lab interval:     Imaging    Pharmacy appointment    Other referrals

## 2024-04-17 ENCOUNTER — OFFICE VISIT (OUTPATIENT)
Dept: FAMILY MEDICINE | Facility: CLINIC | Age: 74
End: 2024-04-17
Payer: MEDICARE

## 2024-04-17 VITALS
TEMPERATURE: 99 F | DIASTOLIC BLOOD PRESSURE: 64 MMHG | WEIGHT: 155.75 LBS | SYSTOLIC BLOOD PRESSURE: 108 MMHG | OXYGEN SATURATION: 96 % | HEIGHT: 65 IN | HEART RATE: 88 BPM | BODY MASS INDEX: 25.95 KG/M2

## 2024-04-17 DIAGNOSIS — N32.81 OVERACTIVE BLADDER: ICD-10-CM

## 2024-04-17 PROCEDURE — 1126F AMNT PAIN NOTED NONE PRSNT: CPT | Mod: CPTII,S$GLB,, | Performed by: NURSE PRACTITIONER

## 2024-04-17 PROCEDURE — 99999 PR PBB SHADOW E&M-EST. PATIENT-LVL IV: CPT | Mod: PBBFAC,,, | Performed by: NURSE PRACTITIONER

## 2024-04-17 PROCEDURE — 3288F FALL RISK ASSESSMENT DOCD: CPT | Mod: CPTII,S$GLB,, | Performed by: NURSE PRACTITIONER

## 2024-04-17 PROCEDURE — 1101F PT FALLS ASSESS-DOCD LE1/YR: CPT | Mod: CPTII,S$GLB,, | Performed by: NURSE PRACTITIONER

## 2024-04-17 PROCEDURE — 1160F RVW MEDS BY RX/DR IN RCRD: CPT | Mod: CPTII,S$GLB,, | Performed by: NURSE PRACTITIONER

## 2024-04-17 PROCEDURE — 1159F MED LIST DOCD IN RCRD: CPT | Mod: CPTII,S$GLB,, | Performed by: NURSE PRACTITIONER

## 2024-04-17 PROCEDURE — 3078F DIAST BP <80 MM HG: CPT | Mod: CPTII,S$GLB,, | Performed by: NURSE PRACTITIONER

## 2024-04-17 PROCEDURE — 3074F SYST BP LT 130 MM HG: CPT | Mod: CPTII,S$GLB,, | Performed by: NURSE PRACTITIONER

## 2024-04-17 PROCEDURE — 99214 OFFICE O/P EST MOD 30 MIN: CPT | Mod: S$GLB,,, | Performed by: NURSE PRACTITIONER

## 2024-04-17 RX ORDER — TOLTERODINE 4 MG/1
4 CAPSULE, EXTENDED RELEASE ORAL DAILY
Qty: 90 CAPSULE | Refills: 4 | Status: SHIPPED | OUTPATIENT
Start: 2024-04-17 | End: 2024-07-16

## 2024-04-17 RX ORDER — TRIAMCINOLONE ACETONIDE 1 MG/G
OINTMENT TOPICAL 2 TIMES DAILY
Qty: 30 G | Refills: 1 | Status: SHIPPED | OUTPATIENT
Start: 2024-04-17

## 2024-04-17 RX ORDER — NYSTATIN 100000 U/G
CREAM TOPICAL 2 TIMES DAILY
Qty: 30 G | Refills: 1 | Status: SHIPPED | OUTPATIENT
Start: 2024-04-17

## 2024-04-17 NOTE — PROGRESS NOTES
"Subjective:       Patient ID: Radha Lamas is a 74 y.o. female.    Chief Complaint: Follow-up  Pt reports to clinic to discuss urinary incontinence and urinary frequency.  Previously evaluated by urology and treated Detrol.  Reports medication "worked", but is now out of medication.  Denies dysuria and flank pain.  Reports using 4+ incontinent pad/days.  Has to use bedside commode @ [facility].   Also reports rash to genitalia     Past Medical History:   Diagnosis Date    COPD (chronic obstructive pulmonary disease) 2013    Eczema     GERD (gastroesophageal reflux disease)     Hiatal hernia     History of torn meniscus of right knee 01/2011    Hypothyroid     Incidental pulmonary nodule, > 3mm and < 8mm - [facility] 8/12/2021    Lung cancer     Urinary incontinence in female     Mild , only takes mediciane with travel      Current Outpatient Medications on File Prior to Visit   Medication Sig Dispense Refill    albuterol (PROVENTIL/VENTOLIN HFA) 90 mcg/actuation inhaler Inhale 2 puffs into the lungs every 4 (four) hours as needed for Wheezing or Shortness of Breath. Rescue 18 g 11    calcium carbonate (OS-CYDNEY) 600 mg calcium (1,500 mg) Tab Take 600 mg by mouth.      DUPIXENT  mg/2 mL PnIj Inject into the skin every 14 (fourteen) days.      levothyroxine (SYNTHROID) 75 MCG tablet Take 75 mcg by mouth once daily.      loratadine (CLARITIN) 10 mg tablet Take 10 mg by mouth once daily.      omeprazole (PRILOSEC) 20 MG capsule Take 20 mg by mouth once daily.      umeclidinium-vilanteroL (ANORO ELLIPTA) 62.5-25 mcg/actuation DsDv USE 1 INHALATION DAILY (CONTROLLER) 180 each 3    gabapentin (NEURONTIN) 300 MG capsule Take 1 capsule (300 mg total) by mouth every evening. 30 capsule 11    [DISCONTINUED] dexAMETHasone (DECADRON) 4 MG Tab Take 2 tablets (8 mg total) by mouth once daily. on the day prior to chemotherapy then daily on days 2,3,4 of each chemotherapy cycle. 24 tablet 3    [DISCONTINUED] " diphenhydrAMINE-aluminum-magnesium hydroxide-simethicone-LIDOcaine HCl 2% Swish and spit 15 mLs every 4 (four) hours as needed. 540 each 2    [DISCONTINUED] folic acid (FOLVITE) 1 MG tablet Take 1 tablet (1 mg total) by mouth once daily. starting 1 week prior to pemetrexed and continuing for 21 days after stopping pemetrexed 30 tablet 5    [DISCONTINUED] magnesium oxide (MAG-OX) 400 mg (241.3 mg magnesium) tablet Take 1 tablet (400 mg total) by mouth once daily. 7 tablet 0    [DISCONTINUED] OLANZapine (ZYPREXA) 5 MG tablet Take 1 tablet (5 mg total) by mouth every evening. Take as directed on days 1-4 of your chemotherapy cycle. 16 tablet 0    [DISCONTINUED] ondansetron (ZOFRAN-ODT) 8 MG TbDL Take 1 tablet (8 mg total) by mouth every 8 (eight) hours as needed (nausea). 60 tablet 5    [DISCONTINUED] oxyCODONE (ROXICODONE) 5 MG immediate release tablet Take 1 tablet (5 mg total) by mouth every 4 (four) hours as needed for Pain. 41 tablet 0    [DISCONTINUED] tolterodine (DETROL LA) 4 MG 24 hr capsule Take 1 capsule (4 mg total) by mouth once daily. (Patient not taking: Reported on 4/17/2024) 90 capsule 4     Current Facility-Administered Medications on File Prior to Visit   Medication Dose Route Frequency Provider Last Rate Last Admin    [COMPLETED] atezolizumab (TECENTRIQ) 1,200 mg in sodium chloride 0.9% SolP 305 mL infusion  1,200 mg Intravenous 1 time in Clinic/HOD Orion Arce IV, MD   Stopped at 04/16/24 1111    prochlorperazine injection Soln 5 mg  5 mg Intravenous Q30 Min PRN Adrien Vail MD        [COMPLETED] sodium chloride 0.9% 250 mL flush bag   Intravenous 1 time in Clinic/HOD Orion Arce IV, MD   Stopped at 04/16/24 1133    [DISCONTINUED] heparin, porcine (PF) 100 unit/mL injection flush 500 Units  500 Units Intravenous PRN Orion Arce IV, MD   500 Units at 04/16/24 1116      Urinary Frequency   This is a chronic problem. The current episode started more than 1 year ago. The problem  occurs every urination. The problem has been unchanged. The pain is at a severity of 0/10. The patient is experiencing no pain. There has been no fever. She is Not sexually active. There is No history of pyelonephritis. Associated symptoms include frequency, urgency and rash. Pertinent negatives include no discharge, flank pain, bubble bath use or constipation.     Review of Systems   Constitutional: Negative.    HENT: Negative.     Respiratory: Negative.     Cardiovascular: Negative.    Gastrointestinal:  Negative for constipation.   Genitourinary:  Positive for frequency and urgency. Negative for flank pain.   Skin:  Positive for rash.   Neurological: Negative.    Psychiatric/Behavioral: Negative.         Objective:      Physical Exam  Vitals reviewed.   Constitutional:       Appearance: She is well-developed.   HENT:      Head: Normocephalic.      Mouth/Throat:      Pharynx: No oropharyngeal exudate.   Eyes:      Pupils: Pupils are equal, round, and reactive to light.   Neck:      Vascular: No JVD.      Trachea: No tracheal deviation.   Cardiovascular:      Rate and Rhythm: Normal rate and regular rhythm.      Heart sounds: Normal heart sounds. No murmur heard.  Pulmonary:      Effort: Pulmonary effort is normal. No respiratory distress.      Breath sounds: Normal breath sounds.   Abdominal:      General: Bowel sounds are normal. There is no distension.      Palpations: Abdomen is soft.      Tenderness: There is no abdominal tenderness.   Genitourinary:     Labia:         Right: Rash present.         Left: Rash present.       Comments: Erythema,   Musculoskeletal:         General: Normal range of motion.      Cervical back: Normal range of motion.   Skin:     General: Skin is warm and dry.   Neurological:      Mental Status: She is alert and oriented to person, place, and time.      Deep Tendon Reflexes: Reflexes are normal and symmetric.   Psychiatric:         Speech: Speech normal.         Behavior: Behavior  normal.         Assessment:       1. Overactive bladder        Plan:   Overactive bladder  -     tolterodine (DETROL LA) 4 MG 24 hr capsule; Take 1 capsule (4 mg total) by mouth once daily.  Dispense: 90 capsule; Refill: 4    Other orders  -     nystatin (MYCOSTATIN) cream; Apply topically 2 (two) times daily.  Dispense: 30 g; Refill: 1  -     triamcinolone acetonide 0.1% (KENALOG) 0.1 % ointment; Apply topically 2 (two) times daily.  Dispense: 30 g; Refill: 1    Establish care appt scheduled with new PCP    No follow-ups on file.

## 2024-05-06 ENCOUNTER — LAB VISIT (OUTPATIENT)
Dept: LAB | Facility: HOSPITAL | Age: 74
End: 2024-05-06
Attending: HOSPITALIST
Payer: MEDICARE

## 2024-05-06 DIAGNOSIS — C79.9 NEOPLASM, METASTATIC: ICD-10-CM

## 2024-05-06 DIAGNOSIS — C34.32 MALIGNANT NEOPLASM OF LOWER LOBE OF LEFT LUNG: ICD-10-CM

## 2024-05-06 LAB
ALBUMIN SERPL BCP-MCNC: 4 G/DL (ref 3.5–5.2)
ALP SERPL-CCNC: 67 U/L (ref 55–135)
ALT SERPL W/O P-5'-P-CCNC: <5 U/L (ref 10–44)
ANION GAP SERPL CALC-SCNC: 7 MMOL/L (ref 8–16)
AST SERPL-CCNC: 23 U/L (ref 10–40)
BILIRUB SERPL-MCNC: 0.9 MG/DL (ref 0.1–1)
BUN SERPL-MCNC: 19 MG/DL (ref 8–23)
CALCIUM SERPL-MCNC: 9.2 MG/DL (ref 8.7–10.5)
CHLORIDE SERPL-SCNC: 104 MMOL/L (ref 95–110)
CO2 SERPL-SCNC: 28 MMOL/L (ref 23–29)
CREAT SERPL-MCNC: 1.5 MG/DL (ref 0.5–1.4)
ERYTHROCYTE [DISTWIDTH] IN BLOOD BY AUTOMATED COUNT: 13.6 % (ref 11.5–14.5)
EST. GFR  (NO RACE VARIABLE): 36 ML/MIN/1.73 M^2
GLUCOSE SERPL-MCNC: 89 MG/DL (ref 70–110)
HCT VFR BLD AUTO: 33.1 % (ref 37–48.5)
HGB BLD-MCNC: 10.6 G/DL (ref 12–16)
IMM GRANULOCYTES # BLD AUTO: 0.06 K/UL (ref 0–0.04)
MCH RBC QN AUTO: 29.4 PG (ref 27–31)
MCHC RBC AUTO-ENTMCNC: 32 G/DL (ref 32–36)
MCV RBC AUTO: 92 FL (ref 82–98)
NEUTROPHILS # BLD AUTO: 3.2 K/UL (ref 1.8–7.7)
PLATELET # BLD AUTO: 164 K/UL (ref 150–450)
PMV BLD AUTO: 10.2 FL (ref 9.2–12.9)
POTASSIUM SERPL-SCNC: 4.1 MMOL/L (ref 3.5–5.1)
PROT SERPL-MCNC: 6.7 G/DL (ref 6–8.4)
RBC # BLD AUTO: 3.6 M/UL (ref 4–5.4)
SODIUM SERPL-SCNC: 139 MMOL/L (ref 136–145)
TSH SERPL DL<=0.005 MIU/L-ACNC: 0.97 UIU/ML (ref 0.4–4)
WBC # BLD AUTO: 5.11 K/UL (ref 3.9–12.7)

## 2024-05-06 PROCEDURE — 80053 COMPREHEN METABOLIC PANEL: CPT | Performed by: HOSPITALIST

## 2024-05-06 PROCEDURE — 84443 ASSAY THYROID STIM HORMONE: CPT | Performed by: HOSPITALIST

## 2024-05-06 PROCEDURE — 36415 COLL VENOUS BLD VENIPUNCTURE: CPT | Mod: PO | Performed by: HOSPITALIST

## 2024-05-06 PROCEDURE — 85027 COMPLETE CBC AUTOMATED: CPT | Performed by: HOSPITALIST

## 2024-05-07 ENCOUNTER — INFUSION (OUTPATIENT)
Dept: INFUSION THERAPY | Facility: HOSPITAL | Age: 74
End: 2024-05-07
Attending: INTERNAL MEDICINE
Payer: MEDICARE

## 2024-05-07 ENCOUNTER — OFFICE VISIT (OUTPATIENT)
Dept: HEMATOLOGY/ONCOLOGY | Facility: CLINIC | Age: 74
End: 2024-05-07
Payer: MEDICARE

## 2024-05-07 VITALS
HEART RATE: 63 BPM | OXYGEN SATURATION: 98 % | RESPIRATION RATE: 16 BRPM | BODY MASS INDEX: 27.97 KG/M2 | SYSTOLIC BLOOD PRESSURE: 109 MMHG | HEIGHT: 63 IN | TEMPERATURE: 97 F | WEIGHT: 157.88 LBS | DIASTOLIC BLOOD PRESSURE: 72 MMHG

## 2024-05-07 VITALS
SYSTOLIC BLOOD PRESSURE: 115 MMHG | BODY MASS INDEX: 27.97 KG/M2 | WEIGHT: 157.88 LBS | HEIGHT: 63 IN | TEMPERATURE: 97 F | HEART RATE: 77 BPM | DIASTOLIC BLOOD PRESSURE: 79 MMHG | OXYGEN SATURATION: 97 %

## 2024-05-07 DIAGNOSIS — G62.9 NEUROPATHY: ICD-10-CM

## 2024-05-07 DIAGNOSIS — C34.32 MALIGNANT NEOPLASM OF LOWER LOBE OF LEFT LUNG: Primary | ICD-10-CM

## 2024-05-07 DIAGNOSIS — R06.00 DYSPNEA, UNSPECIFIED TYPE: ICD-10-CM

## 2024-05-07 DIAGNOSIS — C79.9 NEOPLASM, METASTATIC: ICD-10-CM

## 2024-05-07 PROCEDURE — 99999 PR PBB SHADOW E&M-EST. PATIENT-LVL III: CPT | Mod: PBBFAC,,, | Performed by: HOSPITALIST

## 2024-05-07 PROCEDURE — 3078F DIAST BP <80 MM HG: CPT | Mod: CPTII,S$GLB,, | Performed by: HOSPITALIST

## 2024-05-07 PROCEDURE — 63600175 PHARM REV CODE 636 W HCPCS: Mod: JZ,JG | Performed by: HOSPITALIST

## 2024-05-07 PROCEDURE — 1159F MED LIST DOCD IN RCRD: CPT | Mod: CPTII,S$GLB,, | Performed by: HOSPITALIST

## 2024-05-07 PROCEDURE — 1126F AMNT PAIN NOTED NONE PRSNT: CPT | Mod: CPTII,S$GLB,, | Performed by: HOSPITALIST

## 2024-05-07 PROCEDURE — 96413 CHEMO IV INFUSION 1 HR: CPT

## 2024-05-07 PROCEDURE — 3288F FALL RISK ASSESSMENT DOCD: CPT | Mod: CPTII,S$GLB,, | Performed by: HOSPITALIST

## 2024-05-07 PROCEDURE — 3074F SYST BP LT 130 MM HG: CPT | Mod: CPTII,S$GLB,, | Performed by: HOSPITALIST

## 2024-05-07 PROCEDURE — 99215 OFFICE O/P EST HI 40 MIN: CPT | Mod: S$GLB,,, | Performed by: HOSPITALIST

## 2024-05-07 PROCEDURE — G2211 COMPLEX E/M VISIT ADD ON: HCPCS | Mod: S$GLB,,, | Performed by: HOSPITALIST

## 2024-05-07 PROCEDURE — 25000003 PHARM REV CODE 250: Performed by: HOSPITALIST

## 2024-05-07 PROCEDURE — 1101F PT FALLS ASSESS-DOCD LE1/YR: CPT | Mod: CPTII,S$GLB,, | Performed by: HOSPITALIST

## 2024-05-07 RX ORDER — SODIUM CHLORIDE 0.9 % (FLUSH) 0.9 %
10 SYRINGE (ML) INJECTION
Status: CANCELLED | OUTPATIENT
Start: 2024-05-07

## 2024-05-07 RX ORDER — HEPARIN 100 UNIT/ML
500 SYRINGE INTRAVENOUS
Status: CANCELLED | OUTPATIENT
Start: 2024-05-07

## 2024-05-07 RX ORDER — DIPHENHYDRAMINE HYDROCHLORIDE 50 MG/ML
50 INJECTION INTRAMUSCULAR; INTRAVENOUS ONCE AS NEEDED
Status: CANCELLED | OUTPATIENT
Start: 2024-05-07

## 2024-05-07 RX ORDER — HEPARIN 100 UNIT/ML
500 SYRINGE INTRAVENOUS
Status: DISCONTINUED | OUTPATIENT
Start: 2024-05-07 | End: 2024-05-07 | Stop reason: HOSPADM

## 2024-05-07 RX ORDER — EPINEPHRINE 0.3 MG/.3ML
0.3 INJECTION SUBCUTANEOUS ONCE AS NEEDED
Status: CANCELLED | OUTPATIENT
Start: 2024-05-07

## 2024-05-07 RX ADMIN — ATEZOLIZUMAB 1200 MG: 1200 INJECTION, SOLUTION INTRAVENOUS at 10:05

## 2024-05-07 RX ADMIN — HEPARIN 500 UNITS: 100 SYRINGE at 11:05

## 2024-05-07 NOTE — PLAN OF CARE
Pt reports feeling well today, continue with treatment as ordered.    Problem: Adult Inpatient Plan of Care  Goal: Plan of Care Review  Outcome: Progressing  Flowsheets (Taken 5/7/2024 1025)  Plan of Care Reviewed With:   patient   spouse  Goal: Patient-Specific Goal (Individualized)  Outcome: Progressing  Flowsheets (Taken 5/7/2024 1025)  Individualized Care Needs: warm blanket, pillow  Anxieties, Fears or Concerns: none today  Goal: Absence of Hospital-Acquired Illness or Injury  Outcome: Progressing  Intervention: Prevent Infection  Flowsheets (Taken 5/7/2024 1025)  Infection Prevention:   equipment surfaces disinfected   hand hygiene promoted   personal protective equipment utilized  Goal: Optimal Comfort and Wellbeing  Outcome: Progressing  Intervention: Provide Person-Centered Care  Flowsheets (Taken 5/7/2024 1025)  Trust Relationship/Rapport:   care explained   thoughts/feelings acknowledged   emotional support provided   choices provided   empathic listening provided   questions encouraged   reassurance provided   questions answered     Problem: Chemotherapy Effects  Goal: Safety Maintained  Outcome: Progressing  Intervention: Promote Safe Chemotherapy Delivery  Flowsheets (Taken 5/7/2024 1025)  Infection Prevention:   equipment surfaces disinfected   hand hygiene promoted   personal protective equipment utilized  Chemotherapy Environmental Safety:   chemotherapy waste containers in room   meticulous hand hygiene promoted   patient environment monitored for safety  Goal: Absence of Infection  Outcome: Progressing  Intervention: Prevent Infection and Maximize Resistance  Flowsheets (Taken 5/7/2024 1025)  Infection Prevention:   equipment surfaces disinfected   hand hygiene promoted   personal protective equipment utilized     Problem: Fall Injury Risk  Goal: Absence of Fall and Fall-Related Injury  Outcome: Progressing  Intervention: Promote Injury-Free Environment  Flowsheets (Taken 5/7/2024 1025)  Safety  Promotion/Fall Prevention:   assistive device/personal item within reach   in recliner, wheels locked   medications reviewed   nonskid shoes/socks when out of bed   high risk medications identified   instructed to call staff for mobility

## 2024-05-07 NOTE — PROGRESS NOTES
[facility] MEDICAL ONCOLOGY - FOLLOW UP VISIT    Reason for visit: Follow up for stage IIB squamous cell carcinoma      Oncology History   Malignant neoplasm of lower lobe of left lung - Squamous cell   4/15/2021 Initial Diagnosis    Malignant neoplasm of lower lobe of left lung - Squamous cell     5/25/2021 Cancer Staged    Staging form: Lung, AJCC 8th Edition  - Clinical: Stage IA1 (cT1a, cN0, cM0)      Cancer Staged    9mm non-keratinizing squamous cell carcinoma, no VPI, no LVI, margin at least 8mm.  Levels 9 and 11 = negative.  T1aN0     4/5/2023 Cancer Staged    Staging form: Lung, AJCC 8th Edition  - Pathologic stage from 4/5/2023: Stage IIB (pT2, pN1, cM0)     5/1/2023 - 7/21/2023 Chemotherapy    Treatment Summary   Plan Name: OP NSCLC PEMETREXED + CISPLATIN Q3W  Treatment Goal: Supportive  Status: Inactive  Start Date: 5/1/2023  End Date: 7/21/2023  Provider: Marissa Brower MD  Chemotherapy: CISplatin (Platinol) 75 mg/m2 = 138 mg in sodium chloride 0.9% 665 mL chemo infusion, 75 mg/m2 = 138 mg, Intravenous, Clinic/HOD 1 time, 4 of 4 cycles  Administration: 138 mg (5/12/2023), 138 mg (6/29/2023), 138 mg (7/20/2023), 138 mg (6/8/2023)  PEMEtrexed disodium (ALIMTA) 900 mg in sodium chloride 0.9% SolP 100 mL chemo infusion, 925 mg, Intravenous, Clinic/HOD 1 time, 4 of 4 cycles  Dose modification: 375 mg/m2 (original dose 500 mg/m2, Cycle 3, Reason: MD Discretion, Comment: neutropenia )  Administration: 900 mg (5/12/2023), 700 mg (6/29/2023), 700 mg (7/20/2023), 700 mg (6/8/2023)     8/14/2023 -  Chemotherapy    Treatment Summary   Plan Name: OP ATEZOLIZUMAB Q3W  Treatment Goal: Supportive  Status: Active  Start Date: 8/14/2023  End Date: 8/20/2024 (Planned)  Provider: Marissa Brower MD  Chemotherapy: [No matching medication found in this treatment plan]     NSCLC of left lung (Resolved)   4/28/2021 Initial Diagnosis    NSCLC of left lung  "(Resolved)      Cancer Staged    9mm non-keratinizing squamous cell carcinoma, no VPI, no LVI, margin at least 8mm.  Levels 9 and 11 = negative.  T1aN0        NANI NSCLC/ADC IIB (pT2 pN1a cMx)  - History of stage I squamous cell carcinoma moderately differentiated left lower lung status post wedge resection 04/28/2021  - Abnormality seen on surveillance for history of squamous cell carcinoma. Initial biopsy February 20, 2023 without neoplasm identified , thus had left lower lobe wedge resection after multiple disciplinary discussion, final pathology positive on 03/28/2022 for invasive moderately differentiated adenocarcinoma station 10 lymph node positive, pleural invasion noted, margins negative.    - Restaging MRI brain negative and PET scan with left hilar avidity SUV 4 and chest wall. Given recent surgery potential inflammation presented at tumor board recommended proceeding with adjuvant chemotherapy and follow-up on repeat imaging. Started adjuvant therapy with chemotherapy and immunotherapy per IMPOWER 010 given PDL1 positive disease 95%.  Mutational analysis negative for EGFR mutation. Noted BRAF mutation.   - Completed adjuvant chemotherapy with cisplatin and pemetrexed tolerated well aside from chemotherapy associated progressive anemia.    - Restaging PET on 8/7/2023 with resolution of prior lymph node avidity.    - Started immunotherapy with atezolizumab (8/14/2023 - present; delay from C3 on 9/26/23 and C4 on 11/14/23 due to concnern for pneumonitis)  - 2/5/24: CT C, "no detrimental interval change in appearance"  - 3/01/24: CT C non-con, no acute pathology, remaining findings unchanged when compared to prior study    HPI:     Radha Lamas is a 74 yo woman w/ pmh significant for COPD and two primary L lung cancers as below. She presents today for follow up.     - Stage I squamous cell carcinoma of the of the LLL, initially dx'd 4/28/21, s/p wedge resection (4/28/21)    - Stage IIB (pT2, pN1a, cMx) " adenocarcinoma of the NANI (PD-L1 95%, BRAF [id number] mutated), initially dx'd 3/28/23, s/p wedge resection 3/28/23, adjuvant cisplatin/pemetrexed x 4 cycles (5/11/23-7/20/23), and currently on adjuvant atezolizumab (8/14/23 - present; of note, delay between C3 on 9/26/23 and C4 on 11/14/23 due to concern for pneumonitis)     Last clinic 4/16/24, proceed with cycle 11 atezolizumab.  RTC on 5/7/24.  Next CT C due around 6/5/24.  Continue to follow with pulmonology for dyspnea.  Continue to follow with outside Nephrology.  New PCP visit on 4/17/24, we will discuss urinary stress incontinence at this time.    Interval History:   - 4/16/24: C11D1 atezolizumab  - 4/17/24: PCP visit, Detrol LA refilled, nystatin cream and triamcinolone also prescribed.    Pt states that she is doing well today. She says there have been no developments since our last visit. She says that since starting the creams provided by her PCP, she has had improvement in vaginal itch. She also notes that since restarting Detrol LA, she has been able to hold her urine better, noting no more leaks or night time accidents. She says that her energy levels as well as her PÉREZ are unchanged compared to prior. She was able to shovel dirt. She says that her peripheral neuropathy is unchanged. She denies N/V, diarrhea, constipation, rash, or new/worsening cough.     Past Medical History:   Past Medical History:   Diagnosis Date    COPD (chronic obstructive pulmonary disease) 2013    Eczema     GERD (gastroesophageal reflux disease)     Hiatal hernia     History of torn meniscus of right knee 01/2011    Hypothyroid     Incidental pulmonary nodule, > 3mm and < 8mm - [facility]ula 8/12/2021    Lung cancer     Urinary incontinence in female     Mild , only takes mediciane with travel        Allergies:   Review of patient's allergies indicates:  No Known Allergies     Medications:   Current Outpatient Medications   Medication Sig Dispense Refill    albuterol  "(PROVENTIL/VENTOLIN HFA) 90 mcg/actuation inhaler Inhale 2 puffs into the lungs every 4 (four) hours as needed for Wheezing or Shortness of Breath. Rescue 18 g 11    calcium carbonate (OS-CYDNEY) 600 mg calcium (1,500 mg) Tab Take 600 mg by mouth.      DUPIXENT  mg/2 mL PnIj Inject into the skin every 14 (fourteen) days.      levothyroxine (SYNTHROID) 75 MCG tablet Take 75 mcg by mouth once daily.      loratadine (CLARITIN) 10 mg tablet Take 10 mg by mouth once daily.      nystatin (MYCOSTATIN) cream Apply topically 2 (two) times daily. 30 g 1    omeprazole (PRILOSEC) 20 MG capsule Take 20 mg by mouth once daily.      tolterodine (DETROL LA) 4 MG 24 hr capsule Take 1 capsule (4 mg total) by mouth once daily. 90 capsule 4    triamcinolone acetonide 0.1% (KENALOG) 0.1 % ointment Apply topically 2 (two) times daily. 30 g 1    umeclidinium-vilanteroL (ANORO ELLIPTA) 62.5-25 mcg/actuation DsDv USE 1 INHALATION DAILY (CONTROLLER) 180 each 3    gabapentin (NEURONTIN) 300 MG capsule Take 1 capsule (300 mg total) by mouth every evening. 30 capsule 11     No current facility-administered medications for this visit.     Facility-Administered Medications Ordered in Other Visits   Medication Dose Route Frequency Provider Last Rate Last Admin    prochlorperazine injection Soln 5 mg  5 mg Intravenous Q30 Min PRN Adrien Vail MD              ROS:  Review of Systems   A complete 12-point review of systems was reviewed and is negative except as mentioned above.       Physical Exam:       /79   Pulse 77   Temp 97.2 °F (36.2 °C) (Temporal)   Ht 5' 3" (1.6 m)   Wt 71.6 kg (157 lb 13.6 oz)   SpO2 97%   BMI 27.96 kg/m²                Physical Exam  Constitutional:       Appearance: Normal appearance.   HENT:      Head: Normocephalic and atraumatic.   Eyes:      Extraocular Movements: Extraocular movements intact.      Conjunctiva/sclera: Conjunctivae normal.      Pupils: Pupils are equal, round, and reactive to light. "   Cardiovascular:      Rate and Rhythm: Normal rate and regular rhythm.      Heart sounds: No murmur heard.     No friction rub. No gallop.   Pulmonary:      Effort: Pulmonary effort is normal.      Breath sounds: No wheezing, rhonchi or rales.   Musculoskeletal:         General: Normal range of motion.      Right lower leg: No edema.      Left lower leg: No edema.   Skin:     General: Skin is warm and dry.   Neurological:      Mental Status: She is alert and oriented to person, place, and time.   Psychiatric:         Mood and Affect: Mood normal.         Thought Content: Thought content normal.         Judgment: Judgment normal.           Labs:   Recent Results (from the past 48 hour(s))   CBC Oncology    Collection Time: 05/06/24 10:27 AM   Result Value Ref Range    WBC 5.11 3.90 - 12.70 K/uL    RBC 3.60 (L) 4.00 - 5.40 M/uL    Hemoglobin 10.6 (L) 12.0 - 16.0 g/dL    Hematocrit 33.1 (L) 37.0 - 48.5 %    MCV 92 82 - 98 fL    MCH 29.4 27.0 - 31.0 pg    MCHC 32.0 32.0 - 36.0 g/dL    RDW 13.6 11.5 - 14.5 %    Platelets 164 150 - 450 K/uL    MPV 10.2 9.2 - 12.9 fL    Gran # (ANC) 3.2 1.8 - 7.7 K/uL    Immature Grans (Abs) 0.06 (H) 0.00 - 0.04 K/uL   Comprehensive Metabolic Panel    Collection Time: 05/06/24 10:27 AM   Result Value Ref Range    Sodium 139 136 - 145 mmol/L    Potassium 4.1 3.5 - 5.1 mmol/L    Chloride 104 95 - 110 mmol/L    CO2 28 23 - 29 mmol/L    Glucose 89 70 - 110 mg/dL    BUN 19 8 - 23 mg/dL    Creatinine 1.5 (H) 0.5 - 1.4 mg/dL    Calcium 9.2 8.7 - 10.5 mg/dL    Total Protein 6.7 6.0 - 8.4 g/dL    Albumin 4.0 3.5 - 5.2 g/dL    Total Bilirubin 0.9 0.1 - 1.0 mg/dL    Alkaline Phosphatase 67 55 - 135 U/L    AST 23 10 - 40 U/L    ALT <5 (L) 10 - 44 U/L    eGFR 36 (A) >60 mL/min/1.73 m^2    Anion Gap 7 (L) 8 - 16 mmol/L   TSH    Collection Time: 05/06/24 10:27 AM   Result Value Ref Range    TSH 0.968 0.400 - 4.000 uIU/mL          Imaging:   CT Chest Without Contrast  Narrative: EXAMINATION:  CT CHEST  WITHOUT CONTRAST    CLINICAL HISTORY:  Rule out pneumonitis; Dyspnea, unspecified    TECHNIQUE:  Low dose axial images, sagittal and coronal reformations were obtained from the thoracic inlet to the lung bases. Contrast was not administered.    COMPARISON:  02/05/2024    FINDINGS:  There are stable scarring/postoperative changes seen associated with the left lung.  There is a subpleural noncalcified pulmonary nodule seen within the left lung base that measures approximately 4 mm that is unchanged in appearance.  There are also a few subpleural areas of probable scarring within the right upper lung zone including series 4, image 84, series 4, image 91 and series 4, image 93 that are also stable.    Mild vascular calcification seen involving the aorta.  There is no pericardial effusion.  No enlarged mediastinal, hilar or axillary lymph nodes are identified.  A right-sided MediPort catheter is noted with the tip seen within the distal SVC.    Stable appearance of a few hypodense lesions within the liver most consistent with cysts.    No suspicious appearing osseus abnormalities are identified.  Impression: 1. No acute pathology.  2. Remaining findings unchanged when compared to the prior study.    Electronically signed by: Lonnie Robin DO  Date:    03/01/2024  Time:    08:41            Path:   1. Left lung, wedge resection: Invasive adenocarcinoma, predominantly solid   pattern, measuring 9 mm (0.9 cm) in greatest dimension.          Tumor is located 3 mm (0.3 cm) from the blue inked/stapled parenchymal   surgical margin.          Tumor extends into the elastic layer of the visceral pleura.          See synoptic report in comment section for further details.   2. Lymph node, left level 11, excision: 1 benign fragmented lymph node with   sinus histiocytosis and anthracotic pigment, negative for metastatic   carcinoma (0/1).   3. Lymph node, left level 10, excision: 1 lymph node, positive for metastatic   carcinoma  with crush artifact dense fibrosis and focal necrosis (1/1).          Confirmed with positive immunohistochemical stain with cytokeratin   AE1/AE3 with satisfactory positive and negative controls.   4. Lymph node, left level 12, excision: 1 benign lymph node with sinus   histiocytosis, negative for metastatic carcinoma (0/1).   5. Lymph node, left level 9, excision: 1 benign fragmented lymph node with   sinus histiocytosis and anthracotic pigment, negative for metastatic   carcinoma (0/1).   6. Lymph node, level 7, excision: 1 benign lymph node with sinus   histiocytosis, negative for metastatic carcinoma (0/1).   7.  Lymph node, level 5, excision: 1 benign fragmented lymph node with sinus   histiocytosis and anthracotic pigment, negative for metastatic carcinoma   (0/1).   8. Lung, lingula, anatomic lingulectomy: Lung with congested vasculature.          No residual carcinoma is identified.          Bronchial and vascular margin is negative for malignancy.          See synoptic report in comment section for further details.       Assessment and Plan:     Radha Lamas is a 72 yo woman w/ pmh significant for COPD and two primary L lung cancers as below. She presents today for follow up.     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 Currently on adjuvant atezolizumab following adjuvant cisplatin/pemetrexed and tolerating without significant issue (initially with some worsening shortness of breath and fatigue).  Most recent imaging (CT C non-con, 3/1/24) without  evidence of recurrent/residual disease or of pneumonitis (see below). Given good tolerance and response, will continue with immunotherapy at this time. Ultimately, planning atezolizumab for total of 1 year and q3-4m imaging throughout per IMPOWER-010. Following completion of treatment, will expand to q4m imaging and then q6m imaging.    Of note, there was a delay between cycle 3 (09/26/2023) and cycle 4 (11/14/2023) due to concern for pneumonitis, workup for which was largely unrevealing. There was also a 1 week delay between C8 and C9 due to concern for pneumonitis, workup for which was again largely unrevealing.     PLAN:   -- Proceed w/ C12 atezolizumab today (5/724), labs acceptable (Cr stable) and treatment signed  -- Labs 5/27/24, RTC and C12 on 5/28/24  -- Next CT C due around 6/1/24      Dyspnea  See note from 2/27/24. Symptoms are stable today (5/7/24). CT C from 3/1/24 is without evidence of pneumonitis. Favor that symptoms are related to COPD. Pt currently being managed by pulmonology, may also be a component of post-prandial dyspnea; patient does confirm improvement in ability to tolerate walks if taken before meals. Given lack of pneumonitis on CT C, okay to proceed with treatment as above.   -- Continue to f/u w/ pulmonology, PFTs scheduled 5/09/24      Depressed Renal Function  Patient's eGFR dipped below 60 following her 4th dose of cisplatin/pemetrexed and has remained depressed since (notably, this was prior to her 1st dose of atezolizumab).  It has remained low since it was first noted 08/10/2023. Urine protein/creatinine ratio 0.1 g/day on 11/14/23. This time line may be inconsistent with cisplatin induced nephropathy as recovery of renal function would be anticipated by this time, however it is possible to develop a CKD following cisplatin therapy which may be the etiology. Does not appear consistent with an IrAE. Given stability of renal function, okay to proceed with treatment as above. Pt has  "now established care w/ Dr. Turner.  -- Continued f/u w/ nephrology      Neuropathy  Pt describes "deadened" feet and also like she has "bunched socks" on her feet when she puts on her shoes which developed approximately 2 months after completion of chemotherapy. This is worse at night. She has some long-standing decreased sensation in her fingertips which she relates to eczema. She denies any problems like this previously, including when she received her chemotherapy. She denies any issues with proprioception / ambulation related to this. Exam has been previously unremarkable. Uncertain etiology. The pt does not have a diagnosis of diabetes. The time course is not consistent with chemotherapy induced peripheral neuropathy. Immunotherapy can cause neuropathic issues, however this developed while the patient had been off of the atezolizumab for 4 weeks already (though this does not preclude this as a possibility).  Symptoms are currently stable and chiefly in her feet.  She has seen a podiatrist with recommendation to increase her walking, which she has done.  She has not previously been interested in pharmacotherapy time given mild and stable symptoms. Given stability, will continue with treatment as above.   -- Continue to follow with podiatrist  -- Referral to neurology in place, patient did not make appointment.  Okay to hold at this time, low threshold for evaluation.      The above information has been reviewed with the patient and all questions have been answered to their apparent satisfaction.  They understand that they can call the clinic with any questions.    Orion Arce MD  Hematology/Oncology  [facility]        Med Onc Chart Routing      Follow up with physician . Labs 5/27/24, RTC and C12 on 5/28/24.   Follow up with [facility]    Infusion scheduling note    Injection scheduling note    Labs CBC, CMP, free T4 and TSH   Scheduling:  Preferred lab:  Lab interval:     Imaging    Pharmacy " appointment    Other referrals

## 2024-05-07 NOTE — NURSING
Pt tolerated Tecentriq well. No adverse reaction noted. Pt education reinforced on chemo regimen, side effects, what to expect, and when to call . Pt verbalized understanding. I reviewed pt calendar w/ pt and understanding verbalized. PAC deaccessed and flushed w/ NS and heparin per protocol.   Infection precautions reviewed.  Pt states full understanding to call with any sign of infection, including temp >100.4.

## 2024-05-09 ENCOUNTER — CLINICAL SUPPORT (OUTPATIENT)
Dept: PULMONOLOGY | Facility: CLINIC | Age: 74
End: 2024-05-09
Payer: MEDICARE

## 2024-05-09 ENCOUNTER — OFFICE VISIT (OUTPATIENT)
Dept: PULMONOLOGY | Facility: CLINIC | Age: 74
End: 2024-05-09
Payer: MEDICARE

## 2024-05-09 VITALS
HEIGHT: 63 IN | HEART RATE: 75 BPM | DIASTOLIC BLOOD PRESSURE: 72 MMHG | SYSTOLIC BLOOD PRESSURE: 120 MMHG | BODY MASS INDEX: 28.24 KG/M2 | OXYGEN SATURATION: 97 % | WEIGHT: 159.38 LBS | RESPIRATION RATE: 17 BRPM

## 2024-05-09 DIAGNOSIS — Z87.891 FORMER HEAVY CIGARETTE SMOKER (20-39 PER DAY): ICD-10-CM

## 2024-05-09 DIAGNOSIS — J44.9 COPD, MODERATE: ICD-10-CM

## 2024-05-09 DIAGNOSIS — I70.0 AORTIC ATHEROSCLEROSIS: ICD-10-CM

## 2024-05-09 DIAGNOSIS — E66.3 OVERWEIGHT (BMI 25.0-29.9): ICD-10-CM

## 2024-05-09 DIAGNOSIS — J44.9 COPD, MODERATE: Primary | ICD-10-CM

## 2024-05-09 LAB
BRPFT: NORMAL
DLCO ADJ PRE: 11.39 ML/(MIN*MMHG)
DLCO SINGLE BREATH LLN: 14.05
DLCO SINGLE BREATH PRE REF: 57.6 %
DLCO SINGLE BREATH REF: 19.78
DLCOC SBVA LLN: 2.67
DLCOC SBVA PRE REF: 83.2 %
DLCOC SBVA REF: 4.15
DLCOC SINGLE BREATH LLN: 14.05
DLCOC SINGLE BREATH PRE REF: 57.6 %
DLCOC SINGLE BREATH REF: 19.78
DLCOVA LLN: 2.67
DLCOVA PRE REF: 83.2 %
DLCOVA PRE: 3.45 ML/(MIN*MMHG*L)
DLCOVA REF: 4.15
DLVAADJ PRE: 3.45 ML/(MIN*MMHG*L)
ERV LLN: -16449.45
ERV PRE REF: 75.7 %
ERV REF: 0.55
FEF 25 75 CHG: -5.1 %
FEF 25 75 LLN: 0.7
FEF 25 75 POST REF: 39 %
FEF 25 75 PRE REF: 41.1 %
FEF 25 75 REF: 1.63
FET100 CHG: -1.5 %
FEV1 CHG: -1.2 %
FEV1 FVC CHG: 0.7 %
FEV1 FVC LLN: 63
FEV1 FVC POST REF: 74.5 %
FEV1 FVC PRE REF: 74.1 %
FEV1 FVC REF: 78
FEV1 LLN: 1.39
FEV1 POST REF: 74.1 %
FEV1 PRE REF: 74.9 %
FEV1 REF: 1.96
FRCPLETH LLN: 1.84
FRCPLETH PREREF: 102.4 %
FRCPLETH REF: 2.66
FVC CHG: -1.8 %
FVC LLN: 1.82
FVC POST REF: 98.4 %
FVC PRE REF: 100.2 %
FVC REF: 2.56
IVC PRE: 2.18 L
IVC SINGLE BREATH LLN: 1.82
IVC SINGLE BREATH PRE REF: 85.2 %
IVC SINGLE BREATH REF: 2.56
MVV LLN: 62
MVV PRE REF: 86.5 %
MVV REF: 73
PEF CHG: -12.6 %
PEF LLN: 3.35
PEF POST REF: 73.8 %
PEF PRE REF: 84.4 %
PEF REF: 5.01
POST FEF 25 75: 0.63 L/S
POST FET 100: 9.5 SEC
POST FEV1 FVC: 57.79 %
POST FEV1: 1.45 L
POST FVC: 2.52 L
POST PEF: 3.69 L/S
PRE DLCO: 11.39 ML/(MIN*MMHG)
PRE ERV: 0.41 L
PRE FEF 25 75: 0.67 L/S
PRE FET 100: 9.65 SEC
PRE FEV1 FVC: 57.42 %
PRE FEV1: 1.47 L
PRE FRC PL: 2.72 L
PRE FVC: 2.56 L
PRE MVV: 63 L/MIN
PRE PEF: 4.22 L/S
PRE RV: 2.19 L
PRE TLC: 4.75 L
RAW LLN: 3.06
RAW PRE REF: 128.7 %
RAW PRE: 3.94 CMH2O*S/L
RAW REF: 3.06
RV LLN: 1.54
RV PRE REF: 103.7 %
RV REF: 2.11
RVTLC LLN: 35
RVTLC PRE REF: 102.9 %
RVTLC PRE: 46.1 %
RVTLC REF: 45
TLC LLN: 3.78
TLC PRE REF: 99.6 %
TLC REF: 4.77
VA PRE: 3.3 L
VA SINGLE BREATH LLN: 4.62
VA SINGLE BREATH PRE REF: 71.5 %
VA SINGLE BREATH REF: 4.62
VC LLN: 1.82
VC PRE REF: 100.2 %
VC PRE: 2.56 L
VC REF: 2.56
VTGRAWPRE: 3.15 L

## 2024-05-09 PROCEDURE — 1160F RVW MEDS BY RX/DR IN RCRD: CPT | Mod: CPTII,S$GLB,, | Performed by: NURSE PRACTITIONER

## 2024-05-09 PROCEDURE — 3074F SYST BP LT 130 MM HG: CPT | Mod: CPTII,S$GLB,, | Performed by: NURSE PRACTITIONER

## 2024-05-09 PROCEDURE — 99214 OFFICE O/P EST MOD 30 MIN: CPT | Mod: 25,S$GLB,, | Performed by: NURSE PRACTITIONER

## 2024-05-09 PROCEDURE — 99999 PR PBB SHADOW E&M-EST. PATIENT-LVL III: CPT | Mod: PBBFAC,,, | Performed by: NURSE PRACTITIONER

## 2024-05-09 PROCEDURE — 94726 PLETHYSMOGRAPHY LUNG VOLUMES: CPT | Mod: S$GLB,,, | Performed by: INTERNAL MEDICINE

## 2024-05-09 PROCEDURE — 1101F PT FALLS ASSESS-DOCD LE1/YR: CPT | Mod: CPTII,S$GLB,, | Performed by: NURSE PRACTITIONER

## 2024-05-09 PROCEDURE — 3078F DIAST BP <80 MM HG: CPT | Mod: CPTII,S$GLB,, | Performed by: NURSE PRACTITIONER

## 2024-05-09 PROCEDURE — 94729 DIFFUSING CAPACITY: CPT | Mod: S$GLB,,, | Performed by: INTERNAL MEDICINE

## 2024-05-09 PROCEDURE — 94060 EVALUATION OF WHEEZING: CPT | Mod: S$GLB,,, | Performed by: INTERNAL MEDICINE

## 2024-05-09 PROCEDURE — 1159F MED LIST DOCD IN RCRD: CPT | Mod: CPTII,S$GLB,, | Performed by: NURSE PRACTITIONER

## 2024-05-09 PROCEDURE — 3288F FALL RISK ASSESSMENT DOCD: CPT | Mod: CPTII,S$GLB,, | Performed by: NURSE PRACTITIONER

## 2024-05-09 RX ORDER — TIOTROPIUM BROMIDE AND OLODATEROL 3.124; 2.736 UG/1; UG/1
2 SPRAY, METERED RESPIRATORY (INHALATION) DAILY
Qty: 12 G | Refills: 3 | Status: SHIPPED | OUTPATIENT
Start: 2024-05-09

## 2024-05-09 RX ORDER — ALBUTEROL SULFATE 90 UG/1
2 AEROSOL, METERED RESPIRATORY (INHALATION) EVERY 4 HOURS PRN
Qty: 17 G | Refills: 11 | Status: SHIPPED | OUTPATIENT
Start: 2024-05-09

## 2024-05-09 NOTE — PROGRESS NOTES
Subjective:      Patient ID: Radha Lamas is a 74 y.o. female.    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 provider found for evaluation and management for   Chief Complaint   Patient presents with    COPD     Chief Complaint: COPD    HPI:  5/9/2024 Gage CHACKO  Radha Lamas 74 year old female here review CPFT and shortness of breath with activity.  In March 2023 advised stop eating heavy breakfast before exercise related to suspect post prandial shortness of breath.  Since stopped eating before exercise, no longer short of breath with morning walk or doing work in yard.   She feels so much better since not eating a big meal before walking or yard work.   She feels very stable on Anoro daily, paying $250 mo. Formulary preferred is Stiolto, patient willing to change to Stiolto.   No cough, no wheezing, no mucous, no longer shortness of breath with walking program.     Regular walking program 5 days a week x 40 minutes, a little over a mile. Works in garden and yard, stays very active.     Two primary LLL lung cancers initially dx'd 4/28/2021.  Remains on immunotherapy, 4 more infusions, continues follow up with oncologist, Dr. Arce.     Current regimen: Anoro. Albuterol inhaler   5/9/2024 per Formulary change to Stiolto.     5/9/2024 CPFT Spirometry shows mild obstruction. Lung volume determination is normal. Spirometry remains unimproved following bronchodilator. Airway mechanics are abnormal showing increased airway resistance and decreased conductance. DLCO is moderately decreased. MVV is normal.       3/6/2024 Gage CHACKO  Radha Lamas 74 year old female  here with her  to pulmonary clinic for follow up requested by Oncologist, Dr. Arce related to patient compliant of shortness of breath with exertion, mainly with walking around track in morning with daughter.   There is also sensation of flutter of epigastric region then sensation of sob if lifting container while filling with water at home. She has to put container down and sensation will resolve.  Patient feels shortness of breath links to treatment with Atezolizumab. Since occurred with past dosing. She is not opposed to continue Atezolizumab treatment since no desats of oxygen when feeling shortness of breath.   Patient cardiologist is non [facility], she can not name right off at this visit.   She states her heart checks out okay.   COPD remains stable on Anoro and Albuterol inhaler. There is no cough, no wheezing, no sputum production, no chest pain.     Evaluation today for shortness of breath feno and 6mwd are unrevealing.  3/6/2024 6MWD No desaturations requiring supplemental oxygen at rest or exertion. Normal exercise capacity.   3/6/2024 FeNO 9, no inflammation of lungs suggested.  No indication to add ICS controller to COPD inhaler regimen. Continue ANORO daily and Albuterol inhaler or nebs before exercise.   Advised possible post prandial shortness of breath and for a trial of avoiding eating large meal just before exercise. Pt reports she does eat a good breakfast right before going to walk with her daughter, not hungry just eats because her  eats breakfast just before her scheduled walk with daughter. (Recommended 1/2 banana instead of big breakfast before exercise).       11/08/2024 Gage CHACKO  Radha Lamas is a 74 y.o. female presents with her  to pulmonary clinic for follow up requested by Oncology related to complaint of shortness of breath with exertion with review Spirometry/6MWD    She is followed in pulmonary related to diagnosis of COPD review chest xray/CPFT.      Patient presents stating shortness of breath with exertion has resolved.  No cough, no mucous, no hemoptysis, no wheezing. No long shortness of breath.    Patient reports shortness of breath and fatigue with exertion began after starting atezolizumab with dozing 8/14/2023, 9/5/2023, 9/26/2023.   SOB with exertion has resolved over past 5 weeks, patient reports since off Atezolizumab shortness of breath resolved about 2 weeks after stopping Atezolizumab. Over past 3 weeks she has resumed her walking program, no shortness of breath.   10/24/2023 CT chest no pneumonia or acute findings.  11/8/2023 spirometry stable moderate COPD   11/8/2023 6MWD No desaturations requiring supplemental oxygen at rest or exertion.  Echo is scheduled for 11/10/2023 evaluate for IO myocarditis ordered by Dr. Arce, oncologist.   Proceed with Overnight oximetry on room air evaluate for nocturnal hypoxemia, if abnormal patient is agreeable to obstructive sleep apnea evaluation with PSG, if Overnight oximetry is normal she is not willing for other sleep testing.      Current treatment regimen:  ANORO. Albuterol inhaler. Use of albuterol before house work or yard work.     Smoking history former cigarette smoker quit in 2009 with 68 pack year smoking history. Smoked 1.5 packs x 45 years.     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     8/7/2023 PET-CT No concerning FDG avid lesion or detrimental change.  Resolution/improvement of previously noted chest findings.     5/4/2023 PET-CT Postoperative changes left lung with a very small loculated hydropneumothorax in the upper left  hemithorax (mainly fluid with minimal air). Small approximate 1 cm hypermetabolic node at the left hilum adjacent to staple line.  Subcentimeter minimally hypermetabolic nearby AP window lymph node, increased from prior.  These could be inflammatory in nature though close follow-up is advised particularly for the left hilar hypermetabolic focus.  The examination elsewhere is unremarkable with no additional foci of abnormal uptake.    Patient has family history of cancer in sister lung cancer diagnosis age 55 years with metastasis brain,  2018.   Father with lung cancer at 67 yrs,  age 68 years. Sister and father both smokers.   Brother 69 year old with new diagnosis of rectal cancer, finished chemo and radiation.    COPD Questionnaire  How often do you cough?: A little of the time  How often do you have phlegm (mucus) in your chest?: Never  How often does your chest feel tight?: Never  When you walk up a hill or one flight of stairs, how often are you breathless?: All of the time  How often are you limited doing any activities at home?: Never  How often are you confident leaving the house despite your lung condition?: All of the time  How often do you sleep soundly?: All of the time  How often do you have energy?: A little of the time  Total score: 10     Previous Report Reviewed: historical medical records, office notes and radiology reports      Past Medical History: The following portions of the patient's history were reviewed and updated as appropriate:   She  has a past surgical history that includes Knee cartilage surgery (Right, 2011); Thoracoscopic wedge resection of lung (Left, 2021); Tubal ligation (); Wedge resection, lung, robot-assisted, using VATS, using da Kaci Xi (Left, 2023); Laparoscopic lysis of adhesions (Left, 2023); Robot-assisted laparoscopic lymphadenectomy using da Kaci Xi (Left, 2023); Injection of anesthetic agent around multiple intercostal nerves  (Left, 03/20/2023); Fluoroscopy (N/A, 04/27/2023); and Breast mass excision.  Her family history includes Cancer in her brother, father, and sister; Heart failure in her mother; Hypertension in her father and mother; Macular degeneration in her mother; Retinal detachment in her mother.  She  reports that she quit smoking about 15 years ago. Her smoking use included cigarettes. She started smoking about 60 years ago. She has a 67.5 pack-year smoking history. She has never been exposed to tobacco smoke. She has never used smokeless tobacco. She reports that she does not drink alcohol and does not use drugs.  She has a current medication list which includes the following prescription(s): albuterol, calcium carbonate, dupixent pen, gabapentin, levothyroxine, loratadine, nystatin, omeprazole, stiolto respimat, tolterodine, and triamcinolone acetonide 0.1%, and the following Facility-Administered Medications: prochlorperazine.  She has No Known Allergies.    Review of Systems   Constitutional:  Negative for fever, chills, weight loss, weight gain, activity change, appetite change, fatigue and night sweats.   HENT:  Negative for postnasal drip, rhinorrhea, sinus pressure, voice change and congestion.    Eyes:  Negative for redness and itching.   Respiratory:  Negative for snoring, cough, sputum production, chest tightness, shortness of breath, wheezing, orthopnea, asthma nighttime symptoms, dyspnea on extertion, use of rescue inhaler and somnolence.    Cardiovascular: Negative.  Negative for chest pain, palpitations and leg swelling.   Genitourinary:  Negative for difficulty urinating and hematuria.   Endocrine:  Negative for cold intolerance and heat intolerance.    Musculoskeletal:  Negative for arthralgias, gait problem, joint swelling and myalgias.   Skin: Negative.    Gastrointestinal:  Negative for nausea, vomiting, abdominal pain and acid reflux.   Neurological:  Negative for dizziness, weakness, light-headedness  "and headaches.   Hematological:  Negative for adenopathy. No excessive bruising.   All other systems reviewed and are negative.     Objective:   /72   Pulse 75   Resp 17   Ht 5' 3" (1.6 m)   Wt 72.3 kg (159 lb 6.3 oz)   SpO2 97%   BMI 28.24 kg/m²   Physical Exam  Vitals reviewed.   Constitutional:       General: She is not in acute distress.     Appearance: She is well-developed. She is not ill-appearing or toxic-appearing.   HENT:      Head: Normocephalic.      Right Ear: External ear normal.      Left Ear: External ear normal.      Nose: Nose normal.      Mouth/Throat:      Pharynx: No oropharyngeal exudate.   Eyes:      Conjunctiva/sclera: Conjunctivae normal.   Cardiovascular:      Rate and Rhythm: Normal rate and regular rhythm.      Heart sounds: Normal heart sounds.   Pulmonary:      Effort: Pulmonary effort is normal.      Breath sounds: Normal breath sounds. No stridor.   Abdominal:      Palpations: Abdomen is soft.   Musculoskeletal:         General: Normal range of motion.      Cervical back: Normal range of motion and neck supple.   Lymphadenopathy:      Cervical: No cervical adenopathy.   Skin:     General: Skin is warm and dry.   Neurological:      Mental Status: She is alert and oriented to person, place, and time.   Psychiatric:         Behavior: Behavior normal. Behavior is cooperative.         Thought Content: Thought content normal.         Judgment: Judgment normal.       Personal Diagnostic Review    5/9/2024 CPFT Spirometry shows mild obstruction. Lung volume determination is normal. Spirometry remains unimproved following bronchodilator. Airway mechanics are abnormal showing increased airway resistance and decreased conductance. DLCO is moderately decreased. MVV is normal.     3/6/2024 FeNO 9   Clinical Guide to Interpretation or FeNO Levels :     FeNO  (ppb) LOW INTERMEDIATE HIGH   ADULT VALUES < 25 25-50          > 50   Th2-driven Inflammation Unlikely Likely Significant    "   Patients FeNO level at this visit : __9__ (ppb)     Interpretation of FeNO measurement in adults:  [x] FENO is less than 25 ppb implies non eosinophilic airway inflammation or the absence of airway inflammation.               Comment: Low FENO (<25 ppb) in adult asthmatics with persistent symptoms suggests other etiologies for these symptoms and a lower likelihood of benefit from adding or increasing inhaled glucocorticoids.    3/6/2024 6MWD No desaturations requiring supplemental oxygen at rest or exertion. Exercise capacity is normal.  Phase Oxygen Assessment Supplemental O2 Heart   Rate Blood Pressure Edmundo Dyspnea Scale Rating   Resting 99 % Room Air 78 bpm 132/60 0   Exercise             Minute             1 95 % Room Air 98 bpm       2 94 % Room Air 110 bpm       3 93 % Room Air 111 bpm       4 95 % Room Air 111 bpm       5 93 % Room Air 111 bpm       6  94 % Room Air 109 bpm 136/62 3   Recovery             Minute             1 98 % Room Air 84 bpm       2 98 % Room Air 86 bpm       3 100 % Room Air 89 bpm       4 99 % Room Air 89 bpm 136/67 0      Six Minute Walk Summary  6MWT Status: completed without stopping  Patient Reported: Dyspnea         Interpretation:  Did the patient stop or pause?: No  Total Time Walked (Calculated): 360 seconds  Final Partial Lap Distance (feet): 100 feet  Total Distance Meters (Calculated): 396.24 meters  Predicted Distance Meters (Calculated): 407.08 meters  Percentage of Predicted (Calculated): 97.34  Peak VO2 (Calculated): 15.87  Mets: 4.53  Has The Patient Had a Previous Six Minute Walk Test?: Yes     Previous 6MWT Results  Has The Patient Had a Previous Six Minute Walk Test?: Yes  Date of Previous Test: 11/08/23  Total Time Walked: 360 seconds  Total Distance (meters): 419.1  Predicted Distance (meters): 413.09 meters  Percentage of Predicted: 101.45  Percent Change (Calculated): 0.05    CT Chest Without Contrast  Narrative: EXAMINATION:  CT CHEST WITHOUT  CONTRAST    CLINICAL HISTORY:  Rule out pneumonitis; Dyspnea, unspecified    TECHNIQUE:  Low dose axial images, sagittal and coronal reformations were obtained from the thoracic inlet to the lung bases. Contrast was not administered.    COMPARISON:  02/05/2024    FINDINGS:  There are stable scarring/postoperative changes seen associated with the left lung.  There is a subpleural noncalcified pulmonary nodule seen within the left lung base that measures approximately 4 mm that is unchanged in appearance.  There are also a few subpleural areas of probable scarring within the right upper lung zone including series 4, image 84, series 4, image 91 and series 4, image 93 that are also stable.    Mild vascular calcification seen involving the aorta.  There is no pericardial effusion.  No enlarged mediastinal, hilar or axillary lymph nodes are identified.  A right-sided MediPort catheter is noted with the tip seen within the distal SVC.    Stable appearance of a few hypodense lesions within the liver most consistent with cysts.    No suspicious appearing osseus abnormalities are identified.  Impression: 1. No acute pathology.  2. Remaining findings unchanged when compared to the prior study.    Electronically signed by: Lonnie Robin DO  Date:    03/01/2024  Time:    08:41    11/8/2023 Normal overnight oximetry.  Patient does not meet criteria for supplemental oxygen administration    11/8/2023 Spirometry shows moderate obstruction. Spirometry remains unimproved following bronchodilator.    11/8/2023 6MWD No desaturations requiring supplemental oxygen at rest or exertion.     Phase Oxygen Assessment Supplemental O2 Heart   Rate Blood Pressure Edmundo Dyspnea Scale Rating   Resting 97 % Room Air 82 bpm 127/77 1   Exercise             Minute             1 93 % Room Air 99 bpm       2 92 % Room Air 104 bpm       3 92 % Room Air 106 bpm       4 94 % Room Air 107 bpm       5 94 % Room Air 107 bpm       6  94 % Room Air 98 bpm  152/70 3   Recovery             Minute             1 96 % Room Air 90 bpm       2 97 % Room Air 92 bpm       3 98 % Room Air 82 bpm       4 98 % Room Air 78 bpm 158/72 1      Six Minute Walk Summary  6MWT Status: completed without stopping  Patient Reported: No complaints             Interpretation:  Did the patient stop or pause?: No  Total Time Walked (Calculated): 360 seconds  Final Partial Lap Distance (feet): 175 feet  Total Distance Meters (Calculated): 419.1 meters  Predicted Distance Meters (Calculated): 413.09 meters  Percentage of Predicted (Calculated): 101.45  Peak VO2 (Calculated): 16.55  Mets: 4.73  Has The Patient Had a Previous Six Minute Walk Test?: Yes     Previous 6MWT Results  Has The Patient Had a Previous Six Minute Walk Test?: Yes  Date of Previous Test: 04/28/22  Total Time Walked: 360 seconds  Total Distance (meters): 365  Predicted Distance (meters): 422 meters  Percentage of Predicted: 86  Percent Change (Calculated): -0.15    5/11/2023 CPFT spirometry reveals moderate obstruction.  Lung volume determination is normal.  Diffusing capacity is moderately decreased.    4/28/2022  CPFT Spirometry shows moderate obstruction. Lung volume determination is normal. Airway mechanics are abnormal showing increased airway resistance and decreased conductance. DLCO is mildly decreased. MVV is moderately decreased.     3/10/2021 1 year follow up CT chest low dose screening revealed 4B - Suspicious - Left lower lobe nodule has increased in size from 4 mm to 11 mm when compared to the prior study dated 03/10/2020.  - possible next steps  Chest CT, tissue sampling and-or PET/CT.      2/11/2019 CPFT   Acceptable and reproducible spirometry data.     FEV1/ FVC decreased to 62% indicating obstruction.     FEV1 reduced to 69% indicating moderate obstruction.     FVC normal at 86%.     No significant bronchodilation noted after bronchodilator administration.     FEF 25-75% decreased to 34% indicating small  airways flow obstruction.     Flow volume curve shows obstructive pattern.     Spirometry showing moderate obstruction.     Lung volume shows increased TLC to 120% indicating hyper inflation, increased RV and RV/ TLC indicating the presence of air trapping.     DLCO mildly reduced to 70%.     Moderate obstruction, air trapping, mild DLCO reduction.  Clinical correlation recommended.     6/24/2013 CPFT  Mild obstruction. Airflow is not clearly improved following bronchodilation. Clinical improvement following bronchodilator  therapy may occur in the absence of spirometric improvement.    4/28/2022 6MWD No desaturations requiring supplemental oxygen at rest or exertion.  Oxygen Assessment Supplemental O2 Heart   Rate Blood Pressure Edmundo Dyspnea Scale Rating    Resting 95 % Room Air 75 bpm 115/61 0   Exercise             Minute             1 96 % Room Air 105 bpm       2 95 % Room Air 105 bpm       3 95 % Room Air 104 bpm       4 96 % Room Air 104 bpm       5 96 % Room Air 102 bpm       6  96 % Room Air 105 bpm 131/61 2   Recovery             Minute             1 98 % Room Air 78 bpm       2 99 % Room Air 74 bpm       3 98 % Room Air 77 bpm       4 98 % Room Air 77 bpm 121/74 0      Six Minute Walk Summary  6MWT Status: completed without stopping  Patient Reported: Dyspnea         Interpretation:  Did the patient stop or pause?: No  Total Time Walked (Calculated): 360 seconds  Final Partial Lap Distance (feet): 0 feet  Total Distance Meters (Calculated): 365.76 meters  Predicted Distance Meters (Calculated): 422.53 meters  Percentage of Predicted (Calculated): 86.56  Peak VO2 (Calculated): 14.95  Mets: 4.27  Has The Patient Had a Previous Six Minute Walk Test?: Yes     Previous 6MWT Results  Has The Patient Had a Previous Six Minute Walk Test?: Yes  Date of Previous Test: 04/15/21  Total Time Walked: 360 seconds  Total Distance (meters): 441.96  Predicted Distance (meters): 416.28 meters  Percentage of Predicted:  106.17  Percent Change (Calculated): 0.17    Assessment:     1. COPD, moderate    2. Overweight (BMI 25.0-29.9)    3. Former heavy cigarette smoker (20-39 per day)    4. Aortic atherosclerosis            Orders Placed This Encounter   Procedures    Complete PFT with bronchodilator     Standing Status:   Future     Standing Expiration Date:   5/9/2025     Order Specific Question:   Release to patient     Answer:   Immediate     Plan:   Discussed diagnosis, its evaluation, treatment and usual course. All questions answered.  Problem List Items Addressed This Visit       Overweight (BMI 25.0-29.9)     Continue to encourage exercise and dietary modification to obtain normal weight.   Has walking program daily  Wt Readings from Last 9 Encounters:   05/09/24 72.3 kg (159 lb 6.3 oz)   05/07/24 71.6 kg (157 lb 13.6 oz)   05/07/24 71.6 kg (157 lb 13.6 oz)   04/17/24 70.7 kg (155 lb 12.1 oz)   04/16/24 72.7 kg (160 lb 2.6 oz)   03/26/24 73.5 kg (162 lb 2.4 oz)   03/26/24 73.5 kg (162 lb 2.4 oz)   03/06/24 73.8 kg (162 lb 11.2 oz)   03/06/24 73.8 kg (162 lb 11.2 oz)   Body mass index is 28.24 kg/m².           Former heavy cigarette smoker (20-39 per day)     67.5 pk/year smoker quit in 2009  CT follow up with oncology   ONCOLOGIC DIAGNOSIS: Stage IIB, pT1a, pN1a cMx lung adenocarcinoma left lower lung, Dr. Medley. History of stage I squamous cell carcinoma moderately differentiated left lower lung status post wedge resection 04/28/2021           COPD, moderate - Primary     Current regimen effective. Per formulary change and patient agreeable, stop Anoro begin Stiolto 2 puffs daily. Albuterol inhaler if needed.          Relevant Medications    tiotropium-olodateroL (STIOLTO RESPIMAT) 2.5-2.5 mcg/actuation Mist    albuterol (PROVENTIL/VENTOLIN HFA) 90 mcg/actuation inhaler    Other Relevant Orders    Complete PFT with bronchodilator    Aortic atherosclerosis     Follow heart healthy low fat diet. regular exercise.              I spent a total of 32 minutes on the day of the visit.  This includes face to face time and non-face to face time preparing to see the patient (eg, review of tests), obtaining and/or reviewing separately obtained history, documenting clinical information in the electronic or other health record, independently interpreting results and communicating results to the patient/family/caregiver, or care coordinator.    Follow up in about 1 year (around 5/9/2025) for COPD cpft .     Cornelia Almonte NP  [facility] [place]

## 2024-05-09 NOTE — ASSESSMENT & PLAN NOTE
Current regimen effective. Per formulary change and patient agreeable, stop Anoro begin Stiolto 2 puffs daily. Albuterol inhaler if needed.

## 2024-05-09 NOTE — ASSESSMENT & PLAN NOTE
Continue to encourage exercise and dietary modification to obtain normal weight.   Has walking program daily  Wt Readings from Last 9 Encounters:   05/09/24 72.3 kg (159 lb 6.3 oz)   05/07/24 71.6 kg (157 lb 13.6 oz)   05/07/24 71.6 kg (157 lb 13.6 oz)   04/17/24 70.7 kg (155 lb 12.1 oz)   04/16/24 72.7 kg (160 lb 2.6 oz)   03/26/24 73.5 kg (162 lb 2.4 oz)   03/26/24 73.5 kg (162 lb 2.4 oz)   03/06/24 73.8 kg (162 lb 11.2 oz)   03/06/24 73.8 kg (162 lb 11.2 oz)   Body mass index is 28.24 kg/m².

## 2024-05-27 ENCOUNTER — LAB VISIT (OUTPATIENT)
Dept: LAB | Facility: HOSPITAL | Age: 74
End: 2024-05-27
Attending: HOSPITALIST
Payer: MEDICARE

## 2024-05-27 DIAGNOSIS — C79.9 NEOPLASM, METASTATIC: ICD-10-CM

## 2024-05-27 DIAGNOSIS — C34.32 MALIGNANT NEOPLASM OF LOWER LOBE OF LEFT LUNG: ICD-10-CM

## 2024-05-27 LAB
ALBUMIN SERPL BCP-MCNC: 4.1 G/DL (ref 3.5–5.2)
ALP SERPL-CCNC: 76 U/L (ref 55–135)
ALT SERPL W/O P-5'-P-CCNC: 5 U/L (ref 10–44)
ANION GAP SERPL CALC-SCNC: 11 MMOL/L (ref 8–16)
AST SERPL-CCNC: 23 U/L (ref 10–40)
BILIRUB SERPL-MCNC: 0.8 MG/DL (ref 0.1–1)
BUN SERPL-MCNC: 17 MG/DL (ref 8–23)
CALCIUM SERPL-MCNC: 9.8 MG/DL (ref 8.7–10.5)
CHLORIDE SERPL-SCNC: 103 MMOL/L (ref 95–110)
CO2 SERPL-SCNC: 26 MMOL/L (ref 23–29)
CREAT SERPL-MCNC: 1.5 MG/DL (ref 0.5–1.4)
ERYTHROCYTE [DISTWIDTH] IN BLOOD BY AUTOMATED COUNT: 13.8 % (ref 11.5–14.5)
EST. GFR  (NO RACE VARIABLE): 36 ML/MIN/1.73 M^2
GLUCOSE SERPL-MCNC: 97 MG/DL (ref 70–110)
HCT VFR BLD AUTO: 34.9 % (ref 37–48.5)
HGB BLD-MCNC: 11.4 G/DL (ref 12–16)
IMM GRANULOCYTES # BLD AUTO: 0.01 K/UL (ref 0–0.04)
MCH RBC QN AUTO: 30.1 PG (ref 27–31)
MCHC RBC AUTO-ENTMCNC: 32.7 G/DL (ref 32–36)
MCV RBC AUTO: 92 FL (ref 82–98)
NEUTROPHILS # BLD AUTO: 3.4 K/UL (ref 1.8–7.7)
PLATELET # BLD AUTO: 158 K/UL (ref 150–450)
PMV BLD AUTO: 10 FL (ref 9.2–12.9)
POTASSIUM SERPL-SCNC: 4.3 MMOL/L (ref 3.5–5.1)
PROT SERPL-MCNC: 7.1 G/DL (ref 6–8.4)
RBC # BLD AUTO: 3.79 M/UL (ref 4–5.4)
SODIUM SERPL-SCNC: 140 MMOL/L (ref 136–145)
T4 FREE SERPL-MCNC: 1.13 NG/DL (ref 0.71–1.51)
TSH SERPL DL<=0.005 MIU/L-ACNC: 1.23 UIU/ML (ref 0.4–4)
WBC # BLD AUTO: 5.17 K/UL (ref 3.9–12.7)

## 2024-05-27 PROCEDURE — 80053 COMPREHEN METABOLIC PANEL: CPT | Performed by: HOSPITALIST

## 2024-05-27 PROCEDURE — 84443 ASSAY THYROID STIM HORMONE: CPT | Performed by: HOSPITALIST

## 2024-05-27 PROCEDURE — 85027 COMPLETE CBC AUTOMATED: CPT | Performed by: HOSPITALIST

## 2024-05-27 PROCEDURE — 84439 ASSAY OF FREE THYROXINE: CPT | Performed by: HOSPITALIST

## 2024-05-27 PROCEDURE — 36415 COLL VENOUS BLD VENIPUNCTURE: CPT | Mod: PO | Performed by: HOSPITALIST

## 2024-05-28 ENCOUNTER — INFUSION (OUTPATIENT)
Dept: INFUSION THERAPY | Facility: HOSPITAL | Age: 74
End: 2024-05-28
Attending: INTERNAL MEDICINE
Payer: MEDICARE

## 2024-05-28 ENCOUNTER — OFFICE VISIT (OUTPATIENT)
Dept: HEMATOLOGY/ONCOLOGY | Facility: CLINIC | Age: 74
End: 2024-05-28
Payer: MEDICARE

## 2024-05-28 VITALS
TEMPERATURE: 98 F | HEIGHT: 64 IN | DIASTOLIC BLOOD PRESSURE: 60 MMHG | SYSTOLIC BLOOD PRESSURE: 116 MMHG | HEART RATE: 70 BPM | RESPIRATION RATE: 18 BRPM | BODY MASS INDEX: 26.84 KG/M2 | WEIGHT: 157.19 LBS | OXYGEN SATURATION: 96 %

## 2024-05-28 VITALS
BODY MASS INDEX: 26.84 KG/M2 | HEART RATE: 84 BPM | DIASTOLIC BLOOD PRESSURE: 63 MMHG | SYSTOLIC BLOOD PRESSURE: 100 MMHG | HEIGHT: 64 IN | WEIGHT: 157.19 LBS | TEMPERATURE: 97 F | OXYGEN SATURATION: 98 %

## 2024-05-28 DIAGNOSIS — C77.1 SECONDARY MALIGNANT NEOPLASM OF INTRATHORACIC LYMPH NODES: ICD-10-CM

## 2024-05-28 DIAGNOSIS — C34.12 ADENOCARCINOMA OF UPPER LOBE OF LEFT LUNG: Primary | ICD-10-CM

## 2024-05-28 DIAGNOSIS — E87.6 HYPOKALEMIA: ICD-10-CM

## 2024-05-28 DIAGNOSIS — G62.9 NEUROPATHY: ICD-10-CM

## 2024-05-28 DIAGNOSIS — R06.00 DYSPNEA, UNSPECIFIED TYPE: ICD-10-CM

## 2024-05-28 DIAGNOSIS — C34.32 MALIGNANT NEOPLASM OF LOWER LOBE OF LEFT LUNG: Primary | ICD-10-CM

## 2024-05-28 DIAGNOSIS — N28.9 RENAL INSUFFICIENCY: ICD-10-CM

## 2024-05-28 PROCEDURE — 1159F MED LIST DOCD IN RCRD: CPT | Mod: CPTII,S$GLB,, | Performed by: HOSPITALIST

## 2024-05-28 PROCEDURE — 96413 CHEMO IV INFUSION 1 HR: CPT

## 2024-05-28 PROCEDURE — 99999 PR PBB SHADOW E&M-EST. PATIENT-LVL III: CPT | Mod: PBBFAC,,, | Performed by: HOSPITALIST

## 2024-05-28 PROCEDURE — 3074F SYST BP LT 130 MM HG: CPT | Mod: CPTII,S$GLB,, | Performed by: HOSPITALIST

## 2024-05-28 PROCEDURE — 1126F AMNT PAIN NOTED NONE PRSNT: CPT | Mod: CPTII,S$GLB,, | Performed by: HOSPITALIST

## 2024-05-28 PROCEDURE — 1101F PT FALLS ASSESS-DOCD LE1/YR: CPT | Mod: CPTII,S$GLB,, | Performed by: HOSPITALIST

## 2024-05-28 PROCEDURE — 63600175 PHARM REV CODE 636 W HCPCS: Performed by: HOSPITALIST

## 2024-05-28 PROCEDURE — 25000003 PHARM REV CODE 250: Performed by: HOSPITALIST

## 2024-05-28 PROCEDURE — G2211 COMPLEX E/M VISIT ADD ON: HCPCS | Mod: S$GLB,,, | Performed by: HOSPITALIST

## 2024-05-28 PROCEDURE — 3288F FALL RISK ASSESSMENT DOCD: CPT | Mod: CPTII,S$GLB,, | Performed by: HOSPITALIST

## 2024-05-28 PROCEDURE — 3078F DIAST BP <80 MM HG: CPT | Mod: CPTII,S$GLB,, | Performed by: HOSPITALIST

## 2024-05-28 PROCEDURE — 99215 OFFICE O/P EST HI 40 MIN: CPT | Mod: S$GLB,,, | Performed by: HOSPITALIST

## 2024-05-28 RX ORDER — HEPARIN 100 UNIT/ML
500 SYRINGE INTRAVENOUS
Status: DISCONTINUED | OUTPATIENT
Start: 2024-05-28 | End: 2024-05-28 | Stop reason: HOSPADM

## 2024-05-28 RX ORDER — HEPARIN 100 UNIT/ML
500 SYRINGE INTRAVENOUS
Status: CANCELLED | OUTPATIENT
Start: 2024-05-28

## 2024-05-28 RX ORDER — EPINEPHRINE 0.3 MG/.3ML
0.3 INJECTION SUBCUTANEOUS ONCE AS NEEDED
Status: CANCELLED | OUTPATIENT
Start: 2024-05-28

## 2024-05-28 RX ORDER — DIPHENHYDRAMINE HYDROCHLORIDE 50 MG/ML
50 INJECTION INTRAMUSCULAR; INTRAVENOUS ONCE AS NEEDED
Status: CANCELLED | OUTPATIENT
Start: 2024-05-28

## 2024-05-28 RX ORDER — CHOLECALCIFEROL (VITAMIN D3) 25 MCG
1000 TABLET ORAL DAILY
COMMUNITY

## 2024-05-28 RX ORDER — SODIUM CHLORIDE 0.9 % (FLUSH) 0.9 %
10 SYRINGE (ML) INJECTION
Status: CANCELLED | OUTPATIENT
Start: 2024-05-28

## 2024-05-28 RX ADMIN — HEPARIN 500 UNITS: 100 SYRINGE at 10:05

## 2024-05-28 RX ADMIN — ATEZOLIZUMAB 1200 MG: 1200 INJECTION, SOLUTION INTRAVENOUS at 10:05

## 2024-05-28 RX ADMIN — SODIUM CHLORIDE: 9 INJECTION, SOLUTION INTRAVENOUS at 10:05

## 2024-05-28 NOTE — PROGRESS NOTES
[facility] MEDICAL ONCOLOGY - FOLLOW UP VISIT    Reason for visit: Follow up for stage IIB squamous cell carcinoma      Oncology History   Malignant neoplasm of lower lobe of left lung - Squamous cell   4/15/2021 Initial Diagnosis    Malignant neoplasm of lower lobe of left lung - Squamous cell     5/25/2021 Cancer Staged    Staging form: Lung, AJCC 8th Edition  - Clinical: Stage IA1 (cT1a, cN0, cM0)      Cancer Staged    9mm non-keratinizing squamous cell carcinoma, no VPI, no LVI, margin at least 8mm.  Levels 9 and 11 = negative.  T1aN0     4/5/2023 Cancer Staged    Staging form: Lung, AJCC 8th Edition  - Pathologic stage from 4/5/2023: Stage IIB (pT2, pN1, cM0)     5/1/2023 - 7/21/2023 Chemotherapy    Treatment Summary   Plan Name: OP NSCLC PEMETREXED + CISPLATIN Q3W  Treatment Goal: Supportive  Status: Inactive  Start Date: 5/1/2023  End Date: 7/21/2023  Provider: Marissa Brower MD  Chemotherapy: CISplatin (Platinol) 75 mg/m2 = 138 mg in sodium chloride 0.9% 665 mL chemo infusion, 75 mg/m2 = 138 mg, Intravenous, Clinic/HOD 1 time, 4 of 4 cycles  Administration: 138 mg (5/12/2023), 138 mg (6/29/2023), 138 mg (7/20/2023), 138 mg (6/8/2023)  PEMEtrexed disodium (ALIMTA) 900 mg in sodium chloride 0.9% SolP 100 mL chemo infusion, 925 mg, Intravenous, Clinic/HOD 1 time, 4 of 4 cycles  Dose modification: 375 mg/m2 (original dose 500 mg/m2, Cycle 3, Reason: MD Discretion, Comment: neutropenia )  Administration: 900 mg (5/12/2023), 700 mg (6/29/2023), 700 mg (7/20/2023), 700 mg (6/8/2023)     8/14/2023 -  Chemotherapy    Treatment Summary   Plan Name: OP ATEZOLIZUMAB Q3W  Treatment Goal: Supportive  Status: Active  Start Date: 8/14/2023  End Date: 8/20/2024 (Planned)  Provider: Marissa Brower MD  Chemotherapy: [No matching medication found in this treatment plan]     NSCLC of left lung (Resolved)   4/28/2021 Initial Diagnosis    NSCLC of left lung  "(Resolved)      Cancer Staged    9mm non-keratinizing squamous cell carcinoma, no VPI, no LVI, margin at least 8mm.  Levels 9 and 11 = negative.  T1aN0        NANI NSCLC/ADC IIB (pT2 pN1a cMx)  - History of stage I squamous cell carcinoma moderately differentiated left lower lung status post wedge resection 04/28/2021  - Abnormality seen on surveillance for history of squamous cell carcinoma. Initial biopsy February 20, 2023 without neoplasm identified , thus had left lower lobe wedge resection after multiple disciplinary discussion, final pathology positive on 03/28/2022 for invasive moderately differentiated adenocarcinoma station 10 lymph node positive, pleural invasion noted, margins negative.    - Restaging MRI brain negative and PET scan with left hilar avidity SUV 4 and chest wall. Given recent surgery potential inflammation presented at tumor board recommended proceeding with adjuvant chemotherapy and follow-up on repeat imaging. Started adjuvant therapy with chemotherapy and immunotherapy per IMPOWER 010 given PDL1 positive disease 95%.  Mutational analysis negative for EGFR mutation. Noted BRAF mutation.   - Completed adjuvant chemotherapy with cisplatin and pemetrexed tolerated well aside from chemotherapy associated progressive anemia.    - Restaging PET on 8/7/2023 with resolution of prior lymph node avidity.    - Started immunotherapy with atezolizumab (8/14/2023 - present; delay from C3 on 9/26/23 and C4 on 11/14/23 due to concnern for pneumonitis)  - 2/5/24: CT C, "no detrimental interval change in appearance"  - 3/01/24: CT C non-con, no acute pathology, remaining findings unchanged when compared to prior study    HPI:     Radha Lamas is a 74 yo woman w/ pmh significant for COPD and two primary L lung cancers as below. She presents today for follow up.     - Stage I squamous cell carcinoma of the of the LLL, initially dx'd 4/28/21, s/p wedge resection (4/28/21)    - Stage IIB (pT2, pN1a, cMx) " adenocarcinoma of the NANI (PD-L1 95%, BRAF [id number] mutated), initially dx'd 3/28/23, s/p wedge resection 3/28/23, adjuvant cisplatin/pemetrexed x 4 cycles (5/11/23-7/20/23), and currently on adjuvant atezolizumab (8/14/23 - present; of note, delay between C3 on 9/26/23 and C4 on 11/14/23 due to concern for pneumonitis)     Last clinic 5/7/24, PÉREZ energy levels unchanged.  Proceed with C12 atezolizumab, RTC in 528, next CT due around 6/1/24.  PFT scheduled in 5 9.  Continue to follow with Nephrology.    Interval History:   - 5/7/24:  C12D1 atezolizumab  - 5/9/24:  Pulmonology:  Patient's breathing improved since stopping heavy breakfast prior to exercise.  Likely change of inhalers due to insurance preference.  RTC in 1 year    Pt states that she is feeling well today. She says that her energy is largely unchanged, noting that she was in her garden picking green beans this morning. She says that her breathing is entirely unchanged compared to prior, stating that she sometimes breathes well and sometimes she doesn't; she says that she doesn't even think about it any more. She denies N/V, diarrhea, constipation, new/worsening cough, or rash.       Past Medical History:   Past Medical History:   Diagnosis Date    COPD (chronic obstructive pulmonary disease) 2013    Eczema     GERD (gastroesophageal reflux disease)     Hiatal hernia     History of torn meniscus of right knee 01/2011    Hypothyroid     Incidental pulmonary nodule, > 3mm and < 8mm - [facility] 8/12/2021    Lung cancer     Urinary incontinence in female     Mild , only takes mediciane with travel        Allergies:   Review of patient's allergies indicates:  No Known Allergies     Medications:   Current Outpatient Medications   Medication Sig Dispense Refill    albuterol (PROVENTIL/VENTOLIN HFA) 90 mcg/actuation inhaler Inhale 2 puffs into the lungs every 4 (four) hours as needed for Wheezing or Shortness of Breath. Rescue 17 g 11    calcium carbonate (OS-CYDNEY)  600 mg calcium (1,500 mg) Tab Take 600 mg by mouth.      DUPIXENT  mg/2 mL PnIj Inject into the skin every 14 (fourteen) days.      gabapentin (NEURONTIN) 300 MG capsule Take 1 capsule (300 mg total) by mouth every evening. 30 capsule 11    levothyroxine (SYNTHROID) 75 MCG tablet Take 75 mcg by mouth once daily.      loratadine (CLARITIN) 10 mg tablet Take 10 mg by mouth once daily.      nystatin (MYCOSTATIN) cream Apply topically 2 (two) times daily. 30 g 1    omeprazole (PRILOSEC) 20 MG capsule Take 20 mg by mouth once daily.      tiotropium-olodateroL (STIOLTO RESPIMAT) 2.5-2.5 mcg/actuation Mist Inhale 2 puffs into the lungs once daily. Controller 12 g 3    tolterodine (DETROL LA) 4 MG 24 hr capsule Take 1 capsule (4 mg total) by mouth once daily. 90 capsule 4    triamcinolone acetonide 0.1% (KENALOG) 0.1 % ointment Apply topically 2 (two) times daily. 30 g 1     No current facility-administered medications for this visit.     Facility-Administered Medications Ordered in Other Visits   Medication Dose Route Frequency Provider Last Rate Last Admin    prochlorperazine injection Soln 5 mg  5 mg Intravenous Q30 Min PRN Adrien Vail MD              ROS:  Review of Systems   A complete 12-point review of systems was reviewed and is negative except as mentioned above.       Physical Exam:       There were no vitals taken for this visit.               Physical Exam  Constitutional:       Appearance: Normal appearance.   HENT:      Head: Normocephalic and atraumatic.   Eyes:      Extraocular Movements: Extraocular movements intact.      Conjunctiva/sclera: Conjunctivae normal.      Pupils: Pupils are equal, round, and reactive to light.   Cardiovascular:      Rate and Rhythm: Normal rate and regular rhythm.      Heart sounds: No murmur heard.     No friction rub. No gallop.   Pulmonary:      Effort: Pulmonary effort is normal.      Breath sounds: No wheezing, rhonchi or rales.   Musculoskeletal:          General: Normal range of motion.      Right lower leg: No edema.      Left lower leg: No edema.   Skin:     General: Skin is warm and dry.   Neurological:      Mental Status: She is alert and oriented to person, place, and time.   Psychiatric:         Mood and Affect: Mood normal.         Thought Content: Thought content normal.         Judgment: Judgment normal.           Labs:   Recent Results (from the past 48 hour(s))   CBC Oncology    Collection Time: 05/27/24 10:15 AM   Result Value Ref Range    WBC 5.17 3.90 - 12.70 K/uL    RBC 3.79 (L) 4.00 - 5.40 M/uL    Hemoglobin 11.4 (L) 12.0 - 16.0 g/dL    Hematocrit 34.9 (L) 37.0 - 48.5 %    MCV 92 82 - 98 fL    MCH 30.1 27.0 - 31.0 pg    MCHC 32.7 32.0 - 36.0 g/dL    RDW 13.8 11.5 - 14.5 %    Platelets 158 150 - 450 K/uL    MPV 10.0 9.2 - 12.9 fL    Gran # (ANC) 3.4 1.8 - 7.7 K/uL    Immature Grans (Abs) 0.01 0.00 - 0.04 K/uL   Comprehensive Metabolic Panel    Collection Time: 05/27/24 10:15 AM   Result Value Ref Range    Sodium 140 136 - 145 mmol/L    Potassium 4.3 3.5 - 5.1 mmol/L    Chloride 103 95 - 110 mmol/L    CO2 26 23 - 29 mmol/L    Glucose 97 70 - 110 mg/dL    BUN 17 8 - 23 mg/dL    Creatinine 1.5 (H) 0.5 - 1.4 mg/dL    Calcium 9.8 8.7 - 10.5 mg/dL    Total Protein 7.1 6.0 - 8.4 g/dL    Albumin 4.1 3.5 - 5.2 g/dL    Total Bilirubin 0.8 0.1 - 1.0 mg/dL    Alkaline Phosphatase 76 55 - 135 U/L    AST 23 10 - 40 U/L    ALT 5 (L) 10 - 44 U/L    eGFR 36 (A) >60 mL/min/1.73 m^2    Anion Gap 11 8 - 16 mmol/L   TSH    Collection Time: 05/27/24 10:15 AM   Result Value Ref Range    TSH 1.230 0.400 - 4.000 uIU/mL   T4, Free    Collection Time: 05/27/24 10:15 AM   Result Value Ref Range    Free T4 1.13 0.71 - 1.51 ng/dL          Imaging:   CT Chest Without Contrast  Narrative: EXAMINATION:  CT CHEST WITHOUT CONTRAST    CLINICAL HISTORY:  Rule out pneumonitis; Dyspnea, unspecified    TECHNIQUE:  Low dose axial images, sagittal and coronal reformations were obtained from  the thoracic inlet to the lung bases. Contrast was not administered.    COMPARISON:  02/05/2024    FINDINGS:  There are stable scarring/postoperative changes seen associated with the left lung.  There is a subpleural noncalcified pulmonary nodule seen within the left lung base that measures approximately 4 mm that is unchanged in appearance.  There are also a few subpleural areas of probable scarring within the right upper lung zone including series 4, image 84, series 4, image 91 and series 4, image 93 that are also stable.    Mild vascular calcification seen involving the aorta.  There is no pericardial effusion.  No enlarged mediastinal, hilar or axillary lymph nodes are identified.  A right-sided MediPort catheter is noted with the tip seen within the distal SVC.    Stable appearance of a few hypodense lesions within the liver most consistent with cysts.    No suspicious appearing osseus abnormalities are identified.  Impression: 1. No acute pathology.  2. Remaining findings unchanged when compared to the prior study.    Electronically signed by: Lonnie Robin DO  Date:    03/01/2024  Time:    08:41            Path:   1. Left lung, wedge resection: Invasive adenocarcinoma, predominantly solid   pattern, measuring 9 mm (0.9 cm) in greatest dimension.          Tumor is located 3 mm (0.3 cm) from the blue inked/stapled parenchymal   surgical margin.          Tumor extends into the elastic layer of the visceral pleura.          See synoptic report in comment section for further details.   2. Lymph node, left level 11, excision: 1 benign fragmented lymph node with   sinus histiocytosis and anthracotic pigment, negative for metastatic   carcinoma (0/1).   3. Lymph node, left level 10, excision: 1 lymph node, positive for metastatic   carcinoma with crush artifact dense fibrosis and focal necrosis (1/1).          Confirmed with positive immunohistochemical stain with cytokeratin   AE1/AE3 with satisfactory positive  and negative controls.   4. Lymph node, left level 12, excision: 1 benign lymph node with sinus   histiocytosis, negative for metastatic carcinoma (0/1).   5. Lymph node, left level 9, excision: 1 benign fragmented lymph node with   sinus histiocytosis and anthracotic pigment, negative for metastatic   carcinoma (0/1).   6. Lymph node, level 7, excision: 1 benign lymph node with sinus   histiocytosis, negative for metastatic carcinoma (0/1).   7.  Lymph node, level 5, excision: 1 benign fragmented lymph node with sinus   histiocytosis and anthracotic pigment, negative for metastatic carcinoma   (0/1).   8. Lung, lingula, anatomic lingulectomy: Lung with congested vasculature.          No residual carcinoma is identified.          Bronchial and vascular margin is negative for malignancy.          See synoptic report in comment section for further details.       Assessment and Plan:     Radha Lamas is a 74 yo woman w/ pmh significant for COPD and two primary L lung cancers as below. She presents today for follow up.     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 Currently on adjuvant atezolizumab following adjuvant cisplatin/pemetrexed and tolerating without significant issue (initially with some worsening shortness of breath and fatigue, as below).  Most recent imaging (CT C non-con, 3/1/24) without evidence of recurrent/residual disease or of pneumonitis (see below). Given good tolerance and response, will continue with immunotherapy at this time. Ultimately,  "planning atezolizumab for total of 1 year and q3-4m imaging throughout per IMPOWER-010. Following completion of treatment, will expand to q4m imaging and then q6m imaging.    Of note, there was a delay between cycle 3 (09/26/2023) and cycle 4 (11/14/2023) due to concern for pneumonitis, workup for which was largely unrevealing. There was also a 1 week delay between C8 and C9 due to concern for pneumonitis, workup for which was again largely unrevealing.     PLAN:   -- Proceed w/ C13 atezolizumab today (5/28/24), labs acceptable (Cr stable) and treatment signed  -- CT C prior to C14, pt prefers imaging at [facility]. Labs with imaging.   -- RTC and C14 on 6/18/24      Dyspnea  See note from 2/27/24. Symptoms are stable today (5/28/24). CT C from 3/1/24 is without evidence of pneumonitis. Favor that symptoms are related to COPD. Pt currently being managed by pulmonology, may also be a component of post-prandial dyspnea; patient does confirm improvement in ability to tolerate walks if taken before meals. Given lack of pneumonitis on CT C, okay to proceed with treatment as above.   -- Continue to f/u w/ pulmonology, last seen on 5/9/24      Depressed Renal Function  Patient's eGFR dipped below 60 following her 4th dose of cisplatin/pemetrexed and has remained depressed since (notably, this was prior to her 1st dose of atezolizumab).  It has remained low since it was first noted 08/10/2023. Urine protein/creatinine ratio 0.1 g/day on 11/14/23. This time line may be inconsistent with cisplatin induced nephropathy as recovery of renal function would be anticipated by this time, however it is possible to develop a CKD following cisplatin therapy which may be the etiology. Does not appear consistent with an IrAE. Given stability of renal function, okay to proceed with treatment as above. Pt has now established care w/ Dr. Turner.  -- Continued f/u w/ nephrology, next visit in July      Neuropathy  Pt describes "deadened" feet " "and also like she has "bunched socks" on her feet when she puts on her shoes which developed approximately 2 months after completion of chemotherapy. This is worse at night. She has some long-standing decreased sensation in her fingertips which she relates to eczema. She denies any problems like this previously, including when she received her chemotherapy. She denies any issues with proprioception / ambulation related to this. Exam has been previously unremarkable. Uncertain etiology. The pt does not have a diagnosis of diabetes. The time course is not consistent with chemotherapy induced peripheral neuropathy. Immunotherapy can cause neuropathic issues, however this developed while the patient had been off of the atezolizumab for 4 weeks already (though this does not preclude this as a possibility).  Symptoms are currently stable and chiefly in her feet.  She has seen a podiatrist with recommendation to increase her walking, which she has done.  She has not previously been interested in pharmacotherapy time given mild and stable symptoms. Given stability, will continue with treatment as above.   -- Continue to follow with podiatrist  -- Referral to neurology in place, patient did not make appointment.  Okay to hold at this time, low threshold for evaluation.      The above information has been reviewed with the patient and all questions have been answered to their apparent satisfaction.  They understand that they can call the clinic with any questions.    Orion Arce MD  Hematology/Oncology  [facility]        Med Onc Chart Routing      Follow up with physician . CT C at least 1 day prior to C14, pt prefers imaging at [facility]. Labs with imaging. RTC and C14 on 6/18/24   Follow up with [facility]    Infusion scheduling note    Injection scheduling note    Labs CBC, CMP, free T4 and TSH   Scheduling:  Preferred lab:  Lab interval:     Imaging    Pharmacy appointment    Other referrals               "             As

## 2024-05-28 NOTE — PLAN OF CARE
Problem: Adult Inpatient Plan of Care  Goal: Plan of Care Review  Outcome: Progressing  Flowsheets (Taken 5/28/2024 1000)  Plan of Care Reviewed With:   patient   spouse  Goal: Patient-Specific Goal (Individualized)  Outcome: Progressing  Flowsheets (Taken 5/28/2024 1000)  Individualized Care Needs: warm blanket, pillow, reclining position  Anxieties, Fears or Concerns: none verbalized     Problem: Infection  Goal: Absence of Infection Signs and Symptoms  Outcome: Progressing  Intervention: Prevent or Manage Infection  Flowsheets (Taken 5/28/2024 1000)  Infection Management: aseptic technique maintained

## 2024-06-14 DIAGNOSIS — C34.32 MALIGNANT NEOPLASM OF LOWER LOBE OF LEFT LUNG: ICD-10-CM

## 2024-06-17 ENCOUNTER — HOSPITAL ENCOUNTER (OUTPATIENT)
Dept: RADIOLOGY | Facility: HOSPITAL | Age: 74
Discharge: HOME | End: 2024-06-17
Attending: HOSPITALIST
Payer: MEDICARE

## 2024-06-17 DIAGNOSIS — C34.12 ADENOCARCINOMA OF UPPER LOBE OF LEFT LUNG: ICD-10-CM

## 2024-06-17 PROCEDURE — 71250 CT THORAX DX C-: CPT | Mod: TC

## 2024-06-17 PROCEDURE — 71250 CT THORAX DX C-: CPT | Mod: 26,,, | Performed by: RADIOLOGY

## 2024-06-18 ENCOUNTER — INFUSION (OUTPATIENT)
Dept: INFUSION THERAPY | Facility: HOSPITAL | Age: 74
End: 2024-06-18
Attending: INTERNAL MEDICINE
Payer: MEDICARE

## 2024-06-18 ENCOUNTER — OFFICE VISIT (OUTPATIENT)
Dept: HEMATOLOGY/ONCOLOGY | Facility: CLINIC | Age: 74
End: 2024-06-18
Payer: MEDICARE

## 2024-06-18 VITALS
DIASTOLIC BLOOD PRESSURE: 58 MMHG | OXYGEN SATURATION: 98 % | HEART RATE: 63 BPM | RESPIRATION RATE: 18 BRPM | TEMPERATURE: 98 F | SYSTOLIC BLOOD PRESSURE: 114 MMHG

## 2024-06-18 VITALS
SYSTOLIC BLOOD PRESSURE: 111 MMHG | OXYGEN SATURATION: 96 % | TEMPERATURE: 97 F | DIASTOLIC BLOOD PRESSURE: 71 MMHG | BODY MASS INDEX: 26.31 KG/M2 | HEIGHT: 65 IN | HEART RATE: 70 BPM | WEIGHT: 157.94 LBS

## 2024-06-18 DIAGNOSIS — C34.32 MALIGNANT NEOPLASM OF LOWER LOBE OF LEFT LUNG: ICD-10-CM

## 2024-06-18 DIAGNOSIS — R94.6 ABNORMAL RESULTS OF THYROID FUNCTION STUDIES: ICD-10-CM

## 2024-06-18 DIAGNOSIS — C34.12 ADENOCARCINOMA OF UPPER LOBE OF LEFT LUNG: Primary | ICD-10-CM

## 2024-06-18 DIAGNOSIS — N18.32 STAGE 3B CHRONIC KIDNEY DISEASE: ICD-10-CM

## 2024-06-18 DIAGNOSIS — C34.32 MALIGNANT NEOPLASM OF LOWER LOBE OF LEFT LUNG: Primary | ICD-10-CM

## 2024-06-18 DIAGNOSIS — E87.6 HYPOKALEMIA: ICD-10-CM

## 2024-06-18 DIAGNOSIS — E66.3 OVERWEIGHT (BMI 25.0-29.9): ICD-10-CM

## 2024-06-18 PROCEDURE — 25000003 PHARM REV CODE 250: Performed by: NURSE PRACTITIONER

## 2024-06-18 PROCEDURE — 3074F SYST BP LT 130 MM HG: CPT | Mod: CPTII,S$GLB,, | Performed by: NURSE PRACTITIONER

## 2024-06-18 PROCEDURE — 1159F MED LIST DOCD IN RCRD: CPT | Mod: CPTII,S$GLB,, | Performed by: NURSE PRACTITIONER

## 2024-06-18 PROCEDURE — 1101F PT FALLS ASSESS-DOCD LE1/YR: CPT | Mod: CPTII,S$GLB,, | Performed by: NURSE PRACTITIONER

## 2024-06-18 PROCEDURE — 99214 OFFICE O/P EST MOD 30 MIN: CPT | Mod: 25,S$GLB,, | Performed by: NURSE PRACTITIONER

## 2024-06-18 PROCEDURE — 3078F DIAST BP <80 MM HG: CPT | Mod: CPTII,S$GLB,, | Performed by: NURSE PRACTITIONER

## 2024-06-18 PROCEDURE — 1126F AMNT PAIN NOTED NONE PRSNT: CPT | Mod: CPTII,S$GLB,, | Performed by: NURSE PRACTITIONER

## 2024-06-18 PROCEDURE — 1160F RVW MEDS BY RX/DR IN RCRD: CPT | Mod: CPTII,S$GLB,, | Performed by: NURSE PRACTITIONER

## 2024-06-18 PROCEDURE — 99999 PR PBB SHADOW E&M-EST. PATIENT-LVL IV: CPT | Mod: PBBFAC,,, | Performed by: NURSE PRACTITIONER

## 2024-06-18 PROCEDURE — 96413 CHEMO IV INFUSION 1 HR: CPT

## 2024-06-18 PROCEDURE — 63600175 PHARM REV CODE 636 W HCPCS: Performed by: NURSE PRACTITIONER

## 2024-06-18 PROCEDURE — 3288F FALL RISK ASSESSMENT DOCD: CPT | Mod: CPTII,S$GLB,, | Performed by: NURSE PRACTITIONER

## 2024-06-18 RX ORDER — SODIUM CHLORIDE 0.9 % (FLUSH) 0.9 %
10 SYRINGE (ML) INJECTION
Status: CANCELLED | OUTPATIENT
Start: 2024-06-18

## 2024-06-18 RX ORDER — EPINEPHRINE 0.3 MG/.3ML
0.3 INJECTION SUBCUTANEOUS ONCE AS NEEDED
Status: CANCELLED | OUTPATIENT
Start: 2024-06-18

## 2024-06-18 RX ORDER — SODIUM CHLORIDE 0.9 % (FLUSH) 0.9 %
10 SYRINGE (ML) INJECTION
Status: DISCONTINUED | OUTPATIENT
Start: 2024-06-18 | End: 2024-06-18 | Stop reason: HOSPADM

## 2024-06-18 RX ORDER — HEPARIN 100 UNIT/ML
500 SYRINGE INTRAVENOUS
Status: CANCELLED | OUTPATIENT
Start: 2024-06-18

## 2024-06-18 RX ORDER — HEPARIN 100 UNIT/ML
500 SYRINGE INTRAVENOUS
Status: DISCONTINUED | OUTPATIENT
Start: 2024-06-18 | End: 2024-06-18 | Stop reason: HOSPADM

## 2024-06-18 RX ORDER — DIPHENHYDRAMINE HYDROCHLORIDE 50 MG/ML
50 INJECTION INTRAMUSCULAR; INTRAVENOUS ONCE AS NEEDED
Status: CANCELLED | OUTPATIENT
Start: 2024-06-18

## 2024-06-18 RX ADMIN — SODIUM CHLORIDE: 9 INJECTION, SOLUTION INTRAVENOUS at 09:06

## 2024-06-18 RX ADMIN — HEPARIN 500 UNITS: 100 SYRINGE at 10:06

## 2024-06-18 RX ADMIN — ATEZOLIZUMAB 1200 MG: 1200 INJECTION, SOLUTION INTRAVENOUS at 09:06

## 2024-06-18 NOTE — PROGRESS NOTES
Subjective:       Patient ID: Radha Lamas is a 74 y.o. female.    Chief Complaint: Review labs. 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 Initial biopsy February 20, 2023 without neoplasm identified who after multiple disciplinary discussion proceeded with left lower lobe wedge resection, final pathology positive for 03/28/2022 for invasive moderately differentiated adenocarcinoma station 10 lymph node positive, pleural invasion noted, margins negative.      MRI brain and PET 1 month s/p surgery MRI brain negative PET scan with avidity left hilar SUV 4 and chest wall.  Given recent surgery potential inflammation presented at tumor board recommended proceeding with adjuvant chemotherapy and follow-up on repeat imaging.      Completed adjuvant chemotherapy with cisplatin and pemetrexed tolerated well aside from chemotherapy associated progressive anemia. Restaging PET with resolution of prior lymph node avidity. Initiated Tecentriq immunotherapy 8/14/2023 planned Q 3 weeks.       Patient previously noted declining kidney function. Unclear etiology. She was referred to outside Nephrology per her preference. She has seen Dr. Maico Turner with CKD attributed to prior Cisplatin use. Ongoing f/u with next visit planned 7/2024.    Today she presents following recent PET for review and  consideration of Tecentriq. She notes feeling well outside of chronic SOB with exertion  Social History     Socioeconomic History    Marital status:    Tobacco Use    Smoking status: Former     Current packs/day: 0.00     Average packs/day: 1.5 packs/day for 45.0 years (67.5 ttl pk-yrs)     Types: Cigarettes     Start date: 1964     Quit date: 2009     Years since quitting: 15.4     Passive exposure: Never    Smokeless tobacco: Never   Substance and Sexual Activity    Alcohol use: No     Alcohol/week: 0.0 standard drinks of alcohol    Drug use: No    Sexual activity: Not Currently     Partners: Male     Birth control/protection: Post-menopausal     Social Determinants of Health     Financial Resource Strain: Low Risk  (12/20/2023)    Overall Financial Resource Strain (CARDIA)     Difficulty of Paying Living Expenses: Not hard at all   Food Insecurity: No Food Insecurity (12/20/2023)    Hunger Vital Sign     Worried About Running Out of Food in the Last Year: Never true     Ran Out of Food in the Last Year: Never true   Transportation Needs: No Transportation Needs (12/20/2023)    PRAPARE - Transportation     Lack of Transportation (Medical): No     Lack of Transportation (Non-Medical): No   Physical Activity: Sufficiently Active (12/20/2023)    Exercise Vital Sign     Days of Exercise per Week: 5 days     Minutes of Exercise per Session: 150+ min   Stress: No Stress Concern Present (12/20/2023)    [employer] [place] [employer] - Occupational Stress Questionnaire     Feeling of Stress : Not at all   Housing Stability: Unknown (12/20/2023)    Housing Stability Vital Sign     Unable to Pay for Housing in the Last Year: No     Unstable Housing in the Last Year: No       Past Medical History:   Diagnosis Date    COPD (chronic obstructive pulmonary disease) 2013    Eczema     GERD (gastroesophageal reflux disease)     Hiatal hernia     History of torn meniscus of right knee 01/2011    Hypothyroid      Incidental pulmonary nodule, > 3mm and < 8mm - Lingula 8/12/2021    Lung cancer     Urinary incontinence in female     Mild , only takes mediciane with travel       Family History   Problem Relation Name Age of Onset    Macular degeneration Mother      Retinal detachment Mother      Heart failure Mother      Hypertension Mother      Cancer Father      Hypertension Father      Cancer Sister      Cancer Brother         Past Surgical History:   Procedure Laterality Date    BREAST MASS EXCISION      FLUOROSCOPY N/A 04/27/2023    Procedure: Fluoroscopy/Mediport placement;  Surgeon: Kodak Retana MD;  Location: [facility];  Service: General;  Laterality: N/A;    INJECTION OF ANESTHETIC AGENT AROUND MULTIPLE INTERCOSTAL NERVES Left 03/20/2023    Procedure: BLOCK, NERVE, INTERCOSTAL, 2 OR MORE;  Surgeon: Refugio Medley MD;  [place] [facility] [place] [address] [place] [address];  Service: Thoracic;  Laterality: Left;    KNEE CARTILAGE SURGERY Right 01/2011    LAPAROSCOPIC LYSIS OF ADHESIONS Left 03/20/2023    Procedure: LYSIS, ADHESIONS, LAPAROSCOPIC;  Surgeon: Refugio Medlye MD;  [facility];  Service: Thoracic;  Laterality: Left;    ROBOT-ASSISTED LAPAROSCOPIC LYMPHADENECTOMY USING DA ROSEMARY XI Left 03/20/2023    Procedure: XI ROBOTIC LYMPHADENECTOMY;  Surgeon: Refugio Medley MD;  [facility];  Service: Thoracic;  Laterality: Left;    THORACOSCOPIC WEDGE RESECTION OF LUNG Left 04/28/2021    Procedure: VATS, WITH WEDGE RESECTION, LUNG;  Surgeon: Clarke Sauceda MD;  [facility];  Service: Thoracic;  Laterality: Left;  lymph node dissection     TUBAL LIGATION  1976    WEDGE RESECTION, LUNG, ROBOT-ASSISTED, USING VATS, USING DA ROSEMARY XI Left 03/20/2023    Procedure: XI ROBOTIC WEDGE RESECTION, LUNG (RATS); segmentectomy;  Surgeon: Refugio Medley MD;  [facility];  Service: Thoracic;  Laterality: Left;       Review of Systems   Constitutional:  Negative for activity  change, appetite change, chills, fatigue, fever and unexpected weight change.   HENT:  Negative for congestion, mouth sores, nosebleeds, sore throat, trouble swallowing and voice change.    Eyes:  Negative for visual disturbance.   Respiratory:  Positive for shortness of breath (with exertion). Negative for cough, chest tightness and wheezing.    Cardiovascular:  Negative for chest pain, palpitations and leg swelling.   Gastrointestinal:  Negative for abdominal distention, abdominal pain, blood in stool, constipation, diarrhea, nausea and vomiting.   Genitourinary:  Negative for difficulty urinating, dysuria and hematuria.   Musculoskeletal:  Negative for arthralgias, back pain and myalgias.   Skin:  Negative for pallor, rash and wound.   Neurological:  Negative for dizziness, syncope, weakness and headaches.        BLE neuropathy    Hematological:  Negative for adenopathy. Does not bruise/bleed easily.   Psychiatric/Behavioral:  The patient is nervous/anxious.          Medication List with Changes/Refills   Current Medications    ALBUTEROL (PROVENTIL/VENTOLIN HFA) 90 MCG/ACTUATION INHALER    Inhale 2 puffs into the lungs every 4 (four) hours as needed for Wheezing or Shortness of Breath. Rescue    CALCIUM CARBONATE (OS-CYDNEY) 600 MG CALCIUM (1,500 MG) TAB    Take 600 mg by mouth.    DUPIXENT  MG/2 ML PNIJ    Inject into the skin every 14 (fourteen) days.    GABAPENTIN (NEURONTIN) 300 MG CAPSULE    Take 1 capsule (300 mg total) by mouth every evening.    LEVOTHYROXINE (SYNTHROID) 75 MCG TABLET    Take 75 mcg by mouth once daily.    LORATADINE (CLARITIN) 10 MG TABLET    Take 10 mg by mouth once daily.    NYSTATIN (MYCOSTATIN) CREAM    Apply topically 2 (two) times daily.    OMEPRAZOLE (PRILOSEC) 20 MG CAPSULE    Take 20 mg by mouth once daily.    TIOTROPIUM-OLODATEROL (STIOLTO RESPIMAT) 2.5-2.5 MCG/ACTUATION MIST    Inhale 2 puffs into the lungs once daily. Controller    TOLTERODINE (DETROL LA) 4 MG 24 HR  CAPSULE    Take 1 capsule (4 mg total) by mouth once daily.    TRIAMCINOLONE ACETONIDE 0.1% (KENALOG) 0.1 % OINTMENT    Apply topically 2 (two) times daily.    VITAMIN D (VITAMIN D3) 1000 UNITS TAB    Take 1,000 Units by mouth once daily.     Objective:     Vitals:    06/18/24 0851   BP: 111/71   Pulse: 70   Temp: 97 °F (36.1 °C)     Lab Results   Component Value Date    WBC 4.12 06/17/2024    HGB 10.2 (L) 06/17/2024    HCT 32.0 (L) 06/17/2024    MCV 93 06/17/2024     06/17/2024       BMP  Lab Results   Component Value Date     06/17/2024    K 4.0 06/17/2024     06/17/2024    CO2 26 06/17/2024    BUN 17 06/17/2024    CREATININE 1.4 06/17/2024    CALCIUM 9.5 06/17/2024    ANIONGAP 9 06/17/2024    EGFRNORACEVR 39 (A) 06/17/2024       Lab Results   Component Value Date    ALT <5 (L) 06/17/2024    AST 23 06/17/2024    ALKPHOS 70 06/17/2024    BILITOT 0.6 06/17/2024         Physical Exam  Vitals reviewed.   Constitutional:       Appearance: She is well-developed.   HENT:      Head: Normocephalic.      Right Ear: External ear normal.      Left Ear: External ear normal.      Nose: Nose normal.   Eyes:      General: Lids are normal. No scleral icterus.        Right eye: No discharge.         Left eye: No discharge.      Conjunctiva/sclera: Conjunctivae normal.   Neck:      Thyroid: No thyroid mass.   Cardiovascular:      Rate and Rhythm: Normal rate and regular rhythm.      Heart sounds: Normal heart sounds.   Pulmonary:      Effort: Pulmonary effort is normal. No respiratory distress.      Breath sounds: Normal breath sounds. No wheezing or rales.   Abdominal:      General: There is no distension.   Genitourinary:     Comments: deferred  Musculoskeletal:         General: Normal range of motion.      Cervical back: Normal range of motion.   Skin:     General: Skin is warm and dry.   Neurological:      Mental Status: She is alert and oriented to person, place, and time.   Psychiatric:         Speech:  Speech normal.         Behavior: Behavior normal. Behavior is cooperative.         Thought Content: Thought content normal.          Assessment:     Problem List Items Addressed This Visit          Renal/    Stage 3b chronic kidney disease     Seen by Dr. Maico Turner, Nephrology 3/2024. Per Nephrology likely secondary to prior Cisplatin use. 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 6/17/2024 CT chest without contrast notes new left lung base nodule 21 x 18 x 11 mm concerning for new or disease recurrence    Will go ahead and proceed with Tecentriq today. Arrange PET. F/u with Dr. Daily after PET to review and discuss further recommendations.              Endocrine    Overweight (BMI 25.0-29.9)     Encourage healthy nutrition along with portion control. Encourage daily exercise          Other Visit Diagnoses       Adenocarcinoma of upper lobe of left lung    -  Primary    Relevant Orders    NM PET CT FDG Skull Base to Mid Thigh    CBC Auto Differential    Comprehensive Metabolic Panel    TSH    T4, FREE    Abnormal results of thyroid function studies        Relevant Orders    TSH    T4, FREE              Plan:     Adenocarcinoma of upper lobe of left lung  -     NM PET CT FDG Skull Base to Mid Thigh; Future; Expected date: 06/18/2024  -     CBC Auto Differential; Future; Expected date: 06/18/2024  -     Comprehensive Metabolic Panel; Future; Expected date: 06/18/2024  -     TSH; Future; Expected date: 06/18/2024  -     T4, FREE; Future; Expected date:  06/18/2024    Malignant neoplasm of lower lobe of left lung - Squamous cell    Overweight (BMI 25.0-29.9)    Abnormal results of thyroid function studies  -     TSH; Future; Expected date: 06/18/2024  -     T4, FREE; Future; Expected date: 06/18/2024    Stage 3b chronic kidney disease    Other orders  -     Cancel: atezolizumab (TECENTRIQ) 1,200 mg in sodium chloride 0.9% SolP 270 mL infusion  -     EPINEPHrine (EPIPEN) 0.3 mg/0.3 mL pen injection 0.3 mg  -     diphenhydrAMINE injection 50 mg  -     hydrocortisone sodium succinate injection 100 mg  -     Cancel: sodium chloride 0.9% 250 mL flush bag  -     Cancel: sodium chloride 0.9% flush 10 mL  -     Cancel: heparin, porcine (PF) 100 unit/mL injection flush 500 Units  -     alteplase injection 2 mg          Med Onc Chart Routing      Follow up with physician Other. Dr. Daily after PET to discuss/further treatment recommendations   Follow up with [facility]    Infusion scheduling note    Injection scheduling note    Labs    Imaging PET scan   asap with Dr. Daily follow up to follow.   Pharmacy appointment    Other referrals                  FLETCHER Rogers

## 2024-06-18 NOTE — PLAN OF CARE
Discussed plan of care with pt. Addressed any and ongoing concerns. Pt denies   Problem: Adult Inpatient Plan of Care  Goal: Plan of Care Review  Outcome: Progressing  Goal: Patient-Specific Goal (Individualized)  Outcome: Progressing  Flowsheets (Taken 6/18/2024 0939)  Individualized Care Needs: Pillow and reclined position warm blanket offered and declined, water with ice  Anxieties, Fears or Concerns: none  Patient/Family-Specific Goals (Include Timeframe): Tolerated infusion  Goal: Absence of Hospital-Acquired Illness or Injury  Outcome: Progressing  Intervention: Identify and Manage Fall Risk  Flowsheets (Taken 6/18/2024 0939)  Safety Promotion/Fall Prevention: in recliner, wheels locked  Intervention: Prevent Infection  Flowsheets (Taken 6/18/2024 0939)  Infection Prevention:   equipment surfaces disinfected   hand hygiene promoted   personal protective equipment utilized  Goal: Optimal Comfort and Wellbeing  Outcome: Progressing  Intervention: Provide Person-Centered Care  Flowsheets (Taken 6/18/2024 0939)  Trust Relationship/Rapport:   care explained   questions encouraged   choices provided   reassurance provided   emotional support provided   thoughts/feelings acknowledged   empathic listening provided   questions answered

## 2024-06-18 NOTE — ASSESSMENT & PLAN NOTE
Cancer Staging   Malignant neoplasm of lower lobe of left lung - Squamous cell  Staging form: Lung, AJCC 8th Edition  - Clinical: Stage IA1 (cT1a, cN0, cM0) - Signed by Ronak Dao MD on 5/25/2021  - Pathologic stage from 4/5/2023: Stage IIB (pT2, pN1, cM0) - Signed by Marissa Brower MD on 6/8/2023    Labs reviewed overall stable. 6/17/2024 CT chest without contrast notes new left lung base nodule 21 x 18 x 11 mm concerning for new or disease recurrence    Will go ahead and proceed with Tecentriq today. Arrange PET. F/u with Dr. Daily after PET to review and discuss further recommendations.

## 2024-06-18 NOTE — ASSESSMENT & PLAN NOTE
Seen by Dr. Maico Turner, Nephrology 3/2024. Per Nephrology likely secondary to prior Cisplatin use. Ongoing f/u with Nephrology, next visit planned 7/2024

## 2024-06-21 ENCOUNTER — INFUSION (OUTPATIENT)
Dept: INFUSION THERAPY | Facility: HOSPITAL | Age: 74
End: 2024-06-21
Attending: INTERNAL MEDICINE
Payer: MEDICARE

## 2024-06-21 ENCOUNTER — HOSPITAL ENCOUNTER (OUTPATIENT)
Dept: RADIOLOGY | Facility: HOSPITAL | Age: 74
Discharge: HOME | End: 2024-06-21
Attending: NURSE PRACTITIONER
Payer: MEDICARE

## 2024-06-21 DIAGNOSIS — C34.12 ADENOCARCINOMA OF UPPER LOBE OF LEFT LUNG: ICD-10-CM

## 2024-06-21 DIAGNOSIS — C34.32 MALIGNANT NEOPLASM OF LOWER LOBE OF LEFT LUNG: Primary | ICD-10-CM

## 2024-06-21 PROCEDURE — 96523 IRRIG DRUG DELIVERY DEVICE: CPT

## 2024-06-21 PROCEDURE — 63600175 PHARM REV CODE 636 W HCPCS: Performed by: HOSPITALIST

## 2024-06-21 PROCEDURE — 25000003 PHARM REV CODE 250: Performed by: HOSPITALIST

## 2024-06-21 PROCEDURE — 78815 PET IMAGE W/CT SKULL-THIGH: CPT | Mod: TC

## 2024-06-21 PROCEDURE — A9552 F18 FDG: HCPCS | Performed by: NURSE PRACTITIONER

## 2024-06-21 PROCEDURE — A4216 STERILE WATER/SALINE, 10 ML: HCPCS | Performed by: HOSPITALIST

## 2024-06-21 PROCEDURE — 78815 PET IMAGE W/CT SKULL-THIGH: CPT | Mod: 26,PS,, | Performed by: RADIOLOGY

## 2024-06-21 RX ORDER — SODIUM CHLORIDE 0.9 % (FLUSH) 0.9 %
10 SYRINGE (ML) INJECTION
OUTPATIENT
Start: 2024-06-21

## 2024-06-21 RX ORDER — HEPARIN 100 UNIT/ML
5 SYRINGE INTRAVENOUS
Status: DISCONTINUED | OUTPATIENT
Start: 2024-06-21 | End: 2024-06-21 | Stop reason: HOSPADM

## 2024-06-21 RX ORDER — HEPARIN 100 UNIT/ML
500 SYRINGE INTRAVENOUS
OUTPATIENT
Start: 2024-06-21

## 2024-06-21 RX ORDER — FLUDEOXYGLUCOSE F18 500 MCI/ML
12.28 INJECTION INTRAVENOUS
Status: COMPLETED | OUTPATIENT
Start: 2024-06-21 | End: 2024-06-21

## 2024-06-21 RX ORDER — SODIUM CHLORIDE 9 MG/ML
10 INJECTION, SOLUTION INTRAMUSCULAR; INTRAVENOUS; SUBCUTANEOUS
Status: DISCONTINUED | OUTPATIENT
Start: 2024-06-21 | End: 2024-06-21 | Stop reason: HOSPADM

## 2024-06-21 RX ADMIN — SODIUM CHLORIDE, PRESERVATIVE FREE 10 ML: 5 INJECTION INTRAVENOUS at 10:06

## 2024-06-21 RX ADMIN — FLUDEOXYGLUCOSE F-18 12.28 MILLICURIE: 500 INJECTION INTRAVENOUS at 10:06

## 2024-06-21 RX ADMIN — SODIUM CHLORIDE, PRESERVATIVE FREE 10 ML: 5 INJECTION INTRAVENOUS at 11:06

## 2024-06-21 RX ADMIN — HEPARIN 500 UNITS: 100 SYRINGE at 11:06

## 2024-06-21 NOTE — NURSING
.Mediport deaccessed with luna needle intact without difficulty.  Positive blood return noted.  Flushed per protocol with 10 ml NS f/b 5 cc heparin flush. Bandaid pressure dressing applied.  Pt tolerated well.  Discharged by PET scan.

## 2024-06-25 ENCOUNTER — OFFICE VISIT (OUTPATIENT)
Dept: HEMATOLOGY/ONCOLOGY | Facility: CLINIC | Age: 74
End: 2024-06-25
Payer: MEDICARE

## 2024-06-25 VITALS
WEIGHT: 158.38 LBS | HEART RATE: 75 BPM | DIASTOLIC BLOOD PRESSURE: 71 MMHG | HEIGHT: 64 IN | OXYGEN SATURATION: 98 % | SYSTOLIC BLOOD PRESSURE: 112 MMHG | TEMPERATURE: 99 F | BODY MASS INDEX: 27.04 KG/M2

## 2024-06-25 DIAGNOSIS — C34.32 MALIGNANT NEOPLASM OF LOWER LOBE OF LEFT LUNG: ICD-10-CM

## 2024-06-25 DIAGNOSIS — C77.1 SECONDARY MALIGNANT NEOPLASM OF INTRATHORACIC LYMPH NODES: ICD-10-CM

## 2024-06-25 DIAGNOSIS — C34.12 ADENOCARCINOMA OF UPPER LOBE OF LEFT LUNG: Primary | ICD-10-CM

## 2024-06-25 PROCEDURE — 3074F SYST BP LT 130 MM HG: CPT | Mod: CPTII,S$GLB,, | Performed by: HOSPITALIST

## 2024-06-25 PROCEDURE — 1126F AMNT PAIN NOTED NONE PRSNT: CPT | Mod: CPTII,S$GLB,, | Performed by: HOSPITALIST

## 2024-06-25 PROCEDURE — 1101F PT FALLS ASSESS-DOCD LE1/YR: CPT | Mod: CPTII,S$GLB,, | Performed by: HOSPITALIST

## 2024-06-25 PROCEDURE — 99999 PR PBB SHADOW E&M-EST. PATIENT-LVL III: CPT | Mod: PBBFAC,,, | Performed by: HOSPITALIST

## 2024-06-25 PROCEDURE — 1159F MED LIST DOCD IN RCRD: CPT | Mod: CPTII,S$GLB,, | Performed by: HOSPITALIST

## 2024-06-25 PROCEDURE — G2211 COMPLEX E/M VISIT ADD ON: HCPCS | Mod: S$GLB,,, | Performed by: HOSPITALIST

## 2024-06-25 PROCEDURE — 3288F FALL RISK ASSESSMENT DOCD: CPT | Mod: CPTII,S$GLB,, | Performed by: HOSPITALIST

## 2024-06-25 PROCEDURE — 3078F DIAST BP <80 MM HG: CPT | Mod: CPTII,S$GLB,, | Performed by: HOSPITALIST

## 2024-06-25 PROCEDURE — 99215 OFFICE O/P EST HI 40 MIN: CPT | Mod: S$GLB,,, | Performed by: HOSPITALIST

## 2024-06-25 NOTE — Clinical Note
Good afternoon,   Dr. Rojas, this is the pt we discussed at lunch. She has a history of a LLL squamous cell carcinoma and a NANI adenocarcinoma.  She has most recently been treated for her adenocarcinoma, but appears to have likely recurrent disease in the LLL as well as in several lymph nodes (left hilum, AP window, and superior mediastinum). Hoping these lesions are amenable to biopsy via bronchoscopy? Please let me know if there are any referrals I need to place on my end and I will do so.   Team, attaching you here to f/u as well.   Thanks! Alex

## 2024-06-25 NOTE — PROGRESS NOTES
[facility] MEDICAL ONCOLOGY - FOLLOW UP VISIT    Reason for visit: Follow up for stage IIB squamous cell carcinoma      Oncology History   Malignant neoplasm of lower lobe of left lung - Squamous cell   4/15/2021 Initial Diagnosis    Malignant neoplasm of lower lobe of left lung - Squamous cell     5/25/2021 Cancer Staged    Staging form: Lung, AJCC 8th Edition  - Clinical: Stage IA1 (cT1a, cN0, cM0)      Cancer Staged    9mm non-keratinizing squamous cell carcinoma, no VPI, no LVI, margin at least 8mm.  Levels 9 and 11 = negative.  T1aN0     4/5/2023 Cancer Staged    Staging form: Lung, AJCC 8th Edition  - Pathologic stage from 4/5/2023: Stage IIB (pT2, pN1, cM0)     5/1/2023 - 7/21/2023 Chemotherapy    Treatment Summary   Plan Name: OP NSCLC PEMETREXED + CISPLATIN Q3W  Treatment Goal: Supportive  Status: Inactive  Start Date: 5/1/2023  End Date: 7/21/2023  Provider: Marissa Brower MD  Chemotherapy: CISplatin (Platinol) 75 mg/m2 = 138 mg in sodium chloride 0.9% 665 mL chemo infusion, 75 mg/m2 = 138 mg, Intravenous, Clinic/HOD 1 time, 4 of 4 cycles  Administration: 138 mg (5/12/2023), 138 mg (6/29/2023), 138 mg (7/20/2023), 138 mg (6/8/2023)  PEMEtrexed disodium (ALIMTA) 900 mg in sodium chloride 0.9% SolP 100 mL chemo infusion, 925 mg, Intravenous, Clinic/HOD 1 time, 4 of 4 cycles  Dose modification: 375 mg/m2 (original dose 500 mg/m2, Cycle 3, Reason: MD Discretion, Comment: neutropenia )  Administration: 900 mg (5/12/2023), 700 mg (6/29/2023), 700 mg (7/20/2023), 700 mg (6/8/2023)     8/14/2023 -  Chemotherapy    Treatment Summary   Plan Name: OP ATEZOLIZUMAB Q3W  Treatment Goal: Supportive  Status: Active  Start Date: 8/14/2023  End Date: 8/20/2024 (Planned)  Provider: Marissa Brower MD  Chemotherapy: [No matching medication found in this treatment plan]     NSCLC of left lung (Resolved)   4/28/2021 Initial Diagnosis    NSCLC of left lung  "(Resolved)      Cancer Staged    9mm non-keratinizing squamous cell carcinoma, no VPI, no LVI, margin at least 8mm.  Levels 9 and 11 = negative.  T1aN0        NANI NSCLC/ADC IIB (pT2 pN1a cMx)  - History of stage I squamous cell carcinoma moderately differentiated left lower lung status post wedge resection 04/28/2021  - Abnormality seen on surveillance for history of squamous cell carcinoma. Initial biopsy February 20, 2023 without neoplasm identified , thus had left lower lobe wedge resection after multiple disciplinary discussion, final pathology positive on 03/28/2022 for invasive moderately differentiated adenocarcinoma station 10 lymph node positive, pleural invasion noted, margins negative.    - Restaging MRI brain negative and PET scan with left hilar avidity SUV 4 and chest wall. Given recent surgery potential inflammation presented at tumor board recommended proceeding with adjuvant chemotherapy and follow-up on repeat imaging. Started adjuvant therapy with chemotherapy and immunotherapy per IMPOWER 010 given PDL1 positive disease 95%.  Mutational analysis negative for EGFR mutation. Noted BRAF mutation.   - Completed adjuvant chemotherapy with cisplatin and pemetrexed tolerated well aside from chemotherapy associated progressive anemia.    - Restaging PET on 8/7/2023 with resolution of prior lymph node avidity.    - Started immunotherapy with atezolizumab (8/14/2023 - present; delay from C3 on 9/26/23 and C4 on 11/14/23 due to concnern for pneumonitis)  - 2/5/24: CT C, "no detrimental interval change in appearance"  - 3/01/24: CT C non-con, no acute pathology, remaining findings unchanged when compared to prior study  - 6/17/24:  CT C, interval development of 2.1 x 1.8 x 1.1 cm left lung base nodule concerning for disease recurrence versus new lung lesion; RLL peripheral ill-defined ground-glass unchanged; 0.6 cm medial left lung base nodule; 0.5 cm left costophrenic angle nodule  - 6/21/24:  PET-CT, 1.6 x " 1.3 cm LLL nodule, SUV 4.5; 1.1 x 1.0 cm left hilar lymph node, SUV 5.6  - 1.2 x 0.9 cm AP window lymph node, SUV 5.9; 1.2 x 0.7 cm superior mediastinum lymph node, SUV 5.0    HPI:     Radha Lamas is a 72 yo woman w/ pmh significant for COPD and two primary L lung cancers as below. She presents today for follow up.     - Stage I squamous cell carcinoma of the of the LLL (PD-L1 50%, insufficient sample for NGS), initially dx'd 4/28/21, s/p wedge resection (4/28/21)    - Stage IIB (pT2, pN1a, cMx) adenocarcinoma of the NANI (PD-L1 95%, BRAF [id number] mutated), initially dx'd 3/28/23, s/p wedge resection 3/28/23, adjuvant cisplatin/pemetrexed x 4 cycles (5/11/23-7/20/23), and currently on adjuvant atezolizumab (8/14/23 - present; of note, delay between C3 on 9/26/23 and C4 on 11/14/23 due to concern for pneumonitis)     Last clinic 5/7/24, PÉREZ energy levels unchanged.  Proceed with C12 atezolizumab, RTC in 528, next CT due around 6/1/24.  PFT scheduled in 5 9.  Continue to follow with Nephrology.    Interval History:   - 6/17/24: CT C, new left lung base nodule concerning for disease recurrence  - 6/18/24: C 14 atezolizumab  - 6/21/24: PET-CT, mildly avid LLL nodule as well as avid left hilar and mediastinal lymphadenopathy     Patient states that she is felt physically well since our last visit.  She denies any new or worsening symptoms at this time.  She confirms that she is continued to work in her yard picking PEs and butter beans.  She confirms that she has reviewed the results of her PET scan already.    Past Medical History:   Past Medical History:   Diagnosis Date    COPD (chronic obstructive pulmonary disease) 2013    Eczema     GERD (gastroesophageal reflux disease)     Hiatal hernia     History of torn meniscus of right knee 01/2011    Hypothyroid     Incidental pulmonary nodule, > 3mm and < 8mm - Lingula 8/12/2021    Lung cancer     Urinary incontinence in female     Mild , only takes mediciane with travel     "    Allergies:   Review of patient's allergies indicates:  No Known Allergies     Medications:   Current Outpatient Medications   Medication Sig Dispense Refill    albuterol (PROVENTIL/VENTOLIN HFA) 90 mcg/actuation inhaler Inhale 2 puffs into the lungs every 4 (four) hours as needed for Wheezing or Shortness of Breath. Rescue 17 g 11    calcium carbonate (OS-CYDNEY) 600 mg calcium (1,500 mg) Tab Take 600 mg by mouth.      DUPIXENT  mg/2 mL PnIj Inject into the skin every 14 (fourteen) days.      levothyroxine (SYNTHROID) 75 MCG tablet Take 75 mcg by mouth once daily.      loratadine (CLARITIN) 10 mg tablet Take 10 mg by mouth once daily.      nystatin (MYCOSTATIN) cream Apply topically 2 (two) times daily. 30 g 1    omeprazole (PRILOSEC) 20 MG capsule Take 20 mg by mouth once daily.      tiotropium-olodateroL (STIOLTO RESPIMAT) 2.5-2.5 mcg/actuation Mist Inhale 2 puffs into the lungs once daily. Controller 12 g 3    tolterodine (DETROL LA) 4 MG 24 hr capsule Take 1 capsule (4 mg total) by mouth once daily. 90 capsule 4    triamcinolone acetonide 0.1% (KENALOG) 0.1 % ointment Apply topically 2 (two) times daily. 30 g 1    vitamin D (VITAMIN D3) 1000 units Tab Take 1,000 Units by mouth once daily.      gabapentin (NEURONTIN) 300 MG capsule Take 1 capsule (300 mg total) by mouth every evening. 30 capsule 11     No current facility-administered medications for this visit.     Facility-Administered Medications Ordered in Other Visits   Medication Dose Route Frequency Provider Last Rate Last Admin    prochlorperazine injection Soln 5 mg  5 mg Intravenous Q30 Min PRN Adrien Vail MD              ROS:  Review of Systems   A complete 12-point review of systems was reviewed and is negative except as mentioned above.       Physical Exam:       /71   Pulse 75   Temp 98.5 °F (36.9 °C) (Temporal)   Ht 5' 4" (1.626 m)   Wt 71.9 kg (158 lb 6.4 oz)   SpO2 98%   BMI 27.19 kg/m²                Physical " Exam  Constitutional:       Appearance: Normal appearance.   HENT:      Head: Normocephalic and atraumatic.   Eyes:      Extraocular Movements: Extraocular movements intact.      Conjunctiva/sclera: Conjunctivae normal.      Pupils: Pupils are equal, round, and reactive to light.   Pulmonary:      Effort: Pulmonary effort is normal.   Musculoskeletal:         General: Normal range of motion.      Right lower leg: No edema.      Left lower leg: No edema.   Skin:     General: Skin is warm and dry.   Neurological:      Mental Status: She is alert and oriented to person, place, and time.   Psychiatric:         Mood and Affect: Mood normal.         Thought Content: Thought content normal.         Judgment: Judgment normal.           Labs:   No results found for this or any previous visit (from the past 48 hour(s)).         Imaging:   NM PET CT FDG Skull Base to Mid Thigh  Narrative: EXAMINATION:  NM PET CT FDG SKULL BASE TO MID THIGH    CLINICAL HISTORY:  lung cancer. new nodule on CT while on active treatment; Malignant neoplasm of upper lobe, left bronchus or lung    TECHNIQUE:  12.28 mCi of F18-FDG was administered intravenously in the right chest port.  After an approximately 60 min distribution time, PET/CT images were acquired from the skull base to the mid thigh. Transmission images were acquired to correct for attenuation using a whole body low-dose CT scan without contrast with the arms positioned above the head. Glycemia at the time of injection was 90 mg/dL.    COMPARISON:  Chest CT, 06/17/2024 and PET-CT, 08/07/2023    FINDINGS:  Quality of the study: Adequate.    SUV max of the mediastinal blood pool is 2.5    Neck: No abnormal FDG avidity.  No lymphadenopathy or mass.    Chest: The left lower lobe pulmonary nodule measuring 16 x 13 mm is mildly FDG avid with SUV max 4.5.    Left hilar lymph node measures 11 x 10 mm with SUV max 5.6.    AP window lymph node measuring 12 x 9 mm has SUV max of 5.9.    Lymph  node in the superior mediastinum immediately posterior to the esophagus measuring 12 x 7 mm has SUV max of 5.0.    No pleural effusion.    Abdomen/pelvis: No abnormal FDG avidity.  No lymphadenopathy or mass.  No ascites.    Skeletal structures: No abnormal FDG avidity.  No focal lytic or sclerotic lesion.    Physiologic uptake of the tracer is present within the brain, salivary glands, myocardium, GI and  tracts.    Incidental CT findings: N/A  Impression: Findings concerning for disease recurrence.  Left lower lobe 16 x 13 mm pulmonary nodule is mildly FDG avid.  Moderately FDG avid left hilar and mediastinal lymph nodes as described above.    Electronically signed by: Willis Valdez MD  Date:    06/21/2024  Time:    15:10            Path:   1. Left lung, wedge resection: Invasive adenocarcinoma, predominantly solid   pattern, measuring 9 mm (0.9 cm) in greatest dimension.          Tumor is located 3 mm (0.3 cm) from the blue inked/stapled parenchymal   surgical margin.          Tumor extends into the elastic layer of the visceral pleura.          See synoptic report in comment section for further details.   2. Lymph node, left level 11, excision: 1 benign fragmented lymph node with   sinus histiocytosis and anthracotic pigment, negative for metastatic   carcinoma (0/1).   3. Lymph node, left level 10, excision: 1 lymph node, positive for metastatic   carcinoma with crush artifact dense fibrosis and focal necrosis (1/1).          Confirmed with positive immunohistochemical stain with cytokeratin   AE1/AE3 with satisfactory positive and negative controls.   4. Lymph node, left level 12, excision: 1 benign lymph node with sinus   histiocytosis, negative for metastatic carcinoma (0/1).   5. Lymph node, left level 9, excision: 1 benign fragmented lymph node with   sinus histiocytosis and anthracotic pigment, negative for metastatic   carcinoma (0/1).   6. Lymph node, level 7, excision: 1 benign lymph node with sinus    histiocytosis, negative for metastatic carcinoma (0/1).   7.  Lymph node, level 5, excision: 1 benign fragmented lymph node with sinus   histiocytosis and anthracotic pigment, negative for metastatic carcinoma   (0/1).   8. Lung, lingula, anatomic lingulectomy: Lung with congested vasculature.          No residual carcinoma is identified.          Bronchial and vascular margin is negative for malignancy.          See synoptic report in comment section for further details.       Assessment and Plan:     Radha Lamas is a 72 yo woman w/ pmh significant for COPD and two primary L lung cancers as below. She presents today for follow up.     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, initially dx'd 4/28/21, s/p wedge resection (4/28/21)    2) Stage IIB (pT2, pN1a, cMx) adenocarcinoma of the NANI (PD-L1 95%, BRAF V600E mutated), initially dx'd 3/28/23, s/p wedge resection 3/28/23, adjuvant cisplatin/pemetrexed x 4 cycles (5/11/23-7/20/23), and currently on adjuvant atezolizumab (8/14/23 - present; of note, delay between C3 on 9/26/23 and C4 on 11/14/23 due to concern for pneumonitis)       Stage IIB Adenocarcinoma of NANI  ECOG PS 1. Currently on adjuvant atezolizumab following adjuvant cisplatin/pemetrexed and tolerating without significant issue (initially with some worsening shortness of breath and fatigue, as below).  A CT C on 6/17/24 demonstrated a new 2.1 x 1.8 x 1.1 cm left lung base nodule, and a PET-CT on 6/21/24 demonstrated a 1.6 x 1.3 cm LLL nodule with an SUV of 4.5 as well as multiple hypermetabolic lymph nodes (1.1 x 1.0 cm left hilar lymph node with SUV of 5.6; 1.2 x 0.9 cm AP window lymph node with an SUV of 5.9; 1.2 x 0.7 cm superior mediastinal lymph node with an SUV of 5.0).  Imaging findings are concerning for locally recurrent disease.  Notably, the patient did have a squamous cell carcinoma of the LLL (where the new nodule appears to be) in addition to an adenocarcinoma of the NANI.   Given location of the new nodule, favor that this may represent a recurrence of the squamous cell carcinoma rather than the adenocarcinoma for which patient has been recently receiving adjuvant therapy, though cannot confirm this in the absence of biopsy.  Likewise, unclear etiology of the hypermetabolic lymphadenopathy.  To this end, will request a bronchoscopy with EBUS for sampling of all of these lesions and confirm with pulmonology that the LLL lesion is in fact amenable to biopsy via this approach.  This was discussed with the patient and her  who endorsed their understanding of the plan.    Of note, there was a delay between cycle 3 (09/26/2023) and cycle 4 (11/14/2023) due to concern for pneumonitis, workup for which was largely unrevealing. There was also a 1 week delay between C8 and C9 due to concern for pneumonitis, workup for which was again largely unrevealing.     PLAN:   -- Referral for bronchoscopy placed, message sent to pulmonology  -- Tentative RTC in 3 weeks  -- MRI brain if biopsy confirmed recurrence (last MRI brain on 5/03/24)  -- Hold atezolizumab while the above is being worked up      Dyspnea  See note from 2/27/24. Symptoms are stable today (5/28/24). CT C from 3/1/24 is without evidence of pneumonitis. Favor that symptoms are related to COPD. Pt currently being managed by pulmonology, may also be a component of post-prandial dyspnea; patient does confirm improvement in ability to tolerate walks if taken before meals. No evidence of pneumonitis seen on recent imaging.  -- Continue to f/u w/ pulmonology      Depressed Renal Function  Patient's eGFR dipped below 60 following her 4th dose of cisplatin/pemetrexed and has remained depressed since (notably, this was prior to her 1st dose of atezolizumab).  It has remained low since it was first noted 08/10/2023. Urine protein/creatinine ratio 0.1 g/day on 11/14/23. This time line may be inconsistent with cisplatin induced nephropathy as  "recovery of renal function would be anticipated by this time, however it is possible to develop a CKD following cisplatin therapy which may be the etiology. Does not appear consistent with an IrAE. Given stability of renal function, okay to proceed with treatment as above. Pt has now established care w/ Dr. Turner.  -- Continued f/u w/ nephrology, next visit in July      Neuropathy  Pt describes "deadened" feet and also like she has "bunched socks" on her feet when she puts on her shoes which developed approximately 2 months after completion of chemotherapy. This is worse at night. She has some long-standing decreased sensation in her fingertips which she relates to eczema. She denies any problems like this previously, including when she received her chemotherapy. She denies any issues with proprioception / ambulation related to this. Exam has been previously unremarkable. Uncertain etiology. The pt does not have a diagnosis of diabetes. The time course is not consistent with chemotherapy induced peripheral neuropathy. Immunotherapy can cause neuropathic issues, however this developed while the patient had been off of the atezolizumab for 4 weeks already (though this does not preclude this as a possibility).  Symptoms are currently stable and chiefly in her feet.  She has seen a podiatrist with recommendation to increase her walking, which she has done.  She has not previously been interested in pharmacotherapy time given mild and stable symptoms. Given stability, will continue with treatment as above.   -- Continue to follow with podiatrist  -- Referral to neurology in place, patient did not make appointment.  Okay to hold at this time, low threshold for evaluation.      The above information has been reviewed with the patient and all questions have been answered to their apparent satisfaction.  They understand that they can call the clinic with any questions.    Orion Arce MD  Hematology/Oncology  [facility]        Med Onc Chart Routing      Follow up with physician . Bronch now. RTC in 3 weeks with me. No labs needed at that time.   Follow up with [facility]    Infusion scheduling note    Injection scheduling note    Labs    Imaging    Pharmacy appointment    Other referrals

## 2024-06-26 ENCOUNTER — TELEPHONE (OUTPATIENT)
Dept: PULMONOLOGY | Facility: CLINIC | Age: 74
End: 2024-06-26
Payer: MEDICARE

## 2024-06-26 ENCOUNTER — PATIENT MESSAGE (OUTPATIENT)
Dept: PULMONOLOGY | Facility: CLINIC | Age: 74
End: 2024-06-26
Payer: MEDICARE

## 2024-06-26 DIAGNOSIS — Z01.818 PREOPERATIVE CLEARANCE: Primary | ICD-10-CM

## 2024-06-26 NOTE — TELEPHONE ENCOUNTER
----- Message from Itz Rojas MD sent at 6/25/2024  2:28 PM CDT -----  Step/Tatiana/Jerrell  I Need to see for consent for EBUS, robotic bronch and EKG this week  To schedule procedure Next Wednesday  ----- Message -----  From: Orion Arce IV, MD  Sent: 6/25/2024   1:01 PM CDT  To: Itz Rojas MD; Chuy Brody LPN; #    Good afternoon,     Dr. Rojas, this is the pt we discussed at lunch. She has a history of a LLL squamous cell carcinoma and a NANI adenocarcinoma.  She has most recently been treated for her adenocarcinoma, but appears to have likely recurrent disease in the LLL as well as in several lymph nodes (left hilum, AP window, and superior mediastinum). Hoping these lesions are amenable to biopsy via bronchoscopy? Please let me know if there are any referrals I need to place on my end and I will do so.     Team, attaching you here to f/u as well.     Thanks!  Alex

## 2024-06-28 ENCOUNTER — HOSPITAL ENCOUNTER (OUTPATIENT)
Dept: CARDIOLOGY | Facility: HOSPITAL | Age: 74
Discharge: HOME | End: 2024-06-28
Attending: INTERNAL MEDICINE
Payer: MEDICARE

## 2024-06-28 ENCOUNTER — OFFICE VISIT (OUTPATIENT)
Dept: PULMONOLOGY | Facility: CLINIC | Age: 74
End: 2024-06-28
Payer: MEDICARE

## 2024-06-28 VITALS
OXYGEN SATURATION: 99 % | HEIGHT: 65 IN | WEIGHT: 157.88 LBS | BODY MASS INDEX: 26.3 KG/M2 | HEART RATE: 70 BPM | SYSTOLIC BLOOD PRESSURE: 122 MMHG | RESPIRATION RATE: 20 BRPM | DIASTOLIC BLOOD PRESSURE: 82 MMHG

## 2024-06-28 DIAGNOSIS — Z01.818 PREOPERATIVE CLEARANCE: ICD-10-CM

## 2024-06-28 DIAGNOSIS — J44.9 COPD, MODERATE: ICD-10-CM

## 2024-06-28 DIAGNOSIS — R94.8 ABNORMAL PET SCAN, MEDIASTINUM: ICD-10-CM

## 2024-06-28 DIAGNOSIS — C34.32 MALIGNANT NEOPLASM OF LOWER LOBE OF LEFT LUNG: Primary | ICD-10-CM

## 2024-06-28 DIAGNOSIS — I70.0 AORTIC ATHEROSCLEROSIS: ICD-10-CM

## 2024-06-28 DIAGNOSIS — R91.1 NODULE OF LEFT LUNG: ICD-10-CM

## 2024-06-28 PROCEDURE — 99999 PR PBB SHADOW E&M-EST. PATIENT-LVL IV: CPT | Mod: PBBFAC,,, | Performed by: INTERNAL MEDICINE

## 2024-06-28 PROCEDURE — 93005 ELECTROCARDIOGRAM TRACING: CPT

## 2024-06-28 PROCEDURE — 93010 ELECTROCARDIOGRAM REPORT: CPT | Mod: ,,, | Performed by: INTERNAL MEDICINE

## 2024-06-28 NOTE — PATIENT INSTRUCTIONS
PRE-Procedure INSTRUCTIONS   *Arrive at     am / pm (2 hours before your scheduled procedure time). This arrival time will allow us time to prepare you for your  procedure on (day)  At(time)   On(date)   Check in at Endoscopy Lab desk on the 5th Floor of [facility] located [facility] [address] ([address] [place] [address]). You DO NOT check in on the first floor for this procedure.   Please read the following instructions carefully before your appointment.   ALL PATIENTS:   Plan to be at the hospital for approximately 4-6 hours.   You must have a responsible person who can drive you to the hospital, stay while the procedure is being done, assume responsibility for your care at discharge, and drive you home. If not, your exam will be canceled.   Please leave all valuables at home, including jewelry. You will need to bring your s license and medical insurance card.   Also, you will be responsible for any co-payment at time of registration.   You will be sedated for the procedure. Due to the sedation you will not be able to operate a vehicle or sign any legal documentation for 24 hours after exam.   Wear loose and comfortable clothing and wear socks if you are cold natured.   Bring all medications (in original containers) that you are currently taking, or a complete list.   To prepare for this test, you MAY NOT have anything to eat or drink after midnight, not even water, unless you take any necessary medications as listed in # 1 below and then a sip of water with those medications is allowed.   1 - If you take BLOOD PRESSURE, HEART, SEIZURE or PSYCHIATRIC MEDICATIONS in the morning, please take them the morning of your procedure.   Please take these medications as soon as you awaken with a small sip of water ... please make sure they are taken before you leave to come to the hospital for your procedure.   On the day before your procedure, take all of your regular  scheduled medications except for the blood thinning medications discussed below.   2 - If you are DIABETIC:   Check blood sugar levels on the morning of the exam and/or as needed if you feel hypoglycemic (low blood sugar).   DO NOT TAKE any diabetic medications (including insulin) the morning of the exam.   If your blood sugar goes below 70, you may drink 4 ounces of juice, soda or eat a piece of hard candy. Wait 15 minutes then recheck your blood sugar. If it isn't going up, you may drink another 4 ounces and contact your Primary Care doctor or our office. Please do not have any liquids within 2 hours of your arrival time.   3 - STOP BLOOD THINNING MEDICATION, Aspirin, Ibuprofen, Naproxen, etc as listed below:   Coumadin, Plavix, Aspirin, NSAIDs (nonsteroidal anti-inflammatory drugs)  and fish oil.   If on Coumadin or Plavix, notify the prescribing physician that it is being temporarily discontinued for procedure.   Coumadin must be stopped 3 days prior to exam.   Plavix, Aspirin and NSAIDs (see above) must be stopped 7 DAYS PRIOR TO EXAM.   If you are on a blood thinner not listed, please contact your physician about stop times. Such as Aggrenox, Brilinta, Effient, Pradaxa, Xaralto, etc.   Avoid Smoking for 24 hours prior to the test!   Endoscopy Pre-Procedure Nursing Call Line [phone]   For Insurance or Financial obligations, call [phone]   [facility] Nursing Line [phone]

## 2024-06-28 NOTE — H&P (VIEW-ONLY)
Pulmonary Outpatient  Visit     Subjective:       Patient ID: Radha Lamas is a 74 y.o. female.    Social History     Tobacco Use   Smoking Status Former    Current packs/day: 0.00    Average packs/day: 1.5 packs/day for 45.0 years (67.5 ttl pk-yrs)    Types: Cigarettes    Start date: 1964    Quit date: 2009    Years since quitting: 15.4    Passive exposure: Never   Smokeless Tobacco Never            Chief Complaint: Abnormal Chest X-ray, Abnormal Ct Scan, and Abnormal PET scan          Radha Lamas is 74 y.o.  Refer by Orion Arce IV, MD  [address] [facility]   [address] [place] [address]   Recent abdominal PET scan with FDG activity seen in left lower lobe nodule, station 4 L, 10 L/11 L  Also noted was also some FDG activity in the esophageal area   Three new FDG avid areas of shown up on PET scan   History of lung cancer surgery in 2021 and 2023   Has been on chemo immunotherapy  No history of any cardiac issues with surgery   Baseline EKG was reviewed   Procedure robotic bronchoscopy discussed  Opportunity to answer questions      Stage IIB (pT2, pN1a, cMx) adenocarcinoma of the NANI (PD-L1 95%, BRAF V600E mutated), initially dx'd 3/28/23, s/p wedge resection 3/28/23, adjuvant cisplatin/pemetrexed x 4 cycles (5/11/23-7/20/23), and currently on adjuvant atezolizumab (8/14/23 - present; of note, delay between C3 on 9/26/23 and C4 on 11/14/23 due to concern for pneumonitis)       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 LEVEL 9 AND 3. LEVEL 11 LYMPH NODES:  NO METASTATIC CARCINOMA IDENTIFIED  2. LEFT LOWER LOBE WEDGE:  MODERATELY WELL-DIFFERENTIATED SQUAMOUS CELL CARCINOMA  NO CARCINOMA IDENTIFIED IN  "MARGINS  MODERATE PULMONARY EMPHYSEMA      2023 path report    "1. Left lung, wedge resection: Invasive adenocarcinoma, predominantly solid pattern, measuring 9 mm (0.9 cm) in greatest dimension. Tumor is located 3 mm (0.3 cm) from the blue inked/stapled parenchymal surgical margin. Tumor extends into the elastic layer of the visceral pleura. See synoptic report in comment section for further details. 2. Lymph node, left level 11, excision: 1 benign fragmented lymph node with sinus histiocytosis and anthracotic pigment, negative for metastatic carcinoma (0/1). 3. Lymph node, left level 10, excision: 1 lymph node, positive for metastatic carcinoma with crush artifact dense fibrosis and focal necrosis (1/1). Confirmed with positive immunohistochemical stain with cytokeratin AE1/AE3 with satisfactory positive and negative controls. 4. Lymph node, left level 12, excision: 1 benign lymph node with sinus histiocytosis, negative for metastatic carcinoma (0/1). 5. Lymph node, left level 9, excision: 1 benign fragmented lymph node with sinus histiocytosis and anthracotic pigment, negative for metastatic carcinoma (0/1). 6. Lymph node, level 7, excision: 1 benign lymph node with sinus histiocytosis, negative for metastatic carcinoma (0/1). 7. Lymph node, level 5, excision: 1 benign fragmented lymph node with sinus histiocytosis and anthracotic pigment, negative for metastatic carcinoma (0/1). 8. Lung, lingula, anatomic lingulectomy: Lung with congested vasculature. No residual carcinoma is identified. Bronchial and vascular margin is negative for malignancy. See synoptic report in comment section for further details                Review of Systems   Constitutional:  Negative for fever, chills, activity change, appetite change and fatigue.   HENT:  Negative for postnasal drip, rhinorrhea, sinus pressure, trouble swallowing, voice change, congestion and hearing loss.    Respiratory:  Negative for apnea, cough, hemoptysis, " sputum production, choking, chest tightness, shortness of breath, wheezing, orthopnea, previous hospitialization due to pulmonary problems, asthma nighttime symptoms and use of rescue inhaler.    Cardiovascular:  Negative for chest pain, palpitations and leg swelling.   Genitourinary:  Negative for difficulty urinating and hematuria.   Endocrine:  Negative for cold intolerance and heat intolerance.    Musculoskeletal:  Negative for back pain, gait problem and joint swelling.   Skin:  Negative for rash.   Gastrointestinal:  Negative for abdominal pain and abdominal distention.   Neurological:  Negative for dizziness, weakness and headaches.   Hematological:  Negative for adenopathy.   Psychiatric/Behavioral:  Negative for sleep disturbance. The patient is not nervous/anxious.        Outpatient Encounter Medications as of 6/28/2024   Medication Sig Dispense Refill    albuterol (PROVENTIL/VENTOLIN HFA) 90 mcg/actuation inhaler Inhale 2 puffs into the lungs every 4 (four) hours as needed for Wheezing or Shortness of Breath. Rescue 17 g 11    calcium carbonate (OS-CYDNEY) 600 mg calcium (1,500 mg) Tab Take 600 mg by mouth.      DUPIXENT  mg/2 mL PnIj Inject into the skin every 14 (fourteen) days.      levothyroxine (SYNTHROID) 75 MCG tablet Take 75 mcg by mouth once daily.      loratadine (CLARITIN) 10 mg tablet Take 10 mg by mouth once daily.      omeprazole (PRILOSEC) 20 MG capsule Take 20 mg by mouth once daily.      tiotropium-olodateroL (STIOLTO RESPIMAT) 2.5-2.5 mcg/actuation Mist Inhale 2 puffs into the lungs once daily. Controller 12 g 3    tolterodine (DETROL LA) 4 MG 24 hr capsule Take 1 capsule (4 mg total) by mouth once daily. 90 capsule 4    triamcinolone acetonide 0.1% (KENALOG) 0.1 % ointment Apply topically 2 (two) times daily. 30 g 1    vitamin D (VITAMIN D3) 1000 units Tab Take 1,000 Units by mouth once daily.      gabapentin (NEURONTIN) 300 MG capsule Take 1 capsule (300 mg total) by mouth every  evening. 30 capsule 11    nystatin (MYCOSTATIN) cream Apply topically 2 (two) times daily. 30 g 1     Facility-Administered Encounter Medications as of 6/28/2024   Medication Dose Route Frequency Provider Last Rate Last Admin    prochlorperazine injection Soln 5 mg  5 mg Intravenous Q30 Min PRN Adrien Vail MD           The following portions of the patient's history were reviewed and updated as appropriate: She  has a past medical history of COPD (chronic obstructive pulmonary disease) (2013), Eczema, GERD (gastroesophageal reflux disease), Hiatal hernia, History of torn meniscus of right knee (01/2011), Hypothyroid, Incidental pulmonary nodule, > 3mm and < 8mm - Lingula (8/12/2021), Lung cancer, and Urinary incontinence in female.  She does not have any pertinent problems on file.  She  has a past surgical history that includes Knee cartilage surgery (Right, 01/2011); Thoracoscopic wedge resection of lung (Left, 04/28/2021); Tubal ligation (1976); Wedge resection, lung, robot-assisted, using VATS, using da Kaci Xi (Left, 03/20/2023); Laparoscopic lysis of adhesions (Left, 03/20/2023); Robot-assisted laparoscopic lymphadenectomy using da Kaci Xi (Left, 03/20/2023); Injection of anesthetic agent around multiple intercostal nerves (Left, 03/20/2023); Fluoroscopy (N/A, 04/27/2023); and Breast mass excision.  Her family history includes Cancer in her brother, father, and sister; Heart failure in her mother; Hypertension in her father and mother; Macular degeneration in her mother; Retinal detachment in her mother.  She  reports that she quit smoking about 15 years ago. Her smoking use included cigarettes. She started smoking about 60 years ago. She has a 67.5 pack-year smoking history. She has never been exposed to tobacco smoke. She has never used smokeless tobacco. She reports that she does not drink alcohol and does not use drugs.  She has a current medication list which includes the following prescription(s):  albuterol, calcium carbonate, dupixent pen, levothyroxine, loratadine, omeprazole, stiolto respimat, tolterodine, triamcinolone acetonide 0.1%, vitamin d, gabapentin, and nystatin, and the following Facility-Administered Medications: prochlorperazine.  Current Outpatient Medications on File Prior to Visit   Medication Sig Dispense Refill    albuterol (PROVENTIL/VENTOLIN HFA) 90 mcg/actuation inhaler Inhale 2 puffs into the lungs every 4 (four) hours as needed for Wheezing or Shortness of Breath. Rescue 17 g 11    calcium carbonate (OS-CYDNEY) 600 mg calcium (1,500 mg) Tab Take 600 mg by mouth.      DUPIXENT  mg/2 mL PnIj Inject into the skin every 14 (fourteen) days.      levothyroxine (SYNTHROID) 75 MCG tablet Take 75 mcg by mouth once daily.      loratadine (CLARITIN) 10 mg tablet Take 10 mg by mouth once daily.      omeprazole (PRILOSEC) 20 MG capsule Take 20 mg by mouth once daily.      tiotropium-olodateroL (STIOLTO RESPIMAT) 2.5-2.5 mcg/actuation Mist Inhale 2 puffs into the lungs once daily. Controller 12 g 3    tolterodine (DETROL LA) 4 MG 24 hr capsule Take 1 capsule (4 mg total) by mouth once daily. 90 capsule 4    triamcinolone acetonide 0.1% (KENALOG) 0.1 % ointment Apply topically 2 (two) times daily. 30 g 1    vitamin D (VITAMIN D3) 1000 units Tab Take 1,000 Units by mouth once daily.      gabapentin (NEURONTIN) 300 MG capsule Take 1 capsule (300 mg total) by mouth every evening. 30 capsule 11    nystatin (MYCOSTATIN) cream Apply topically 2 (two) times daily. 30 g 1     Current Facility-Administered Medications on File Prior to Visit   Medication Dose Route Frequency Provider Last Rate Last Admin    prochlorperazine injection Soln 5 mg  5 mg Intravenous Q30 Min PRN Adrien Vail MD         She has No Known Allergies..      BP Readings from Last 3 Encounters:   06/28/24 122/82   06/25/24 112/71   06/18/24 (!) 114/58      MMRC Dyspnea Scale (4 is worst)     [] MMRC 0: Dyspneic on strenuous  "excercise (0 points)    [] MMRC 1: Dyspneic on walking a slight hill (0 points)    [] MMRC 2: Dyspneic on walking level ground; must stop occasionally due to breathlessness (1 point)    [] MMRC 3: Must stop for breathlessness after walking 100 yards or after a few minutes (2 points)    [] MMRC 4: Cannot leave house; breathless on dressing/undressing (3 points)              COPD Questionnaire  How often do you cough?: A little of the time  How often do you have phlegm (mucus) in your chest?: A little of the time  How often does your chest feel tight?: Never  When you walk up a hill or one flight of stairs, how often are you breathless?: All of the time  How often are you limited doing any activities at home?: Almost never  How often are you confident leaving the house despite your lung condition?: All of the time  How often do you sleep soundly?: All of the time  How often do you have energy?: Most of the time  Total score: 11           No data to display                          Objective:     Vital Signs (Most Recent)  Vital Signs  Pulse: 70  Resp: 20  SpO2: 99 %  BP: 122/82  Pain Score: 0-No pain  Height and Weight  Height: 5' 5" (165.1 cm)  Weight: 71.6 kg (157 lb 13.6 oz)  BSA (Calculated - sq m): 1.81 sq meters  BMI (Calculated): 26.3  Weight in (lb) to have BMI = 25: 149.9]  Wt Readings from Last 2 Encounters:   06/28/24 71.6 kg (157 lb 13.6 oz)   06/25/24 71.9 kg (158 lb 6.4 oz)       Physical Exam  Vitals and nursing note reviewed.   Constitutional:       Appearance: She is normal weight.       HENT:      Head: Normocephalic and atraumatic.      Nose: Nose normal.   Eyes:      Pupils: Pupils are equal, round, and reactive to light.   Cardiovascular:      Rate and Rhythm: Normal rate and regular rhythm.      Pulses: Normal pulses.      Heart sounds: Normal heart sounds.   Pulmonary:      Effort: Pulmonary effort is normal.      Breath sounds: Normal breath sounds.   Abdominal:      General: Bowel sounds are " "normal.      Palpations: Abdomen is soft.   Musculoskeletal:      Cervical back: Normal range of motion.   Skin:     General: Skin is warm.   Neurological:      General: No focal deficit present.      Mental Status: She is alert and oriented to person, place, and time.   Psychiatric:         Mood and Affect: Mood normal.          Laboratory  Lab Results   Component Value Date    WBC 4.12 06/17/2024    RBC 3.45 (L) 06/17/2024    HGB 10.2 (L) 06/17/2024    HCT 32.0 (L) 06/17/2024    MCV 93 06/17/2024    MCH 29.6 06/17/2024    MCHC 31.9 (L) 06/17/2024    RDW 14.6 (H) 06/17/2024     06/17/2024    MPV 9.8 06/17/2024    GRAN 2.4 06/17/2024    LYMPH 1.0 10/17/2023    LYMPH 23.4 10/17/2023    MONO 0.4 10/17/2023    MONO 9.9 10/17/2023    EOS 0.2 10/17/2023    BASO 0.02 10/17/2023    EOSINOPHIL 4.4 10/17/2023    BASOPHIL 0.5 10/17/2023       BMP  Lab Results   Component Value Date     06/17/2024    K 4.0 06/17/2024     06/17/2024    CO2 26 06/17/2024    BUN 17 06/17/2024    CREATININE 1.4 06/17/2024    CALCIUM 9.5 06/17/2024    ANIONGAP 9 06/17/2024    ESTGFRAFRICA >60 04/09/2021    EGFRNONAA >60 04/09/2021    AST 23 06/17/2024    ALT <5 (L) 06/17/2024    PROT 7.0 06/17/2024          No results found for: "IGE"     No results found for: "ASPERGILLUS"  No results found for: "AFUMIGATUSCL"     No results found for: "ACE"     Diagnostic Results:  I have personally reviewed today the following studies:    NM PET CT FDG Skull Base to Mid Thigh  Narrative: EXAMINATION:  NM PET CT FDG SKULL BASE TO MID THIGH    CLINICAL HISTORY:  lung cancer. new nodule on CT while on active treatment; Malignant neoplasm of upper lobe, left bronchus or lung    TECHNIQUE:  12.28 mCi of F18-FDG was administered intravenously in the right chest port.  After an approximately 60 min distribution time, PET/CT images were acquired from the skull base to the mid thigh. Transmission images were acquired to correct for attenuation using a " whole body low-dose CT scan without contrast with the arms positioned above the head. Glycemia at the time of injection was 90 mg/dL.    COMPARISON:  Chest CT, 06/17/2024 and PET-CT, 08/07/2023    FINDINGS:  Quality of the study: Adequate.    SUV max of the mediastinal blood pool is 2.5    Neck: No abnormal FDG avidity.  No lymphadenopathy or mass.    Chest: The left lower lobe pulmonary nodule measuring 16 x 13 mm is mildly FDG avid with SUV max 4.5.    Left hilar lymph node measures 11 x 10 mm with SUV max 5.6.    AP window lymph node measuring 12 x 9 mm has SUV max of 5.9.    Lymph node in the superior mediastinum immediately posterior to the esophagus measuring 12 x 7 mm has SUV max of 5.0.    No pleural effusion.    Abdomen/pelvis: No abnormal FDG avidity.  No lymphadenopathy or mass.  No ascites.    Skeletal structures: No abnormal FDG avidity.  No focal lytic or sclerotic lesion.    Physiologic uptake of the tracer is present within the brain, salivary glands, myocardium, GI and  tracts.    Incidental CT findings: N/A  Impression: Findings concerning for disease recurrence.  Left lower lobe 16 x 13 mm pulmonary nodule is mildly FDG avid.  Moderately FDG avid left hilar and mediastinal lymph nodes as described above.    Electronically signed by: Willis Valdez MD  Date:    06/21/2024  Time:    15:10                   Test Reason : Z01.818,     Vent. Rate : 069 BPM     Atrial Rate : 069 BPM      P-R Int : 136 ms          QRS Dur : 084 ms       QT Int : 394 ms       P-R-T Axes : 065 -33 017 degrees      QTc Int : 422 ms     Normal sinus rhythm   Left axis deviation   Abnormal ECG   When compared with ECG of 15-MAR-2023 13:20,   Incomplete right bundle branch block is no longer Present   T wave inversion no longer evident in Anterior leads   Assessment/Plan:     Problem List Items Addressed This Visit       COPD, moderate    Aortic atherosclerosis    Malignant neoplasm of lower lobe of left lung - Squamous cell -  Primary    Relevant Orders    Case Request Endoscopy: Bronchoscopy - insert lighted tube into airway to take a biopsy of lung, ROBOTIC BRONCHOSCOPY, ENDOBRONCHIAL ULTRASOUND (EBUS) (Completed)    Nodule of left lung    Relevant Orders    Case Request Endoscopy: Bronchoscopy - insert lighted tube into airway to take a biopsy of lung, ROBOTIC BRONCHOSCOPY, ENDOBRONCHIAL ULTRASOUND (EBUS) (Completed)    Abnormal PET scan, mediastinum    Relevant Orders    Case Request Endoscopy: Bronchoscopy - insert lighted tube into airway to take a biopsy of lung, ROBOTIC BRONCHOSCOPY, ENDOBRONCHIAL ULTRASOUND (EBUS) (Completed)      My diagnostic impression and work-up plan were discussed at length with patient. Risks were discussed. Robotic bron chscopy procedure. Complications of the procedure discussed in detail with patient. Complications including but not limited to infection that may require hospital admission, bleeding that may require blood transfusion and or hospital admission, perforation of the lung which may require surgery,chance of injury to the throat, windpipe or bronchial tubes, laryngospam, coughing, aspiration, hypoxemia or cardiac arrythmias. Patient expressed and verbalized understanding. The material risks of anesthesia in connection with the procedure including brain damage, paralysis from the neck downwards, paralysis from the waist downwards, loss of function of an arm or a leg, disfigurement (incluing scars)and death were discussed with patient who expressedand verbalized understanding. Alternate treatments and material risks associated with such alternatives were discussed with pateint. These include radiologic surveillance with minimal risk and sugery with an indeterminate risk. The material risks of refusing the procedure was discussed in detail. This includes no diagnosis or confirmation of diagnisis and rendering of appropriate treatment the risk of which depends on the nature of the diagnosed  illness. Patient expressed and verbalized understanding. Procedure scheduled for date 07/03/2024. Consent signed. Orders entered. Coagulation studies per orders.   All questions were answered and the patient expressed understanding     For robotic bronchoscopy left lower lobe spiculated lesion PET avid lesion 16 mm x 13 mm, 10 11 L, 4 L     Medial left lung base 6 mm nodule image 295 and Left costophrenic angle 5 mm nodule image 330:  This will be below the threshold for accessibility with robotic bronchoscopy    Follow up in about 4 weeks (around 7/26/2024), or bronch consent.    This note was prepared using voice recognition system and is likely to have sound alike errors that may have been overlooked even after proof reading.  Please call me with any questions    Discussed diagnosis, its evaluation, treatment and usual course. All questions answered.    Thank you for the courtesy of participating in the care of this patient    Itz Rojas MD      Personal Diagnostic Review  []  CXR    []  ECHO    []  ONSAT    []  6MWD    []  LABS    []  CHEST CT    []  PET CT    []  Biopsy results

## 2024-06-28 NOTE — PROGRESS NOTES
Pulmonary Outpatient  Visit     Subjective:       Patient ID: Radha Lamas is a 74 y.o. female.    Social History     Tobacco Use   Smoking Status Former    Current packs/day: 0.00    Average packs/day: 1.5 packs/day for 45.0 years (67.5 ttl pk-yrs)    Types: Cigarettes    Start date: 1964    Quit date: 2009    Years since quitting: 15.4    Passive exposure: Never   Smokeless Tobacco Never            Chief Complaint: Abnormal Chest X-ray, Abnormal Ct Scan, and Abnormal PET scan          Radha Lamas is 74 y.o.  Refer by Orion Arce IV, MD  [address] [facility]   [address] [place] [address]   Recent abdominal PET scan with FDG activity seen in left lower lobe nodule, station 4 L, 10 L/11 L  Also noted was also some FDG activity in the esophageal area   Three new FDG avid areas of shown up on PET scan   History of lung cancer surgery in 2021 and 2023   Has been on chemo immunotherapy  No history of any cardiac issues with surgery   Baseline EKG was reviewed   Procedure robotic bronchoscopy discussed  Opportunity to answer questions      Stage IIB (pT2, pN1a, cMx) adenocarcinoma of the NANI (PD-L1 95%, BRAF V600E mutated), initially dx'd 3/28/23, s/p wedge resection 3/28/23, adjuvant cisplatin/pemetrexed x 4 cycles (5/11/23-7/20/23), and currently on adjuvant atezolizumab (8/14/23 - present; of note, delay between C3 on 9/26/23 and C4 on 11/14/23 due to concern for pneumonitis)       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 LEVEL 9 AND 3. LEVEL 11 LYMPH NODES:  NO METASTATIC CARCINOMA IDENTIFIED  2. LEFT LOWER LOBE WEDGE:  MODERATELY WELL-DIFFERENTIATED SQUAMOUS CELL CARCINOMA  NO CARCINOMA IDENTIFIED IN  "MARGINS  MODERATE PULMONARY EMPHYSEMA      2023 path report    "1. Left lung, wedge resection: Invasive adenocarcinoma, predominantly solid pattern, measuring 9 mm (0.9 cm) in greatest dimension. Tumor is located 3 mm (0.3 cm) from the blue inked/stapled parenchymal surgical margin. Tumor extends into the elastic layer of the visceral pleura. See synoptic report in comment section for further details. 2. Lymph node, left level 11, excision: 1 benign fragmented lymph node with sinus histiocytosis and anthracotic pigment, negative for metastatic carcinoma (0/1). 3. Lymph node, left level 10, excision: 1 lymph node, positive for metastatic carcinoma with crush artifact dense fibrosis and focal necrosis (1/1). Confirmed with positive immunohistochemical stain with cytokeratin AE1/AE3 with satisfactory positive and negative controls. 4. Lymph node, left level 12, excision: 1 benign lymph node with sinus histiocytosis, negative for metastatic carcinoma (0/1). 5. Lymph node, left level 9, excision: 1 benign fragmented lymph node with sinus histiocytosis and anthracotic pigment, negative for metastatic carcinoma (0/1). 6. Lymph node, level 7, excision: 1 benign lymph node with sinus histiocytosis, negative for metastatic carcinoma (0/1). 7. Lymph node, level 5, excision: 1 benign fragmented lymph node with sinus histiocytosis and anthracotic pigment, negative for metastatic carcinoma (0/1). 8. Lung, lingula, anatomic lingulectomy: Lung with congested vasculature. No residual carcinoma is identified. Bronchial and vascular margin is negative for malignancy. See synoptic report in comment section for further details                Review of Systems   Constitutional:  Negative for fever, chills, activity change, appetite change and fatigue.   HENT:  Negative for postnasal drip, rhinorrhea, sinus pressure, trouble swallowing, voice change, congestion and hearing loss.    Respiratory:  Negative for apnea, cough, hemoptysis, " sputum production, choking, chest tightness, shortness of breath, wheezing, orthopnea, previous hospitialization due to pulmonary problems, asthma nighttime symptoms and use of rescue inhaler.    Cardiovascular:  Negative for chest pain, palpitations and leg swelling.   Genitourinary:  Negative for difficulty urinating and hematuria.   Endocrine:  Negative for cold intolerance and heat intolerance.    Musculoskeletal:  Negative for back pain, gait problem and joint swelling.   Skin:  Negative for rash.   Gastrointestinal:  Negative for abdominal pain and abdominal distention.   Neurological:  Negative for dizziness, weakness and headaches.   Hematological:  Negative for adenopathy.   Psychiatric/Behavioral:  Negative for sleep disturbance. The patient is not nervous/anxious.        Outpatient Encounter Medications as of 6/28/2024   Medication Sig Dispense Refill    albuterol (PROVENTIL/VENTOLIN HFA) 90 mcg/actuation inhaler Inhale 2 puffs into the lungs every 4 (four) hours as needed for Wheezing or Shortness of Breath. Rescue 17 g 11    calcium carbonate (OS-CYDNEY) 600 mg calcium (1,500 mg) Tab Take 600 mg by mouth.      DUPIXENT  mg/2 mL PnIj Inject into the skin every 14 (fourteen) days.      levothyroxine (SYNTHROID) 75 MCG tablet Take 75 mcg by mouth once daily.      loratadine (CLARITIN) 10 mg tablet Take 10 mg by mouth once daily.      omeprazole (PRILOSEC) 20 MG capsule Take 20 mg by mouth once daily.      tiotropium-olodateroL (STIOLTO RESPIMAT) 2.5-2.5 mcg/actuation Mist Inhale 2 puffs into the lungs once daily. Controller 12 g 3    tolterodine (DETROL LA) 4 MG 24 hr capsule Take 1 capsule (4 mg total) by mouth once daily. 90 capsule 4    triamcinolone acetonide 0.1% (KENALOG) 0.1 % ointment Apply topically 2 (two) times daily. 30 g 1    vitamin D (VITAMIN D3) 1000 units Tab Take 1,000 Units by mouth once daily.      gabapentin (NEURONTIN) 300 MG capsule Take 1 capsule (300 mg total) by mouth every  evening. 30 capsule 11    nystatin (MYCOSTATIN) cream Apply topically 2 (two) times daily. 30 g 1     Facility-Administered Encounter Medications as of 6/28/2024   Medication Dose Route Frequency Provider Last Rate Last Admin    prochlorperazine injection Soln 5 mg  5 mg Intravenous Q30 Min PRN Adrien Vail MD           The following portions of the patient's history were reviewed and updated as appropriate: She  has a past medical history of COPD (chronic obstructive pulmonary disease) (2013), Eczema, GERD (gastroesophageal reflux disease), Hiatal hernia, History of torn meniscus of right knee (01/2011), Hypothyroid, Incidental pulmonary nodule, > 3mm and < 8mm - Lingula (8/12/2021), Lung cancer, and Urinary incontinence in female.  She does not have any pertinent problems on file.  She  has a past surgical history that includes Knee cartilage surgery (Right, 01/2011); Thoracoscopic wedge resection of lung (Left, 04/28/2021); Tubal ligation (1976); Wedge resection, lung, robot-assisted, using VATS, using da Kaci Xi (Left, 03/20/2023); Laparoscopic lysis of adhesions (Left, 03/20/2023); Robot-assisted laparoscopic lymphadenectomy using da Kaci Xi (Left, 03/20/2023); Injection of anesthetic agent around multiple intercostal nerves (Left, 03/20/2023); Fluoroscopy (N/A, 04/27/2023); and Breast mass excision.  Her family history includes Cancer in her brother, father, and sister; Heart failure in her mother; Hypertension in her father and mother; Macular degeneration in her mother; Retinal detachment in her mother.  She  reports that she quit smoking about 15 years ago. Her smoking use included cigarettes. She started smoking about 60 years ago. She has a 67.5 pack-year smoking history. She has never been exposed to tobacco smoke. She has never used smokeless tobacco. She reports that she does not drink alcohol and does not use drugs.  She has a current medication list which includes the following prescription(s):  albuterol, calcium carbonate, dupixent pen, levothyroxine, loratadine, omeprazole, stiolto respimat, tolterodine, triamcinolone acetonide 0.1%, vitamin d, gabapentin, and nystatin, and the following Facility-Administered Medications: prochlorperazine.  Current Outpatient Medications on File Prior to Visit   Medication Sig Dispense Refill    albuterol (PROVENTIL/VENTOLIN HFA) 90 mcg/actuation inhaler Inhale 2 puffs into the lungs every 4 (four) hours as needed for Wheezing or Shortness of Breath. Rescue 17 g 11    calcium carbonate (OS-CYDNEY) 600 mg calcium (1,500 mg) Tab Take 600 mg by mouth.      DUPIXENT  mg/2 mL PnIj Inject into the skin every 14 (fourteen) days.      levothyroxine (SYNTHROID) 75 MCG tablet Take 75 mcg by mouth once daily.      loratadine (CLARITIN) 10 mg tablet Take 10 mg by mouth once daily.      omeprazole (PRILOSEC) 20 MG capsule Take 20 mg by mouth once daily.      tiotropium-olodateroL (STIOLTO RESPIMAT) 2.5-2.5 mcg/actuation Mist Inhale 2 puffs into the lungs once daily. Controller 12 g 3    tolterodine (DETROL LA) 4 MG 24 hr capsule Take 1 capsule (4 mg total) by mouth once daily. 90 capsule 4    triamcinolone acetonide 0.1% (KENALOG) 0.1 % ointment Apply topically 2 (two) times daily. 30 g 1    vitamin D (VITAMIN D3) 1000 units Tab Take 1,000 Units by mouth once daily.      gabapentin (NEURONTIN) 300 MG capsule Take 1 capsule (300 mg total) by mouth every evening. 30 capsule 11    nystatin (MYCOSTATIN) cream Apply topically 2 (two) times daily. 30 g 1     Current Facility-Administered Medications on File Prior to Visit   Medication Dose Route Frequency Provider Last Rate Last Admin    prochlorperazine injection Soln 5 mg  5 mg Intravenous Q30 Min PRN Adrien Vail MD         She has No Known Allergies..      BP Readings from Last 3 Encounters:   06/28/24 122/82   06/25/24 112/71   06/18/24 (!) 114/58      MMRC Dyspnea Scale (4 is worst)     [] MMRC 0: Dyspneic on strenuous  "excercise (0 points)    [] MMRC 1: Dyspneic on walking a slight hill (0 points)    [] MMRC 2: Dyspneic on walking level ground; must stop occasionally due to breathlessness (1 point)    [] MMRC 3: Must stop for breathlessness after walking 100 yards or after a few minutes (2 points)    [] MMRC 4: Cannot leave house; breathless on dressing/undressing (3 points)              COPD Questionnaire  How often do you cough?: A little of the time  How often do you have phlegm (mucus) in your chest?: A little of the time  How often does your chest feel tight?: Never  When you walk up a hill or one flight of stairs, how often are you breathless?: All of the time  How often are you limited doing any activities at home?: Almost never  How often are you confident leaving the house despite your lung condition?: All of the time  How often do you sleep soundly?: All of the time  How often do you have energy?: Most of the time  Total score: 11           No data to display                          Objective:     Vital Signs (Most Recent)  Vital Signs  Pulse: 70  Resp: 20  SpO2: 99 %  BP: 122/82  Pain Score: 0-No pain  Height and Weight  Height: 5' 5" (165.1 cm)  Weight: 71.6 kg (157 lb 13.6 oz)  BSA (Calculated - sq m): 1.81 sq meters  BMI (Calculated): 26.3  Weight in (lb) to have BMI = 25: 149.9]  Wt Readings from Last 2 Encounters:   06/28/24 71.6 kg (157 lb 13.6 oz)   06/25/24 71.9 kg (158 lb 6.4 oz)       Physical Exam  Vitals and nursing note reviewed.   Constitutional:       Appearance: She is normal weight.       HENT:      Head: Normocephalic and atraumatic.      Nose: Nose normal.   Eyes:      Pupils: Pupils are equal, round, and reactive to light.   Cardiovascular:      Rate and Rhythm: Normal rate and regular rhythm.      Pulses: Normal pulses.      Heart sounds: Normal heart sounds.   Pulmonary:      Effort: Pulmonary effort is normal.      Breath sounds: Normal breath sounds.   Abdominal:      General: Bowel sounds are " "normal.      Palpations: Abdomen is soft.   Musculoskeletal:      Cervical back: Normal range of motion.   Skin:     General: Skin is warm.   Neurological:      General: No focal deficit present.      Mental Status: She is alert and oriented to person, place, and time.   Psychiatric:         Mood and Affect: Mood normal.          Laboratory  Lab Results   Component Value Date    WBC 4.12 06/17/2024    RBC 3.45 (L) 06/17/2024    HGB 10.2 (L) 06/17/2024    HCT 32.0 (L) 06/17/2024    MCV 93 06/17/2024    MCH 29.6 06/17/2024    MCHC 31.9 (L) 06/17/2024    RDW 14.6 (H) 06/17/2024     06/17/2024    MPV 9.8 06/17/2024    GRAN 2.4 06/17/2024    LYMPH 1.0 10/17/2023    LYMPH 23.4 10/17/2023    MONO 0.4 10/17/2023    MONO 9.9 10/17/2023    EOS 0.2 10/17/2023    BASO 0.02 10/17/2023    EOSINOPHIL 4.4 10/17/2023    BASOPHIL 0.5 10/17/2023       BMP  Lab Results   Component Value Date     06/17/2024    K 4.0 06/17/2024     06/17/2024    CO2 26 06/17/2024    BUN 17 06/17/2024    CREATININE 1.4 06/17/2024    CALCIUM 9.5 06/17/2024    ANIONGAP 9 06/17/2024    ESTGFRAFRICA >60 04/09/2021    EGFRNONAA >60 04/09/2021    AST 23 06/17/2024    ALT <5 (L) 06/17/2024    PROT 7.0 06/17/2024          No results found for: "IGE"     No results found for: "ASPERGILLUS"  No results found for: "AFUMIGATUSCL"     No results found for: "ACE"     Diagnostic Results:  I have personally reviewed today the following studies:    NM PET CT FDG Skull Base to Mid Thigh  Narrative: EXAMINATION:  NM PET CT FDG SKULL BASE TO MID THIGH    CLINICAL HISTORY:  lung cancer. new nodule on CT while on active treatment; Malignant neoplasm of upper lobe, left bronchus or lung    TECHNIQUE:  12.28 mCi of F18-FDG was administered intravenously in the right chest port.  After an approximately 60 min distribution time, PET/CT images were acquired from the skull base to the mid thigh. Transmission images were acquired to correct for attenuation using a " whole body low-dose CT scan without contrast with the arms positioned above the head. Glycemia at the time of injection was 90 mg/dL.    COMPARISON:  Chest CT, 06/17/2024 and PET-CT, 08/07/2023    FINDINGS:  Quality of the study: Adequate.    SUV max of the mediastinal blood pool is 2.5    Neck: No abnormal FDG avidity.  No lymphadenopathy or mass.    Chest: The left lower lobe pulmonary nodule measuring 16 x 13 mm is mildly FDG avid with SUV max 4.5.    Left hilar lymph node measures 11 x 10 mm with SUV max 5.6.    AP window lymph node measuring 12 x 9 mm has SUV max of 5.9.    Lymph node in the superior mediastinum immediately posterior to the esophagus measuring 12 x 7 mm has SUV max of 5.0.    No pleural effusion.    Abdomen/pelvis: No abnormal FDG avidity.  No lymphadenopathy or mass.  No ascites.    Skeletal structures: No abnormal FDG avidity.  No focal lytic or sclerotic lesion.    Physiologic uptake of the tracer is present within the brain, salivary glands, myocardium, GI and  tracts.    Incidental CT findings: N/A  Impression: Findings concerning for disease recurrence.  Left lower lobe 16 x 13 mm pulmonary nodule is mildly FDG avid.  Moderately FDG avid left hilar and mediastinal lymph nodes as described above.    Electronically signed by: Willis Valdez MD  Date:    06/21/2024  Time:    15:10                   Test Reason : Z01.818,     Vent. Rate : 069 BPM     Atrial Rate : 069 BPM      P-R Int : 136 ms          QRS Dur : 084 ms       QT Int : 394 ms       P-R-T Axes : 065 -33 017 degrees      QTc Int : 422 ms     Normal sinus rhythm   Left axis deviation   Abnormal ECG   When compared with ECG of 15-MAR-2023 13:20,   Incomplete right bundle branch block is no longer Present   T wave inversion no longer evident in Anterior leads   Assessment/Plan:     Problem List Items Addressed This Visit       COPD, moderate    Aortic atherosclerosis    Malignant neoplasm of lower lobe of left lung - Squamous cell -  Primary    Relevant Orders    Case Request Endoscopy: Bronchoscopy - insert lighted tube into airway to take a biopsy of lung, ROBOTIC BRONCHOSCOPY, ENDOBRONCHIAL ULTRASOUND (EBUS) (Completed)    Nodule of left lung    Relevant Orders    Case Request Endoscopy: Bronchoscopy - insert lighted tube into airway to take a biopsy of lung, ROBOTIC BRONCHOSCOPY, ENDOBRONCHIAL ULTRASOUND (EBUS) (Completed)    Abnormal PET scan, mediastinum    Relevant Orders    Case Request Endoscopy: Bronchoscopy - insert lighted tube into airway to take a biopsy of lung, ROBOTIC BRONCHOSCOPY, ENDOBRONCHIAL ULTRASOUND (EBUS) (Completed)      My diagnostic impression and work-up plan were discussed at length with patient. Risks were discussed. Robotic bron chscopy procedure. Complications of the procedure discussed in detail with patient. Complications including but not limited to infection that may require hospital admission, bleeding that may require blood transfusion and or hospital admission, perforation of the lung which may require surgery,chance of injury to the throat, windpipe or bronchial tubes, laryngospam, coughing, aspiration, hypoxemia or cardiac arrythmias. Patient expressed and verbalized understanding. The material risks of anesthesia in connection with the procedure including brain damage, paralysis from the neck downwards, paralysis from the waist downwards, loss of function of an arm or a leg, disfigurement (incluing scars)and death were discussed with patient who expressedand verbalized understanding. Alternate treatments and material risks associated with such alternatives were discussed with pateint. These include radiologic surveillance with minimal risk and sugery with an indeterminate risk. The material risks of refusing the procedure was discussed in detail. This includes no diagnosis or confirmation of diagnisis and rendering of appropriate treatment the risk of which depends on the nature of the diagnosed  illness. Patient expressed and verbalized understanding. Procedure scheduled for date 07/03/2024. Consent signed. Orders entered. Coagulation studies per orders.   All questions were answered and the patient expressed understanding     For robotic bronchoscopy left lower lobe spiculated lesion PET avid lesion 16 mm x 13 mm, 10 11 L, 4 L     Medial left lung base 6 mm nodule image 295 and Left costophrenic angle 5 mm nodule image 330:  This will be below the threshold for accessibility with robotic bronchoscopy    Follow up in about 4 weeks (around 7/26/2024), or bronch consent.    This note was prepared using voice recognition system and is likely to have sound alike errors that may have been overlooked even after proof reading.  Please call me with any questions    Discussed diagnosis, its evaluation, treatment and usual course. All questions answered.    Thank you for the courtesy of participating in the care of this patient    Itz Rojas MD      Personal Diagnostic Review  []  CXR    []  ECHO    []  ONSAT    []  6MWD    []  LABS    []  CHEST CT    []  PET CT    []  Biopsy results

## 2024-07-01 LAB
OHS QRS DURATION: 84 MS
OHS QTC CALCULATION: 422 MS

## 2024-07-03 ENCOUNTER — ANESTHESIA (OUTPATIENT)
Dept: ENDOSCOPY | Facility: HOSPITAL | Age: 74
End: 2024-07-03
Payer: MEDICARE

## 2024-07-03 ENCOUNTER — ANESTHESIA EVENT (OUTPATIENT)
Dept: ENDOSCOPY | Facility: HOSPITAL | Age: 74
End: 2024-07-03
Payer: MEDICARE

## 2024-07-03 ENCOUNTER — HOSPITAL ENCOUNTER (OUTPATIENT)
Facility: HOSPITAL | Age: 74
Discharge: HOME | End: 2024-07-03
Attending: INTERNAL MEDICINE | Admitting: INTERNAL MEDICINE
Payer: MEDICARE

## 2024-07-03 VITALS
TEMPERATURE: 98 F | WEIGHT: 157 LBS | SYSTOLIC BLOOD PRESSURE: 150 MMHG | RESPIRATION RATE: 18 BRPM | HEART RATE: 72 BPM | HEIGHT: 65 IN | DIASTOLIC BLOOD PRESSURE: 64 MMHG | BODY MASS INDEX: 26.16 KG/M2 | OXYGEN SATURATION: 94 %

## 2024-07-03 DIAGNOSIS — C34.32 MALIGNANT NEOPLASM OF LOWER LOBE OF LEFT LUNG: ICD-10-CM

## 2024-07-03 DIAGNOSIS — R94.8 ABNORMAL PET SCAN, MEDIASTINUM: ICD-10-CM

## 2024-07-03 DIAGNOSIS — R91.1 NODULE OF LEFT LUNG: ICD-10-CM

## 2024-07-03 PROCEDURE — 31654 BRONCH EBUS IVNTJ PERPH LES: CPT | Mod: ,,, | Performed by: INTERNAL MEDICINE

## 2024-07-03 PROCEDURE — 31627 NAVIGATIONAL BRONCHOSCOPY: CPT | Mod: ,,, | Performed by: INTERNAL MEDICINE

## 2024-07-03 PROCEDURE — 31629 BRONCHOSCOPY/NEEDLE BX EACH: CPT | Mod: 51,,, | Performed by: INTERNAL MEDICINE

## 2024-07-03 PROCEDURE — 37000009 HC ANESTHESIA EA ADD 15 MINS: Performed by: INTERNAL MEDICINE

## 2024-07-03 PROCEDURE — 31628 BRONCHOSCOPY/LUNG BX EACH: CPT | Mod: 51,,, | Performed by: INTERNAL MEDICINE

## 2024-07-03 PROCEDURE — 31628 BRONCHOSCOPY/LUNG BX EACH: CPT | Performed by: INTERNAL MEDICINE

## 2024-07-03 PROCEDURE — 31653 BRONCH EBUS SAMPLNG 3/> NODE: CPT | Mod: ,,, | Performed by: INTERNAL MEDICINE

## 2024-07-03 PROCEDURE — 25000003 PHARM REV CODE 250

## 2024-07-03 PROCEDURE — 31653 BRONCH EBUS SAMPLNG 3/> NODE: CPT | Performed by: INTERNAL MEDICINE

## 2024-07-03 PROCEDURE — 31654 BRONCH EBUS IVNTJ PERPH LES: CPT | Performed by: INTERNAL MEDICINE

## 2024-07-03 PROCEDURE — 63600175 PHARM REV CODE 636 W HCPCS

## 2024-07-03 PROCEDURE — 88112 CYTOPATH CELL ENHANCE TECH: CPT | Mod: 59 | Performed by: PATHOLOGY

## 2024-07-03 PROCEDURE — 37000008 HC ANESTHESIA 1ST 15 MINUTES: Performed by: INTERNAL MEDICINE

## 2024-07-03 PROCEDURE — 31624 DX BRONCHOSCOPE/LAVAGE: CPT | Performed by: INTERNAL MEDICINE

## 2024-07-03 PROCEDURE — 27200944 HC BRONCH FORCEPS DISPOSABLE: Performed by: INTERNAL MEDICINE

## 2024-07-03 PROCEDURE — 31629 BRONCHOSCOPY/NEEDLE BX EACH: CPT | Performed by: INTERNAL MEDICINE

## 2024-07-03 PROCEDURE — 88305 TISSUE EXAM BY PATHOLOGIST: CPT | Mod: 59 | Performed by: PATHOLOGY

## 2024-07-03 PROCEDURE — 25000003 PHARM REV CODE 250: Performed by: INTERNAL MEDICINE

## 2024-07-03 PROCEDURE — 88173 CYTOPATH EVAL FNA REPORT: CPT | Performed by: PATHOLOGY

## 2024-07-03 PROCEDURE — 27202059 HC NEEDLE, FNA (ANY): Performed by: INTERNAL MEDICINE

## 2024-07-03 PROCEDURE — 31624 DX BRONCHOSCOPE/LAVAGE: CPT | Mod: 51,,, | Performed by: INTERNAL MEDICINE

## 2024-07-03 PROCEDURE — 31627 NAVIGATIONAL BRONCHOSCOPY: CPT | Performed by: INTERNAL MEDICINE

## 2024-07-03 PROCEDURE — 88312 SPECIAL STAINS GROUP 1: CPT | Performed by: PATHOLOGY

## 2024-07-03 RX ORDER — FENTANYL CITRATE 50 UG/ML
INJECTION, SOLUTION INTRAMUSCULAR; INTRAVENOUS
Status: DISCONTINUED | OUTPATIENT
Start: 2024-07-03 | End: 2024-07-03

## 2024-07-03 RX ORDER — LIDOCAINE HYDROCHLORIDE 10 MG/ML
INJECTION, SOLUTION EPIDURAL; INFILTRATION; INTRACAUDAL; PERINEURAL
Status: DISCONTINUED | OUTPATIENT
Start: 2024-07-03 | End: 2024-07-03

## 2024-07-03 RX ORDER — LIDOCAINE HYDROCHLORIDE 10 MG/ML
INJECTION INFILTRATION; PERINEURAL
Status: COMPLETED | OUTPATIENT
Start: 2024-07-03 | End: 2024-07-03

## 2024-07-03 RX ORDER — ROCURONIUM BROMIDE 10 MG/ML
INJECTION, SOLUTION INTRAVENOUS
Status: DISCONTINUED | OUTPATIENT
Start: 2024-07-03 | End: 2024-07-03

## 2024-07-03 RX ORDER — SODIUM CHLORIDE, SODIUM LACTATE, POTASSIUM CHLORIDE, CALCIUM CHLORIDE 600; 310; 30; 20 MG/100ML; MG/100ML; MG/100ML; MG/100ML
INJECTION, SOLUTION INTRAVENOUS CONTINUOUS PRN
Status: DISCONTINUED | OUTPATIENT
Start: 2024-07-03 | End: 2024-07-03

## 2024-07-03 RX ORDER — ONDANSETRON HYDROCHLORIDE 2 MG/ML
INJECTION, SOLUTION INTRAVENOUS
Status: DISCONTINUED | OUTPATIENT
Start: 2024-07-03 | End: 2024-07-03

## 2024-07-03 RX ORDER — PROPOFOL 10 MG/ML
VIAL (ML) INTRAVENOUS
Status: DISCONTINUED | OUTPATIENT
Start: 2024-07-03 | End: 2024-07-03

## 2024-07-03 RX ADMIN — FENTANYL CITRATE 50 MCG: 50 INJECTION, SOLUTION INTRAMUSCULAR; INTRAVENOUS at 09:07

## 2024-07-03 RX ADMIN — ROCURONIUM BROMIDE 10 MG: 10 SOLUTION INTRAVENOUS at 10:07

## 2024-07-03 RX ADMIN — LIDOCAINE HYDROCHLORIDE 50 MG: 10 SOLUTION INTRAVENOUS at 09:07

## 2024-07-03 RX ADMIN — ROCURONIUM BROMIDE 50 MG: 10 SOLUTION INTRAVENOUS at 09:07

## 2024-07-03 RX ADMIN — PROPOFOL 100 MG: 10 INJECTION, EMULSION INTRAVENOUS at 09:07

## 2024-07-03 RX ADMIN — SODIUM CHLORIDE, SODIUM LACTATE, POTASSIUM CHLORIDE, AND CALCIUM CHLORIDE: .6; .31; .03; .02 INJECTION, SOLUTION INTRAVENOUS at 09:07

## 2024-07-03 RX ADMIN — SUGAMMADEX 200 MG: 100 INJECTION, SOLUTION INTRAVENOUS at 10:07

## 2024-07-03 RX ADMIN — ONDANSETRON 4 MG: 2 INJECTION INTRAMUSCULAR; INTRAVENOUS at 10:07

## 2024-07-03 NOTE — TRANSFER OF CARE
"Anesthesia Transfer of Care Note    Patient: Radha Lamas    Procedure(s) Performed: Procedure(s) (LRB):  Bronchoscopy - insert lighted tube into airway to take a biopsy of lung (Bilateral)  ROBOTIC BRONCHOSCOPY (N/A)  ENDOBRONCHIAL ULTRASOUND (EBUS) (Bilateral)    Patient location: PACU    Anesthesia Type: general    Transport from OR: Transported from OR on room air with adequate spontaneous ventilation    Post pain: adequate analgesia    Post assessment: no apparent anesthetic complications and tolerated procedure well    Post vital signs: stable    Level of consciousness: awake    Nausea/Vomiting: no nausea/vomiting    Complications: none    Transfer of care protocol was followed      Last vitals: Visit Vitals  /65 (BP Location: Left arm)   Pulse 68   Temp 36.6 °C (97.8 °F) (Temporal)   Resp 16   Ht 5' 5" (1.651 m)   Wt 71.2 kg (157 lb)   SpO2 97%   Breastfeeding No   BMI 26.13 kg/m²     "

## 2024-07-03 NOTE — ANESTHESIA PREPROCEDURE EVALUATION
07/03/2024  Radha Lamas is a 74 y.o., female.    Patient Active Problem List   Diagnosis    Brow ptosis    Dermatochalasis of right eyelid    Ptosis of both eyelids    Dermatochalasia    Nodule of left lung    Former heavy cigarette smoker (20-39 per day)    COPD, moderate    Malignant neoplasm of lower lobe of left lung - Squamous cell    Overweight (BMI 25.0-29.9)    Thrombocytopenia    Aortic atherosclerosis    Hypokalemia    Stage 3b chronic kidney disease    Abnormal PET scan, mediastinum     Past Medical History:   Diagnosis Date    COPD (chronic obstructive pulmonary disease) 2013    Eczema     GERD (gastroesophageal reflux disease)     Hiatal hernia     History of torn meniscus of right knee 01/2011    Hypothyroid     Incidental pulmonary nodule, > 3mm and < 8mm - Lingula 8/12/2021    Lung cancer     Urinary incontinence in female     Mild , only takes mediciane with travel     Past Surgical History:   Procedure Laterality Date    BREAST MASS EXCISION      FLUOROSCOPY N/A 04/27/2023    Procedure: Fluoroscopy/Mediport placement;  Surgeon: Kodak Retana MD;  Location: [facility] CATH LAB;  Service: General;  Laterality: N/A;    INJECTION OF ANESTHETIC AGENT AROUND MULTIPLE INTERCOSTAL NERVES Left 03/20/2023    Procedure: BLOCK, NERVE, INTERCOSTAL, 2 OR MORE;  Surgeon: Refugio Medley MD;  Location: [facility] OR [address] [place] [address];  Service: Thoracic;  Laterality: Left;    KNEE CARTILAGE SURGERY Right 01/2011    LAPAROSCOPIC LYSIS OF ADHESIONS Left 03/20/2023    Procedure: LYSIS, ADHESIONS, LAPAROSCOPIC;  Surgeon: Refugio Medley MD;  Location: [facility] OR [address] [place] [address];  Service: Thoracic;  Laterality: Left;    ROBOT-ASSISTED LAPAROSCOPIC LYMPHADENECTOMY USING DA ROSEMARY XI Left 03/20/2023    Procedure: XI ROBOTIC LYMPHADENECTOMY;  Surgeon: Refugio Medley MD;  Location: [facility] OR [address] [place] [address];  Service: Thoracic;   Laterality: Left;    THORACOSCOPIC WEDGE RESECTION OF LUNG Left 04/28/2021    Procedure: VATS, WITH WEDGE RESECTION, LUNG;  Surgeon: Clarke Sauceda MD;  Location: [facility] OR [address] [place] [address];  Service: Thoracic;  Laterality: Left;  lymph node dissection     TUBAL LIGATION  1976    WEDGE RESECTION, LUNG, ROBOT-ASSISTED, USING VATS, USING DA ROSEMARY XI Left 03/20/2023    Procedure: XI ROBOTIC WEDGE RESECTION, LUNG (RATS); segmentectomy;  Surgeon: Refugio Medley MD;  Location: [facility] OR [address] [place] [address];  Service: Thoracic;  Laterality: Left;       Pre-op Assessment    I have reviewed the Patient Summary Reports.     I have reviewed the Nursing Notes.    I have reviewed the Medications.     Review of Systems  Anesthesia Hx:  No problems with previous Anesthesia   History of prior surgery of interest to airway management or planning:          Denies Family Hx of Anesthesia complications.    Denies Personal Hx of Anesthesia complications.                    Social:  Former Smoker, No Alcohol Use       Hematology/Oncology:                      Current/Recent Cancer.  Other (see Oncology comments)          Oncology Comments: Lung cx     Cardiovascular:                   ECG has been reviewed. Aortic atherosclerosis                         Pulmonary:   COPD      Incidental pulmonary nodule, > 3mm and < 8mm - Lingula               Renal/:  Chronic Renal Disease                Hepatic/GI:    Hiatal Hernia, GERD             Neurological:        C-spine cleared.                               Endocrine:   Hypothyroidism              Physical Exam  General: Well nourished, Cooperative and Alert    Airway:  Mallampati: II   Mouth Opening: Normal  TM Distance: Normal  Tongue: Normal  Neck ROM: Normal ROM    Dental:  Intact      Results for orders placed or performed during the hospital encounter of 06/28/24   EKG 12-lead    Collection Time: 06/28/24  1:19 PM   Result Value Ref Range    QRS Duration 84 ms    OHS QTC Calculation 422 ms     Narrative    Test Reason : Z01.818,    Vent. Rate : 069 BPM     Atrial Rate : 069 BPM     P-R Int : 136 ms          QRS Dur : 084 ms      QT Int : 394 ms       P-R-T Axes : 065 -33 017 degrees     QTc Int : 422 ms    Normal sinus rhythm  Left axis deviation  Abnormal ECG  When compared with ECG of 15-MAR-2023 13:20,  Incomplete right bundle branch block is no longer Present  T wave inversion no longer evident in Anterior leads  Confirmed by MARIA M QUINN MD ([id number]) on 7/1/2024 2:52:51 PM    Referred By: ERMELINDA CASTELLON           Confirmed By:MARIA M QUINN MD     Results for orders placed during the hospital encounter of 11/10/23    Echo    Interpretation Summary    Left Ventricle: The left ventricle is normal in size. Normal wall thickness. There is concentric remodeling. Normal wall motion. There is normal systolic function with a visually estimated ejection fraction of 60 - 65%. There is normal diastolic function.    Right Ventricle: Normal right ventricular cavity size. Wall thickness is normal. Right ventricle wall motion  is normal. Systolic function is borderline low.    Tricuspid Valve: There is mild regurgitation.    Pulmonic Valve: There is mild regurgitation.    Pulmonary Artery: The estimated pulmonary artery systolic pressure is 24 mmHg.    IVC/SVC: Normal venous pressure at 3 mmHg.      Anesthesia Plan  Type of Anesthesia, risks & benefits discussed:    Anesthesia Type: Gen ETT  Intra-op Monitoring Plan: Standard ASA Monitors and Art Line  Post Op Pain Control Plan: multimodal analgesia  Induction:  IV  Airway Plan: Direct, Post-Induction  Informed Consent: Informed consent signed with the Patient and all parties understand the risks and agree with anesthesia plan.  All questions answered.   ASA Score: 3  Day of Surgery Review of History & Physical: H&P Update referred to the surgeon/provider.    Ready For Surgery From Anesthesia Perspective.     .

## 2024-07-03 NOTE — PLAN OF CARE
Dr Rojas spoke to patient and family regarding results and recs. Post procedure discharge instructions discussed and verbalized understanding. Questions answered. Tolerating ice chip/water. Minimal cough, non-productive at this time.

## 2024-07-03 NOTE — ANESTHESIA PROCEDURE NOTES
Intubation    Date/Time: 7/3/2024 9:24 AM    Performed by: Kale Mckeon CRNA  Authorized by: Ab Joseph II, MD    Intubation:     Induction:  Intravenous    Intubated:  Postinduction    Mask Ventilation:  Easy mask    Attempts:  1    Attempted By:  CRNA    Method of Intubation:  Video laryngoscopy    Blade:  Thompson 3    Laryngeal View Grade: Grade I - full view of cords      Difficult Airway Encountered?: No      Complications:  None    Airway Device:  Oral endotracheal tube    Airway Device Size:  7.5    Style/Cuff Inflation:  Cuffed (inflated to minimal occlusive pressure)    Tube secured:  19    Secured at:  The lips    Placement Verified By:  Capnometry    Complicating Factors:  None    Findings Post-Intubation:  BS equal bilateral

## 2024-07-03 NOTE — ANESTHESIA POSTPROCEDURE EVALUATION
Anesthesia Post Evaluation    Patient: Radha Lamas    Procedure(s) Performed: Procedure(s) (LRB):  Bronchoscopy - insert lighted tube into airway to take a biopsy of lung (Bilateral)  ROBOTIC BRONCHOSCOPY (N/A)  ENDOBRONCHIAL ULTRASOUND (EBUS) (Bilateral)    Final Anesthesia Type: MAC      Patient location during evaluation: GI PACU  Patient participation: Yes- Able to Participate  Level of consciousness: awake  Post-procedure vital signs: reviewed and stable  Pain management: adequate  Airway patency: patent    PONV status at discharge: No PONV  Anesthetic complications: no      Cardiovascular status: blood pressure returned to baseline and hemodynamically stable  Respiratory status: unassisted and spontaneous ventilation  Hydration status: euvolemic  Follow-up not needed.              Vitals Value Taken Time   /64 07/03/24 1150   Temp 36.4 °C (97.5 °F) 07/03/24 1150   Pulse 72 07/03/24 1150   Resp 18 07/03/24 1150   SpO2 94 % 07/03/24 1150         Event Time   Out of Recovery 11:59:31         Pain/Skye Score: Skye Score: 10 (7/3/2024 11:51 AM)

## 2024-07-03 NOTE — OP NOTE
Planning CT scan loaded into laptop   Image planning completed loaded into platform   Patient brought into operating room   Patient after completion of informed consents intubated and time-out performed initial inspection of air with diagnostic bronchoscopy normal airways  Navigation to left lower lobe nodule 16 x 13 mm   Transbronchial needle aspiration performed on transbronchial biopsies performed assisted with radial EBUS and bronchoscopy  Flouroscopy  Fluoro time was 5 minutes 9 seconds  BAL  Patient was converted to EBUS  Station 4l: 11.2 mm : 4 passes  Station 4 R :4.7 mm   Station 7:  11.1 mm : 3 passes  Station 10 L 8.9: 3 pass    Airway suction  4 cc of 1% lidocaine to the left airway and 4 cc of 1% lidocaine to the right airway   Patient extubated in tolerates well   Postprocedure chest x-ray was clear

## 2024-07-09 ENCOUNTER — OFFICE VISIT (OUTPATIENT)
Dept: HEMATOLOGY/ONCOLOGY | Facility: CLINIC | Age: 74
End: 2024-07-09
Payer: MEDICARE

## 2024-07-09 VITALS
OXYGEN SATURATION: 98 % | HEART RATE: 69 BPM | DIASTOLIC BLOOD PRESSURE: 66 MMHG | HEIGHT: 65 IN | BODY MASS INDEX: 26.24 KG/M2 | SYSTOLIC BLOOD PRESSURE: 103 MMHG | WEIGHT: 157.5 LBS | TEMPERATURE: 98 F

## 2024-07-09 DIAGNOSIS — C34.32 MALIGNANT NEOPLASM OF LOWER LOBE OF LEFT LUNG: ICD-10-CM

## 2024-07-09 DIAGNOSIS — C34.12 ADENOCARCINOMA OF UPPER LOBE OF LEFT LUNG: Primary | ICD-10-CM

## 2024-07-09 DIAGNOSIS — C77.1 SECONDARY MALIGNANT NEOPLASM OF INTRATHORACIC LYMPH NODES: ICD-10-CM

## 2024-07-09 DIAGNOSIS — J84.10 GRANULOMATOUS LUNG DISEASE: ICD-10-CM

## 2024-07-09 LAB
FINAL PATHOLOGIC DIAGNOSIS: NORMAL
Lab: NORMAL
SUPPLEMENTAL DIAGNOSIS: NORMAL

## 2024-07-09 PROCEDURE — 1126F AMNT PAIN NOTED NONE PRSNT: CPT | Mod: CPTII,S$GLB,, | Performed by: HOSPITALIST

## 2024-07-09 PROCEDURE — 3288F FALL RISK ASSESSMENT DOCD: CPT | Mod: CPTII,S$GLB,, | Performed by: HOSPITALIST

## 2024-07-09 PROCEDURE — 99999 PR PBB SHADOW E&M-EST. PATIENT-LVL III: CPT | Mod: PBBFAC,,, | Performed by: HOSPITALIST

## 2024-07-09 PROCEDURE — G2211 COMPLEX E/M VISIT ADD ON: HCPCS | Mod: S$GLB,,, | Performed by: HOSPITALIST

## 2024-07-09 PROCEDURE — 1101F PT FALLS ASSESS-DOCD LE1/YR: CPT | Mod: CPTII,S$GLB,, | Performed by: HOSPITALIST

## 2024-07-09 PROCEDURE — 1159F MED LIST DOCD IN RCRD: CPT | Mod: CPTII,S$GLB,, | Performed by: HOSPITALIST

## 2024-07-09 PROCEDURE — 99215 OFFICE O/P EST HI 40 MIN: CPT | Mod: S$GLB,,, | Performed by: HOSPITALIST

## 2024-07-09 PROCEDURE — 3074F SYST BP LT 130 MM HG: CPT | Mod: CPTII,S$GLB,, | Performed by: HOSPITALIST

## 2024-07-09 PROCEDURE — 3078F DIAST BP <80 MM HG: CPT | Mod: CPTII,S$GLB,, | Performed by: HOSPITALIST

## 2024-07-09 NOTE — PROGRESS NOTES
[facility] MEDICAL ONCOLOGY - FOLLOW UP VISIT    Reason for visit: Follow up for stage IIB squamous cell carcinoma      Oncology History   Malignant neoplasm of lower lobe of left lung - Squamous cell   4/15/2021 Initial Diagnosis    Malignant neoplasm of lower lobe of left lung - Squamous cell     5/25/2021 Cancer Staged    Staging form: Lung, AJCC 8th Edition  - Clinical: Stage IA1 (cT1a, cN0, cM0)      Cancer Staged    9mm non-keratinizing squamous cell carcinoma, no VPI, no LVI, margin at least 8mm.  Levels 9 and 11 = negative.  T1aN0     4/5/2023 Cancer Staged    Staging form: Lung, AJCC 8th Edition  - Pathologic stage from 4/5/2023: Stage IIB (pT2, pN1, cM0)     5/1/2023 - 7/21/2023 Chemotherapy    Treatment Summary   Plan Name: OP NSCLC PEMETREXED + CISPLATIN Q3W  Treatment Goal: Supportive  Status: Inactive  Start Date: 5/1/2023  End Date: 7/21/2023  Provider: Marissa Brower MD  Chemotherapy: CISplatin (Platinol) 75 mg/m2 = 138 mg in sodium chloride 0.9% 665 mL chemo infusion, 75 mg/m2 = 138 mg, Intravenous, Clinic/HOD 1 time, 4 of 4 cycles  Administration: 138 mg (5/12/2023), 138 mg (6/29/2023), 138 mg (7/20/2023), 138 mg (6/8/2023)  PEMEtrexed disodium (ALIMTA) 900 mg in sodium chloride 0.9% SolP 100 mL chemo infusion, 925 mg, Intravenous, Clinic/HOD 1 time, 4 of 4 cycles  Dose modification: 375 mg/m2 (original dose 500 mg/m2, Cycle 3, Reason: MD Discretion, Comment: neutropenia )  Administration: 900 mg (5/12/2023), 700 mg (6/29/2023), 700 mg (7/20/2023), 700 mg (6/8/2023)     8/14/2023 -  Chemotherapy    Treatment Summary   Plan Name: OP ATEZOLIZUMAB Q3W  Treatment Goal: Supportive  Status: Active  Start Date: 8/14/2023  End Date: 8/20/2024 (Planned)  Provider: Marissa Brower MD  Chemotherapy: [No matching medication found in this treatment plan]     NSCLC of left lung (Resolved)   4/28/2021 Initial Diagnosis    NSCLC of left lung  "(Resolved)      Cancer Staged    9mm non-keratinizing squamous cell carcinoma, no VPI, no LVI, margin at least 8mm.  Levels 9 and 11 = negative.  T1aN0        NANI NSCLC/ADC IIB (pT2 pN1a cMx)  - History of stage I squamous cell carcinoma moderately differentiated left lower lung status post wedge resection 04/28/2021  - Abnormality seen on surveillance for history of squamous cell carcinoma. Initial biopsy February 20, 2023 without neoplasm identified , thus had left lower lobe wedge resection after multiple disciplinary discussion, final pathology positive on 03/28/2022 for invasive moderately differentiated adenocarcinoma station 10 lymph node positive, pleural invasion noted, margins negative.    - Restaging MRI brain negative and PET scan with left hilar avidity SUV 4 and chest wall. Given recent surgery potential inflammation presented at tumor board recommended proceeding with adjuvant chemotherapy and follow-up on repeat imaging. Started adjuvant therapy with chemotherapy and immunotherapy per IMPOWER 010 given PDL1 positive disease 95%.  Mutational analysis negative for EGFR mutation. Noted BRAF mutation.   - Completed adjuvant chemotherapy with cisplatin and pemetrexed tolerated well aside from chemotherapy associated progressive anemia.    - Restaging PET on 8/7/2023 with resolution of prior lymph node avidity.    - Started immunotherapy with atezolizumab (8/14/2023 - present; delay from C3 on 9/26/23 and C4 on 11/14/23 due to concnern for pneumonitis)  - 2/5/24: CT C, "no detrimental interval change in appearance"  - 3/01/24: CT C non-con, no acute pathology, remaining findings unchanged when compared to prior study  - 6/17/24:  CT C, interval development of 2.1 x 1.8 x 1.1 cm left lung base nodule concerning for disease recurrence versus new lung lesion; RLL peripheral ill-defined ground-glass unchanged; 0.6 cm medial left lung base nodule; 0.5 cm left costophrenic angle nodule  - 6/21/24:  PET-CT, 1.6 x " 1.3 cm LLL nodule, SUV 4.5; 1.1 x 1.0 cm left hilar lymph node, SUV 5.6;  1.2 x 0.9 cm AP window lymph node, SUV 5.9; 1.2 x 0.7 cm superior mediastinum lymph node, SUV 5.0  - 7/3/24: Robotic Bronch w/ EBUS   - LLL endobronchial mass: Areas of organizing pnuemonia, no malignancy   - Station 4L LN: No malignancy   - Station 10L LN: Non-necrotizing granulomatous inflammation   - LN (site unspecified): Non-necrotizing granulomatous inflammation  HPI:     Radha Lamas is a 74 yo woman w/ pmh significant for COPD and two primary L lung cancers as below. She presents today for follow up.     - Stage I squamous cell carcinoma of the of the LLL (PD-L1 50%, insufficient sample for NGS), initially dx'd 4/28/21, s/p wedge resection (4/28/21)    - Stage IIB (pT2, pN1a, cMx) adenocarcinoma of the NANI (PD-L1 95%, BRAF [id number] mutated), initially dx'd 3/28/23, s/p wedge resection 3/28/23, adjuvant cisplatin/pemetrexed x 4 cycles (5/11/23-7/20/23), and currently on adjuvant atezolizumab (8/14/23 - present; of note, delay between C3 on 9/26/23 and C4 on 11/14/23 due to concern for pneumonitis)     Last clinic 6/25/24, PET-CT with hypermetabolic LLL nodule as well as multiple hypermetabolic lymph nodes concerning for recurrent disease.  Given history of squamous cell carcinoma of the LLL, favor biopsies and determine the etiology    Interval History:   - 7/3/24:  Bronch with EBUS, chronic inflammation almost specimens as above    Patient states that she feels well today.  She denies any new or bothersome symptoms at this time.  Her shortness of breath appears stable.  She continues to work her garden in the mornings.  She states that she is very happy with the results of her recent bronchoscopy.  She questions with the next steps will be regarding her cancer treatment at this time.    Past Medical History:   Past Medical History:   Diagnosis Date    COPD (chronic obstructive pulmonary disease) 2013    Eczema     GERD (gastroesophageal  reflux disease)     Hiatal hernia     History of torn meniscus of right knee 01/2011    Hypothyroid     Incidental pulmonary nodule, > 3mm and < 8mm - Lingula 8/12/2021    Lung cancer     Urinary incontinence in female     Mild , only takes mediciane with travel        Allergies:   Review of patient's allergies indicates:  No Known Allergies     Medications:   Current Outpatient Medications   Medication Sig Dispense Refill    albuterol (PROVENTIL/VENTOLIN HFA) 90 mcg/actuation inhaler Inhale 2 puffs into the lungs every 4 (four) hours as needed for Wheezing or Shortness of Breath. Rescue 17 g 11    calcium carbonate (OS-CYDNEY) 600 mg calcium (1,500 mg) Tab Take 600 mg by mouth.      DUPIXENT  mg/2 mL PnIj Inject into the skin every 14 (fourteen) days.      gabapentin (NEURONTIN) 300 MG capsule Take 1 capsule (300 mg total) by mouth every evening. 30 capsule 11    levothyroxine (SYNTHROID) 75 MCG tablet Take 75 mcg by mouth once daily.      loratadine (CLARITIN) 10 mg tablet Take 10 mg by mouth once daily.      nystatin (MYCOSTATIN) cream Apply topically 2 (two) times daily. 30 g 1    omeprazole (PRILOSEC) 20 MG capsule Take 20 mg by mouth once daily.      tiotropium-olodateroL (STIOLTO RESPIMAT) 2.5-2.5 mcg/actuation Mist Inhale 2 puffs into the lungs once daily. Controller 12 g 3    tolterodine (DETROL LA) 4 MG 24 hr capsule Take 1 capsule (4 mg total) by mouth once daily. 90 capsule 4    triamcinolone acetonide 0.1% (KENALOG) 0.1 % ointment Apply topically 2 (two) times daily. 30 g 1    vitamin D (VITAMIN D3) 1000 units Tab Take 1,000 Units by mouth once daily.       No current facility-administered medications for this visit.     Facility-Administered Medications Ordered in Other Visits   Medication Dose Route Frequency Provider Last Rate Last Admin    prochlorperazine injection Soln 5 mg  5 mg Intravenous Q30 Min PRN Adrien Vail MD              ROS:  Review of Systems   A complete 12-point review of  systems was reviewed and is negative except as mentioned above.       Physical Exam:       There were no vitals taken for this visit.               Physical Exam  Constitutional:       Appearance: Normal appearance.   HENT:      Head: Normocephalic and atraumatic.   Eyes:      Extraocular Movements: Extraocular movements intact.      Conjunctiva/sclera: Conjunctivae normal.      Pupils: Pupils are equal, round, and reactive to light.   Pulmonary:      Effort: Pulmonary effort is normal.   Musculoskeletal:         General: Normal range of motion.      Right lower leg: No edema.      Left lower leg: No edema.   Skin:     General: Skin is warm and dry.   Neurological:      Mental Status: She is alert and oriented to person, place, and time.   Psychiatric:         Mood and Affect: Mood normal.         Thought Content: Thought content normal.         Judgment: Judgment normal.           Labs:   No results found for this or any previous visit (from the past 48 hour(s)).         Imaging:   X-Ray Chest AP Portable  Narrative: EXAMINATION:  XR CHEST AP PORTABLE    CLINICAL HISTORY:  Malignant neoplasm of lower lobe, left bronchus or lungpneumothorax;    FINDINGS:  Postsurgical changes to the left lung.  Right IJ central venous catheter with port.  No pneumothorax or pleural effusion.  No acute osseous finding.  Impression: No acute finding.  No pneumothorax.    Electronically signed by: Pk Nathan  Date:    07/03/2024  Time:    11:59  FL Bronchoscopy Fluoro in Xray  Narrative: EXAMINATION:  FL BRONCHOSCOPY FLUORO IN XRAY    CLINICAL HISTORY:  Left lower lung lesion.    TECHNIQUE:  Fluoroscopic guidance for procedure performed by Itz Rojas.    Air Kerma: 48 mGy    FINDINGS:  Fluoroscopic guidance utilized for robotic bronchoscopy and biopsy.  Impression: Fluoroscopic guidance utilized for robotic bronchoscopy and biopsy.  Please see procedural dictation for further details.    Electronically signed by: Pk  Venita  Date:    07/03/2024  Time:    11:41  CT Chest Without Contrast  Narrative: EXAMINATION:  CT CHEST WITHOUT CONTRAST    CLINICAL HISTORY:  Malignant neoplasm of lower lobe of left lung - Squamous cell;    COMPARISON:  June 17, 2024.  PET-CT June 21, 2024.    TECHNIQUE:  Axial CT images were obtained of the CHEST.  Iterative reconstruction technique was used. The CT exam was performed using one or more of the following dose reduction techniques- Automated exposure control, adjustment of the mA and/or kV according to patient size, and/or use of iterative reconstructed technique.    FINDINGS:  Lungs/pleura: Left lower lobe pulmonary nodule which demonstrated mild the FDG avidity has decreased in size now measuring 1.4 x 0.8 cm, previously 2.1 x 1.1 cm.  Postsurgical changes to the left lung are redemonstrated.  No pleural effusion or pneumothorax.    Coronary artery calcification: Present.    Aorta: Atherosclerosis. No Aneurysm.    Heart: Normal size. No pericardial effusion.    Bones/joints: No acute osseous finding.    Other: Right IJ central venous catheter with port.  Evaluation of hilar lymph nodes significantly limited without IV contrast.  No mediastinal or axillary lymphadenopathy.  No acute finding in the visualized upper abdomen.  Impression: Left lower lobe pulmonary nodule has decreased in size suggesting potential resolving infectious/inflammatory process.  Malignancy not entirely excluded.    Electronically signed by: Pk Nathan  Date:    07/03/2024  Time:    10:32            Path:   1. Left lung, wedge resection: Invasive adenocarcinoma, predominantly solid   pattern, measuring 9 mm (0.9 cm) in greatest dimension.          Tumor is located 3 mm (0.3 cm) from the blue inked/stapled parenchymal   surgical margin.          Tumor extends into the elastic layer of the visceral pleura.          See synoptic report in comment section for further details.   2. Lymph node, left level 11, excision: 1  benign fragmented lymph node with   sinus histiocytosis and anthracotic pigment, negative for metastatic   carcinoma (0/1).   3. Lymph node, left level 10, excision: 1 lymph node, positive for metastatic   carcinoma with crush artifact dense fibrosis and focal necrosis (1/1).          Confirmed with positive immunohistochemical stain with cytokeratin   AE1/AE3 with satisfactory positive and negative controls.   4. Lymph node, left level 12, excision: 1 benign lymph node with sinus   histiocytosis, negative for metastatic carcinoma (0/1).   5. Lymph node, left level 9, excision: 1 benign fragmented lymph node with   sinus histiocytosis and anthracotic pigment, negative for metastatic   carcinoma (0/1).   6. Lymph node, level 7, excision: 1 benign lymph node with sinus   histiocytosis, negative for metastatic carcinoma (0/1).   7.  Lymph node, level 5, excision: 1 benign fragmented lymph node with sinus   histiocytosis and anthracotic pigment, negative for metastatic carcinoma   (0/1).   8. Lung, lingula, anatomic lingulectomy: Lung with congested vasculature.          No residual carcinoma is identified.          Bronchial and vascular margin is negative for malignancy.          See synoptic report in comment section for further details.       Assessment and Plan:     Radha Lamas is a 74 yo woman w/ pmh significant for COPD and two primary L lung cancers as below. She presents today for follow up.     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 Currently on adjuvant atezolizumab following adjuvant cisplatin/pemetrexed and tolerating without significant issue (initially with some worsening shortness of breath and fatigue, as below).  A CT C on 6/17/24 demonstrated a new 2.1 x 1.8 x 1.1 cm left lung base nodule, and a PET-CT on 6/21/24 demonstrated a 1.6 x 1.3 cm LLL nodule with an SUV of 4.5 as well as multiple hypermetabolic lymph nodes (1.1 x 1.0 cm left hilar lymph node with SUV of 5.6; 1.2 x 0.9 cm AP window lymph node with an SUV of 5.9; 1.2 x 0.7 cm superior mediastinal lymph node with an SUV of 5.0).  Since last visit, a bronchoscopy with EBUS was performed and demonstrated non-necrotizing granulomatous changes in the LLL mass as well as multiple lymph nodes, and was notably without evidence of malignancy.  Favor that these granulomatous changes are secondary chiefly to the patient's immunotherapy (https://www.ncbi.nlm.nih.gov/pmc/articles/HFL2084375/; https://www.sciencedirect.com/science/article/abs/pii/T6858036503865463).  Given that patient is asymptomatic, this appears to be a G1 immunotherapy related adverse event.  As such, it may be appropriate to continue with immunotherapy treatment until completion of 1 year per IMPOWER-010.  That said, will need to discuss patient with pulmonology who may favored starting patient with steroids, in which case would likely stop atezolizumab indefinitely given that she has received treatment for approximately 10 months.    Of note, there was a delay between cycle 3 (09/26/2023) and cycle 4 (11/14/2023) due to concern for pneumonitis, workup for which was largely unrevealing. There was also a 1 week delay between C8 and C9 due to concern for pneumonitis, workup for which was again largely unrevealing.     PLAN:   -- Continue to hold atezolizumab  -- Message sent to pulmonology  -- RTC tentatively on 7/16/24, may change based on the above      Dyspnea  See note from 2/27/24. Symptoms are  "stable today (7/9/24). CT C from 3/1/24 is without evidence of pneumonitis. Favor that symptoms are related to COPD. Pt currently being managed by pulmonology, may also be a component of post-prandial dyspnea; patient does confirm improvement in ability to tolerate walks if taken before meals. No evidence of pneumonitis seen on recent imaging. Symptoms appear to pre-date development of granulomatous changes.  -- Continue to f/u w/ pulmonology      Depressed Renal Function  Patient's eGFR dipped below 60 following her 4th dose of cisplatin/pemetrexed and has remained depressed since (notably, this was prior to her 1st dose of atezolizumab).  It has remained low since it was first noted 08/10/2023. Urine protein/creatinine ratio 0.1 g/day on 11/14/23. This time line may be inconsistent with cisplatin induced nephropathy as recovery of renal function would be anticipated by this time, however it is possible to develop a CKD following cisplatin therapy which may be the etiology. Does not appear consistent with an IrAE. Given stability of renal function, okay to proceed with treatment as above. Pt has now established care w/ Dr. Turner.  -- Continued f/u w/ nephrology, next visit in July      Neuropathy  Pt describes "deadened" feet and also like she has "bunched socks" on her feet when she puts on her shoes which developed approximately 2 months after completion of chemotherapy. This is worse at night. She has some long-standing decreased sensation in her fingertips which she relates to eczema. She denies any problems like this previously, including when she received her chemotherapy. She denies any issues with proprioception / ambulation related to this. Exam has been previously unremarkable. Uncertain etiology. The pt does not have a diagnosis of diabetes. The time course is not consistent with chemotherapy induced peripheral neuropathy. Immunotherapy can cause neuropathic issues, however this developed while the " patient had been off of the atezolizumab for 4 weeks already (though this does not preclude this as a possibility).  Symptoms are currently stable and chiefly in her feet.  She has seen a podiatrist with recommendation to increase her walking, which she has done.  She has not previously been interested in pharmacotherapy time given mild and stable symptoms. Given stability, will continue with treatment as above.   -- Continue to follow with podiatrist  -- Referral to neurology in place, patient did not make appointment.  Okay to hold at this time, low threshold for evaluation.      The above information has been reviewed with the patient and all questions have been answered to their apparent satisfaction.  They understand that they can call the clinic with any questions.    Orion Arce MD  Hematology/Oncology  [facility]        Med Onc Chart Routing      Follow up with physician . F/u as scheduled   Follow up with [facility]    Infusion scheduling note    Injection scheduling note    Labs    Imaging    Pharmacy appointment    Other referrals

## 2024-07-11 ENCOUNTER — PATIENT MESSAGE (OUTPATIENT)
Dept: HEMATOLOGY/ONCOLOGY | Facility: CLINIC | Age: 74
End: 2024-07-11
Payer: MEDICARE

## 2024-07-11 DIAGNOSIS — J84.10 GRANULOMATOUS LUNG DISEASE: ICD-10-CM

## 2024-07-11 DIAGNOSIS — C34.12 ADENOCARCINOMA OF UPPER LOBE OF LEFT LUNG: Primary | ICD-10-CM

## 2024-07-12 ENCOUNTER — DOCUMENTATION ONLY (OUTPATIENT)
Dept: HEMATOLOGY/ONCOLOGY | Facility: CLINIC | Age: 74
End: 2024-07-12
Payer: MEDICARE

## 2024-07-12 ENCOUNTER — PATIENT MESSAGE (OUTPATIENT)
Dept: PULMONOLOGY | Facility: CLINIC | Age: 74
End: 2024-07-12
Payer: MEDICARE

## 2024-07-12 NOTE — PROGRESS NOTES
Discussed pt with pulmonology. New granulomatous disease proven by biopsy. Given that pt is asymptomatic, may either complete 1 year of immunotherapy vs stopping now (approximately 10 months received) vs stop and monitor. On discussion with pulmonology, favor stopping immunotherapy now with PFTs and pulm follow up, and repeat PET CT 4-6 months from last. This has been communicated with the patient.

## 2024-07-17 ENCOUNTER — PATIENT MESSAGE (OUTPATIENT)
Dept: PULMONOLOGY | Facility: CLINIC | Age: 74
End: 2024-07-17
Payer: MEDICARE

## 2024-07-17 ENCOUNTER — OFFICE VISIT (OUTPATIENT)
Dept: FAMILY MEDICINE | Facility: CLINIC | Age: 74
End: 2024-07-17
Attending: FAMILY MEDICINE
Payer: MEDICARE

## 2024-07-17 VITALS
HEIGHT: 65 IN | WEIGHT: 157.19 LBS | TEMPERATURE: 97 F | BODY MASS INDEX: 26.19 KG/M2 | HEART RATE: 72 BPM | SYSTOLIC BLOOD PRESSURE: 112 MMHG | DIASTOLIC BLOOD PRESSURE: 70 MMHG | OXYGEN SATURATION: 97 %

## 2024-07-17 DIAGNOSIS — N95.8 OTHER SPECIFIED MENOPAUSAL AND PERIMENOPAUSAL DISORDERS: ICD-10-CM

## 2024-07-17 DIAGNOSIS — Z85.118 HISTORY OF LUNG CANCER: ICD-10-CM

## 2024-07-17 DIAGNOSIS — E03.8 OTHER SPECIFIED HYPOTHYROIDISM: Primary | ICD-10-CM

## 2024-07-17 DIAGNOSIS — J44.9 COPD, MODERATE: ICD-10-CM

## 2024-07-17 DIAGNOSIS — M85.80 OSTEOPENIA, UNSPECIFIED LOCATION: ICD-10-CM

## 2024-07-17 PROBLEM — R94.8 ABNORMAL PET SCAN, MEDIASTINUM: Status: RESOLVED | Noted: 2024-06-28 | Resolved: 2024-07-17

## 2024-07-17 PROBLEM — E03.9 HYPOTHYROID: Status: ACTIVE | Noted: 2024-07-17

## 2024-07-17 PROBLEM — D69.6 THROMBOCYTOPENIA: Status: RESOLVED | Noted: 2023-09-05 | Resolved: 2024-07-17

## 2024-07-17 PROCEDURE — 1160F RVW MEDS BY RX/DR IN RCRD: CPT | Mod: CPTII,S$GLB,, | Performed by: FAMILY MEDICINE

## 2024-07-17 PROCEDURE — G2211 COMPLEX E/M VISIT ADD ON: HCPCS | Mod: S$GLB,,, | Performed by: FAMILY MEDICINE

## 2024-07-17 PROCEDURE — 1126F AMNT PAIN NOTED NONE PRSNT: CPT | Mod: CPTII,S$GLB,, | Performed by: FAMILY MEDICINE

## 2024-07-17 PROCEDURE — 1159F MED LIST DOCD IN RCRD: CPT | Mod: CPTII,S$GLB,, | Performed by: FAMILY MEDICINE

## 2024-07-17 PROCEDURE — 99999 PR PBB SHADOW E&M-EST. PATIENT-LVL IV: CPT | Mod: PBBFAC,,, | Performed by: FAMILY MEDICINE

## 2024-07-17 PROCEDURE — 1101F PT FALLS ASSESS-DOCD LE1/YR: CPT | Mod: CPTII,S$GLB,, | Performed by: FAMILY MEDICINE

## 2024-07-17 PROCEDURE — 99214 OFFICE O/P EST MOD 30 MIN: CPT | Mod: S$GLB,,, | Performed by: FAMILY MEDICINE

## 2024-07-17 PROCEDURE — 3074F SYST BP LT 130 MM HG: CPT | Mod: CPTII,S$GLB,, | Performed by: FAMILY MEDICINE

## 2024-07-17 PROCEDURE — 3288F FALL RISK ASSESSMENT DOCD: CPT | Mod: CPTII,S$GLB,, | Performed by: FAMILY MEDICINE

## 2024-07-17 PROCEDURE — 3078F DIAST BP <80 MM HG: CPT | Mod: CPTII,S$GLB,, | Performed by: FAMILY MEDICINE

## 2024-07-17 RX ORDER — LEVOTHYROXINE SODIUM 75 UG/1
75 TABLET ORAL DAILY
Qty: 90 TABLET | Refills: 4 | Status: SHIPPED | OUTPATIENT
Start: 2024-07-17

## 2024-07-17 RX ORDER — OMEPRAZOLE 20 MG/1
20 CAPSULE, DELAYED RELEASE ORAL DAILY
Qty: 90 CAPSULE | Refills: 1 | Status: SHIPPED | OUTPATIENT
Start: 2024-07-17

## 2024-07-17 RX ORDER — ACETAMINOPHEN 500 MG
2000 TABLET ORAL DAILY
COMMUNITY

## 2024-07-17 NOTE — PROGRESS NOTES
Subjective:       Patient ID: Radha Lamas is a 74 y.o. female.    Chief Complaint: Establish Care    74 y old female with COPD, hypothyroidism , Osteopenia, hx of L lung SCC and stage 3 ckg here to get est . Sees Dr Rojas  for COPD. She has upcoming mac to discus recent bronchoscopy results . No symptoms  of thyroid dysregulation . Walks daily . Sees Dr. Daily with Oncology . No depression, memory loss, hearing loss, falls. Keeps a regular diet .       Review of Systems   Constitutional: Negative.    HENT: Negative.     Eyes: Negative.    Respiratory: Negative.     Cardiovascular: Negative.    Gastrointestinal: Negative.    Genitourinary: Negative.    Musculoskeletal: Negative.    Skin: Negative.    Hematological: Negative.        Objective:      Physical Exam  Vitals and nursing note reviewed.   Constitutional:       General: She is not in acute distress.     Appearance: She is well-developed. She is not diaphoretic.   HENT:      Head: Normocephalic and atraumatic.      Right Ear: External ear normal.      Left Ear: External ear normal.      Nose: Nose normal.   Eyes:      General: No scleral icterus.        Left eye: No discharge.      Conjunctiva/sclera: Conjunctivae normal.      Pupils: Pupils are equal, round, and reactive to light.   Neck:      Thyroid: No thyromegaly.      Vascular: No JVD.      Trachea: No tracheal deviation.   Cardiovascular:      Rate and Rhythm: Normal rate and regular rhythm.      Heart sounds: Normal heart sounds. No murmur heard.     No friction rub. No gallop.   Pulmonary:      Effort: Pulmonary effort is normal. No respiratory distress.      Breath sounds: Normal breath sounds. No wheezing or rales.   Chest:      Chest wall: No tenderness.   Abdominal:      General: Bowel sounds are normal. There is no distension.      Palpations: Abdomen is soft. There is no mass.      Tenderness: There is no abdominal tenderness. There is no guarding.   Musculoskeletal:      Cervical back:  Normal range of motion and neck supple.   Lymphadenopathy:      Cervical: No cervical adenopathy.         Assessment:      Radha was seen today for establish care.    Diagnoses and all orders for this visit:    Other specified hypothyroidism  -     CBC Auto Differential; Future  -     Comprehensive Metabolic Panel; Future  -     Hemoglobin A1C; Future  -     Lipid Panel; Future    Osteopenia, unspecified location  -     DXA Bone Density Axial Skeleton 1 or more sites; Future    Other specified menopausal and perimenopausal disorders  -     DXA Bone Density Axial Skeleton 1 or more sites; Future    History of lung cancer  -     Hemoglobin A1C; Future    COPD, moderate    Other orders  -     levothyroxine (SYNTHROID) 75 MCG tablet; Take 1 tablet (75 mcg total) by mouth once daily.  -     omeprazole (PRILOSEC) 20 MG capsule; Take 1 capsule (20 mg total) by mouth once daily.           Plan:     Continue levothyroxine   DEXA   F.u with hem onc   Stable f/u with pulm

## 2024-07-22 ENCOUNTER — APPOINTMENT (OUTPATIENT)
Dept: RADIOLOGY | Facility: HOSPITAL | Age: 74
End: 2024-07-22
Attending: FAMILY MEDICINE
Payer: MEDICARE

## 2024-07-22 ENCOUNTER — PATIENT MESSAGE (OUTPATIENT)
Dept: FAMILY MEDICINE | Facility: CLINIC | Age: 74
End: 2024-07-22
Payer: MEDICARE

## 2024-07-22 DIAGNOSIS — M85.80 OSTEOPENIA, UNSPECIFIED LOCATION: ICD-10-CM

## 2024-07-22 DIAGNOSIS — M81.0 OSTEOPOROSIS, UNSPECIFIED OSTEOPOROSIS TYPE, UNSPECIFIED PATHOLOGICAL FRACTURE PRESENCE: Primary | ICD-10-CM

## 2024-07-22 DIAGNOSIS — N95.8 OTHER SPECIFIED MENOPAUSAL AND PERIMENOPAUSAL DISORDERS: ICD-10-CM

## 2024-07-22 PROCEDURE — 77080 DXA BONE DENSITY AXIAL: CPT | Mod: 26,,, | Performed by: RADIOLOGY

## 2024-07-22 PROCEDURE — 77080 DXA BONE DENSITY AXIAL: CPT | Mod: TC

## 2024-07-24 ENCOUNTER — TELEPHONE (OUTPATIENT)
Dept: FAMILY MEDICINE | Facility: CLINIC | Age: 74
End: 2024-07-24
Payer: MEDICARE

## 2024-08-12 NOTE — PROGRESS NOTES
Pulmonary Outpatient  Visit     Subjective:       Patient ID: Radha Lamas is a 74 y.o. female.    Social History     Tobacco Use   Smoking Status Former    Current packs/day: 0.00    Average packs/day: 1.5 packs/day for 45.0 years (67.5 ttl pk-yrs)    Types: Cigarettes    Start date: 1964    Quit date: 2009    Years since quitting: 15.6    Passive exposure: Never   Smokeless Tobacco Never            Chief Complaint: Follow-up and Results          Radha Lamas is 74 y.o.  Refer by Orion Arce IV, MD  [address]   [address] [place] [address]   Recent abdominal PET scan with FDG activity seen in left lower lobe nodule, station 4 L, 10 L/11 L  Also noted was also some FDG activity in the esophageal area   Three new FDG avid areas of shown up on PET scan   History of lung cancer surgery in 2021 and 2023   Has been on chemo immunotherapy  No history of any cardiac issues with surgery   Baseline EKG was reviewed   Procedure robotic bronchoscopy discussed  Opportunity to answer questions      Stage IIB (pT2, pN1a, cMx) adenocarcinoma of the NANI (PD-L1 95%, BRAF V600E mutated), initially dx'd 3/28/23, s/p wedge resection 3/28/23, adjuvant cisplatin/pemetrexed x 4 cycles (5/11/23-7/20/23), and currently on adjuvant atezolizumab (8/14/23 - present; of note, delay between C3 on 9/26/23 and C4 on 11/14/23 due to concern for pneumonitis)       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 LEVEL 9 AND 3. LEVEL 11 LYMPH NODES:  NO METASTATIC CARCINOMA IDENTIFIED  2. LEFT LOWER LOBE WEDGE:  MODERATELY WELL-DIFFERENTIATED SQUAMOUS CELL CARCINOMA  NO CARCINOMA IDENTIFIED IN MARGINS  MODERATE PULMONARY  "EMPHYSEMA      2023 path report    "1. Left lung, wedge resection: Invasive adenocarcinoma, predominantly solid pattern, measuring 9 mm (0.9 cm) in greatest dimension. Tumor is located 3 mm (0.3 cm) from the blue inked/stapled parenchymal surgical margin. Tumor extends into the elastic layer of the visceral pleura. See synoptic report in comment section for further details. 2. Lymph node, left level 11, excision: 1 benign fragmented lymph node with sinus histiocytosis and anthracotic pigment, negative for metastatic carcinoma (0/1). 3. Lymph node, left level 10, excision: 1 lymph node, positive for metastatic carcinoma with crush artifact dense fibrosis and focal necrosis (1/1). Confirmed with positive immunohistochemical stain with cytokeratin AE1/AE3 with satisfactory positive and negative controls. 4. Lymph node, left level 12, excision: 1 benign lymph node with sinus histiocytosis, negative for metastatic carcinoma (0/1). 5. Lymph node, left level 9, excision: 1 benign fragmented lymph node with sinus histiocytosis and anthracotic pigment, negative for metastatic carcinoma (0/1). 6. Lymph node, level 7, excision: 1 benign lymph node with sinus histiocytosis, negative for metastatic carcinoma (0/1). 7. Lymph node, level 5, excision: 1 benign fragmented lymph node with sinus histiocytosis and anthracotic pigment, negative for metastatic carcinoma (0/1). 8. Lung, lingula, anatomic lingulectomy: Lung with congested vasculature. No residual carcinoma is identified. Bronchial and vascular margin is negative for malignancy. See synoptic report in comment section for further details    08/13/2024  Follow-up visit   Last seen 06/28/2024   Spouse here   Reviewed bronchoscopy results   Left upper lobe left lower lobe biopsies were consistent with organizing pneumonia necrotizing no necrotizing granuloma   4L, 7 and 10 L: Lymph node with non-necrotizing granulomatous inflammation.  Known side effect from her " immunotherapy  She has been taken off the immunotherapy   No symptomatic   No cough no wheezing no shortness a breath   We will get baseline PFTs and 6 minute walk   Repeat CT scan in 4 months                   Review of Systems   Constitutional:  Negative for fever, chills, activity change, appetite change and fatigue.   HENT:  Negative for postnasal drip, rhinorrhea, sinus pressure, trouble swallowing, voice change, congestion and hearing loss.    Respiratory:  Negative for apnea, cough, hemoptysis, sputum production, choking, chest tightness, shortness of breath, wheezing, orthopnea, previous hospitialization due to pulmonary problems, asthma nighttime symptoms and use of rescue inhaler.    Cardiovascular:  Negative for chest pain, palpitations and leg swelling.   Genitourinary:  Negative for difficulty urinating and hematuria.   Endocrine:  Negative for cold intolerance and heat intolerance.    Musculoskeletal:  Negative for back pain, gait problem and joint swelling.   Skin:  Negative for rash.   Gastrointestinal:  Negative for abdominal pain and abdominal distention.   Neurological:  Negative for dizziness, weakness and headaches.   Hematological:  Negative for adenopathy.   Psychiatric/Behavioral:  Negative for sleep disturbance. The patient is not nervous/anxious.        Outpatient Encounter Medications as of 8/13/2024   Medication Sig Dispense Refill    albuterol (PROVENTIL/VENTOLIN HFA) 90 mcg/actuation inhaler Inhale 2 puffs into the lungs every 4 (four) hours as needed for Wheezing or Shortness of Breath. Rescue 17 g 11    calcium carbonate (OS-CYDNEY) 600 mg calcium (1,500 mg) Tab Take 600 mg by mouth.      cholecalciferol, vitamin D3, (VITAMIN D3) 50 mcg (2,000 unit) Cap capsule Take 2,000 Units by mouth once daily.      levothyroxine (SYNTHROID) 75 MCG tablet Take 1 tablet (75 mcg total) by mouth once daily. 90 tablet 4    loratadine (CLARITIN) 10 mg tablet Take 10 mg by mouth daily as needed.       nystatin (MYCOSTATIN) cream Apply topically 2 (two) times daily. 30 g 1    omeprazole (PRILOSEC) 20 MG capsule Take 1 capsule (20 mg total) by mouth once daily. 90 capsule 1    tiotropium-olodateroL (STIOLTO RESPIMAT) 2.5-2.5 mcg/actuation Mist Inhale 2 puffs into the lungs once daily. Controller 12 g 3    tolterodine (DETROL LA) 4 MG 24 hr capsule Take 1 capsule (4 mg total) by mouth once daily. 90 capsule 4    triamcinolone acetonide 0.1% (KENALOG) 0.1 % ointment Apply topically 2 (two) times daily. 30 g 1     Facility-Administered Encounter Medications as of 8/13/2024   Medication Dose Route Frequency Provider Last Rate Last Admin    prochlorperazine injection Soln 5 mg  5 mg Intravenous Q30 Min PRN Adrien Vail MD           The following portions of the patient's history were reviewed and updated as appropriate: She  has a past medical history of COPD (chronic obstructive pulmonary disease) (2013), Eczema, GERD (gastroesophageal reflux disease), Hiatal hernia, History of torn meniscus of right knee (01/2011), Hypothyroid, Incidental pulmonary nodule, > 3mm and < 8mm - Lingula (8/12/2021), Lung cancer, and Urinary incontinence in female.  She does not have any pertinent problems on file.  She  has a past surgical history that includes Knee cartilage surgery (Right, 01/2011); Thoracoscopic wedge resection of lung (Left, 04/28/2021); Tubal ligation (1976); Wedge resection, lung, robot-assisted, using VATS, using da Kaci Xi (Left, 03/20/2023); Laparoscopic lysis of adhesions (Left, 03/20/2023); Robot-assisted laparoscopic lymphadenectomy using da Kaci Xi (Left, 03/20/2023); Injection of anesthetic agent around multiple intercostal nerves (Left, 03/20/2023); Fluoroscopy (N/A, 04/27/2023); Breast mass excision; Bronchoscopy (Bilateral, 7/3/2024); robotic bronchoscopy (N/A, 7/3/2024); and Endobronchial ultrasound (Bilateral, 7/3/2024).  Her family history includes Cancer in her brother, father, and sister; Heart  failure in her mother; Hypertension in her father and mother; Macular degeneration in her mother; Retinal detachment in her mother.  She  reports that she quit smoking about 15 years ago. Her smoking use included cigarettes. She started smoking about 60 years ago. She has a 67.5 pack-year smoking history. She has never been exposed to tobacco smoke. She has never used smokeless tobacco. She reports that she does not drink alcohol and does not use drugs.  She has a current medication list which includes the following prescription(s): albuterol, calcium carbonate, cholecalciferol (vitamin d3), levothyroxine, loratadine, nystatin, omeprazole, stiolto respimat, tolterodine, and triamcinolone acetonide 0.1%, and the following Facility-Administered Medications: prochlorperazine.  Current Outpatient Medications on File Prior to Visit   Medication Sig Dispense Refill    albuterol (PROVENTIL/VENTOLIN HFA) 90 mcg/actuation inhaler Inhale 2 puffs into the lungs every 4 (four) hours as needed for Wheezing or Shortness of Breath. Rescue 17 g 11    calcium carbonate (OS-CYDNEY) 600 mg calcium (1,500 mg) Tab Take 600 mg by mouth.      cholecalciferol, vitamin D3, (VITAMIN D3) 50 mcg (2,000 unit) Cap capsule Take 2,000 Units by mouth once daily.      levothyroxine (SYNTHROID) 75 MCG tablet Take 1 tablet (75 mcg total) by mouth once daily. 90 tablet 4    loratadine (CLARITIN) 10 mg tablet Take 10 mg by mouth daily as needed.      nystatin (MYCOSTATIN) cream Apply topically 2 (two) times daily. 30 g 1    omeprazole (PRILOSEC) 20 MG capsule Take 1 capsule (20 mg total) by mouth once daily. 90 capsule 1    tiotropium-olodateroL (STIOLTO RESPIMAT) 2.5-2.5 mcg/actuation Mist Inhale 2 puffs into the lungs once daily. Controller 12 g 3    tolterodine (DETROL LA) 4 MG 24 hr capsule Take 1 capsule (4 mg total) by mouth once daily. 90 capsule 4    triamcinolone acetonide 0.1% (KENALOG) 0.1 % ointment Apply topically 2 (two) times daily. 30 g 1  "    Current Facility-Administered Medications on File Prior to Visit   Medication Dose Route Frequency Provider Last Rate Last Admin    prochlorperazine injection Soln 5 mg  5 mg Intravenous Q30 Min PRN Adrien Vail MD         She has No Known Allergies..      BP Readings from Last 3 Encounters:   08/13/24 126/88   07/17/24 112/70   07/09/24 103/66      MMRC Dyspnea Scale (4 is worst)     [] MMRC 0: Dyspneic on strenuous excercise (0 points)    [] MMRC 1: Dyspneic on walking a slight hill (0 points)    [] MMRC 2: Dyspneic on walking level ground; must stop occasionally due to breathlessness (1 point)    [] MMRC 3: Must stop for breathlessness after walking 100 yards or after a few minutes (2 points)    [] MMRC 4: Cannot leave house; breathless on dressing/undressing (3 points)              COPD Questionnaire  How often do you cough?: Almost never  How often do you have phlegm (mucus) in your chest?: Almost never  How often does your chest feel tight?: Never  When you walk up a hill or one flight of stairs, how often are you breathless?: All of the time  How often are you limited doing any activities at home?: Never  How often are you confident leaving the house despite your lung condition?: All of the time  How often do you sleep soundly?: All of the time  How often do you have energy?: Some of the time  Total score: 9           No data to display                          Objective:     Vital Signs (Most Recent)  Vital Signs  Pulse: 75  Resp: 15  SpO2: 98 %  BP: 126/88  Pain Score: 0-No pain  Height and Weight  Height: 5' 5" (165.1 cm)  Weight: 72.3 kg (159 lb 8 oz)  BSA (Calculated - sq m): 1.82 sq meters  BMI (Calculated): 26.5  Weight in (lb) to have BMI = 25: 149.9]  Wt Readings from Last 2 Encounters:   08/13/24 72.3 kg (159 lb 8 oz)   07/17/24 71.3 kg (157 lb 3 oz)       Physical Exam  Vitals and nursing note reviewed.   Constitutional:       Appearance: She is normal weight.       HENT:      Head: " show "Normocephalic and atraumatic.      Nose: Nose normal.   Eyes:      Pupils: Pupils are equal, round, and reactive to light.   Cardiovascular:      Rate and Rhythm: Normal rate and regular rhythm.      Pulses: Normal pulses.      Heart sounds: Normal heart sounds.   Pulmonary:      Effort: Pulmonary effort is normal.      Breath sounds: Normal breath sounds.   Abdominal:      General: Bowel sounds are normal.      Palpations: Abdomen is soft.   Musculoskeletal:      Cervical back: Normal range of motion.   Skin:     General: Skin is warm.   Neurological:      General: No focal deficit present.      Mental Status: She is alert and oriented to person, place, and time.   Psychiatric:         Mood and Affect: Mood normal.          Laboratory  Lab Results   Component Value Date    WBC 4.65 07/22/2024    RBC 4.02 07/22/2024    HGB 11.6 (L) 07/22/2024    HCT 37.1 07/22/2024    MCV 92 07/22/2024    MCH 28.9 07/22/2024    MCHC 31.3 (L) 07/22/2024    RDW 13.9 07/22/2024     (L) 07/22/2024    MPV 10.7 07/22/2024    GRAN 2.6 07/22/2024    GRAN 56.9 07/22/2024    LYMPH 1.4 07/22/2024    LYMPH 29.2 07/22/2024    MONO 0.4 07/22/2024    MONO 9.5 07/22/2024    EOS 0.2 07/22/2024    BASO 0.03 07/22/2024    EOSINOPHIL 3.4 07/22/2024    BASOPHIL 0.6 07/22/2024       BMP  Lab Results   Component Value Date     07/22/2024    K 4.2 07/22/2024     07/22/2024    CO2 27 07/22/2024    BUN 20 07/22/2024    CREATININE 1.5 (H) 07/22/2024    CALCIUM 9.8 07/22/2024    ANIONGAP 10 07/22/2024    ESTGFRAFRICA >60 04/09/2021    EGFRNONAA >60 04/09/2021    AST 24 07/22/2024    ALT 6 (L) 07/22/2024    PROT 7.0 07/22/2024          No results found for: "IGE"     No results found for: "ASPERGILLUS"  No results found for: "AFUMIGATUSCL"     No results found for: "ACE"     Diagnostic Results:  I have personally reviewed today the following studies:    DXA Bone Density Axial Skeleton 1 or more sites  Narrative: EXAMINATION:  DXA BONE DENSITY " AXIAL SKELETON 1 OR MORE SITES    CLINICAL HISTORY:  Other specified disorders of bone density and structure, unspecified site    FINDINGS:  Based on the FRAX fracture risk model, the 10-year probability for major osteoporotic fracture is 33% and that for hip fracture is 11%.    The T score of the lumbar spine from L1-L4 is 1.0.    The T score of the left femoral neck is -2.3.  Impression: Osteopenia.    Electronically signed by: Genaro Falk MD  Date:    07/22/2024  Time:    14:14                  Final Pathologic Diagnosis     1:  Lung, left lower lobe, fine-needle aspiration:  Negative for malignant cells.  Benign bronchial cells.  Scant material.    2:  Endobronchial mass, left lower lobe, biopsy:  Fragments of bronchial epithelium and underlying lung parenchyma with chronic inflammatory cells and areas of organizing pneumonia pattern fibrosis.  Focal acute and chronic inflammation and fibrin are also present.  Bronchial cartilage with areas of calcification are also seen.  No evidence of malignancy.    3:  Left lower lobe bronchioalveolar lavage, cytology:  Negative for malignant cells.  Bronchial epithelial cells inflammatory cells are present.  Only rare lymphocytes are present within the specimen.    4:  Lymph node, station 4L, fine-needle aspiration:  Benign reactive bronchial epithelial cells and blood.  A fragment of bronchial cartilage is also present.      5:  Lymph node, site not specified, fine-needle aspiration  Lymph node with non-necrotizing granulomatous inflammation.  Special stains for fungal and mycobacterial organisms will be performed and results will follow in supplemental report.  No evidence of malignancy.    6:  Lymph node, station 10L, fine-needle aspiration:  Lymph node with non-necrotizing granulomatous inflammation.  Special stains for fungal and mycobacterial organisms will be performed and results will follow in supplemental report.  A fragment of bronchial cartilage is also  "present.  No evidence of malignancy. VC      Comment: Interp By Ebony Horton M.D., Signed on 07/09/2024 at 09:28   Supplemental Diagnosis     Special stains to evaluate for organisms are completed on both parts 5 and 6 of this case with following results:  Special stain AFB is negative for mycobacterial organisms in both specimens 5 and 6 with satisfactory positive controls.    Special stains, GMS and PAS, are completed to evaluate for fungal organisms and are negative for organisms in both parts 5 and 6 of this case with satisfactory positive controls. VC      Disclaimer     Screening was performed at [facility], [address], [place] [address].           PFT  FEV1: 1.56L ( 74.6%), FVC 2.69L( 98.4%)  FEV1/FVC 58  TLC 6.46L( 126.6%)  DLCO 10.99( 52.5%)    Mild Obstruction  Hyperinflation  Reduced DLCO Moderate    Ordering Provider: Bob ANDRADE         Interpreting Provider: Bob ANDRADE  Performing nurse/tech/RT: LS RRT  Diagnosis:  (Organized Pneumonia)  Height: 5' 5" (165.1 cm)  Weight: 72.3 kg (159 lb 6.3 oz)  BMI (Calculated): 26.5              Patient Race:                                                                Phase Oxygen Assessment Supplemental O2 Heart   Rate Blood Pressure Edmundo Dyspnea Scale Rating   Resting 95 % Room Air 81 bpm 114/53 1   Exercise             Minute             1 93 % Room Air 100 bpm       2 93 % Room Air 106 bpm       3 94 % Room Air 108 bpm       4 94 % Room Air 109 bpm       5 94 % Room Air 109 bpm       6  96 % Room Air 108 bpm 121/57 4   Recovery             Minute             1 96 % Room Air 107 bpm       2 96 % Room Air 91 bpm       3 97 % Room Air 88 bpm       4 97 % Room Air 86 bpm 129/62 2      Six Minute Walk Summary  6MWT Status: completed without stopping  Patient Reported: No complaints             Interpretation:  Did the patient stop or pause?: No  Total Time Walked (Calculated): 360 seconds  Final " Partial Lap Distance (feet): 125 feet  Total Distance Meters (Calculated): 403.86 meters  Predicted Distance Meters (Calculated): 410.51 meters  Percentage of Predicted (Calculated): 98.38  Peak VO2 (Calculated): 16.1  Mets: 4.6  Has The Patient Had a Previous Six Minute Walk Test?: Yes     Previous 6MWT Results  Has The Patient Had a Previous Six Minute Walk Test?: Yes  Date of Previous Test: 03/06/24  Total Time Walked: 360 seconds  Total Distance (meters): 396  Predicted Distance (meters): 407 meters  Percentage of Predicted: 97  Percent Change (Calculated): -0.02  Assessment/Plan:     Problem List Items Addressed This Visit       Nodule of left lung    COPD, moderate    Hypothyroid    Organized pneumonia - Primary    Relevant Orders    Complete PFT without bronchodilator - Clinic    Stress test, pulmonary      Asymptomatic  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    This note was prepared using voice recognition system and is likely to have sound alike errors that may have been overlooked even after proof reading.  Please call me with any questions    Discussed diagnosis, its evaluation, treatment and usual course. All questions answered.    Thank you for the courtesy of participating in the care of this patient    Itz Rojas MD      Personal Diagnostic Review  []  CXR    []  ECHO    []  ONSAT    []  6MWD    []  LABS    []  CHEST CT    []  PET CT    []  Biopsy results         Atezolizumab    2  Last update :20/06/2018  I - Interstitial/parenchymal lung disease  I.aPneumonitis (ILD), acute and/or severe (may cause ARDS)    1  I.bPneumonitis (ILD)    1  I.kLung nodule or nodules    1  V - Pleural and/or pericardial involvement  V.aPleural effusion (uni- or bilateral) (can accompany DI-LDs)    1  V.iPleuritis (can cause chest pain)    1  VII - Mediastinal involvement  VII.eGranulomatous mediastinal  lymphadenopathy    1  X - Systemic/Distant conditions, syndromes and reactions  X.dLupus - Lupus syndrome (see also Vd)    1  X.fAnaphylaxis-Anaphylactoid reaction (can be fatal)    1  X.kSarcoid-like granulomatosis (endo-/extrathoracic)    1  X.aeAntisynthetase antibodies (ASA) - ASA syndrome    1  X.amImmune-related adverse effect/toxicity    2  XII - Cardiovascular involvement / toxicity  XII.dMyocarditis (can be fulminant)    1  XII.aiCardiotoxicity    1  XV - Pathology  XV.baPath: Granulomatous lymphadenopathy

## 2024-08-13 ENCOUNTER — OFFICE VISIT (OUTPATIENT)
Dept: PULMONOLOGY | Facility: CLINIC | Age: 74
End: 2024-08-13
Payer: MEDICARE

## 2024-08-13 ENCOUNTER — CLINICAL SUPPORT (OUTPATIENT)
Dept: PULMONOLOGY | Facility: CLINIC | Age: 74
End: 2024-08-13
Payer: MEDICARE

## 2024-08-13 ENCOUNTER — INFUSION (OUTPATIENT)
Dept: INFUSION THERAPY | Facility: HOSPITAL | Age: 74
End: 2024-08-13
Attending: INTERNAL MEDICINE
Payer: MEDICARE

## 2024-08-13 ENCOUNTER — OFFICE VISIT (OUTPATIENT)
Dept: HEMATOLOGY/ONCOLOGY | Facility: CLINIC | Age: 74
End: 2024-08-13
Payer: MEDICARE

## 2024-08-13 VITALS — BODY MASS INDEX: 26.55 KG/M2 | HEIGHT: 65 IN | WEIGHT: 159.38 LBS

## 2024-08-13 VITALS
RESPIRATION RATE: 15 BRPM | SYSTOLIC BLOOD PRESSURE: 126 MMHG | OXYGEN SATURATION: 98 % | HEIGHT: 65 IN | WEIGHT: 159.5 LBS | HEART RATE: 75 BPM | DIASTOLIC BLOOD PRESSURE: 88 MMHG | BODY MASS INDEX: 26.57 KG/M2

## 2024-08-13 VITALS
TEMPERATURE: 98 F | BODY MASS INDEX: 26.57 KG/M2 | WEIGHT: 159.5 LBS | OXYGEN SATURATION: 100 % | HEART RATE: 70 BPM | DIASTOLIC BLOOD PRESSURE: 66 MMHG | SYSTOLIC BLOOD PRESSURE: 120 MMHG | HEIGHT: 65 IN

## 2024-08-13 DIAGNOSIS — J84.89 ORGANIZED PNEUMONIA: ICD-10-CM

## 2024-08-13 DIAGNOSIS — J84.89 ORGANIZED PNEUMONIA: Primary | ICD-10-CM

## 2024-08-13 DIAGNOSIS — J44.9 COPD, MODERATE: ICD-10-CM

## 2024-08-13 DIAGNOSIS — R91.1 NODULE OF LEFT LUNG: ICD-10-CM

## 2024-08-13 DIAGNOSIS — E03.9 HYPOTHYROIDISM, UNSPECIFIED TYPE: ICD-10-CM

## 2024-08-13 DIAGNOSIS — C34.32 MALIGNANT NEOPLASM OF LOWER LOBE OF LEFT LUNG: Primary | ICD-10-CM

## 2024-08-13 DIAGNOSIS — C34.12 ADENOCARCINOMA OF UPPER LOBE OF LEFT LUNG: Primary | ICD-10-CM

## 2024-08-13 LAB
BRPFT: ABNORMAL
DLCO ADJ PRE: 11.7 ML/(MIN*MMHG) (ref 15.27–26.73)
DLCO SINGLE BREATH LLN: 15.27
DLCO SINGLE BREATH PRE REF: 52.4 %
DLCO SINGLE BREATH REF: 21
DLCOC SBVA LLN: 2.74
DLCOC SBVA PRE REF: 70.6 %
DLCOC SBVA REF: 4.12
DLCOC SINGLE BREATH LLN: 15.27
DLCOC SINGLE BREATH PRE REF: 55.7 %
DLCOC SINGLE BREATH REF: 21
DLCOVA LLN: 2.74
DLCOVA PRE REF: 66.4 %
DLCOVA PRE: 2.73 ML/(MIN*MMHG*L) (ref 2.74–5.5)
DLCOVA REF: 4.12
DLVAADJ PRE: 2.91 ML/(MIN*MMHG*L) (ref 2.74–5.5)
ERV LLN: -16449.43
ERV PRE REF: 122 %
ERV REF: 0.57
FEF 25 75 LLN: 1
FEF 25 75 PRE REF: 26.4 %
FEF 25 75 REF: 2.4
FEV1 FVC LLN: 63
FEV1 FVC PRE REF: 75 %
FEV1 FVC REF: 77
FEV1 LLN: 1.48
FEV1 PRE REF: 74.6 %
FEV1 REF: 2.09
FRCPLETH LLN: 1.95
FRCPLETH PREREF: 164 %
FRCPLETH REF: 2.77
FVC LLN: 1.94
FVC PRE REF: 98.4 %
FVC REF: 2.73
IVC PRE: 1.96 L (ref 1.94–3.52)
IVC SINGLE BREATH LLN: 1.94
IVC SINGLE BREATH PRE REF: 71.5 %
IVC SINGLE BREATH REF: 2.73
MVV LLN: 69
MVV PRE REF: 66.9 %
MVV REF: 81
PEF LLN: 3.54
PEF PRE REF: 90.6 %
PEF REF: 5.31
PRE DLCO: 10.99 ML/(MIN*MMHG) (ref 15.27–26.73)
PRE ERV: 0.69 L (ref -16449.43–16450.57)
PRE FEF 25 75: 0.63 L/S (ref 1–3.8)
PRE FET 100: 10.28 SEC
PRE FEV1 FVC: 57.9 % (ref 63.09–91.39)
PRE FEV1: 1.56 L (ref 1.48–2.69)
PRE FRC PL: 4.55 L
PRE FVC: 2.69 L (ref 1.94–3.52)
PRE MVV: 54 L/MIN (ref 68.59–92.81)
PRE PEF: 4.81 L/S (ref 3.54–7.07)
PRE RV: 3.77 L (ref 1.63–2.78)
PRE TLC: 6.46 L (ref 4.11–6.09)
RAW LLN: 3.06
RAW PRE REF: 94.5 %
RAW PRE: 2.89 CMH2O*S/L (ref 3.06–3.06)
RAW REF: 3.06
RV LLN: 1.63
RV PRE REF: 171 %
RV REF: 2.2
RVTLC LLN: 35
RVTLC PRE REF: 130.2 %
RVTLC PRE: 58.32 % (ref 35.21–54.39)
RVTLC REF: 45
TLC LLN: 4.11
TLC PRE REF: 126.6 %
TLC REF: 5.1
VA PRE: 4.02 L (ref 4.95–4.95)
VA SINGLE BREATH LLN: 4.95
VA SINGLE BREATH PRE REF: 81.3 %
VA SINGLE BREATH REF: 4.95
VC LLN: 1.94
VC PRE REF: 98.4 %
VC PRE: 2.69 L (ref 1.94–3.52)
VC REF: 2.73
VTGRAWPRE: 4.33 L

## 2024-08-13 PROCEDURE — 3074F SYST BP LT 130 MM HG: CPT | Mod: CPTII,S$GLB,, | Performed by: HOSPITALIST

## 2024-08-13 PROCEDURE — 1101F PT FALLS ASSESS-DOCD LE1/YR: CPT | Mod: CPTII,S$GLB,, | Performed by: HOSPITALIST

## 2024-08-13 PROCEDURE — 99999 PR PBB SHADOW E&M-EST. PATIENT-LVL I: CPT | Mod: PBBFAC,,,

## 2024-08-13 PROCEDURE — 1126F AMNT PAIN NOTED NONE PRSNT: CPT | Mod: CPTII,S$GLB,, | Performed by: HOSPITALIST

## 2024-08-13 PROCEDURE — 96523 IRRIG DRUG DELIVERY DEVICE: CPT

## 2024-08-13 PROCEDURE — 25000003 PHARM REV CODE 250: Performed by: INTERNAL MEDICINE

## 2024-08-13 PROCEDURE — 3288F FALL RISK ASSESSMENT DOCD: CPT | Mod: CPTII,S$GLB,, | Performed by: HOSPITALIST

## 2024-08-13 PROCEDURE — 3078F DIAST BP <80 MM HG: CPT | Mod: CPTII,S$GLB,, | Performed by: HOSPITALIST

## 2024-08-13 PROCEDURE — A4216 STERILE WATER/SALINE, 10 ML: HCPCS | Performed by: INTERNAL MEDICINE

## 2024-08-13 PROCEDURE — 99999 PR PBB SHADOW E&M-EST. PATIENT-LVL IV: CPT | Mod: PBBFAC,,, | Performed by: INTERNAL MEDICINE

## 2024-08-13 PROCEDURE — G2211 COMPLEX E/M VISIT ADD ON: HCPCS | Mod: S$GLB,,, | Performed by: HOSPITALIST

## 2024-08-13 PROCEDURE — 99999 PR PBB SHADOW E&M-EST. PATIENT-LVL III: CPT | Mod: PBBFAC,,, | Performed by: HOSPITALIST

## 2024-08-13 PROCEDURE — 99214 OFFICE O/P EST MOD 30 MIN: CPT | Mod: S$GLB,,, | Performed by: HOSPITALIST

## 2024-08-13 PROCEDURE — 63600175 PHARM REV CODE 636 W HCPCS: Performed by: INTERNAL MEDICINE

## 2024-08-13 RX ORDER — HEPARIN 100 UNIT/ML
500 SYRINGE INTRAVENOUS
Status: DISCONTINUED | OUTPATIENT
Start: 2024-08-13 | End: 2024-08-13 | Stop reason: HOSPADM

## 2024-08-13 RX ORDER — SODIUM CHLORIDE 0.9 % (FLUSH) 0.9 %
10 SYRINGE (ML) INJECTION
OUTPATIENT
Start: 2024-08-13

## 2024-08-13 RX ORDER — SODIUM CHLORIDE 0.9 % (FLUSH) 0.9 %
10 SYRINGE (ML) INJECTION
Status: DISCONTINUED | OUTPATIENT
Start: 2024-08-13 | End: 2024-08-13 | Stop reason: HOSPADM

## 2024-08-13 RX ORDER — HEPARIN 100 UNIT/ML
500 SYRINGE INTRAVENOUS
OUTPATIENT
Start: 2024-08-13

## 2024-08-13 RX ORDER — CLOBETASOL PROPIONATE 0.5 MG/G
OINTMENT TOPICAL
COMMUNITY
Start: 2024-07-22

## 2024-08-13 RX ADMIN — HEPARIN 500 UNITS: 100 SYRINGE at 02:08

## 2024-08-13 RX ADMIN — SODIUM CHLORIDE, PRESERVATIVE FREE 10 ML: 5 INJECTION INTRAVENOUS at 02:08

## 2024-08-13 NOTE — NURSING
Patient here today for port flush. PAC accessed via sterile technique with 20g 1 in luna needle without difficulty.  Positive blood return noted.  Flushed per protocol NS and Heparin 500 units as documented on MAR. Luna needle removed and intact. Patient tolerated well. Gauze and tape applied to site.

## 2024-08-13 NOTE — PROCEDURES
"[facility] - Pulmonary Function  Six Minute Walk     SUMMARY     Ordering Provider: Bob ANDRADE   Interpreting Provider: Bob ANDRADE  Performing nurse/tech/RT: LS RRT  Diagnosis:  (Organized Pneumonia)  Height: 5' 5" (165.1 cm)  Weight: 72.3 kg (159 lb 6.3 oz)  BMI (Calculated): 26.5   Patient Race:             Phase Oxygen Assessment Supplemental O2 Heart   Rate Blood Pressure Edmundo Dyspnea Scale Rating   Resting 95 % Room Air 81 bpm 114/53 1   Exercise        Minute        1 93 % Room Air 100 bpm     2 93 % Room Air 106 bpm     3 94 % Room Air 108 bpm     4 94 % Room Air 109 bpm     5 94 % Room Air 109 bpm     6  96 % Room Air 108 bpm 121/57 4   Recovery        Minute        1 96 % Room Air 107 bpm     2 96 % Room Air 91 bpm     3 97 % Room Air 88 bpm     4 97 % Room Air 86 bpm 129/62 2     Six Minute Walk Summary  6MWT Status: completed without stopping  Patient Reported: No complaints     Interpretation:  Did the patient stop or pause?: No                                         Total Time Walked (Calculated): 360 seconds  Final Partial Lap Distance (feet): 125 feet  Total Distance Meters (Calculated): 403.86 meters  Predicted Distance Meters (Calculated): 410.51 meters  Percentage of Predicted (Calculated): 98.38  Peak VO2 (Calculated): 16.1  Mets: 4.6  Has The Patient Had a Previous Six Minute Walk Test?: Yes       Previous 6MWT Results  Has The Patient Had a Previous Six Minute Walk Test?: Yes  Date of Previous Test: 03/06/24  Total Time Walked: 360 seconds  Total Distance (meters): 396  Predicted Distance (meters): 407 meters  Percentage of Predicted: 97  Percent Change (Calculated): -0.02    "

## 2024-08-13 NOTE — Clinical Note
Generally need to do q6m C scans given that she received surgery as her primary treatment option, however considering getting a repeat sooner as the initial scan. Looks like she currently has a CT in December - the most recent didn't comment on LAD, though was non-con (she has some CKD). Wondering your thoughts on CT vs PET, given the granulomatous changes?

## 2024-08-13 NOTE — PROGRESS NOTES
[facility] MEDICAL ONCOLOGY - FOLLOW UP VISIT    Reason for visit: Follow up for stage IIB squamous cell carcinoma      Oncology History   Malignant neoplasm of lower lobe of left lung - Squamous cell   4/15/2021 Initial Diagnosis    Malignant neoplasm of lower lobe of left lung - Squamous cell     5/25/2021 Cancer Staged    Staging form: Lung, AJCC 8th Edition  - Clinical: Stage IA1 (cT1a, cN0, cM0)      Cancer Staged    9mm non-keratinizing squamous cell carcinoma, no VPI, no LVI, margin at least 8mm.  Levels 9 and 11 = negative.  T1aN0     4/5/2023 Cancer Staged    Staging form: Lung, AJCC 8th Edition  - Pathologic stage from 4/5/2023: Stage IIB (pT2, pN1, cM0)     5/1/2023 - 7/21/2023 Chemotherapy    Treatment Summary   Plan Name: OP NSCLC PEMETREXED + CISPLATIN Q3W  Treatment Goal: Supportive  Status: Inactive  Start Date: 5/1/2023  End Date: 7/21/2023  Provider: Marissa Brower MD  Chemotherapy: CISplatin (Platinol) 75 mg/m2 = 138 mg in sodium chloride 0.9% 665 mL chemo infusion, 75 mg/m2 = 138 mg, Intravenous, Clinic/HOD 1 time, 4 of 4 cycles  Administration: 138 mg (5/12/2023), 138 mg (6/29/2023), 138 mg (7/20/2023), 138 mg (6/8/2023)  PEMEtrexed disodium (ALIMTA) 900 mg in sodium chloride 0.9% SolP 100 mL chemo infusion, 925 mg, Intravenous, Clinic/HOD 1 time, 4 of 4 cycles  Dose modification: 375 mg/m2 (original dose 500 mg/m2, Cycle 3, Reason: MD Discretion, Comment: neutropenia )  Administration: 900 mg (5/12/2023), 700 mg (6/29/2023), 700 mg (7/20/2023), 700 mg (6/8/2023)     8/14/2023 -  Chemotherapy    Treatment Summary   Plan Name: OP ATEZOLIZUMAB Q3W  Treatment Goal: Supportive  Status: Active  Start Date: 8/14/2023  End Date: 8/20/2024 (Planned)  Provider: Marissa Brower MD  Chemotherapy: [No matching medication found in this treatment plan]     NSCLC of left lung (Resolved)   4/28/2021 Initial Diagnosis    NSCLC of left lung  "(Resolved)      Cancer Staged    9mm non-keratinizing squamous cell carcinoma, no VPI, no LVI, margin at least 8mm.  Levels 9 and 11 = negative.  T1aN0        NANI NSCLC/ADC IIB (pT2 pN1a cMx)  - History of stage I squamous cell carcinoma moderately differentiated left lower lung status post wedge resection 04/28/2021  - Abnormality seen on surveillance for history of squamous cell carcinoma. Initial biopsy February 20, 2023 without neoplasm identified , thus had left lower lobe wedge resection after multiple disciplinary discussion, final pathology positive on 03/28/2022 for invasive moderately differentiated adenocarcinoma station 10 lymph node positive, pleural invasion noted, margins negative.    - Restaging MRI brain negative and PET scan with left hilar avidity SUV 4 and chest wall. Given recent surgery potential inflammation presented at tumor board recommended proceeding with adjuvant chemotherapy and follow-up on repeat imaging. Started adjuvant therapy with chemotherapy and immunotherapy per IMPOWER 010 given PDL1 positive disease 95%.  Mutational analysis negative for EGFR mutation. Noted BRAF mutation.   - Completed adjuvant chemotherapy with cisplatin and pemetrexed tolerated well aside from chemotherapy associated progressive anemia.    - Restaging PET on 8/7/2023 with resolution of prior lymph node avidity.    - Started immunotherapy with atezolizumab (8/14/2023 - present; delay from C3 on 9/26/23 and C4 on 11/14/23 due to concnern for pneumonitis)  - 2/5/24: CT C, "no detrimental interval change in appearance"  - 3/01/24: CT C non-con, no acute pathology, remaining findings unchanged when compared to prior study  - 6/17/24:  CT C, interval development of 2.1 x 1.8 x 1.1 cm left lung base nodule concerning for disease recurrence versus new lung lesion; RLL peripheral ill-defined ground-glass unchanged; 0.6 cm medial left lung base nodule; 0.5 cm left costophrenic angle nodule  - 6/21/24:  PET-CT, 1.6 x " "1.3 cm LLL nodule, SUV 4.5; 1.1 x 1.0 cm left hilar lymph node, SUV 5.6;  1.2 x 0.9 cm AP window lymph node, SUV 5.9; 1.2 x 0.7 cm superior mediastinum lymph node, SUV 5.0  - 7/3/24: Robotic Bronch w/ EBUS   - LLL endobronchial mass: Areas of organizing pnuemonia, no malignancy   - Station 4L LN: No malignancy   - Station 10L LN: Non-necrotizing granulomatous inflammation   - LN (site unspecified): Non-necrotizing granulomatous inflammation  HPI:     Radha Lamas is a 72 yo woman w/ pmh significant for COPD and two primary L lung cancers as below. She presents today for follow up.     - Stage I squamous cell carcinoma of the of the LLL (PD-L1 50%, insufficient sample for NGS), initially dx'd 4/28/21, s/p wedge resection (4/28/21)    - Stage IIB (pT2, pN1a, cMx) adenocarcinoma of the NANI (PD-L1 95%, BRAF V600E mutated), initially dx'd 3/28/23, s/p wedge resection 3/28/23, adjuvant cisplatin/pemetrexed x 4 cycles (5/11/23-7/20/23), and currently on adjuvant atezolizumab (8/14/23 - present; of note, delay between C3 on 9/26/23 and C4 on 11/14/23 due to concern for pneumonitis)     Last clinic 7/9/24, hold atezolizumab, discussed with pulmonology.    Interval History:   - 8/13/24: Pulmonology, plan for 6MWT and PFTs today, hold immunotherapy.     Pt states that she feels well. She says that her breathing is "better, but not perfect". She says that she walked this morning by herself without her inhaler. She continues to complete all ADLs and iADLs without assistance.  She denies other new or worsening symptoms at this time.    ROS:  Review of Systems   A complete 12-point review of systems was reviewed and is negative except as mentioned above.       Physical Exam:       /66   Pulse 70   Temp 97.7 °F (36.5 °C) (Temporal)   Ht 5' 5" (1.651 m)   Wt 72.3 kg (159 lb 8 oz)   SpO2 100%   BMI 26.54 kg/m²                Physical Exam  Constitutional:       Appearance: Normal appearance.   HENT:      Head: " Normocephalic and atraumatic.   Eyes:      Extraocular Movements: Extraocular movements intact.      Conjunctiva/sclera: Conjunctivae normal.      Pupils: Pupils are equal, round, and reactive to light.   Pulmonary:      Effort: Pulmonary effort is normal.   Musculoskeletal:         General: Normal range of motion.      Right lower leg: No edema.      Left lower leg: No edema.   Skin:     General: Skin is warm and dry.   Neurological:      Mental Status: She is alert and oriented to person, place, and time.   Psychiatric:         Mood and Affect: Mood normal.         Thought Content: Thought content normal.         Judgment: Judgment normal.           Labs:   No results found for this or any previous visit (from the past 48 hour(s)).            Path:   1. Left lung, wedge resection: Invasive adenocarcinoma, predominantly solid   pattern, measuring 9 mm (0.9 cm) in greatest dimension.          Tumor is located 3 mm (0.3 cm) from the blue inked/stapled parenchymal   surgical margin.          Tumor extends into the elastic layer of the visceral pleura.          See synoptic report in comment section for further details.   2. Lymph node, left level 11, excision: 1 benign fragmented lymph node with   sinus histiocytosis and anthracotic pigment, negative for metastatic   carcinoma (0/1).   3. Lymph node, left level 10, excision: 1 lymph node, positive for metastatic   carcinoma with crush artifact dense fibrosis and focal necrosis (1/1).          Confirmed with positive immunohistochemical stain with cytokeratin   AE1/AE3 with satisfactory positive and negative controls.   4. Lymph node, left level 12, excision: 1 benign lymph node with sinus   histiocytosis, negative for metastatic carcinoma (0/1).   5. Lymph node, left level 9, excision: 1 benign fragmented lymph node with   sinus histiocytosis and anthracotic pigment, negative for metastatic   carcinoma (0/1).   6. Lymph node, level 7, excision: 1 benign lymph node  with sinus   histiocytosis, negative for metastatic carcinoma (0/1).   7.  Lymph node, level 5, excision: 1 benign fragmented lymph node with sinus   histiocytosis and anthracotic pigment, negative for metastatic carcinoma   (0/1).   8. Lung, lingula, anatomic lingulectomy: Lung with congested vasculature.          No residual carcinoma is identified.          Bronchial and vascular margin is negative for malignancy.          See synoptic report in comment section for further details.       Assessment and Plan:     Radha Lamas is a 74 yo woman w/ pmh significant for COPD and two primary L lung cancers as below. She presents today for follow up.     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, initially dx'd 4/28/21, s/p wedge resection (4/28/21)    2) Stage IIB (pT2, pN1a, cMx) adenocarcinoma of the NANI (PD-L1 95%, BRAF V600E mutated), initially dx'd 3/28/23, s/p wedge resection 3/28/23, adjuvant cisplatin/pemetrexed x 4 cycles (5/11/23-7/20/23), and currently on adjuvant atezolizumab (8/14/23 - present; of note, delay between C3 on 9/26/23 and C4 on 11/14/23 due to concern for pneumonitis)       Stage IIB Adenocarcinoma of NANI  Granulomatous Lung Changes  ECOG PS 1.  Since last visit, the decision was ultimately made to hold the final doses of atezolizumab, noting that the patient receive the majority of her adjuvant treatment (see note from 7/9/24, 7/12/24 regarding rationale).  She presents today to follow up after that decision. Clinically, she is doing well.  Will transition to formal active surveillance at this time, w/ short interval imaging f/u followed by q6m surveillance imaging if stable (noting surgical therapy as primary treatment modality)    PLAN:   -- Continue active surveillance  -- Port flush today, continue w/ q6w port flushes  -- Last imaging 7/03/24, will obtain next 3 months from last. If stable, can transition to q6m imaging.   -- Message sent to pulmonology      The above  information has been reviewed with the patient and all questions have been answered to their apparent satisfaction.  They understand that they can call the clinic with any questions.    Orion Arce MD  Hematology/Oncology  [facility] [place]        Med Onc Chart Routing      Follow up with physician . Q6w port flushes (pluse minus a couple of weeks, and can be flushed with scheduled imaging). CT C due around 10/03/24, RTC at least 1 day afterward. Creatinine ordered   Follow up with [facility]    Infusion scheduling note    Injection scheduling note    Labs    Imaging    Pharmacy appointment    Other referrals

## 2024-08-13 NOTE — DISCHARGE INSTRUCTIONS
[facility]  [address] [address]  [address]  [phone] phone     [phone] fax  Hours of Operation: Monday- Friday 8:00am- 5:00pm  After hours phone  [phone]  Hematology / Oncology Physicians on call      FOUZIA Flores Dr., NP Phaon Dunbar, NP Khelsea Conley, FNP    Please call with any concerns regarding your appointment today.

## 2024-09-23 ENCOUNTER — TELEPHONE (OUTPATIENT)
Dept: INFUSION THERAPY | Facility: HOSPITAL | Age: 74
End: 2024-09-23
Payer: MEDICARE

## 2024-09-23 ENCOUNTER — PATIENT MESSAGE (OUTPATIENT)
Dept: HEMATOLOGY/ONCOLOGY | Facility: CLINIC | Age: 74
End: 2024-09-23
Payer: MEDICARE

## 2024-09-26 ENCOUNTER — INFUSION (OUTPATIENT)
Dept: INFUSION THERAPY | Facility: HOSPITAL | Age: 74
End: 2024-09-26
Attending: FAMILY MEDICINE
Payer: MEDICARE

## 2024-09-26 DIAGNOSIS — C34.32 MALIGNANT NEOPLASM OF LOWER LOBE OF LEFT LUNG: Primary | ICD-10-CM

## 2024-09-26 PROCEDURE — 63600175 PHARM REV CODE 636 W HCPCS: Performed by: INTERNAL MEDICINE

## 2024-09-26 PROCEDURE — A4216 STERILE WATER/SALINE, 10 ML: HCPCS | Performed by: INTERNAL MEDICINE

## 2024-09-26 PROCEDURE — 96523 IRRIG DRUG DELIVERY DEVICE: CPT

## 2024-09-26 PROCEDURE — 25000003 PHARM REV CODE 250: Performed by: INTERNAL MEDICINE

## 2024-09-26 RX ORDER — SODIUM CHLORIDE 0.9 % (FLUSH) 0.9 %
10 SYRINGE (ML) INJECTION
OUTPATIENT
Start: 2024-09-26

## 2024-09-26 RX ORDER — HEPARIN 100 UNIT/ML
500 SYRINGE INTRAVENOUS
OUTPATIENT
Start: 2024-09-26

## 2024-09-26 RX ORDER — SODIUM CHLORIDE 0.9 % (FLUSH) 0.9 %
10 SYRINGE (ML) INJECTION
Status: DISCONTINUED | OUTPATIENT
Start: 2024-09-26 | End: 2024-09-26 | Stop reason: HOSPADM

## 2024-09-26 RX ORDER — HEPARIN 100 UNIT/ML
500 SYRINGE INTRAVENOUS
Status: DISCONTINUED | OUTPATIENT
Start: 2024-09-26 | End: 2024-09-26 | Stop reason: HOSPADM

## 2024-09-26 RX ADMIN — SODIUM CHLORIDE, PRESERVATIVE FREE 10 ML: 5 INJECTION INTRAVENOUS at 10:09

## 2024-09-26 RX ADMIN — HEPARIN 500 UNITS: 100 SYRINGE at 10:09

## 2024-09-26 NOTE — NURSING
"Pt here for Mediport Flush. Right chestwall mediport accessed with a 20g 1" luna via sterile technique.  Excellent blood return noted.  Flushed with 10ml NS and 5 ml heparin solution.  Needle D/C, site without redness, swelling, or drainage noted.  Dressing applied.  Patient tolerated well.  Patient to return to clinic in 6 weeks.  "

## 2024-10-02 ENCOUNTER — LAB VISIT (OUTPATIENT)
Dept: LAB | Facility: HOSPITAL | Age: 74
End: 2024-10-02
Attending: HOSPITALIST
Payer: MEDICARE

## 2024-10-02 DIAGNOSIS — C34.12 ADENOCARCINOMA OF UPPER LOBE OF LEFT LUNG: ICD-10-CM

## 2024-10-02 LAB
CREAT SERPL-MCNC: 1.4 MG/DL (ref 0.5–1.4)
EST. GFR  (NO RACE VARIABLE): 39 ML/MIN/1.73 M^2

## 2024-10-02 PROCEDURE — 36415 COLL VENOUS BLD VENIPUNCTURE: CPT | Mod: PO | Performed by: HOSPITALIST

## 2024-10-02 PROCEDURE — 82565 ASSAY OF CREATININE: CPT | Performed by: HOSPITALIST

## 2024-10-03 ENCOUNTER — HOSPITAL ENCOUNTER (OUTPATIENT)
Dept: RADIOLOGY | Facility: HOSPITAL | Age: 74
Discharge: HOME | End: 2024-10-03
Attending: HOSPITALIST
Payer: MEDICARE

## 2024-10-03 DIAGNOSIS — C34.12 ADENOCARCINOMA OF UPPER LOBE OF LEFT LUNG: ICD-10-CM

## 2024-10-03 PROCEDURE — 71250 CT THORAX DX C-: CPT | Mod: 26,,, | Performed by: RADIOLOGY

## 2024-10-03 PROCEDURE — 71250 CT THORAX DX C-: CPT | Mod: TC

## 2024-10-08 ENCOUNTER — OFFICE VISIT (OUTPATIENT)
Dept: HEMATOLOGY/ONCOLOGY | Facility: CLINIC | Age: 74
End: 2024-10-08
Payer: MEDICARE

## 2024-10-08 ENCOUNTER — PATIENT MESSAGE (OUTPATIENT)
Dept: HEMATOLOGY/ONCOLOGY | Facility: CLINIC | Age: 74
End: 2024-10-08

## 2024-10-08 VITALS
HEART RATE: 75 BPM | SYSTOLIC BLOOD PRESSURE: 92 MMHG | TEMPERATURE: 97 F | BODY MASS INDEX: 26.12 KG/M2 | OXYGEN SATURATION: 97 % | WEIGHT: 156.75 LBS | DIASTOLIC BLOOD PRESSURE: 65 MMHG | HEIGHT: 65 IN

## 2024-10-08 DIAGNOSIS — C77.1 SECONDARY MALIGNANT NEOPLASM OF INTRATHORACIC LYMPH NODES: ICD-10-CM

## 2024-10-08 DIAGNOSIS — I95.9 HYPOTENSION, UNSPECIFIED HYPOTENSION TYPE: ICD-10-CM

## 2024-10-08 DIAGNOSIS — C34.12 ADENOCARCINOMA OF UPPER LOBE OF LEFT LUNG: Primary | ICD-10-CM

## 2024-10-08 DIAGNOSIS — J84.10 GRANULOMATOUS LUNG DISEASE: ICD-10-CM

## 2024-10-08 PROCEDURE — G2211 COMPLEX E/M VISIT ADD ON: HCPCS | Mod: S$GLB,,, | Performed by: HOSPITALIST

## 2024-10-08 PROCEDURE — 3288F FALL RISK ASSESSMENT DOCD: CPT | Mod: CPTII,S$GLB,, | Performed by: HOSPITALIST

## 2024-10-08 PROCEDURE — 99999 PR PBB SHADOW E&M-EST. PATIENT-LVL III: CPT | Mod: PBBFAC,,, | Performed by: HOSPITALIST

## 2024-10-08 PROCEDURE — 3074F SYST BP LT 130 MM HG: CPT | Mod: CPTII,S$GLB,, | Performed by: HOSPITALIST

## 2024-10-08 PROCEDURE — 1126F AMNT PAIN NOTED NONE PRSNT: CPT | Mod: CPTII,S$GLB,, | Performed by: HOSPITALIST

## 2024-10-08 PROCEDURE — 1101F PT FALLS ASSESS-DOCD LE1/YR: CPT | Mod: CPTII,S$GLB,, | Performed by: HOSPITALIST

## 2024-10-08 PROCEDURE — 1159F MED LIST DOCD IN RCRD: CPT | Mod: CPTII,S$GLB,, | Performed by: HOSPITALIST

## 2024-10-08 PROCEDURE — 99215 OFFICE O/P EST HI 40 MIN: CPT | Mod: S$GLB,,, | Performed by: HOSPITALIST

## 2024-10-08 PROCEDURE — 3078F DIAST BP <80 MM HG: CPT | Mod: CPTII,S$GLB,, | Performed by: HOSPITALIST

## 2024-10-08 NOTE — Clinical Note
Good morning!  This pt's BP has been running a bit on the low side (92/65 today, and she notes previously similar readings), though she's asymptomatic. Are you able to order one of the home monitoring cuffs as part of the BP program? As she is not actively on cancer therapy, I don't think I can order her our typical chemocare  which would include a cuff but can still try if needed.   Thanks! Alex

## 2024-10-08 NOTE — PROGRESS NOTES
[facility] MEDICAL ONCOLOGY - FOLLOW UP VISIT    Reason for visit: Follow up for stage IIB squamous cell carcinoma      Oncology History   Malignant neoplasm of lower lobe of left lung - Squamous cell   4/15/2021 Initial Diagnosis    Malignant neoplasm of lower lobe of left lung - Squamous cell     5/25/2021 Cancer Staged    Staging form: Lung, AJCC 8th Edition  - Clinical: Stage IA1 (cT1a, cN0, cM0)      Cancer Staged    9mm non-keratinizing squamous cell carcinoma, no VPI, no LVI, margin at least 8mm.  Levels 9 and 11 = negative.  T1aN0     4/5/2023 Cancer Staged    Staging form: Lung, AJCC 8th Edition  - Pathologic stage from 4/5/2023: Stage IIB (pT2, pN1, cM0)     5/1/2023 - 7/21/2023 Chemotherapy    Treatment Summary   Plan Name: OP NSCLC PEMETREXED + CISPLATIN Q3W  Treatment Goal: Supportive  Status: Inactive  Start Date: 5/1/2023  End Date: 7/21/2023  Provider: Marissa Brower MD  Chemotherapy: CISplatin (Platinol) 75 mg/m2 = 138 mg in sodium chloride 0.9% 665 mL chemo infusion, 75 mg/m2 = 138 mg, Intravenous, Clinic/HOD 1 time, 4 of 4 cycles  Administration: 138 mg (5/12/2023), 138 mg (6/29/2023), 138 mg (7/20/2023), 138 mg (6/8/2023)  PEMEtrexed disodium (ALIMTA) 900 mg in sodium chloride 0.9% SolP 100 mL chemo infusion, 925 mg, Intravenous, Clinic/HOD 1 time, 4 of 4 cycles  Dose modification: 375 mg/m2 (original dose 500 mg/m2, Cycle 3, Reason: MD Discretion, Comment: neutropenia )  Administration: 900 mg (5/12/2023), 700 mg (6/29/2023), 700 mg (7/20/2023), 700 mg (6/8/2023)     8/14/2023 - 6/18/2024 Chemotherapy    Treatment Summary   Plan Name: OP ATEZOLIZUMAB Q3W  Treatment Goal: Supportive  Status: Inactive  Start Date: 8/14/2023  End Date: 6/18/2024  Provider: Marissa Brower MD  Chemotherapy: [No matching medication found in this treatment plan]     NSCLC of left lung (Resolved)   4/28/2021 Initial Diagnosis    NSCLC of left lung  "(Resolved)      Cancer Staged    9mm non-keratinizing squamous cell carcinoma, no VPI, no LVI, margin at least 8mm.  Levels 9 and 11 = negative.  T1aN0        NANI NSCLC/ADC IIB (pT2 pN1a cMx)  - History of stage I squamous cell carcinoma moderately differentiated left lower lung status post wedge resection 04/28/2021  - Abnormality seen on surveillance for history of squamous cell carcinoma. Initial biopsy February 20, 2023 without neoplasm identified , thus had left lower lobe wedge resection after multiple disciplinary discussion, final pathology positive on 03/28/2022 for invasive moderately differentiated adenocarcinoma station 10 lymph node positive, pleural invasion noted, margins negative.    - Restaging MRI brain negative and PET scan with left hilar avidity SUV 4 and chest wall. Given recent surgery potential inflammation presented at tumor board recommended proceeding with adjuvant chemotherapy and follow-up on repeat imaging. Started adjuvant therapy with chemotherapy and immunotherapy per IMPOWER 010 given PDL1 positive disease 95%.  Mutational analysis negative for EGFR mutation. Noted BRAF mutation.   - Completed adjuvant chemotherapy with cisplatin and pemetrexed tolerated well aside from chemotherapy associated progressive anemia.    - Restaging PET on 8/7/2023 with resolution of prior lymph node avidity.    - Started immunotherapy with atezolizumab (8/14/2023 - present; delay from C3 on 9/26/23 and C4 on 11/14/23 due to concnern for pneumonitis)  - 2/5/24: CT C, "no detrimental interval change in appearance"  - 3/01/24: CT C non-con, no acute pathology, remaining findings unchanged when compared to prior study  - 6/17/24:  CT C, interval development of 2.1 x 1.8 x 1.1 cm left lung base nodule concerning for disease recurrence versus new lung lesion; RLL peripheral ill-defined ground-glass unchanged; 0.6 cm medial left lung base nodule; 0.5 cm left costophrenic angle nodule  - 6/21/24:  PET-CT, 1.6 x " 1.3 cm LLL nodule, SUV 4.5; 1.1 x 1.0 cm left hilar lymph node, SUV 5.6;  1.2 x 0.9 cm AP window lymph node, SUV 5.9; 1.2 x 0.7 cm superior mediastinum lymph node, SUV 5.0  - 7/3/24: Robotic Bronch w/ EBUS   - LLL endobronchial mass: Areas of organizing pnuemonia, no malignancy   - Station 4L LN: No malignancy   - Station 10L LN: Non-necrotizing granulomatous inflammation   - LN (site unspecified): Non-necrotizing granulomatous inflammation  - 10/3/24: CT C non-con   - Resolution of LLL pulmonary nodule   - Postsurgical changes to left lung redemonstrated   - No new suspicious pulmonary nodule  HPI:     Radha Lamas is a 74 yo woman w/ pmh significant for COPD and two primary L lung cancers as below. She presents today for follow up.     - Stage I squamous cell carcinoma of the of the LLL (PD-L1 50%, insufficient sample for NGS), initially dx'd 4/28/21, s/p wedge resection (4/28/21)    - Stage IIB (pT2, pN1a, cMx) adenocarcinoma of the NANI (PD-L1 95%, BRAF V600E mutated), initially dx'd 3/28/23, s/p wedge resection 3/28/23, adjuvant cisplatin/pemetrexed x 4 cycles (5/11/23-7/20/23), and currently on adjuvant atezolizumab (8/14/23 - present; of note, delay between C3 on 9/26/23 and C4 on 11/14/23 due to concern for pneumonitis; c/b likely G1 granulomatous change due to immunotherapy, stopped after C10), now on active surveillance    Last clinic 8/13/24, transitioned to formal surveillance noting patient received majority of atezolizumab.  If short interval imaging unremarkable, will transition to q6m surveillance scans.  Continue with q6w port flushes.    Interval History:   10/3/24: Nephrology, suspect that renal function is abnormal due to cisplatin toxicity superimposed upon decline in renal function from age  10/3/24: CT C, resolution of LLL pulmonary nodule, as above    The pt states that she is doing well today.     She says that her breathing is the same. She is walking with her daughter. She continues to  "complete all ADLs and iADLs without assistance. She notes that she has been doing YouTube chair exercises.     Of note, her BP today is 92/65, and she notes it has been running lower recently. She has not been taking her BP at home but does have a cuff. Her PCP is Dr. Chen.     ROS:  Review of Systems   A complete 12-point review of systems was reviewed and is negative except as mentioned above.       Physical Exam:       BP 92/65 (BP Location: Left arm, Patient Position: Sitting)   Pulse 75   Temp 97 °F (36.1 °C) (Temporal)   Ht 5' 5" (1.651 m)   Wt 71.1 kg (156 lb 12 oz)   SpO2 97%   BMI 26.08 kg/m²                Physical Exam  Constitutional:       Appearance: Normal appearance.   HENT:      Head: Normocephalic and atraumatic.   Eyes:      Extraocular Movements: Extraocular movements intact.      Conjunctiva/sclera: Conjunctivae normal.      Pupils: Pupils are equal, round, and reactive to light.   Pulmonary:      Effort: Pulmonary effort is normal.   Musculoskeletal:         General: Normal range of motion.      Right lower leg: No edema.      Left lower leg: No edema.   Skin:     General: Skin is warm and dry.   Neurological:      Mental Status: She is alert and oriented to person, place, and time.   Psychiatric:         Mood and Affect: Mood normal.         Thought Content: Thought content normal.         Judgment: Judgment normal.           Labs:   No results found for this or any previous visit (from the past 48 hours).            Path:   1. Left lung, wedge resection: Invasive adenocarcinoma, predominantly solid   pattern, measuring 9 mm (0.9 cm) in greatest dimension.          Tumor is located 3 mm (0.3 cm) from the blue inked/stapled parenchymal   surgical margin.          Tumor extends into the elastic layer of the visceral pleura.          See synoptic report in comment section for further details.   2. Lymph node, left level 11, excision: 1 benign fragmented lymph node with   sinus " histiocytosis and anthracotic pigment, negative for metastatic   carcinoma (0/1).   3. Lymph node, left level 10, excision: 1 lymph node, positive for metastatic   carcinoma with crush artifact dense fibrosis and focal necrosis (1/1).          Confirmed with positive immunohistochemical stain with cytokeratin   AE1/AE3 with satisfactory positive and negative controls.   4. Lymph node, left level 12, excision: 1 benign lymph node with sinus   histiocytosis, negative for metastatic carcinoma (0/1).   5. Lymph node, left level 9, excision: 1 benign fragmented lymph node with   sinus histiocytosis and anthracotic pigment, negative for metastatic   carcinoma (0/1).   6. Lymph node, level 7, excision: 1 benign lymph node with sinus   histiocytosis, negative for metastatic carcinoma (0/1).   7.  Lymph node, level 5, excision: 1 benign fragmented lymph node with sinus   histiocytosis and anthracotic pigment, negative for metastatic carcinoma   (0/1).   8. Lung, lingula, anatomic lingulectomy: Lung with congested vasculature.          No residual carcinoma is identified.          Bronchial and vascular margin is negative for malignancy.          See synoptic report in comment section for further details.       Assessment and Plan:     Radha Lamas is a 74 yo woman w/ pmh significant for COPD and two primary L lung cancers as below. She presents today for follow up.     - Stage I squamous cell carcinoma of the of the LLL (PD-L1 50%, insufficient sample for NGS), initially dx'd 4/28/21, s/p wedge resection (4/28/21)    - Stage IIB (pT2, pN1a, cMx) adenocarcinoma of the NANI (PD-L1 95%, BRAF V600E mutated), initially dx'd 3/28/23, s/p wedge resection 3/28/23, adjuvant cisplatin/pemetrexed x 4 cycles (5/11/23-7/20/23), and currently on adjuvant atezolizumab (8/14/23 - present; of note, delay between C3 on 9/26/23 and C4 on 11/14/23 due to concern for pneumonitis; c/b likely G1 granulomatous change due to immunotherapy, stopped  after C10), now on active surveillance      Stage IIB Adenocarcinoma of NANI  Granulomatous Lung Changes  ECOG PS 1.  Patient is currently on active surveillance after completing atezolizumab (see notes from 7/9/24, 7/12/24).  Her most recent imaging (CT C noncontrast, 10/3/24) demonstrates resolution of the LLL lesion which was previously biopsy-proven to be granulomatous, does not sThow new or worsening disease elsewhere. Given significant burden of granulomatous disease on recent CT as well as history of multiple primary lung cancers, repeat imaging in 4.5 months; per previous discussion with pulmonology, will make this scan a PET given the LAD which is difficult to discern with a non-contrasted CT in the setting of CKD. If imaging stable at that time, will transition to q6m surveillance imaging w/ CT scans (noting surgical therapy as primary treatment modality)    PLAN:   Continue active surveillance  Repeat PET CT ~4.5 months  RTC at least 1 day after imaging complete      Asymptomatic Hypotension  BP 92/65 today, patient notes this has been running lower recently.  She denies any symptoms.  Not on antihypertensive therapy.  Message sent to PCP regarding home BP monitoring      The above information has been reviewed with the patient and all questions have been answered to their apparent satisfaction.  They understand that they can call the clinic with any questions.    Orion Arce MD  Hematology/Oncology  [facility] [place]        Med Onc Chart Routing      Follow up with physician .  Repeat PET CT ~4.5 months  RTC at least 1 day after imaging complete   Follow up with [facility]    Infusion scheduling note    Injection scheduling note    Labs    Imaging    Pharmacy appointment    Other referrals

## 2024-10-22 ENCOUNTER — TELEPHONE (OUTPATIENT)
Dept: INFUSION THERAPY | Facility: HOSPITAL | Age: 74
End: 2024-10-22
Payer: MEDICARE

## 2024-10-22 NOTE — TELEPHONE ENCOUNTER
Patient calling to reschedule her port flush to early in the am. Flushes rescheduled per her preferences. Call ended well.

## 2024-11-05 ENCOUNTER — INFUSION (OUTPATIENT)
Dept: INFUSION THERAPY | Facility: HOSPITAL | Age: 74
End: 2024-11-05
Attending: FAMILY MEDICINE
Payer: MEDICARE

## 2024-11-05 DIAGNOSIS — C34.32 MALIGNANT NEOPLASM OF LOWER LOBE OF LEFT LUNG: Primary | ICD-10-CM

## 2024-11-05 PROCEDURE — A4216 STERILE WATER/SALINE, 10 ML: HCPCS | Performed by: INTERNAL MEDICINE

## 2024-11-05 PROCEDURE — 25000003 PHARM REV CODE 250: Performed by: INTERNAL MEDICINE

## 2024-11-05 PROCEDURE — 63600175 PHARM REV CODE 636 W HCPCS: Performed by: INTERNAL MEDICINE

## 2024-11-05 PROCEDURE — 96523 IRRIG DRUG DELIVERY DEVICE: CPT

## 2024-11-05 RX ORDER — HEPARIN 100 UNIT/ML
500 SYRINGE INTRAVENOUS
Status: DISCONTINUED | OUTPATIENT
Start: 2024-11-05 | End: 2024-11-05 | Stop reason: HOSPADM

## 2024-11-05 RX ORDER — SODIUM CHLORIDE 0.9 % (FLUSH) 0.9 %
10 SYRINGE (ML) INJECTION
OUTPATIENT
Start: 2024-11-05

## 2024-11-05 RX ORDER — HEPARIN 100 UNIT/ML
500 SYRINGE INTRAVENOUS
OUTPATIENT
Start: 2024-11-05

## 2024-11-05 RX ORDER — SODIUM CHLORIDE 0.9 % (FLUSH) 0.9 %
10 SYRINGE (ML) INJECTION
Status: DISCONTINUED | OUTPATIENT
Start: 2024-11-05 | End: 2024-11-05 | Stop reason: HOSPADM

## 2024-11-05 RX ADMIN — HEPARIN 500 UNITS: 100 SYRINGE at 08:11

## 2024-11-05 RX ADMIN — Medication 10 ML: at 08:11

## 2024-11-05 NOTE — DISCHARGE INSTRUCTIONS
.[facility]  [address] [facility]  [address]  [phone] phone     [phone] fax  Hours of Operation: Monday- Friday 8:00am- 5:00pm  After hours phone  [phone]  Hematology / Oncology Physicians on call    Dr. Feliberto Pimentel           Nurse Practitioners:     Alejandra Lopez, GINNA Mojica, ALEX Alvarez, GINNA Curtis, GINNA Bradley, NP    Please don't hesitate to call if you have any concerns.      FALL PREVENTION   Falls often occur due to slipping, tripping or losing your balance. Here are ways to reduce your risk of falling again.   Was there anything that caused your fall that can be fixed, removed or replaced?   Make your home safe by keeping walkways clear of objects you may trip over.   Use non-slip pads under rugs.   Do not walk in poorly lit areas.   Do not stand on chairs or wobbly ladders.   Use caution when reaching overhead or looking upward. This position can cause a loss of balance.   Be sure your shoes fit properly, have non-slip bottoms and are in good condition.   Be cautious when going up and down stairs, curbs, and when walking on uneven sidewalks.   If your balance is poor, consider using a cane or walker.   If your fall was related to alcohol use, stop or limit alcohol intake.   If your fall was related to use of sleeping medicines, talk to your doctor about this. You may need to reduce your dosage at bedtime if you awaken during the night to go to the bathroom.   To reduce the need for nighttime bathroom trips:   Avoid drinking fluids for several hours before going to bed   Empty your bladder before going to bed   Men can keep a urinal at the bedside   © 0656-3741 Heidi Blackmon, [address] [place] [address], [place] [address]. All rights reserved. This information is not intended as a substitute for professional medical care. Always follow your healthcare  professional's instructions.    WAYS TO HELP PREVENT INFECTION        WASH YOUR HANDS OFTEN DURING THE DAY, ESPECIALLY BEFORE YOU EAT, AFTER USING THE BATHROOM, AND AFTER TOUCHING ANIMALS    STAY AWAY FROM PEOPLE WHO HAVE ILLNESSES YOU CAN CATCH; SUCH AS COLDS, FLU, CHICKEN POX    TRY TO AVOID CROWDS    STAY AWAY FROM CHILDREN WHO RECENTLY HAVE RECEIVED LIVE VIRUS VACCINES    MAINTAIN GOOD MOUTH CARE    DO NOT SQUEEZE OR SCRATCH PIMPLES    CLEAN CUTS & SCRAPES RIGHT AWAY AND DAILY UNTIL HEALED WITH WARM WATER, SOAP & AN ANTISEPTIC    AVOID CONTACT WITH LITTER BOXES, BIRD CAGES, & FISH TANKS    AVOID STANDING WATER, IE., BIRD BATHS, FLOWER POTS/VASES, OR HUMIDIFIERS    WEAR GLOVES WHEN GARDENING OR CLEANING UP AFTER OTHERS, ESPECIALLY BABIES & SMALL CHILDREN    DO NOT EAT RAW FISH, SEAFOOD, MEAT, OR EGGS

## 2024-11-05 NOTE — NURSING
RIGHT Mediport accessed via sterile technique with 20g luna needle without difficulty.  Positive blood return noted.  Flushed per protocol with 10 ml NS f/b 5 cc heparin flush.  Deaccessed without difficulty.  Bandaid pressure dressing applied.  Pt tolerated well.

## 2024-11-19 ENCOUNTER — OFFICE VISIT (OUTPATIENT)
Dept: FAMILY MEDICINE | Facility: CLINIC | Age: 74
End: 2024-11-19
Attending: FAMILY MEDICINE
Payer: MEDICARE

## 2024-11-19 VITALS
HEART RATE: 75 BPM | HEIGHT: 65 IN | BODY MASS INDEX: 26.06 KG/M2 | SYSTOLIC BLOOD PRESSURE: 108 MMHG | DIASTOLIC BLOOD PRESSURE: 68 MMHG | OXYGEN SATURATION: 98 % | WEIGHT: 156.44 LBS | TEMPERATURE: 98 F

## 2024-11-19 DIAGNOSIS — N18.32 STAGE 3B CHRONIC KIDNEY DISEASE: ICD-10-CM

## 2024-11-19 DIAGNOSIS — S30.814D: Primary | ICD-10-CM

## 2024-11-19 PROCEDURE — 1159F MED LIST DOCD IN RCRD: CPT | Mod: CPTII,S$GLB,, | Performed by: FAMILY MEDICINE

## 2024-11-19 PROCEDURE — 3288F FALL RISK ASSESSMENT DOCD: CPT | Mod: CPTII,S$GLB,, | Performed by: FAMILY MEDICINE

## 2024-11-19 PROCEDURE — G2211 COMPLEX E/M VISIT ADD ON: HCPCS | Mod: S$GLB,,, | Performed by: FAMILY MEDICINE

## 2024-11-19 PROCEDURE — 1126F AMNT PAIN NOTED NONE PRSNT: CPT | Mod: CPTII,S$GLB,, | Performed by: FAMILY MEDICINE

## 2024-11-19 PROCEDURE — 3074F SYST BP LT 130 MM HG: CPT | Mod: CPTII,S$GLB,, | Performed by: FAMILY MEDICINE

## 2024-11-19 PROCEDURE — 1160F RVW MEDS BY RX/DR IN RCRD: CPT | Mod: CPTII,S$GLB,, | Performed by: FAMILY MEDICINE

## 2024-11-19 PROCEDURE — 99999 PR PBB SHADOW E&M-EST. PATIENT-LVL IV: CPT | Mod: PBBFAC,,, | Performed by: FAMILY MEDICINE

## 2024-11-19 PROCEDURE — 3078F DIAST BP <80 MM HG: CPT | Mod: CPTII,S$GLB,, | Performed by: FAMILY MEDICINE

## 2024-11-19 PROCEDURE — 99213 OFFICE O/P EST LOW 20 MIN: CPT | Mod: S$GLB,,, | Performed by: FAMILY MEDICINE

## 2024-11-19 PROCEDURE — 1101F PT FALLS ASSESS-DOCD LE1/YR: CPT | Mod: CPTII,S$GLB,, | Performed by: FAMILY MEDICINE

## 2024-11-19 RX ORDER — HYDROCORTISONE 25 MG/G
CREAM TOPICAL 2 TIMES DAILY
Qty: 28 G | Refills: 0 | Status: SHIPPED | OUTPATIENT
Start: 2024-11-19

## 2024-11-19 RX ORDER — VALACYCLOVIR HYDROCHLORIDE 1 G/1
TABLET, FILM COATED ORAL
Qty: 7 TABLET | Refills: 0 | Status: SHIPPED | OUTPATIENT
Start: 2024-11-19

## 2024-11-19 RX ORDER — DOXYCYCLINE 100 MG/1
100 CAPSULE ORAL 2 TIMES DAILY
Qty: 20 CAPSULE | Refills: 0 | Status: SHIPPED | OUTPATIENT
Start: 2024-11-19 | End: 2024-11-29

## 2024-11-19 NOTE — PROGRESS NOTES
Subjective:       Patient ID: Radha Lamas is a 74 y.o. female.    Chief Complaint: Vaginal Pain (Bump to left side of labia )    74 y old female who started experiencing vulvar discomfort 3 days ago . She was able to look and noticed  painful bumps on left labia minora  . She is unable to recall if lesions were vesicles or pustules. Not sexually active . She has not tried any meds.       Review of Systems   Constitutional: Negative.  Negative for activity change and unexpected weight change.   HENT: Negative.  Negative for hearing loss, rhinorrhea and trouble swallowing.    Eyes: Negative.  Negative for discharge and visual disturbance.   Respiratory: Negative.  Negative for chest tightness and wheezing.    Cardiovascular: Negative.  Negative for chest pain and palpitations.   Gastrointestinal: Negative.  Negative for blood in stool, constipation, diarrhea and vomiting.   Endocrine: Negative for polydipsia and polyuria.   Genitourinary:  Positive for vaginal pain. Negative for difficulty urinating, dysuria, hematuria and menstrual problem.   Musculoskeletal: Negative.  Negative for arthralgias, joint swelling and neck pain.   Skin: Negative.    Neurological:  Negative for weakness and headaches.   Hematological: Negative.    Psychiatric/Behavioral:  Negative for confusion and dysphoric mood.        Objective:      Physical Exam  Vitals and nursing note reviewed.   Constitutional:       General: She is not in acute distress.     Appearance: She is well-developed. She is not diaphoretic.   HENT:      Head: Normocephalic and atraumatic.      Right Ear: External ear normal.      Left Ear: External ear normal.      Nose: Nose normal.   Eyes:      General: No scleral icterus.        Left eye: No discharge.      Conjunctiva/sclera: Conjunctivae normal.      Pupils: Pupils are equal, round, and reactive to light.   Neck:      Thyroid: No thyromegaly.      Vascular: No JVD.      Trachea: No tracheal deviation.    Cardiovascular:      Rate and Rhythm: Normal rate and regular rhythm.      Heart sounds: Normal heart sounds. No murmur heard.     No friction rub. No gallop.   Pulmonary:      Effort: Pulmonary effort is normal. No respiratory distress.      Breath sounds: Normal breath sounds. No wheezing or rales.   Chest:      Chest wall: No tenderness.   Abdominal:      General: Bowel sounds are normal. There is no distension.      Palpations: Abdomen is soft. There is no mass.      Tenderness: There is no abdominal tenderness. There is no guarding.   Genitourinary:     Labia:         Left: Lesion present.           Comments: Unroofed 2  mm in diam lesions  Musculoskeletal:      Cervical back: Normal range of motion and neck supple.   Lymphadenopathy:      Cervical: No cervical adenopathy.         Assessment:       1. Abrasion of labia minora, subsequent encounter        Plan:     Radha was seen today for vaginal pain.    Diagnoses and all orders for this visit:    Abrasion of labia minora, subsequent encounter    Other orders  -     valACYclovir (VALTREX) 1000 MG tablet; 1 tab po qd for 7 days  -     doxycycline (VIBRAMYCIN) 100 MG Cap; Take 1 capsule (100 mg total) by mouth 2 (two) times daily. for 10 days  -     hydrocortisone 2.5 % cream; Apply topically 2 (two) times daily.     Herpes ?  Ren[facility] adjust Valtrex ,Doxy .  Unable to to take Cx since lesions are unroofed.   F.u in 1 w

## 2024-12-12 ENCOUNTER — OFFICE VISIT (OUTPATIENT)
Dept: OBSTETRICS AND GYNECOLOGY | Facility: CLINIC | Age: 74
End: 2024-12-12
Payer: MEDICARE

## 2024-12-12 VITALS
HEIGHT: 65 IN | DIASTOLIC BLOOD PRESSURE: 76 MMHG | BODY MASS INDEX: 25.97 KG/M2 | WEIGHT: 155.88 LBS | SYSTOLIC BLOOD PRESSURE: 102 MMHG

## 2024-12-12 DIAGNOSIS — L29.2 VULVAR ITCHING: Primary | ICD-10-CM

## 2024-12-12 PROCEDURE — 99999 PR PBB SHADOW E&M-EST. PATIENT-LVL III: CPT | Mod: PBBFAC,,, | Performed by: NURSE PRACTITIONER

## 2024-12-12 PROCEDURE — 99203 OFFICE O/P NEW LOW 30 MIN: CPT | Mod: S$GLB,,, | Performed by: NURSE PRACTITIONER

## 2024-12-12 PROCEDURE — 1159F MED LIST DOCD IN RCRD: CPT | Mod: CPTII,S$GLB,, | Performed by: NURSE PRACTITIONER

## 2024-12-12 PROCEDURE — 1160F RVW MEDS BY RX/DR IN RCRD: CPT | Mod: CPTII,S$GLB,, | Performed by: NURSE PRACTITIONER

## 2024-12-12 PROCEDURE — 3074F SYST BP LT 130 MM HG: CPT | Mod: CPTII,S$GLB,, | Performed by: NURSE PRACTITIONER

## 2024-12-12 PROCEDURE — 3078F DIAST BP <80 MM HG: CPT | Mod: CPTII,S$GLB,, | Performed by: NURSE PRACTITIONER

## 2024-12-12 RX ORDER — CLOTRIMAZOLE AND BETAMETHASONE DIPROPIONATE 10; .64 MG/G; MG/G
CREAM TOPICAL 2 TIMES DAILY
Qty: 45 G | Refills: 2 | Status: SHIPPED | OUTPATIENT
Start: 2024-12-12 | End: 2025-01-11

## 2024-12-12 NOTE — PROGRESS NOTES
Subjective:       Patient ID: Radha Lamas is a 74 y.o. female.    Chief Complaint:  Itching    No LMP recorded. Patient is postmenopausal.  History of Present Illness  Went to PCP and was diagnosed with herpes based on a visual not swab   Was given medication for HSV and the bumps went away but she is unsure if it was actually herpes because she was not swabbed.  Presents today to see if ulcers have healed (asymptomatic today)  Also complains of vulva irritation     OB History    Para Term  AB Living   3 2 2 0 1     SAB IAB Ectopic Multiple Live Births   0 0 1          # Outcome Date GA Lbr José/2nd Weight Sex Type Anes PTL Lv   3 Term            2 Term            1 Ectopic                Review of Systems  Review of Systems        Objective:    Physical Exam  Genitourinary:         Comments: No lesions, ulcers or sores visualized         Assessment:     1. Vulvar itching              Plan:   Radha was seen today for itching.    Diagnoses and all orders for this visit:    Vulvar itching  -     clotrimazole-betamethasone 1-0.05% (LOTRISONE) cream; Apply topically 2 (two) times daily.      Return to clinic if sores re appear

## 2024-12-18 ENCOUNTER — INFUSION (OUTPATIENT)
Dept: INFUSION THERAPY | Facility: HOSPITAL | Age: 74
End: 2024-12-18
Attending: FAMILY MEDICINE
Payer: MEDICARE

## 2024-12-18 DIAGNOSIS — C34.32 MALIGNANT NEOPLASM OF LOWER LOBE OF LEFT LUNG: Primary | ICD-10-CM

## 2024-12-18 PROCEDURE — 96523 IRRIG DRUG DELIVERY DEVICE: CPT

## 2024-12-18 PROCEDURE — 25000003 PHARM REV CODE 250: Performed by: INTERNAL MEDICINE

## 2024-12-18 PROCEDURE — A4216 STERILE WATER/SALINE, 10 ML: HCPCS | Performed by: INTERNAL MEDICINE

## 2024-12-18 PROCEDURE — 63600175 PHARM REV CODE 636 W HCPCS: Performed by: INTERNAL MEDICINE

## 2024-12-18 RX ORDER — HEPARIN 100 UNIT/ML
500 SYRINGE INTRAVENOUS
OUTPATIENT
Start: 2024-12-18

## 2024-12-18 RX ORDER — SODIUM CHLORIDE 0.9 % (FLUSH) 0.9 %
10 SYRINGE (ML) INJECTION
Status: DISCONTINUED | OUTPATIENT
Start: 2024-12-18 | End: 2024-12-18 | Stop reason: HOSPADM

## 2024-12-18 RX ORDER — HEPARIN 100 UNIT/ML
500 SYRINGE INTRAVENOUS
Status: DISCONTINUED | OUTPATIENT
Start: 2024-12-18 | End: 2024-12-18 | Stop reason: HOSPADM

## 2024-12-18 RX ORDER — SODIUM CHLORIDE 0.9 % (FLUSH) 0.9 %
10 SYRINGE (ML) INJECTION
OUTPATIENT
Start: 2024-12-18

## 2024-12-18 RX ADMIN — SODIUM CHLORIDE, PRESERVATIVE FREE 10 ML: 5 INJECTION INTRAVENOUS at 08:12

## 2024-12-18 RX ADMIN — HEPARIN 500 UNITS: 100 SYRINGE at 08:12

## 2024-12-18 NOTE — NURSING
"Pt here for Mediport Flush. ____r__ chestwall mediport accessed with a 20g 1" luna via sterile technique.  Excellent blood return noted.  Flushed with 10ml NS and 5 ml heparin solution.  Needle D/C, site without redness, swelling, or drainage noted.  Dressing applied.  Patient tolerated well.    "

## 2025-01-14 ENCOUNTER — PATIENT MESSAGE (OUTPATIENT)
Dept: INFUSION THERAPY | Facility: HOSPITAL | Age: 75
End: 2025-01-14
Payer: MEDICARE

## 2025-01-27 ENCOUNTER — PATIENT MESSAGE (OUTPATIENT)
Dept: FAMILY MEDICINE | Facility: CLINIC | Age: 75
End: 2025-01-27
Payer: MEDICARE

## 2025-01-27 DIAGNOSIS — E03.9 HYPOTHYROIDISM, UNSPECIFIED TYPE: Primary | ICD-10-CM

## 2025-01-27 DIAGNOSIS — R79.9 ABNORMAL FINDING OF BLOOD CHEMISTRY, UNSPECIFIED: ICD-10-CM

## 2025-01-28 ENCOUNTER — INFUSION (OUTPATIENT)
Dept: INFUSION THERAPY | Facility: HOSPITAL | Age: 75
End: 2025-01-28
Attending: FAMILY MEDICINE
Payer: MEDICARE

## 2025-01-28 DIAGNOSIS — C34.32 MALIGNANT NEOPLASM OF LOWER LOBE OF LEFT LUNG: Primary | ICD-10-CM

## 2025-01-28 PROCEDURE — 96523 IRRIG DRUG DELIVERY DEVICE: CPT

## 2025-01-28 PROCEDURE — 63600175 PHARM REV CODE 636 W HCPCS: Performed by: INTERNAL MEDICINE

## 2025-01-28 RX ORDER — SODIUM CHLORIDE 0.9 % (FLUSH) 0.9 %
10 SYRINGE (ML) INJECTION
OUTPATIENT
Start: 2025-01-28

## 2025-01-28 RX ORDER — HEPARIN 100 UNIT/ML
500 SYRINGE INTRAVENOUS
Status: DISCONTINUED | OUTPATIENT
Start: 2025-01-28 | End: 2025-01-28 | Stop reason: HOSPADM

## 2025-01-28 RX ORDER — SODIUM CHLORIDE 0.9 % (FLUSH) 0.9 %
10 SYRINGE (ML) INJECTION
Status: DISCONTINUED | OUTPATIENT
Start: 2025-01-28 | End: 2025-01-28 | Stop reason: HOSPADM

## 2025-01-28 RX ORDER — HEPARIN 100 UNIT/ML
500 SYRINGE INTRAVENOUS
OUTPATIENT
Start: 2025-01-28

## 2025-01-28 RX ADMIN — HEPARIN 500 UNITS: 100 SYRINGE at 01:01

## 2025-01-28 NOTE — NURSING
"Pt here for Mediport Flush. Right chestwall mediport accessed with a 20g 1" luna via sterile technique.  Excellent blood return noted.  Flushed with 10ml NS and 5 ml heparin solution.  Needle D/C, site without redness, swelling, or drainage noted.  Dressing applied.  Patient tolerated well.  "

## 2025-01-29 ENCOUNTER — LAB VISIT (OUTPATIENT)
Dept: LAB | Facility: HOSPITAL | Age: 75
End: 2025-01-29
Attending: FAMILY MEDICINE
Payer: MEDICARE

## 2025-01-29 DIAGNOSIS — E03.9 HYPOTHYROIDISM, UNSPECIFIED TYPE: ICD-10-CM

## 2025-01-29 DIAGNOSIS — R79.9 ABNORMAL FINDING OF BLOOD CHEMISTRY, UNSPECIFIED: ICD-10-CM

## 2025-01-29 LAB
ALBUMIN SERPL BCP-MCNC: 4 G/DL (ref 3.5–5.2)
ALP SERPL-CCNC: 76 U/L (ref 40–150)
ALT SERPL W/O P-5'-P-CCNC: <5 U/L (ref 10–44)
ANION GAP SERPL CALC-SCNC: 8 MMOL/L (ref 8–16)
AST SERPL-CCNC: 18 U/L (ref 10–40)
BASOPHILS # BLD AUTO: 0.02 K/UL (ref 0–0.2)
BASOPHILS NFR BLD: 0.5 % (ref 0–1.9)
BILIRUB SERPL-MCNC: 0.7 MG/DL (ref 0.1–1)
BUN SERPL-MCNC: 18 MG/DL (ref 8–23)
CALCIUM SERPL-MCNC: 9.4 MG/DL (ref 8.7–10.5)
CHLORIDE SERPL-SCNC: 104 MMOL/L (ref 95–110)
CHOLEST SERPL-MCNC: 172 MG/DL (ref 120–199)
CHOLEST/HDLC SERPL: 3.6 {RATIO} (ref 2–5)
CO2 SERPL-SCNC: 27 MMOL/L (ref 23–29)
CREAT SERPL-MCNC: 1.4 MG/DL (ref 0.5–1.4)
DIFFERENTIAL METHOD BLD: ABNORMAL
EOSINOPHIL # BLD AUTO: 0.2 K/UL (ref 0–0.5)
EOSINOPHIL NFR BLD: 3.4 % (ref 0–8)
ERYTHROCYTE [DISTWIDTH] IN BLOOD BY AUTOMATED COUNT: 13.8 % (ref 11.5–14.5)
EST. GFR  (NO RACE VARIABLE): 38.7 ML/MIN/1.73 M^2
ESTIMATED AVG GLUCOSE: 100 MG/DL (ref 68–131)
GLUCOSE SERPL-MCNC: 91 MG/DL (ref 70–110)
HBA1C MFR BLD: 5.1 % (ref 4–5.6)
HCT VFR BLD AUTO: 38.2 % (ref 37–48.5)
HDLC SERPL-MCNC: 48 MG/DL (ref 40–75)
HDLC SERPL: 27.9 % (ref 20–50)
HGB BLD-MCNC: 12.2 G/DL (ref 12–16)
IMM GRANULOCYTES # BLD AUTO: 0.02 K/UL (ref 0–0.04)
IMM GRANULOCYTES NFR BLD AUTO: 0.5 % (ref 0–0.5)
LDLC SERPL CALC-MCNC: 106.2 MG/DL (ref 63–159)
LYMPHOCYTES # BLD AUTO: 1.2 K/UL (ref 1–4.8)
LYMPHOCYTES NFR BLD: 26.2 % (ref 18–48)
MCH RBC QN AUTO: 29.8 PG (ref 27–31)
MCHC RBC AUTO-ENTMCNC: 31.9 G/DL (ref 32–36)
MCV RBC AUTO: 93 FL (ref 82–98)
MONOCYTES # BLD AUTO: 0.4 K/UL (ref 0.3–1)
MONOCYTES NFR BLD: 9.1 % (ref 4–15)
NEUTROPHILS # BLD AUTO: 2.7 K/UL (ref 1.8–7.7)
NEUTROPHILS NFR BLD: 60.3 % (ref 38–73)
NONHDLC SERPL-MCNC: 124 MG/DL
NRBC BLD-RTO: 0 /100 WBC
PLATELET # BLD AUTO: 157 K/UL (ref 150–450)
PMV BLD AUTO: 10.9 FL (ref 9.2–12.9)
POTASSIUM SERPL-SCNC: 4.2 MMOL/L (ref 3.5–5.1)
PROT SERPL-MCNC: 7.2 G/DL (ref 6–8.4)
RBC # BLD AUTO: 4.09 M/UL (ref 4–5.4)
SODIUM SERPL-SCNC: 139 MMOL/L (ref 136–145)
TRIGL SERPL-MCNC: 89 MG/DL (ref 30–150)
TSH SERPL DL<=0.005 MIU/L-ACNC: 2.06 UIU/ML (ref 0.4–4)
WBC # BLD AUTO: 4.39 K/UL (ref 3.9–12.7)

## 2025-01-29 PROCEDURE — 85025 COMPLETE CBC W/AUTO DIFF WBC: CPT | Performed by: FAMILY MEDICINE

## 2025-01-29 PROCEDURE — 83036 HEMOGLOBIN GLYCOSYLATED A1C: CPT | Performed by: FAMILY MEDICINE

## 2025-01-29 PROCEDURE — 80053 COMPREHEN METABOLIC PANEL: CPT | Performed by: FAMILY MEDICINE

## 2025-01-29 PROCEDURE — 80061 LIPID PANEL: CPT | Performed by: FAMILY MEDICINE

## 2025-01-29 PROCEDURE — 84443 ASSAY THYROID STIM HORMONE: CPT | Performed by: FAMILY MEDICINE

## 2025-01-29 PROCEDURE — 36415 COLL VENOUS BLD VENIPUNCTURE: CPT | Mod: PO | Performed by: FAMILY MEDICINE

## 2025-02-10 ENCOUNTER — PATIENT MESSAGE (OUTPATIENT)
Dept: HEMATOLOGY/ONCOLOGY | Facility: CLINIC | Age: 75
End: 2025-02-10
Payer: MEDICARE

## 2025-02-14 ENCOUNTER — OFFICE VISIT (OUTPATIENT)
Dept: FAMILY MEDICINE | Facility: CLINIC | Age: 75
End: 2025-02-14
Attending: FAMILY MEDICINE
Payer: MEDICARE

## 2025-02-14 VITALS
TEMPERATURE: 97 F | HEIGHT: 65 IN | BODY MASS INDEX: 26.1 KG/M2 | WEIGHT: 156.63 LBS | DIASTOLIC BLOOD PRESSURE: 70 MMHG | SYSTOLIC BLOOD PRESSURE: 110 MMHG | HEART RATE: 67 BPM | OXYGEN SATURATION: 98 %

## 2025-02-14 DIAGNOSIS — N18.32 STAGE 3B CHRONIC KIDNEY DISEASE: Primary | ICD-10-CM

## 2025-02-14 DIAGNOSIS — N32.81 OVERACTIVE BLADDER: ICD-10-CM

## 2025-02-14 DIAGNOSIS — M85.80 OSTEOPENIA, UNSPECIFIED LOCATION: ICD-10-CM

## 2025-02-14 DIAGNOSIS — E03.9 HYPOTHYROIDISM, UNSPECIFIED TYPE: ICD-10-CM

## 2025-02-14 DIAGNOSIS — K21.9 GASTROESOPHAGEAL REFLUX DISEASE, UNSPECIFIED WHETHER ESOPHAGITIS PRESENT: ICD-10-CM

## 2025-02-14 DIAGNOSIS — J44.9 COPD, MODERATE: ICD-10-CM

## 2025-02-14 DIAGNOSIS — Z12.39 ENCOUNTER FOR SCREENING FOR MALIGNANT NEOPLASM OF BREAST, UNSPECIFIED SCREENING MODALITY: ICD-10-CM

## 2025-02-14 DIAGNOSIS — Z12.31 ENCOUNTER FOR SCREENING MAMMOGRAM FOR MALIGNANT NEOPLASM OF BREAST: ICD-10-CM

## 2025-02-14 DIAGNOSIS — Z12.11 COLON CANCER SCREENING: ICD-10-CM

## 2025-02-14 PROBLEM — J84.89: Status: RESOLVED | Noted: 2024-08-13 | Resolved: 2025-02-14

## 2025-02-14 PROBLEM — E87.6 HYPOKALEMIA: Status: RESOLVED | Noted: 2024-01-16 | Resolved: 2025-02-14

## 2025-02-14 PROCEDURE — 99999 PR PBB SHADOW E&M-EST. PATIENT-LVL IV: CPT | Mod: PBBFAC,,, | Performed by: FAMILY MEDICINE

## 2025-02-14 RX ORDER — OMEPRAZOLE 20 MG/1
20 CAPSULE, DELAYED RELEASE ORAL DAILY
Qty: 90 CAPSULE | Refills: 4 | Status: SHIPPED | OUTPATIENT
Start: 2025-02-14

## 2025-02-14 RX ORDER — ASPIRIN 325 MG
1 TABLET, DELAYED RELEASE (ENTERIC COATED) ORAL NIGHTLY PRN
COMMUNITY

## 2025-02-14 RX ORDER — TOLTERODINE 4 MG/1
4 CAPSULE, EXTENDED RELEASE ORAL DAILY
Qty: 90 CAPSULE | Refills: 4 | Status: SHIPPED | OUTPATIENT
Start: 2025-02-14 | End: 2025-05-15

## 2025-02-14 NOTE — PROGRESS NOTES
Subjective:       Patient ID: Radha Lamas is a 75 y.o. female.    Chief Complaint: Annual Exam    75 y old female with COPD, hypothyroidism , osteopenia , Hx of lung ca , stage 3 ckd here for f.u .  Sees Dr [facility] . Avoids NSAIDS. Will soon have PET scan to f.u on lung nodule seen last summer. No falls. Declines rhuem f.u at thi time . Walks 1 hr daily . No symptoms of thyroid dysregulation. No falls, memory loss. Due for opth eval .       Review of Systems   Constitutional: Negative.  Negative for activity change and unexpected weight change.   HENT: Negative.  Negative for hearing loss, rhinorrhea and trouble swallowing.    Eyes: Negative.  Negative for discharge and visual disturbance.   Respiratory: Negative.  Negative for chest tightness and wheezing.    Cardiovascular: Negative.  Negative for chest pain and palpitations.   Gastrointestinal: Negative.  Negative for blood in stool, constipation, diarrhea and vomiting.   Endocrine: Negative for polydipsia and polyuria.   Genitourinary: Negative.  Negative for difficulty urinating, dysuria, hematuria and menstrual problem.   Musculoskeletal: Negative.  Negative for arthralgias, joint swelling and neck pain.   Skin: Negative.    Neurological:  Negative for weakness and headaches.   Hematological: Negative.    Psychiatric/Behavioral:  Negative for confusion and dysphoric mood.        Objective:      Physical Exam  Vitals and nursing note reviewed.   Constitutional:       General: She is not in acute distress.     Appearance: She is well-developed. She is not diaphoretic.   HENT:      Head: Normocephalic and atraumatic.      Right Ear: External ear normal.      Left Ear: External ear normal.      Nose: Nose normal.   Eyes:      General: No scleral icterus.        Left eye: No discharge.      Conjunctiva/sclera: Conjunctivae normal.      Pupils: Pupils are equal, round, and reactive to light.   Neck:      Thyroid: No thyromegaly.       Vascular: No JVD.      Trachea: No tracheal deviation.   Cardiovascular:      Rate and Rhythm: Normal rate and regular rhythm.      Heart sounds: Normal heart sounds. No murmur heard.     No friction rub. No gallop.   Pulmonary:      Effort: Pulmonary effort is normal. No respiratory distress.      Breath sounds: Normal breath sounds. No wheezing or rales.   Chest:      Chest wall: No tenderness.   Abdominal:      General: Bowel sounds are normal. There is no distension.      Palpations: Abdomen is soft. There is no mass.      Tenderness: There is no abdominal tenderness. There is no guarding.   Musculoskeletal:      Cervical back: Normal range of motion and neck supple.   Lymphadenopathy:      Cervical: No cervical adenopathy.         Assessment:       1. Stage 3b chronic kidney disease    2. Hypothyroidism, unspecified type    3. COPD, moderate    4. Osteopenia, unspecified location    5. Overactive bladder    6. Gastroesophageal reflux disease, unspecified whether esophagitis present        Plan:     1.- stable . F.u with nephrology   2.- Controlled. Continue levothyroxine   3.- stable . F.u with pulm   4.- Caution with falls.   5.- Stable . Med rf   6.- Stable . Med rf

## 2025-02-18 ENCOUNTER — PATIENT MESSAGE (OUTPATIENT)
Dept: HEMATOLOGY/ONCOLOGY | Facility: CLINIC | Age: 75
End: 2025-02-18
Payer: MEDICARE

## 2025-02-21 ENCOUNTER — HOSPITAL ENCOUNTER (OUTPATIENT)
Dept: RADIOLOGY | Facility: HOSPITAL | Age: 75
Discharge: HOME | End: 2025-02-21
Attending: HOSPITALIST
Payer: MEDICARE

## 2025-02-21 ENCOUNTER — INFUSION (OUTPATIENT)
Dept: INFUSION THERAPY | Facility: HOSPITAL | Age: 75
End: 2025-02-21
Attending: FAMILY MEDICINE
Payer: MEDICARE

## 2025-02-21 DIAGNOSIS — C34.12 ADENOCARCINOMA OF UPPER LOBE OF LEFT LUNG: ICD-10-CM

## 2025-02-21 DIAGNOSIS — C34.32 MALIGNANT NEOPLASM OF LOWER LOBE OF LEFT LUNG: Primary | ICD-10-CM

## 2025-02-21 PROCEDURE — 63600175 PHARM REV CODE 636 W HCPCS: Performed by: INTERNAL MEDICINE

## 2025-02-21 PROCEDURE — 96523 IRRIG DRUG DELIVERY DEVICE: CPT

## 2025-02-21 PROCEDURE — A9552 F18 FDG: HCPCS | Performed by: HOSPITALIST

## 2025-02-21 PROCEDURE — 78815 PET IMAGE W/CT SKULL-THIGH: CPT | Mod: 26,PS,, | Performed by: RADIOLOGY

## 2025-02-21 PROCEDURE — 78815 PET IMAGE W/CT SKULL-THIGH: CPT | Mod: TC

## 2025-02-21 RX ORDER — SODIUM CHLORIDE 0.9 % (FLUSH) 0.9 %
10 SYRINGE (ML) INJECTION
Status: DISCONTINUED | OUTPATIENT
Start: 2025-02-21 | End: 2025-02-21 | Stop reason: HOSPADM

## 2025-02-21 RX ORDER — HEPARIN 100 UNIT/ML
500 SYRINGE INTRAVENOUS
Status: DISCONTINUED | OUTPATIENT
Start: 2025-02-21 | End: 2025-02-21 | Stop reason: HOSPADM

## 2025-02-21 RX ORDER — SODIUM CHLORIDE 0.9 % (FLUSH) 0.9 %
10 SYRINGE (ML) INJECTION
OUTPATIENT
Start: 2025-02-21

## 2025-02-21 RX ORDER — FLUDEOXYGLUCOSE F18 500 MCI/ML
11.09 INJECTION INTRAVENOUS
Status: COMPLETED | OUTPATIENT
Start: 2025-02-21 | End: 2025-02-21

## 2025-02-21 RX ORDER — HEPARIN 100 UNIT/ML
500 SYRINGE INTRAVENOUS
OUTPATIENT
Start: 2025-02-21

## 2025-02-21 RX ADMIN — FLUDEOXYGLUCOSE F-18 11.09 MILLICURIE: 500 INJECTION INTRAVENOUS at 08:02

## 2025-02-21 RX ADMIN — HEPARIN SODIUM (PORCINE) LOCK FLUSH IV SOLN 100 UNIT/ML 500 UNITS: 100 SOLUTION at 09:02

## 2025-02-21 NOTE — NURSING
"Pt here for Mediport access for PET.. Right chestwall mediport accessed with a 20g 1" luna via sterile technique.  Excellent blood return noted.  Flushed with 10ml NS site without redness, swelling, or drainage noted.  Dressing applied.  Patient tolerated well.Patient to waiting area for PET scan.  " No

## 2025-02-25 ENCOUNTER — OFFICE VISIT (OUTPATIENT)
Dept: HEMATOLOGY/ONCOLOGY | Facility: CLINIC | Age: 75
End: 2025-02-25
Payer: MEDICARE

## 2025-02-25 VITALS
WEIGHT: 155.63 LBS | HEIGHT: 65 IN | DIASTOLIC BLOOD PRESSURE: 68 MMHG | TEMPERATURE: 98 F | BODY MASS INDEX: 25.93 KG/M2 | SYSTOLIC BLOOD PRESSURE: 98 MMHG | OXYGEN SATURATION: 97 % | HEART RATE: 73 BPM

## 2025-02-25 DIAGNOSIS — C34.12 ADENOCARCINOMA OF UPPER LOBE OF LEFT LUNG: Primary | ICD-10-CM

## 2025-02-25 DIAGNOSIS — C77.1 SECONDARY MALIGNANT NEOPLASM OF INTRATHORACIC LYMPH NODES: ICD-10-CM

## 2025-02-25 DIAGNOSIS — J84.10 GRANULOMATOUS LUNG DISEASE: ICD-10-CM

## 2025-02-25 PROCEDURE — 99999 PR PBB SHADOW E&M-EST. PATIENT-LVL IV: CPT | Mod: PBBFAC,,, | Performed by: HOSPITALIST

## 2025-02-25 PROCEDURE — 1126F AMNT PAIN NOTED NONE PRSNT: CPT | Mod: CPTII,S$GLB,, | Performed by: HOSPITALIST

## 2025-02-25 PROCEDURE — 3288F FALL RISK ASSESSMENT DOCD: CPT | Mod: CPTII,S$GLB,, | Performed by: HOSPITALIST

## 2025-02-25 PROCEDURE — 3074F SYST BP LT 130 MM HG: CPT | Mod: CPTII,S$GLB,, | Performed by: HOSPITALIST

## 2025-02-25 PROCEDURE — 99214 OFFICE O/P EST MOD 30 MIN: CPT | Mod: S$GLB,,, | Performed by: HOSPITALIST

## 2025-02-25 PROCEDURE — 1159F MED LIST DOCD IN RCRD: CPT | Mod: CPTII,S$GLB,, | Performed by: HOSPITALIST

## 2025-02-25 PROCEDURE — 3078F DIAST BP <80 MM HG: CPT | Mod: CPTII,S$GLB,, | Performed by: HOSPITALIST

## 2025-02-25 PROCEDURE — G2211 COMPLEX E/M VISIT ADD ON: HCPCS | Mod: S$GLB,,, | Performed by: HOSPITALIST

## 2025-02-25 PROCEDURE — 1101F PT FALLS ASSESS-DOCD LE1/YR: CPT | Mod: CPTII,S$GLB,, | Performed by: HOSPITALIST

## 2025-02-25 NOTE — PROGRESS NOTES
[facility] MEDICAL ONCOLOGY - FOLLOW UP VISIT    Reason for visit: Follow up for stage IIB squamous cell carcinoma      Oncology History   Malignant neoplasm of lower lobe of left lung - Squamous cell   4/15/2021 Initial Diagnosis    Malignant neoplasm of lower lobe of left lung - Squamous cell     5/25/2021 Cancer Staged    Staging form: Lung, AJCC 8th Edition  - Clinical: Stage IA1 (cT1a, cN0, cM0)      Cancer Staged    9mm non-keratinizing squamous cell carcinoma, no VPI, no LVI, margin at least 8mm.  Levels 9 and 11 = negative.  T1aN0     4/5/2023 Cancer Staged    Staging form: Lung, AJCC 8th Edition  - Pathologic stage from 4/5/2023: Stage IIB (pT2, pN1, cM0)     5/1/2023 - 7/21/2023 Chemotherapy    Treatment Summary   Plan Name: OP NSCLC PEMETREXED + CISPLATIN Q3W  Treatment Goal: Supportive  Status: Inactive  Start Date: 5/1/2023  End Date: 7/21/2023  Provider: Marissa Brower MD  Chemotherapy: CISplatin (Platinol) 75 mg/m2 = 138 mg in sodium chloride 0.9% 665 mL chemo infusion, 75 mg/m2 = 138 mg, Intravenous, Clinic/HOD 1 time, 4 of 4 cycles  Administration: 138 mg (5/12/2023), 138 mg (6/29/2023), 138 mg (7/20/2023), 138 mg (6/8/2023)  PEMEtrexed disodium (ALIMTA) 900 mg in sodium chloride 0.9% SolP 100 mL chemo infusion, 925 mg, Intravenous, Clinic/HOD 1 time, 4 of 4 cycles  Dose modification: 375 mg/m2 (original dose 500 mg/m2, Cycle 3, Reason: MD Discretion, Comment: neutropenia )  Administration: 900 mg (5/12/2023), 700 mg (6/29/2023), 700 mg (7/20/2023), 700 mg (6/8/2023)     8/14/2023 - 6/18/2024 Chemotherapy    Treatment Summary   Plan Name: OP ATEZOLIZUMAB Q3W  Treatment Goal: Supportive  Status: Inactive  Start Date: 8/14/2023  End Date: 6/18/2024  Provider: Marissa Brower MD  Chemotherapy: [No matching medication found in this treatment plan]     NSCLC of left lung (Resolved)   4/28/2021 Initial Diagnosis    NSCLC of left lung  "(Resolved)      Cancer Staged    9mm non-keratinizing squamous cell carcinoma, no VPI, no LVI, margin at least 8mm.  Levels 9 and 11 = negative.  T1aN0        NANI NSCLC/ADC IIB (pT2 pN1a cMx)  - History of stage I squamous cell carcinoma moderately differentiated left lower lung status post wedge resection 04/28/2021  - Abnormality seen on surveillance for history of squamous cell carcinoma. Initial biopsy February 20, 2023 without neoplasm identified , thus had left lower lobe wedge resection after multiple disciplinary discussion, final pathology positive on 03/28/2022 for invasive moderately differentiated adenocarcinoma station 10 lymph node positive, pleural invasion noted, margins negative.    - Restaging MRI brain negative and PET scan with left hilar avidity SUV 4 and chest wall. Given recent surgery potential inflammation presented at tumor board recommended proceeding with adjuvant chemotherapy and follow-up on repeat imaging. Started adjuvant therapy with chemotherapy and immunotherapy per IMPOWER 010 given PDL1 positive disease 95%.  Mutational analysis negative for EGFR mutation. Noted BRAF mutation.   - Completed adjuvant chemotherapy with cisplatin and pemetrexed tolerated well aside from chemotherapy associated progressive anemia.    - Restaging PET on 8/7/2023 with resolution of prior lymph node avidity.    - Started immunotherapy with atezolizumab (8/14/2023 - present; delay from C3 on 9/26/23 and C4 on 11/14/23 due to concnern for pneumonitis)  - 2/5/24: CT C, "no detrimental interval change in appearance"  - 3/01/24: CT C non-con, no acute pathology, remaining findings unchanged when compared to prior study  - 6/17/24:  CT C, interval development of 2.1 x 1.8 x 1.1 cm left lung base nodule concerning for disease recurrence versus new lung lesion; RLL peripheral ill-defined ground-glass unchanged; 0.6 cm medial left lung base nodule; 0.5 cm left costophrenic angle nodule  - 6/21/24:  PET-CT, 1.6 x " 1.3 cm LLL nodule, SUV 4.5; 1.1 x 1.0 cm left hilar lymph node, SUV 5.6;  1.2 x 0.9 cm AP window lymph node, SUV 5.9; 1.2 x 0.7 cm superior mediastinum lymph node, SUV 5.0  - 7/3/24: Robotic Bronch w/ EBUS   - LLL endobronchial mass: Areas of organizing pnuemonia, no malignancy   - Station 4L LN: No malignancy   - Station 10L LN: Non-necrotizing granulomatous inflammation   - LN (site unspecified): Non-necrotizing granulomatous inflammation  - 10/3/24: CT C non-con   - Resolution of LLL pulmonary nodule   - Postsurgical changes to left lung redemonstrated   - No new suspicious pulmonary nodule  - 2/21/25: PET CT  No FDG findings to suggest recurrent or metastatic disease        HPI:     Radha Lamas is a 72 yo woman w/ pmh significant for COPD and two primary L lung cancers as below. She presents today for follow up.     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 (PD-L1 50%, insufficient sample for NGS), initially dx'd 4/28/21, s/p wedge resection (4/28/21)    2) Stage IIB (pT2, pN1a, cMx) adenocarcinoma of the NANI (PD-L1 95%, BRAF [id number] mutated), initially dx'd 3/28/23, s/p wedge resection 3/28/23, adjuvant cisplatin/pemetrexed x 4 cycles (5/11/23-7/20/23), and currently on adjuvant atezolizumab (8/14/23 - 6/18/24; of note, delay between C3 on 9/26/23 and C4 on 11/14/23 due to concern for pneumonitis; c/b likely G1 granulomatous change due to immunotherapy, stopped after C10), now on active surveillance    Last clinic 10/8/24, repeat PET-CT in 4.5 months based on discussion with pulmonology (lymphadenopathy difficult to discern with noncontrast head CT in setting of CKD).  RTC 1 day after imaging complete.    Interval History:   2/21/25: PET-CT, no evidence of disease    The pt states that she feels well physically.     She says that her energy levels have been good. She notes that she is renovating a house that she recently inherited.     She says that her breathing is unchanged compared to prior. She  "says that, with picking up a lot of heavy objects, she feels SOB. She also states that she cannot talk much when she walks.     ROS:  Review of Systems   A complete 12-point review of systems was reviewed and is negative except as mentioned above.       Physical Exam:       BP 98/68   Pulse 73   Temp 97.9 °F (36.6 °C) (Temporal)   Ht 5' 5" (1.651 m)   Wt 70.6 kg (155 lb 10.3 oz)   SpO2 97%   BMI 25.90 kg/m²                Physical Exam  Constitutional:       Appearance: Normal appearance.   HENT:      Head: Normocephalic and atraumatic.   Eyes:      Extraocular Movements: Extraocular movements intact.      Conjunctiva/sclera: Conjunctivae normal.      Pupils: Pupils are equal, round, and reactive to light.   Pulmonary:      Effort: Pulmonary effort is normal.   Musculoskeletal:         General: Normal range of motion.      Right lower leg: No edema.      Left lower leg: No edema.   Skin:     General: Skin is warm and dry.   Neurological:      Mental Status: She is alert and oriented to person, place, and time.   Psychiatric:         Mood and Affect: Mood normal.         Thought Content: Thought content normal.         Judgment: Judgment normal.           Labs:   No results found for this or any previous visit (from the past 48 hours).            Path:   1. Left lung, wedge resection: Invasive adenocarcinoma, predominantly solid   pattern, measuring 9 mm (0.9 cm) in greatest dimension.          Tumor is located 3 mm (0.3 cm) from the blue inked/stapled parenchymal   surgical margin.          Tumor extends into the elastic layer of the visceral pleura.          See synoptic report in comment section for further details.   2. Lymph node, left level 11, excision: 1 benign fragmented lymph node with   sinus histiocytosis and anthracotic pigment, negative for metastatic   carcinoma (0/1).   3. Lymph node, left level 10, excision: 1 lymph node, positive for metastatic   carcinoma with crush artifact dense fibrosis " and focal necrosis (1/1).          Confirmed with positive immunohistochemical stain with cytokeratin   AE1/AE3 with satisfactory positive and negative controls.   4. Lymph node, left level 12, excision: 1 benign lymph node with sinus   histiocytosis, negative for metastatic carcinoma (0/1).   5. Lymph node, left level 9, excision: 1 benign fragmented lymph node with   sinus histiocytosis and anthracotic pigment, negative for metastatic   carcinoma (0/1).   6. Lymph node, level 7, excision: 1 benign lymph node with sinus   histiocytosis, negative for metastatic carcinoma (0/1).   7.  Lymph node, level 5, excision: 1 benign fragmented lymph node with sinus   histiocytosis and anthracotic pigment, negative for metastatic carcinoma   (0/1).   8. Lung, lingula, anatomic lingulectomy: Lung with congested vasculature.          No residual carcinoma is identified.          Bronchial and vascular margin is negative for malignancy.          See synoptic report in comment section for further details.       Assessment and Plan:     Radha Lamas is a 74 yo woman w/ pmh significant for COPD and two primary L lung cancers as below. She presents today for follow up.     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 (PD-L1 50%, insufficient sample for NGS), initially dx'd 4/28/21, s/p wedge resection (4/28/21)    2) Stage IIB (pT2, pN1a, cMx) adenocarcinoma of the NANI (PD-L1 95%, BRAF V600E mutated), initially dx'd 3/28/23, s/p wedge resection 3/28/23, adjuvant cisplatin/pemetrexed x 4 cycles (5/11/23-7/20/23), and currently on adjuvant atezolizumab (8/14/23 - 6/18/24; of note, delay between C3 on 9/26/23 and C4 on 11/14/23 due to concern for pneumonitis; c/b likely G1 granulomatous change due to immunotherapy, stopped after C10), now on active surveillance      Stage IIB Adenocarcinoma of NANI  Immunotherapy-Induced Granulomatous Lung Changes  ECOG PS 1.  Patient is currently on active surveillance after completing  atezolizumab (see notes from 7/9/24, 7/12/24).  Her most recent imaging (PET-CT, 2/21/25, on surveillance) is without FDG avid evidence of recurrent or metastatic disease.  Given stability of imaging, will transition to q6m surveillance imaging w/ CT scans for 2-3 years post-surgery followed by annual LDC (see previous note regarding need for PET imaging).     PLAN:   Continue active surveillance  CT C non-con (CKD) in 6 months  RTC at least 1 day after imaging complete  Given almost 23 months since surgery, okay to remove port now. Order in place.       The above information has been reviewed with the patient and all questions have been answered to their apparent satisfaction.  They understand that they can call the clinic with any questions.    Orion Arce MD  Hematology/Oncology  [facility]        Med Onc Chart Routing      Follow up with physician . CT C in 6 months. RTC at least 1 day afterwards. Order in for port removal.   Follow up with [facility]    Infusion scheduling note    Injection scheduling note    Labs    Imaging    Pharmacy appointment    Other referrals

## 2025-02-25 NOTE — H&P (VIEW-ONLY)
[facility] MEDICAL ONCOLOGY - FOLLOW UP VISIT    Reason for visit: Follow up for stage IIB squamous cell carcinoma      Oncology History   Malignant neoplasm of lower lobe of left lung - Squamous cell   4/15/2021 Initial Diagnosis    Malignant neoplasm of lower lobe of left lung - Squamous cell     5/25/2021 Cancer Staged    Staging form: Lung, AJCC 8th Edition  - Clinical: Stage IA1 (cT1a, cN0, cM0)      Cancer Staged    9mm non-keratinizing squamous cell carcinoma, no VPI, no LVI, margin at least 8mm.  Levels 9 and 11 = negative.  T1aN0     4/5/2023 Cancer Staged    Staging form: Lung, AJCC 8th Edition  - Pathologic stage from 4/5/2023: Stage IIB (pT2, pN1, cM0)     5/1/2023 - 7/21/2023 Chemotherapy    Treatment Summary   Plan Name: OP NSCLC PEMETREXED + CISPLATIN Q3W  Treatment Goal: Supportive  Status: Inactive  Start Date: 5/1/2023  End Date: 7/21/2023  Provider: Marissa Brower MD  Chemotherapy: CISplatin (Platinol) 75 mg/m2 = 138 mg in sodium chloride 0.9% 665 mL chemo infusion, 75 mg/m2 = 138 mg, Intravenous, Clinic/HOD 1 time, 4 of 4 cycles  Administration: 138 mg (5/12/2023), 138 mg (6/29/2023), 138 mg (7/20/2023), 138 mg (6/8/2023)  PEMEtrexed disodium (ALIMTA) 900 mg in sodium chloride 0.9% SolP 100 mL chemo infusion, 925 mg, Intravenous, Clinic/HOD 1 time, 4 of 4 cycles  Dose modification: 375 mg/m2 (original dose 500 mg/m2, Cycle 3, Reason: MD Discretion, Comment: neutropenia )  Administration: 900 mg (5/12/2023), 700 mg (6/29/2023), 700 mg (7/20/2023), 700 mg (6/8/2023)     8/14/2023 - 6/18/2024 Chemotherapy    Treatment Summary   Plan Name: OP ATEZOLIZUMAB Q3W  Treatment Goal: Supportive  Status: Inactive  Start Date: 8/14/2023  End Date: 6/18/2024  Provider: Marissa Brower MD  Chemotherapy: [No matching medication found in this treatment plan]     NSCLC of left lung (Resolved)   4/28/2021 Initial Diagnosis    NSCLC of left lung  "(Resolved)      Cancer Staged    9mm non-keratinizing squamous cell carcinoma, no VPI, no LVI, margin at least 8mm.  Levels 9 and 11 = negative.  T1aN0        NANI NSCLC/ADC IIB (pT2 pN1a cMx)  - History of stage I squamous cell carcinoma moderately differentiated left lower lung status post wedge resection 04/28/2021  - Abnormality seen on surveillance for history of squamous cell carcinoma. Initial biopsy February 20, 2023 without neoplasm identified , thus had left lower lobe wedge resection after multiple disciplinary discussion, final pathology positive on 03/28/2022 for invasive moderately differentiated adenocarcinoma station 10 lymph node positive, pleural invasion noted, margins negative.    - Restaging MRI brain negative and PET scan with left hilar avidity SUV 4 and chest wall. Given recent surgery potential inflammation presented at tumor board recommended proceeding with adjuvant chemotherapy and follow-up on repeat imaging. Started adjuvant therapy with chemotherapy and immunotherapy per IMPOWER 010 given PDL1 positive disease 95%.  Mutational analysis negative for EGFR mutation. Noted BRAF mutation.   - Completed adjuvant chemotherapy with cisplatin and pemetrexed tolerated well aside from chemotherapy associated progressive anemia.    - Restaging PET on 8/7/2023 with resolution of prior lymph node avidity.    - Started immunotherapy with atezolizumab (8/14/2023 - present; delay from C3 on 9/26/23 and C4 on 11/14/23 due to concnern for pneumonitis)  - 2/5/24: CT C, "no detrimental interval change in appearance"  - 3/01/24: CT C non-con, no acute pathology, remaining findings unchanged when compared to prior study  - 6/17/24:  CT C, interval development of 2.1 x 1.8 x 1.1 cm left lung base nodule concerning for disease recurrence versus new lung lesion; RLL peripheral ill-defined ground-glass unchanged; 0.6 cm medial left lung base nodule; 0.5 cm left costophrenic angle nodule  - 6/21/24:  PET-CT, 1.6 x " 1.3 cm LLL nodule, SUV 4.5; 1.1 x 1.0 cm left hilar lymph node, SUV 5.6;  1.2 x 0.9 cm AP window lymph node, SUV 5.9; 1.2 x 0.7 cm superior mediastinum lymph node, SUV 5.0  - 7/3/24: Robotic Bronch w/ EBUS   - LLL endobronchial mass: Areas of organizing pnuemonia, no malignancy   - Station 4L LN: No malignancy   - Station 10L LN: Non-necrotizing granulomatous inflammation   - LN (site unspecified): Non-necrotizing granulomatous inflammation  - 10/3/24: CT C non-con   - Resolution of LLL pulmonary nodule   - Postsurgical changes to left lung redemonstrated   - No new suspicious pulmonary nodule  - 2/21/25: PET CT  No FDG findings to suggest recurrent or metastatic disease        HPI:     Radha Lamas is a 74 yo woman w/ pmh significant for COPD and two primary L lung cancers as below. She presents today for follow up.     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 (PD-L1 50%, insufficient sample for NGS), initially dx'd 4/28/21, s/p wedge resection (4/28/21)    2) Stage IIB (pT2, pN1a, cMx) adenocarcinoma of the NANI (PD-L1 95%, BRAF V600E mutated), initially dx'd 3/28/23, s/p wedge resection 3/28/23, adjuvant cisplatin/pemetrexed x 4 cycles (5/11/23-7/20/23), and currently on adjuvant atezolizumab (8/14/23 - 6/18/24; of note, delay between C3 on 9/26/23 and C4 on 11/14/23 due to concern for pneumonitis; c/b likely G1 granulomatous change due to immunotherapy, stopped after C10), now on active surveillance    Last clinic 10/8/24, repeat PET-CT in 4.5 months based on discussion with pulmonology (lymphadenopathy difficult to discern with noncontrast head CT in setting of CKD).  RTC 1 day after imaging complete.    Interval History:   2/21/25: PET-CT, no evidence of disease    The pt states that she feels well physically.     She says that her energy levels have been good. She notes that she is renovating a house that she recently inherited.     She says that her breathing is unchanged compared to prior. She  "says that, with picking up a lot of heavy objects, she feels SOB. She also states that she cannot talk much when she walks.     ROS:  Review of Systems   A complete 12-point review of systems was reviewed and is negative except as mentioned above.       Physical Exam:       BP 98/68   Pulse 73   Temp 97.9 °F (36.6 °C) (Temporal)   Ht 5' 5" (1.651 m)   Wt 70.6 kg (155 lb 10.3 oz)   SpO2 97%   BMI 25.90 kg/m²                Physical Exam  Constitutional:       Appearance: Normal appearance.   HENT:      Head: Normocephalic and atraumatic.   Eyes:      Extraocular Movements: Extraocular movements intact.      Conjunctiva/sclera: Conjunctivae normal.      Pupils: Pupils are equal, round, and reactive to light.   Pulmonary:      Effort: Pulmonary effort is normal.   Musculoskeletal:         General: Normal range of motion.      Right lower leg: No edema.      Left lower leg: No edema.   Skin:     General: Skin is warm and dry.   Neurological:      Mental Status: She is alert and oriented to person, place, and time.   Psychiatric:         Mood and Affect: Mood normal.         Thought Content: Thought content normal.         Judgment: Judgment normal.           Labs:   No results found for this or any previous visit (from the past 48 hours).            Path:   1. Left lung, wedge resection: Invasive adenocarcinoma, predominantly solid   pattern, measuring 9 mm (0.9 cm) in greatest dimension.          Tumor is located 3 mm (0.3 cm) from the blue inked/stapled parenchymal   surgical margin.          Tumor extends into the elastic layer of the visceral pleura.          See synoptic report in comment section for further details.   2. Lymph node, left level 11, excision: 1 benign fragmented lymph node with   sinus histiocytosis and anthracotic pigment, negative for metastatic   carcinoma (0/1).   3. Lymph node, left level 10, excision: 1 lymph node, positive for metastatic   carcinoma with crush artifact dense fibrosis " and focal necrosis (1/1).          Confirmed with positive immunohistochemical stain with cytokeratin   AE1/AE3 with satisfactory positive and negative controls.   4. Lymph node, left level 12, excision: 1 benign lymph node with sinus   histiocytosis, negative for metastatic carcinoma (0/1).   5. Lymph node, left level 9, excision: 1 benign fragmented lymph node with   sinus histiocytosis and anthracotic pigment, negative for metastatic   carcinoma (0/1).   6. Lymph node, level 7, excision: 1 benign lymph node with sinus   histiocytosis, negative for metastatic carcinoma (0/1).   7.  Lymph node, level 5, excision: 1 benign fragmented lymph node with sinus   histiocytosis and anthracotic pigment, negative for metastatic carcinoma   (0/1).   8. Lung, lingula, anatomic lingulectomy: Lung with congested vasculature.          No residual carcinoma is identified.          Bronchial and vascular margin is negative for malignancy.          See synoptic report in comment section for further details.       Assessment and Plan:     Radha Lamas is a 72 yo woman w/ pmh significant for COPD and two primary L lung cancers as below. She presents today for follow up.     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 (PD-L1 50%, insufficient sample for NGS), initially dx'd 4/28/21, s/p wedge resection (4/28/21)    2) Stage IIB (pT2, pN1a, cMx) adenocarcinoma of the NANI (PD-L1 95%, BRAF V600E mutated), initially dx'd 3/28/23, s/p wedge resection 3/28/23, adjuvant cisplatin/pemetrexed x 4 cycles (5/11/23-7/20/23), and currently on adjuvant atezolizumab (8/14/23 - 6/18/24; of note, delay between C3 on 9/26/23 and C4 on 11/14/23 due to concern for pneumonitis; c/b likely G1 granulomatous change due to immunotherapy, stopped after C10), now on active surveillance      Stage IIB Adenocarcinoma of NANI  Immunotherapy-Induced Granulomatous Lung Changes  ECOG PS 1.  Patient is currently on active surveillance after completing  atezolizumab (see notes from 7/9/24, 7/12/24).  Her most recent imaging (PET-CT, 2/21/25, on surveillance) is without FDG avid evidence of recurrent or metastatic disease.  Given stability of imaging, will transition to q6m surveillance imaging w/ CT scans for 2-3 years post-surgery followed by annual LDC (see previous note regarding need for PET imaging).     PLAN:   Continue active surveillance  CT C non-con (CKD) in 6 months  RTC at least 1 day after imaging complete  Given almost 23 months since surgery, okay to remove port now. Order in place.       The above information has been reviewed with the patient and all questions have been answered to their apparent satisfaction.  They understand that they can call the clinic with any questions.    Orion Arce MD  Hematology/Oncology  [facility]        Med Onc Chart Routing      Follow up with physician . CT C in 6 months. RTC at least 1 day afterwards. Order in for port removal.   Follow up with [facility]    Infusion scheduling note    Injection scheduling note    Labs    Imaging    Pharmacy appointment    Other referrals

## 2025-03-03 ENCOUNTER — PATIENT MESSAGE (OUTPATIENT)
Dept: HEMATOLOGY/ONCOLOGY | Facility: CLINIC | Age: 75
End: 2025-03-03
Payer: MEDICARE

## 2025-03-03 DIAGNOSIS — C34.12 ADENOCARCINOMA OF UPPER LOBE OF LEFT LUNG: Primary | ICD-10-CM

## 2025-03-03 DIAGNOSIS — R79.1 ABNORMAL COAGULATION PROFILE: ICD-10-CM

## 2025-03-04 ENCOUNTER — TELEPHONE (OUTPATIENT)
Dept: RADIOLOGY | Facility: HOSPITAL | Age: 75
End: 2025-03-04
Payer: MEDICARE

## 2025-03-04 ENCOUNTER — LAB VISIT (OUTPATIENT)
Dept: LAB | Facility: HOSPITAL | Age: 75
End: 2025-03-04
Attending: NURSE PRACTITIONER
Payer: MEDICARE

## 2025-03-04 DIAGNOSIS — C34.12 ADENOCARCINOMA OF UPPER LOBE OF LEFT LUNG: ICD-10-CM

## 2025-03-04 DIAGNOSIS — R79.1 ABNORMAL COAGULATION PROFILE: ICD-10-CM

## 2025-03-04 DIAGNOSIS — C34.12 ADENOCARCINOMA OF UPPER LOBE OF LEFT LUNG: Primary | ICD-10-CM

## 2025-03-04 LAB
ALBUMIN SERPL BCP-MCNC: 3.9 G/DL (ref 3.5–5.2)
ALP SERPL-CCNC: 80 U/L (ref 40–150)
ALT SERPL W/O P-5'-P-CCNC: <5 U/L (ref 10–44)
ANION GAP SERPL CALC-SCNC: 8 MMOL/L (ref 8–16)
AST SERPL-CCNC: 19 U/L (ref 10–40)
BASOPHILS # BLD AUTO: 0.02 K/UL (ref 0–0.2)
BASOPHILS NFR BLD: 0.5 % (ref 0–1.9)
BILIRUB SERPL-MCNC: 0.6 MG/DL (ref 0.1–1)
BUN SERPL-MCNC: 20 MG/DL (ref 8–23)
CALCIUM SERPL-MCNC: 9.3 MG/DL (ref 8.7–10.5)
CHLORIDE SERPL-SCNC: 104 MMOL/L (ref 95–110)
CO2 SERPL-SCNC: 27 MMOL/L (ref 23–29)
CREAT SERPL-MCNC: 1.6 MG/DL (ref 0.5–1.4)
DIFFERENTIAL METHOD BLD: ABNORMAL
EOSINOPHIL # BLD AUTO: 0.1 K/UL (ref 0–0.5)
EOSINOPHIL NFR BLD: 2.7 % (ref 0–8)
ERYTHROCYTE [DISTWIDTH] IN BLOOD BY AUTOMATED COUNT: 13.4 % (ref 11.5–14.5)
EST. GFR  (NO RACE VARIABLE): 33 ML/MIN/1.73 M^2
GLUCOSE SERPL-MCNC: 98 MG/DL (ref 70–110)
HCT VFR BLD AUTO: 37.5 % (ref 37–48.5)
HGB BLD-MCNC: 11.9 G/DL (ref 12–16)
IMM GRANULOCYTES # BLD AUTO: 0.01 K/UL (ref 0–0.04)
IMM GRANULOCYTES NFR BLD AUTO: 0.2 % (ref 0–0.5)
INR PPP: 0.9 (ref 0.8–1.2)
LYMPHOCYTES # BLD AUTO: 1 K/UL (ref 1–4.8)
LYMPHOCYTES NFR BLD: 24.8 % (ref 18–48)
MCH RBC QN AUTO: 29.7 PG (ref 27–31)
MCHC RBC AUTO-ENTMCNC: 31.7 G/DL (ref 32–36)
MCV RBC AUTO: 94 FL (ref 82–98)
MONOCYTES # BLD AUTO: 0.3 K/UL (ref 0.3–1)
MONOCYTES NFR BLD: 8 % (ref 4–15)
NEUTROPHILS # BLD AUTO: 2.7 K/UL (ref 1.8–7.7)
NEUTROPHILS NFR BLD: 63.8 % (ref 38–73)
NRBC BLD-RTO: 0 /100 WBC
PLATELET # BLD AUTO: 156 K/UL (ref 150–450)
PMV BLD AUTO: 10.8 FL (ref 9.2–12.9)
POTASSIUM SERPL-SCNC: 3.7 MMOL/L (ref 3.5–5.1)
PROT SERPL-MCNC: 7 G/DL (ref 6–8.4)
PROTHROMBIN TIME: 10.6 SEC (ref 9–12.5)
RBC # BLD AUTO: 4.01 M/UL (ref 4–5.4)
SODIUM SERPL-SCNC: 139 MMOL/L (ref 136–145)
WBC # BLD AUTO: 4.15 K/UL (ref 3.9–12.7)

## 2025-03-04 PROCEDURE — 85025 COMPLETE CBC W/AUTO DIFF WBC: CPT | Performed by: NURSE PRACTITIONER

## 2025-03-04 PROCEDURE — 85610 PROTHROMBIN TIME: CPT | Performed by: NURSE PRACTITIONER

## 2025-03-04 PROCEDURE — 36415 COLL VENOUS BLD VENIPUNCTURE: CPT | Mod: PO | Performed by: NURSE PRACTITIONER

## 2025-03-04 PROCEDURE — 80053 COMPREHEN METABOLIC PANEL: CPT | Performed by: NURSE PRACTITIONER

## 2025-03-04 NOTE — TELEPHONE ENCOUNTER
Interventional Radiology  Scheduled 10:30AM Mediport removal for 3/6/25 w/patient.  Instructed pt. to arrive by 9:00AM to the hospital ([id number]/ Entrance 2) off [facility] [address] in [place] and check in at Patient Registration located on the first floor.  She must have a ride () and NPO after midnight the night before.  Pt. denies taking ASA or other blood thinners, NSAIDs, fish oil, or GLP-1/GIP agonists.  Pt. can take morning medications the day of the procedure with a small sip of water.  She verbalized understanding of all instructions.

## 2025-03-05 ENCOUNTER — TELEPHONE (OUTPATIENT)
Dept: RADIOLOGY | Facility: HOSPITAL | Age: 75
End: 2025-03-05
Payer: MEDICARE

## 2025-03-05 NOTE — TELEPHONE ENCOUNTER
Interventional Radiology  Called pt. and confirmed her appointment tomorrow, 3/6/25 at 10:30AM.  Pt. is to arrive by 9:00AM to the hospital ([id number]/ Entr[address]) off [facility] [address] in [place] and check in at Patient Registration located on the first floor.  She confirmed she will have a ride and be NPO after midnight tonight.  Explained that she can take morning medications with a small sip of water.  Pt. does not take medications that need to be stopped prior to this procedure.  She verbalized understanding of all instructions.

## 2025-03-06 ENCOUNTER — HOSPITAL ENCOUNTER (OUTPATIENT)
Dept: RADIOLOGY | Facility: HOSPITAL | Age: 75
Discharge: HOME | End: 2025-03-06
Attending: HOSPITALIST
Payer: MEDICARE

## 2025-03-06 VITALS
DIASTOLIC BLOOD PRESSURE: 58 MMHG | OXYGEN SATURATION: 97 % | SYSTOLIC BLOOD PRESSURE: 119 MMHG | RESPIRATION RATE: 17 BRPM | HEART RATE: 64 BPM

## 2025-03-06 DIAGNOSIS — C34.12 ADENOCARCINOMA OF UPPER LOBE OF LEFT LUNG: ICD-10-CM

## 2025-03-06 PROCEDURE — 63600175 PHARM REV CODE 636 W HCPCS: Performed by: RADIOLOGY

## 2025-03-06 PROCEDURE — 25000003 PHARM REV CODE 250: Performed by: RADIOLOGY

## 2025-03-06 RX ORDER — LIDOCAINE HYDROCHLORIDE 20 MG/ML
INJECTION, SOLUTION EPIDURAL; INFILTRATION; INTRACAUDAL; PERINEURAL CODE/TRAUMA/SEDATION MEDICATION
Status: COMPLETED | OUTPATIENT
Start: 2025-03-06 | End: 2025-03-06

## 2025-03-06 RX ORDER — MIDAZOLAM HYDROCHLORIDE 1 MG/ML
INJECTION, SOLUTION INTRAMUSCULAR; INTRAVENOUS CODE/TRAUMA/SEDATION MEDICATION
Status: COMPLETED | OUTPATIENT
Start: 2025-03-06 | End: 2025-03-06

## 2025-03-06 RX ORDER — FENTANYL CITRATE 50 UG/ML
INJECTION, SOLUTION INTRAMUSCULAR; INTRAVENOUS CODE/TRAUMA/SEDATION MEDICATION
Status: COMPLETED | OUTPATIENT
Start: 2025-03-06 | End: 2025-03-06

## 2025-03-06 RX ADMIN — LIDOCAINE HYDROCHLORIDE 5 ML: 20 INJECTION, SOLUTION EPIDURAL; INFILTRATION; INTRACAUDAL; PERINEURAL at 11:03

## 2025-03-06 RX ADMIN — VANCOMYCIN HYDROCHLORIDE 1000 MG: 1 INJECTION, POWDER, LYOPHILIZED, FOR SOLUTION INTRAVENOUS at 11:03

## 2025-03-06 RX ADMIN — FENTANYL CITRATE 50 MCG: 50 INJECTION, SOLUTION INTRAMUSCULAR; INTRAVENOUS at 11:03

## 2025-03-06 RX ADMIN — MIDAZOLAM HYDROCHLORIDE 1 MG: 1 INJECTION, SOLUTION INTRAMUSCULAR; INTRAVENOUS at 11:03

## 2025-03-06 NOTE — DISCHARGE SUMMARY
[facility] (Hospital)  Discharge Note  Short Stay    Ambulatory referral/consult to Interventional Radiology  IR Tunneled Cath Removal with Port      OUTCOME: Patient tolerated treatment/procedure well without complication and is now ready for discharge.    DISPOSITION: Home or Self Care    FINAL DIAGNOSIS:  <principal problem not specified>    FOLLOWUP: In clinic    DISCHARGE INSTRUCTIONS:  No discharge procedures on file.      Clinical Reference Documents Added to Patient Instructions         Document    PORT REMOVAL (ENGLISH)            Successful Mediport removal.

## 2025-03-06 NOTE — DISCHARGE INSTRUCTIONS
Please return to ER if any of these symptoms occur:  Fever over 101 degrees,  Bleeding from the puncture site not controlled, (Hold pressure for 5 minutes, if bleeding does not stop then go to the ER.)  Pain not controlled with Aleve or Tylenol,    No driving for 24 hours after procedure due to sedation given during procedure.     Do not lift anything heavy, nothing over the size of a gallon of milk, for at least 2 days.    Do not submerge in standing water for 2 days after biopsy but you may shower.    Resume home medications and diet

## 2025-03-13 ENCOUNTER — RESULTS FOLLOW-UP (OUTPATIENT)
Dept: FAMILY MEDICINE | Facility: CLINIC | Age: 75
End: 2025-03-13

## 2025-03-13 ENCOUNTER — HOSPITAL ENCOUNTER (OUTPATIENT)
Dept: RADIOLOGY | Facility: HOSPITAL | Age: 75
Discharge: HOME | End: 2025-03-13
Attending: FAMILY MEDICINE
Payer: MEDICARE

## 2025-03-13 DIAGNOSIS — Z12.31 ENCOUNTER FOR SCREENING MAMMOGRAM FOR MALIGNANT NEOPLASM OF BREAST: ICD-10-CM

## 2025-03-13 DIAGNOSIS — Z12.39 ENCOUNTER FOR SCREENING FOR MALIGNANT NEOPLASM OF BREAST, UNSPECIFIED SCREENING MODALITY: ICD-10-CM

## 2025-03-13 PROCEDURE — 77067 SCR MAMMO BI INCL CAD: CPT | Mod: 26,,, | Performed by: RADIOLOGY

## 2025-03-13 PROCEDURE — 77067 SCR MAMMO BI INCL CAD: CPT | Mod: TC

## 2025-03-13 PROCEDURE — 77063 BREAST TOMOSYNTHESIS BI: CPT | Mod: 26,,, | Performed by: RADIOLOGY

## 2025-05-23 ENCOUNTER — OFFICE VISIT (OUTPATIENT)
Dept: PULMONOLOGY | Facility: CLINIC | Age: 75
End: 2025-05-23
Payer: MEDICARE

## 2025-05-23 ENCOUNTER — CLINICAL SUPPORT (OUTPATIENT)
Dept: PULMONOLOGY | Facility: CLINIC | Age: 75
End: 2025-05-23
Payer: MEDICARE

## 2025-05-23 VITALS
OXYGEN SATURATION: 97 % | BODY MASS INDEX: 25.66 KG/M2 | HEART RATE: 75 BPM | WEIGHT: 154 LBS | DIASTOLIC BLOOD PRESSURE: 79 MMHG | HEIGHT: 65 IN | SYSTOLIC BLOOD PRESSURE: 120 MMHG

## 2025-05-23 DIAGNOSIS — Z87.891 FORMER HEAVY CIGARETTE SMOKER (20-39 PER DAY): ICD-10-CM

## 2025-05-23 DIAGNOSIS — J30.89 NON-SEASONAL ALLERGIC RHINITIS DUE TO OTHER ALLERGIC TRIGGER: ICD-10-CM

## 2025-05-23 DIAGNOSIS — J44.9 COPD, MODERATE: ICD-10-CM

## 2025-05-23 DIAGNOSIS — R09.02 EXERCISE HYPOXEMIA: ICD-10-CM

## 2025-05-23 DIAGNOSIS — J44.9 COPD, MODERATE: Primary | ICD-10-CM

## 2025-05-23 PROBLEM — R91.1 NODULE OF LEFT LUNG: Status: RESOLVED | Noted: 2020-03-10 | Resolved: 2025-05-23

## 2025-05-23 LAB
BRPFT: ABNORMAL
DLCO SINGLE BREATH LLN: 15.15
DLCO SINGLE BREATH PRE REF: 61 %
DLCO SINGLE BREATH REF: 20.88
DLCOC SBVA LLN: 2.71
DLCOC SBVA REF: 4.09
DLCOC SINGLE BREATH LLN: 15.15
DLCOC SINGLE BREATH REF: 20.88
DLCOVA LLN: 2.71
DLCOVA PRE REF: 68.9 %
DLCOVA PRE: 2.82 ML/(MIN*MMHG*L) (ref 2.71–5.46)
DLCOVA REF: 4.09
ERV LLN: -16449.45
ERV PRE REF: 120.2 %
ERV REF: 0.55
FEF 25 75 CHG: -21.9 %
FEF 25 75 LLN: 0.97
FEF 25 75 POST REF: 25.6 %
FEF 25 75 PRE REF: 32.8 %
FEF 25 75 REF: 2.37
FET100 CHG: 33.4 %
FEV1 CHG: -1.1 %
FEV1 FVC CHG: -6.1 %
FEV1 FVC LLN: 63
FEV1 FVC POST REF: 73.5 %
FEV1 FVC PRE REF: 78.3 %
FEV1 FVC REF: 77
FEV1 LLN: 1.46
FEV1 POST REF: 74.2 %
FEV1 PRE REF: 75 %
FEV1 REF: 2.07
FRCPLETH LLN: 1.95
FRCPLETH PREREF: 136.3 %
FRCPLETH REF: 2.78
FVC CHG: 5.3 %
FVC LLN: 1.92
FVC POST REF: 99.7 %
FVC PRE REF: 94.7 %
FVC REF: 2.71
IVC PRE: 2.52 L (ref 1.92–3.55)
IVC SINGLE BREATH LLN: 1.92
IVC SINGLE BREATH PRE REF: 92.8 %
IVC SINGLE BREATH REF: 2.71
MVV LLN: 68
MVV PRE REF: 82.2 %
MVV REF: 80
PEF CHG: 16.3 %
PEF LLN: 3.46
PEF POST REF: 68.6 %
PEF PRE REF: 59 %
PEF REF: 5.23
POST FEF 25 75: 0.61 L/S (ref 0.97–3.76)
POST FET 100: 11.49 SEC
POST FEV1 FVC: 56.74 % (ref 62.89–89.5)
POST FEV1: 1.54 L (ref 1.46–2.65)
POST FVC: 2.71 L (ref 1.92–3.55)
POST PEF: 3.59 L/S (ref 3.46–6.99)
PRE DLCO: 12.74 ML/(MIN*MMHG) (ref 15.15–26.61)
PRE ERV: 0.67 L (ref -16449.45–16450.55)
PRE FEF 25 75: 0.78 L/S (ref 0.97–3.76)
PRE FET 100: 8.61 SEC
PRE FEV1 FVC: 60.43 % (ref 62.89–89.5)
PRE FEV1: 1.55 L (ref 1.46–2.65)
PRE FRC PL: 3.78 L (ref 1.95–3.6)
PRE FVC: 2.57 L (ref 1.92–3.55)
PRE MVV: 65.67 L/MIN (ref 67.88–91.84)
PRE PEF: 3.08 L/S (ref 3.46–6.99)
PRE RV: 3.12 L (ref 1.64–2.8)
PRE TLC: 5.69 L (ref 4.12–6.09)
RAW LLN: 3.06
RAW PRE REF: 152.4 %
RAW PRE: 4.66 CMH2O*S/L (ref 3.06–3.06)
RAW REF: 3.06
RV LLN: 1.64
RV PRE REF: 140.4 %
RV REF: 2.22
RVTLC LLN: 36
RVTLC PRE REF: 121.4 %
RVTLC PRE: 54.81 % (ref 35.55–54.73)
RVTLC REF: 45
TLC LLN: 4.12
TLC PRE REF: 111.3 %
TLC REF: 5.11
VA PRE: 4.52 L (ref 4.96–4.96)
VA SINGLE BREATH LLN: 4.96
VA SINGLE BREATH PRE REF: 91.3 %
VA SINGLE BREATH REF: 4.96
VC LLN: 1.92
VC PRE REF: 94.7 %
VC PRE: 2.57 L (ref 1.92–3.55)
VC REF: 2.71

## 2025-05-23 PROCEDURE — 99999 PR PBB SHADOW E&M-EST. PATIENT-LVL III: CPT | Mod: PBBFAC,,, | Performed by: INTERNAL MEDICINE

## 2025-05-23 RX ORDER — TIOTROPIUM BROMIDE AND OLODATEROL 3.124; 2.736 UG/1; UG/1
2 SPRAY, METERED RESPIRATORY (INHALATION) DAILY
Qty: 12 G | Refills: 3 | Status: SHIPPED | OUTPATIENT
Start: 2025-05-23

## 2025-05-23 RX ORDER — FLUTICASONE PROPIONATE 50 MCG
2 SPRAY, SUSPENSION (ML) NASAL DAILY
Qty: 48 G | Refills: 3 | Status: SHIPPED | OUTPATIENT
Start: 2025-05-23

## 2025-05-23 NOTE — PROGRESS NOTES
Subjective:      Patient ID: Radha Lamas is a 75 y.o. female.    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  Levels 9 and 11 = negative.  T1aN0       In March 2023 advised stop eating heavy breakfast before exercise related to suspect post prandial shortness of breath.  Since stopped eating before exercise, no longer short of breath with morning walk or doing work in yard.   She feels so much better since not eating a big meal before walking or yard work.    Formulary preferred is Stiolto, patient willing to change to Stiolto.   No cough, no wheezing, no mucous, no longer shortness of breath with walking program.     Regular walking program 5 days a week x 40 minutes, a little over a mile. Works in garden and yard, stays very active.     Two primary LLL lung cancers initially dx'd 4/28/2021.      Current regimen: Spiriva Albuterol inhaler       5/9/2024 CPFT Spirometry shows mild obstruction. Lung volume determination is normal. Spirometry remains unimproved following bronchodilator. Airway mechanics are abnormal showing increased airway resistance and decreased conductance. DLCO is moderately decreased. MVV is normal.       3/6/2024 Gahn NP  Radha Lamas 74 year old female here with her  to pulmonary clinic for follow up requested by Oncologist,   Yazmin related to patient compliant of shortness of breath with exertion, mainly with walking around track in morning with daughter.   There is also sensation of flutter of epigastric region then sensation of sob if lifting container while filling with water at home. She has to put container down and sensation will resolve.  Patient feels shortness of breath links to treatment with Atezolizumab. Since occurred with past dosing. She is not opposed to continue Atezolizumab treatment since no desats of oxygen when feeling shortness of breath.   Patient cardiologist is non [facility], she can not name right off at this visit.   She states her heart checks out okay.   COPD remains stable on Anoro and Albuterol inhaler. There is no cough, no wheezing, no sputum production, no chest pain.     Evaluation today for shortness of breath feno and 6mwd are unrevealing.  3/6/2024 6MWD No desaturations requiring supplemental oxygen at rest or exertion. Normal exercise capacity.   3/6/2024 FeNO 9, no inflammation of lungs suggested.  No indication to add ICS controller to COPD inhaler regimen. Continue ANORO daily and Albuterol inhaler or nebs before exercise.   Advised possible post prandial shortness of breath and for a trial of avoiding eating large meal just before exercise. Pt reports she does eat a good breakfast right before going to walk with her daughter, not hungry just eats because her  eats breakfast just before her scheduled walk with daughter. (Recommended 1/2 banana instead of big breakfast before exercise).            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      2023 PET-CT No concerning FDG avid lesion or detrimental change.  Resolution/improvement of previously noted chest findings.     2023 PET-CT Postoperative changes left lung with a very small loculated hydropneumothorax in the upper left hemithorax (mainly fluid with minimal air). Small approximate 1 cm hypermetabolic node at the left hilum adjacent to staple line.  Subcentimeter minimally hypermetabolic nearby AP window lymph node, increased from prior.  These could be inflammatory in nature though close follow-up is advised particularly for the left hilar hypermetabolic focus.  The examination elsewhere is unremarkable with no additional foci of abnormal uptake.    Patient has family history of cancer in sister lung cancer diagnosis age 55 years with metastasis brain,  2018.   Father with lung cancer at 67 yrs,  age 68 years. Sister and father both smokers.   Brother 69 year old with new diagnosis of rectal cancer, finished chemo and radiation.    COPD Questionnaire  How often do you cough?: A little of the time  How often do you have phlegm (mucus) in your chest?: Almost never  How often does your chest feel tight?: Never  When you walk up a hill or one flight of stairs, how often are you breathless?: All of the time  How often are you limited doing any activities at home?: Never  How often are you confident leaving the house despite your lung condition?: All of the time  How often do you sleep soundly?: All of the time  How often do you have energy?: Most of the time  Total score: 9     Previous Report Reviewed: historical medical records, office notes and radiology reports      Past Medical History: The following portions of the patient's history were reviewed and updated as appropriate:   She  has a past surgical history that includes Knee cartilage surgery (Right, 2011); Thoracoscopic wedge resection of lung (Left, 2021); Tubal ligation (); Wedge resection, lung,  robot-assisted, using VATS, using da Kaci Xi (Left, 03/20/2023); Laparoscopic lysis of adhesions (Left, 03/20/2023); Robot-assisted laparoscopic lymphadenectomy using da Kaci Xi (Left, 03/20/2023); Injection of anesthetic agent around multiple intercostal nerves (Left, 03/20/2023); Fluoroscopy (N/A, 04/27/2023); Breast mass excision; Bronchoscopy (Bilateral, 7/3/2024); robotic bronchoscopy (N/A, 7/3/2024); and Endobronchial ultrasound (Bilateral, 7/3/2024).  Her family history includes Cancer in her brother, father, and sister; Heart failure in her mother; Hypertension in her father and mother; Macular degeneration in her mother; Retinal detachment in her mother.  She  reports that she quit smoking about 16 years ago. Her smoking use included cigarettes. She started smoking about 61 years ago. She has a 67.5 pack-year smoking history. She has never been exposed to tobacco smoke. She has never used smokeless tobacco. She reports that she does not drink alcohol and does not use drugs.  She has a current medication list which includes the following prescription(s): albuterol, calcium carbonate, cholecalciferol (vitamin d3), hydrocortisone, levothyroxine, loratadine, omeprazole, clotrimazole, fluticasone propionate, stiolto respimat, and tolterodine, and the following Facility-Administered Medications: prochlorperazine.  She has no known allergies.    Review of Systems   Constitutional:  Negative for fever, chills, weight loss, weight gain, activity change, appetite change, fatigue and night sweats.   HENT:  Negative for postnasal drip, rhinorrhea, sinus pressure, voice change and congestion.    Eyes:  Negative for redness and itching.   Respiratory:  Negative for snoring, cough, sputum production, chest tightness, shortness of breath, wheezing, orthopnea, asthma nighttime symptoms, dyspnea on extertion, use of rescue inhaler and somnolence.    Cardiovascular: Negative.  Negative for chest pain, palpitations and leg  "swelling.   Genitourinary:  Negative for difficulty urinating and hematuria.   Endocrine:  Negative for cold intolerance and heat intolerance.    Musculoskeletal:  Negative for arthralgias, gait problem, joint swelling and myalgias.   Skin: Negative.    Gastrointestinal:  Negative for nausea, vomiting, abdominal pain and acid reflux.   Neurological:  Negative for dizziness, weakness, light-headedness and headaches.   Hematological:  Negative for adenopathy. No excessive bruising.   All other systems reviewed and are negative.     Objective:   /79 (BP Location: Right arm, Patient Position: Sitting)   Pulse 75   Ht 5' 5" (1.651 m)   Wt 69.9 kg (154 lb)   SpO2 97%   BMI 25.63 kg/m²   Physical Exam  Vitals reviewed.   Constitutional:       General: She is not in acute distress.     Appearance: She is well-developed. She is not ill-appearing or toxic-appearing.   HENT:      Head: Normocephalic.      Right Ear: External ear normal.      Left Ear: External ear normal.      Nose: Nose normal.      Mouth/Throat:      Pharynx: No oropharyngeal exudate.   Eyes:      Conjunctiva/sclera: Conjunctivae normal.   Cardiovascular:      Rate and Rhythm: Normal rate and regular rhythm.      Heart sounds: Normal heart sounds.   Pulmonary:      Effort: Pulmonary effort is normal.      Breath sounds: Normal breath sounds. No stridor.   Abdominal:      Palpations: Abdomen is soft.   Musculoskeletal:         General: Normal range of motion.      Cervical back: Normal range of motion and neck supple.   Lymphadenopathy:      Cervical: No cervical adenopathy.   Skin:     General: Skin is warm and dry.   Neurological:      Mental Status: She is alert and oriented to person, place, and time.   Psychiatric:         Behavior: Behavior normal. Behavior is cooperative.         Thought Content: Thought content normal.         Judgment: Judgment normal.       Personal Diagnostic Review    5/9/2024 CPFT Spirometry shows mild obstruction. " Lung volume determination is normal. Spirometry remains unimproved following bronchodilator. Airway mechanics are abnormal showing increased airway resistance and decreased conductance. DLCO is moderately decreased. MVV is normal.     3/6/2024 FeNO 9   Clinical Guide to Interpretation or FeNO Levels :     FeNO  (ppb) LOW INTERMEDIATE HIGH   ADULT VALUES < 25 25-50          > 50   Th2-driven Inflammation Unlikely Likely Significant      Patients FeNO level at this visit : __9__ (ppb)     Interpretation of FeNO measurement in adults:  [x] FENO is less than 25 ppb implies non eosinophilic airway inflammation or the absence of airway inflammation.               Comment: Low FENO (<25 ppb) in adult asthmatics with persistent symptoms suggests other etiologies for these symptoms and a lower likelihood of benefit from adding or increasing inhaled glucocorticoids.    3/6/2024 6MWD No desaturations requiring supplemental oxygen at rest or exertion. Exercise capacity is normal.  Phase Oxygen Assessment Supplemental O2 Heart   Rate Blood Pressure Edmundo Dyspnea Scale Rating   Resting 99 % Room Air 78 bpm 132/60 0   Exercise             Minute             1 95 % Room Air 98 bpm       2 94 % Room Air 110 bpm       3 93 % Room Air 111 bpm       4 95 % Room Air 111 bpm       5 93 % Room Air 111 bpm       6  94 % Room Air 109 bpm 136/62 3   Recovery             Minute             1 98 % Room Air 84 bpm       2 98 % Room Air 86 bpm       3 100 % Room Air 89 bpm       4 99 % Room Air 89 bpm 136/67 0      Six Minute Walk Summary  6MWT Status: completed without stopping  Patient Reported: Dyspnea         Interpretation:  Did the patient stop or pause?: No  Total Time Walked (Calculated): 360 seconds  Final Partial Lap Distance (feet): 100 feet  Total Distance Meters (Calculated): 396.24 meters  Predicted Distance Meters (Calculated): 407.08 meters  Percentage of Predicted (Calculated): 97.34  Peak VO2 (Calculated): 15.87  Mets: 4.53  Has  The Patient Had a Previous Six Minute Walk Test?: Yes            Assessment:     1. COPD, moderate    2. Exercise hypoxemia    3. Non-seasonal allergic rhinitis due to other allergic trigger    4. Former heavy cigarette smoker (20-39 per day)            Orders Placed This Encounter   Procedures    Six Minute Walk Test to qualify for Home Oxygen     Standing Status:   Future     Expiration Date:   5/23/2026     Release to patient:   Immediate     Plan:   Discussed diagnosis, its evaluation, treatment and usual course. All questions answered.  Problem List Items Addressed This Visit       COPD, moderate - Primary    Relevant Medications    tiotropium-olodateroL (STIOLTO RESPIMAT) 2.5-2.5 mcg/actuation Mist    Other Relevant Orders    Six Minute Walk Test to qualify for Home Oxygen    Former heavy cigarette smoker (20-39 per day)     Other Visit Diagnoses         Exercise hypoxemia        Relevant Orders    Six Minute Walk Test to qualify for Home Oxygen      Non-seasonal allergic rhinitis due to other allergic trigger        Relevant Medications    fluticasone propionate (FLONASE) 50 mcg/actuation nasal spray            No LOS data to display  This includes face to face time and non-face to face time preparing to see the patient (eg, review of tests), obtaining and/or reviewing separately obtained history, documenting clinical information in the electronic or other health record, independently interpreting results and communicating results to the patient/family/caregiver, or care coordinator.    Follow up in about 1 year (around 5/23/2026) for 6 min walk - on return.     Hemal Quintero MD  [facility] [place]

## 2025-05-29 ENCOUNTER — PATIENT MESSAGE (OUTPATIENT)
Dept: FAMILY MEDICINE | Facility: CLINIC | Age: 75
End: 2025-05-29

## 2025-05-29 ENCOUNTER — HOSPITAL ENCOUNTER (OUTPATIENT)
Dept: RADIOLOGY | Facility: HOSPITAL | Age: 75
Discharge: HOME | End: 2025-05-29
Attending: NURSE PRACTITIONER
Payer: MEDICARE

## 2025-05-29 ENCOUNTER — OFFICE VISIT (OUTPATIENT)
Dept: FAMILY MEDICINE | Facility: CLINIC | Age: 75
End: 2025-05-29
Payer: MEDICARE

## 2025-05-29 VITALS
DIASTOLIC BLOOD PRESSURE: 68 MMHG | BODY MASS INDEX: 26.14 KG/M2 | HEIGHT: 65 IN | OXYGEN SATURATION: 97 % | TEMPERATURE: 98 F | SYSTOLIC BLOOD PRESSURE: 112 MMHG | WEIGHT: 156.88 LBS | HEART RATE: 77 BPM

## 2025-05-29 DIAGNOSIS — S92.512D CLOSED DISPLACED FRACTURE OF PROXIMAL PHALANX OF LESSER TOE OF LEFT FOOT WITH ROUTINE HEALING, SUBSEQUENT ENCOUNTER: ICD-10-CM

## 2025-05-29 DIAGNOSIS — S92.512D CLOSED DISPLACED FRACTURE OF PROXIMAL PHALANX OF LESSER TOE OF LEFT FOOT WITH ROUTINE HEALING, SUBSEQUENT ENCOUNTER: Primary | ICD-10-CM

## 2025-05-29 PROCEDURE — 3288F FALL RISK ASSESSMENT DOCD: CPT | Mod: CPTII,S$GLB,, | Performed by: NURSE PRACTITIONER

## 2025-05-29 PROCEDURE — 99214 OFFICE O/P EST MOD 30 MIN: CPT | Mod: S$GLB,,, | Performed by: NURSE PRACTITIONER

## 2025-05-29 PROCEDURE — 3078F DIAST BP <80 MM HG: CPT | Mod: CPTII,S$GLB,, | Performed by: NURSE PRACTITIONER

## 2025-05-29 PROCEDURE — 3074F SYST BP LT 130 MM HG: CPT | Mod: CPTII,S$GLB,, | Performed by: NURSE PRACTITIONER

## 2025-05-29 PROCEDURE — 99999 PR PBB SHADOW E&M-EST. PATIENT-LVL IV: CPT | Mod: PBBFAC,,, | Performed by: NURSE PRACTITIONER

## 2025-05-29 PROCEDURE — 73630 X-RAY EXAM OF FOOT: CPT | Mod: 26,LT,, | Performed by: RADIOLOGY

## 2025-05-29 PROCEDURE — 1101F PT FALLS ASSESS-DOCD LE1/YR: CPT | Mod: CPTII,S$GLB,, | Performed by: NURSE PRACTITIONER

## 2025-05-29 PROCEDURE — 73630 X-RAY EXAM OF FOOT: CPT | Mod: TC,PO,LT

## 2025-05-29 PROCEDURE — 1126F AMNT PAIN NOTED NONE PRSNT: CPT | Mod: CPTII,S$GLB,, | Performed by: NURSE PRACTITIONER

## 2025-05-30 ENCOUNTER — PATIENT MESSAGE (OUTPATIENT)
Dept: FAMILY MEDICINE | Facility: CLINIC | Age: 75
End: 2025-05-30
Payer: MEDICARE

## 2025-05-30 ENCOUNTER — RESULTS FOLLOW-UP (OUTPATIENT)
Dept: FAMILY MEDICINE | Facility: CLINIC | Age: 75
End: 2025-05-30

## 2025-05-30 DIAGNOSIS — S92.512D CLOSED DISPLACED FRACTURE OF PROXIMAL PHALANX OF LESSER TOE OF LEFT FOOT WITH ROUTINE HEALING, SUBSEQUENT ENCOUNTER: Primary | ICD-10-CM

## 2025-06-02 ENCOUNTER — OFFICE VISIT (OUTPATIENT)
Dept: OPHTHALMOLOGY | Facility: CLINIC | Age: 75
End: 2025-06-02
Payer: MEDICARE

## 2025-06-02 DIAGNOSIS — H52.7 REFRACTION DISORDER: ICD-10-CM

## 2025-06-02 DIAGNOSIS — H25.13 CATARACT, NUCLEAR SCLEROTIC SENILE, BILATERAL: Primary | ICD-10-CM

## 2025-06-02 DIAGNOSIS — Z83.511 FAMILY HISTORY OF OPEN-ANGLE GLAUCOMA: ICD-10-CM

## 2025-06-02 PROCEDURE — 92015 DETERMINE REFRACTIVE STATE: CPT | Mod: S$GLB,,, | Performed by: OPTOMETRIST

## 2025-06-02 PROCEDURE — 92014 COMPRE OPH EXAM EST PT 1/>: CPT | Mod: S$GLB,,, | Performed by: OPTOMETRIST

## 2025-06-02 PROCEDURE — 99999 PR PBB SHADOW E&M-EST. PATIENT-LVL III: CPT | Mod: PBBFAC,,, | Performed by: OPTOMETRIST

## 2025-06-02 PROCEDURE — 1159F MED LIST DOCD IN RCRD: CPT | Mod: CPTII,S$GLB,, | Performed by: OPTOMETRIST

## 2025-06-05 ENCOUNTER — OFFICE VISIT (OUTPATIENT)
Dept: PODIATRY | Facility: CLINIC | Age: 75
End: 2025-06-05
Payer: MEDICARE

## 2025-06-05 DIAGNOSIS — M79.89 PAIN AND SWELLING OF TOE, LEFT: ICD-10-CM

## 2025-06-05 DIAGNOSIS — S92.515D CLOSED NONDISPLACED FRACTURE OF PROXIMAL PHALANX OF LESSER TOE OF LEFT FOOT WITH ROUTINE HEALING, SUBSEQUENT ENCOUNTER: Primary | ICD-10-CM

## 2025-06-05 DIAGNOSIS — M79.675 PAIN AND SWELLING OF TOE, LEFT: ICD-10-CM

## 2025-06-05 PROCEDURE — 99999 PR PBB SHADOW E&M-EST. PATIENT-LVL III: CPT | Mod: PBBFAC,,, | Performed by: PODIATRIST

## 2025-06-20 ENCOUNTER — PATIENT MESSAGE (OUTPATIENT)
Dept: PODIATRY | Facility: CLINIC | Age: 75
End: 2025-06-20
Payer: MEDICARE

## 2025-06-20 ENCOUNTER — RESULTS FOLLOW-UP (OUTPATIENT)
Dept: PODIATRY | Facility: HOSPITAL | Age: 75
End: 2025-06-20

## 2025-06-20 ENCOUNTER — HOSPITAL ENCOUNTER (OUTPATIENT)
Dept: RADIOLOGY | Facility: HOSPITAL | Age: 75
Discharge: HOME | End: 2025-06-20
Attending: PODIATRIST
Payer: MEDICARE

## 2025-06-20 DIAGNOSIS — S92.515D CLOSED NONDISPLACED FRACTURE OF PROXIMAL PHALANX OF LESSER TOE OF LEFT FOOT WITH ROUTINE HEALING, SUBSEQUENT ENCOUNTER: ICD-10-CM

## 2025-06-20 DIAGNOSIS — M79.675 PAIN AND SWELLING OF TOE, LEFT: ICD-10-CM

## 2025-06-20 DIAGNOSIS — M79.89 PAIN AND SWELLING OF TOE, LEFT: ICD-10-CM

## 2025-06-20 PROCEDURE — 73630 X-RAY EXAM OF FOOT: CPT | Mod: TC,PO,LT

## 2025-06-20 PROCEDURE — 73630 X-RAY EXAM OF FOOT: CPT | Mod: 26,LT,, | Performed by: STUDENT IN AN ORGANIZED HEALTH CARE EDUCATION/TRAINING PROGRAM

## 2025-07-22 RX ORDER — LEVOTHYROXINE SODIUM 75 UG/1
75 TABLET ORAL
Qty: 90 TABLET | Refills: 1 | Status: SHIPPED | OUTPATIENT
Start: 2025-07-22

## 2025-07-22 NOTE — TELEPHONE ENCOUNTER
Refill Decision Note   Radha Lamas  is requesting a refill authorization.  Brief Assessment and Rationale for Refill:  Approve     Medication Therapy Plan:        Comments:     Note composed:5:14 PM 07/22/2025

## 2025-07-22 NOTE — TELEPHONE ENCOUNTER
No care due was identified.  [facility] Embedded Care Due Messages. Reference number: [id number].   7/22/2025 11:29:27 AM CDT

## 2025-07-25 DIAGNOSIS — J44.9 COPD, MODERATE: ICD-10-CM

## 2025-07-25 RX ORDER — ALBUTEROL SULFATE 90 UG/1
2 INHALANT RESPIRATORY (INHALATION) EVERY 4 HOURS PRN
Qty: 17 G | Refills: 11 | Status: SHIPPED | OUTPATIENT
Start: 2025-07-25

## 2025-07-26 ENCOUNTER — PATIENT MESSAGE (OUTPATIENT)
Dept: PULMONOLOGY | Facility: CLINIC | Age: 75
End: 2025-07-26
Payer: MEDICARE

## 2025-07-26 DIAGNOSIS — J44.9 COPD, MODERATE: ICD-10-CM

## 2025-07-28 DIAGNOSIS — J44.9 COPD, MODERATE: ICD-10-CM

## 2025-07-29 RX ORDER — TIOTROPIUM BROMIDE AND OLODATEROL 3.124; 2.736 UG/1; UG/1
2 SPRAY, METERED RESPIRATORY (INHALATION) DAILY
Qty: 12 G | Refills: 3 | Status: SHIPPED | OUTPATIENT
Start: 2025-07-29

## 2025-08-04 ENCOUNTER — PATIENT MESSAGE (OUTPATIENT)
Dept: FAMILY MEDICINE | Facility: CLINIC | Age: 75
End: 2025-08-04
Payer: MEDICARE

## 2025-08-04 DIAGNOSIS — E03.9 HYPOTHYROIDISM, UNSPECIFIED TYPE: Primary | ICD-10-CM

## 2025-08-04 DIAGNOSIS — R79.9 ABNORMAL FINDING OF BLOOD CHEMISTRY, UNSPECIFIED: ICD-10-CM

## 2025-08-06 ENCOUNTER — APPOINTMENT (OUTPATIENT)
Dept: LAB | Facility: HOSPITAL | Age: 75
End: 2025-08-06
Attending: FAMILY MEDICINE
Payer: MEDICARE

## 2025-08-06 DIAGNOSIS — E03.9 HYPOTHYROIDISM, UNSPECIFIED TYPE: ICD-10-CM

## 2025-08-06 DIAGNOSIS — R79.9 ABNORMAL FINDING OF BLOOD CHEMISTRY, UNSPECIFIED: ICD-10-CM

## 2025-08-06 LAB
ABSOLUTE EOSINOPHIL (OHS): 0.13 K/UL
ABSOLUTE MONOCYTE (OHS): 0.34 K/UL (ref 0.3–1)
ABSOLUTE NEUTROPHIL COUNT (OHS): 2.35 K/UL (ref 1.8–7.7)
ALBUMIN SERPL BCP-MCNC: 3.9 G/DL (ref 3.5–5.2)
ALP SERPL-CCNC: 80 UNIT/L (ref 40–150)
ALT SERPL W/O P-5'-P-CCNC: <8 UNIT/L (ref 0–55)
ANION GAP (OHS): 15 MMOL/L (ref 8–16)
AST SERPL-CCNC: 29 UNIT/L (ref 0–50)
BASOPHILS # BLD AUTO: 0.02 K/UL
BASOPHILS NFR BLD AUTO: 0.5 %
BILIRUB SERPL-MCNC: 0.8 MG/DL (ref 0.1–1)
BUN SERPL-MCNC: 15 MG/DL (ref 8–23)
CALCIUM SERPL-MCNC: 9.5 MG/DL (ref 8.7–10.5)
CHLORIDE SERPL-SCNC: 107 MMOL/L (ref 95–110)
CHOLEST SERPL-MCNC: 157 MG/DL (ref 120–199)
CHOLEST/HDLC SERPL: 3.2 {RATIO} (ref 2–5)
CO2 SERPL-SCNC: 23 MMOL/L (ref 23–29)
CREAT SERPL-MCNC: 1.2 MG/DL (ref 0.5–1.4)
EAG (OHS): 100 MG/DL (ref 68–131)
ERYTHROCYTE [DISTWIDTH] IN BLOOD BY AUTOMATED COUNT: 13.5 % (ref 11.5–14.5)
GFR SERPLBLD CREATININE-BSD FMLA CKD-EPI: 47 ML/MIN/1.73/M2
GLUCOSE SERPL-MCNC: 79 MG/DL (ref 70–110)
HBA1C MFR BLD: 5.1 % (ref 4–5.6)
HCT VFR BLD AUTO: 39.6 % (ref 37–48.5)
HDLC SERPL-MCNC: 49 MG/DL (ref 40–75)
HDLC SERPL: 31.2 % (ref 20–50)
HGB BLD-MCNC: 12.4 GM/DL (ref 12–16)
IMM GRANULOCYTES # BLD AUTO: 0.03 K/UL (ref 0–0.04)
IMM GRANULOCYTES NFR BLD AUTO: 0.7 % (ref 0–0.5)
LDLC SERPL CALC-MCNC: 94.2 MG/DL (ref 63–159)
LYMPHOCYTES # BLD AUTO: 1.2 K/UL (ref 1–4.8)
MCH RBC QN AUTO: 29.3 PG (ref 27–31)
MCHC RBC AUTO-ENTMCNC: 31.3 G/DL (ref 32–36)
MCV RBC AUTO: 94 FL (ref 82–98)
NONHDLC SERPL-MCNC: 108 MG/DL
NUCLEATED RBC (/100WBC) (OHS): 0 /100 WBC
PLATELET # BLD AUTO: 158 K/UL (ref 150–450)
PMV BLD AUTO: 10.7 FL (ref 9.2–12.9)
POTASSIUM SERPL-SCNC: 4 MMOL/L (ref 3.5–5.1)
PROT SERPL-MCNC: 6.8 GM/DL (ref 6–8.4)
RBC # BLD AUTO: 4.23 M/UL (ref 4–5.4)
RELATIVE EOSINOPHIL (OHS): 3.2 %
RELATIVE LYMPHOCYTE (OHS): 29.5 % (ref 18–48)
RELATIVE MONOCYTE (OHS): 8.4 % (ref 4–15)
RELATIVE NEUTROPHIL (OHS): 57.7 % (ref 38–73)
SODIUM SERPL-SCNC: 145 MMOL/L (ref 136–145)
TRIGL SERPL-MCNC: 69 MG/DL (ref 30–150)
TSH SERPL-ACNC: 0.48 UIU/ML (ref 0.4–4)
WBC # BLD AUTO: 4.07 K/UL (ref 3.9–12.7)

## 2025-08-06 PROCEDURE — 80061 LIPID PANEL: CPT

## 2025-08-06 PROCEDURE — 83036 HEMOGLOBIN GLYCOSYLATED A1C: CPT

## 2025-08-06 PROCEDURE — 84443 ASSAY THYROID STIM HORMONE: CPT

## 2025-08-06 PROCEDURE — 80053 COMPREHEN METABOLIC PANEL: CPT

## 2025-08-06 PROCEDURE — 85025 COMPLETE CBC W/AUTO DIFF WBC: CPT

## 2025-08-06 PROCEDURE — 36415 COLL VENOUS BLD VENIPUNCTURE: CPT | Mod: PO

## 2025-08-12 ENCOUNTER — OFFICE VISIT (OUTPATIENT)
Dept: FAMILY MEDICINE | Facility: CLINIC | Age: 75
End: 2025-08-12
Attending: FAMILY MEDICINE
Payer: MEDICARE

## 2025-08-12 ENCOUNTER — TELEPHONE (OUTPATIENT)
Dept: FAMILY MEDICINE | Facility: CLINIC | Age: 75
End: 2025-08-12

## 2025-08-12 VITALS
DIASTOLIC BLOOD PRESSURE: 70 MMHG | BODY MASS INDEX: 25.71 KG/M2 | TEMPERATURE: 97 F | OXYGEN SATURATION: 96 % | SYSTOLIC BLOOD PRESSURE: 112 MMHG | HEART RATE: 79 BPM | HEIGHT: 65 IN | WEIGHT: 154.31 LBS

## 2025-08-12 DIAGNOSIS — J44.9 COPD, MODERATE: Primary | ICD-10-CM

## 2025-08-12 DIAGNOSIS — M85.80 OSTEOPENIA, UNSPECIFIED LOCATION: ICD-10-CM

## 2025-08-12 DIAGNOSIS — C34.32 MALIGNANT NEOPLASM OF LOWER LOBE OF LEFT LUNG: ICD-10-CM

## 2025-08-12 DIAGNOSIS — N18.32 STAGE 3B CHRONIC KIDNEY DISEASE: ICD-10-CM

## 2025-08-12 DIAGNOSIS — E03.9 HYPOTHYROIDISM, UNSPECIFIED TYPE: ICD-10-CM

## 2025-08-12 PROCEDURE — 3074F SYST BP LT 130 MM HG: CPT | Mod: CPTII,S$GLB,, | Performed by: FAMILY MEDICINE

## 2025-08-12 PROCEDURE — 3288F FALL RISK ASSESSMENT DOCD: CPT | Mod: CPTII,S$GLB,, | Performed by: FAMILY MEDICINE

## 2025-08-12 PROCEDURE — 1101F PT FALLS ASSESS-DOCD LE1/YR: CPT | Mod: CPTII,S$GLB,, | Performed by: FAMILY MEDICINE

## 2025-08-12 PROCEDURE — 1160F RVW MEDS BY RX/DR IN RCRD: CPT | Mod: CPTII,S$GLB,, | Performed by: FAMILY MEDICINE

## 2025-08-12 PROCEDURE — 99214 OFFICE O/P EST MOD 30 MIN: CPT | Mod: S$GLB,,, | Performed by: FAMILY MEDICINE

## 2025-08-12 PROCEDURE — 3078F DIAST BP <80 MM HG: CPT | Mod: CPTII,S$GLB,, | Performed by: FAMILY MEDICINE

## 2025-08-12 PROCEDURE — 1126F AMNT PAIN NOTED NONE PRSNT: CPT | Mod: CPTII,S$GLB,, | Performed by: FAMILY MEDICINE

## 2025-08-12 PROCEDURE — G2211 COMPLEX E/M VISIT ADD ON: HCPCS | Mod: S$GLB,,, | Performed by: FAMILY MEDICINE

## 2025-08-12 PROCEDURE — 99999 PR PBB SHADOW E&M-EST. PATIENT-LVL IV: CPT | Mod: PBBFAC,,, | Performed by: FAMILY MEDICINE

## 2025-08-12 PROCEDURE — 1159F MED LIST DOCD IN RCRD: CPT | Mod: CPTII,S$GLB,, | Performed by: FAMILY MEDICINE

## 2025-08-18 ENCOUNTER — HOSPITAL ENCOUNTER (OUTPATIENT)
Dept: RADIOLOGY | Facility: HOSPITAL | Age: 75
Discharge: HOME | End: 2025-08-18
Attending: HOSPITALIST
Payer: MEDICARE

## 2025-08-18 DIAGNOSIS — C34.12 ADENOCARCINOMA OF UPPER LOBE OF LEFT LUNG: ICD-10-CM

## 2025-08-18 PROCEDURE — 71250 CT THORAX DX C-: CPT | Mod: TC

## 2025-08-18 PROCEDURE — 71250 CT THORAX DX C-: CPT | Mod: 26,,, | Performed by: RADIOLOGY

## 2025-08-19 ENCOUNTER — OFFICE VISIT (OUTPATIENT)
Dept: HEMATOLOGY/ONCOLOGY | Facility: CLINIC | Age: 75
End: 2025-08-19
Payer: MEDICARE

## 2025-08-19 VITALS
TEMPERATURE: 98 F | DIASTOLIC BLOOD PRESSURE: 72 MMHG | HEART RATE: 76 BPM | BODY MASS INDEX: 25.9 KG/M2 | WEIGHT: 155.44 LBS | HEIGHT: 65 IN | SYSTOLIC BLOOD PRESSURE: 112 MMHG | OXYGEN SATURATION: 97 %

## 2025-08-19 DIAGNOSIS — C34.12 ADENOCARCINOMA OF UPPER LOBE OF LEFT LUNG: Primary | ICD-10-CM

## 2025-08-19 DIAGNOSIS — C77.1 SECONDARY MALIGNANT NEOPLASM OF INTRATHORACIC LYMPH NODES: ICD-10-CM

## 2025-08-19 PROCEDURE — 1159F MED LIST DOCD IN RCRD: CPT | Mod: CPTII,S$GLB,, | Performed by: HOSPITALIST

## 2025-08-19 PROCEDURE — 3288F FALL RISK ASSESSMENT DOCD: CPT | Mod: CPTII,S$GLB,, | Performed by: HOSPITALIST

## 2025-08-19 PROCEDURE — 3078F DIAST BP <80 MM HG: CPT | Mod: CPTII,S$GLB,, | Performed by: HOSPITALIST

## 2025-08-19 PROCEDURE — 1101F PT FALLS ASSESS-DOCD LE1/YR: CPT | Mod: CPTII,S$GLB,, | Performed by: HOSPITALIST

## 2025-08-19 PROCEDURE — 99999 PR PBB SHADOW E&M-EST. PATIENT-LVL III: CPT | Mod: PBBFAC,,, | Performed by: HOSPITALIST

## 2025-08-19 PROCEDURE — G2211 COMPLEX E/M VISIT ADD ON: HCPCS | Mod: S$GLB,,, | Performed by: HOSPITALIST

## 2025-08-19 PROCEDURE — 1126F AMNT PAIN NOTED NONE PRSNT: CPT | Mod: CPTII,S$GLB,, | Performed by: HOSPITALIST

## 2025-08-19 PROCEDURE — 3074F SYST BP LT 130 MM HG: CPT | Mod: CPTII,S$GLB,, | Performed by: HOSPITALIST

## 2025-08-19 PROCEDURE — 99214 OFFICE O/P EST MOD 30 MIN: CPT | Mod: S$GLB,,, | Performed by: HOSPITALIST

## (undated) DEVICE — SPONGE TONSIL LARGE

## (undated) DEVICE — STAPLER SUREFORM CRVD TIP 45

## (undated) DEVICE — GOWN SMART IMP BREATHABLE XXLG

## (undated) DEVICE — SUT MCRYL PLUS 4-0 PS2 27IN

## (undated) DEVICE — SEE MEDLINE ITEM 146313

## (undated) DEVICE — Device

## (undated) DEVICE — GLOVE BIOGEL SKINSENSE PI 7.5

## (undated) DEVICE — SUT CTD VICRYL 0 UND BR CT

## (undated) DEVICE — DRESSING TRANS 2X2 TEGADERM

## (undated) DEVICE — HEMOSTAT SURGICEL 2X14IN

## (undated) DEVICE — KIT ANTIFOG

## (undated) DEVICE — DRESSING TELFA STRL 4X3 LF

## (undated) DEVICE — SPONGE DERMACEA 4X4IN 12PLY

## (undated) DEVICE — DRAPE COLUMN DAVINCI XI

## (undated) DEVICE — TAPE SILK 3IN

## (undated) DEVICE — ELECTRODE BLADE INSULATED 1 IN

## (undated) DEVICE — SYR SLIP TIP 20CC

## (undated) DEVICE — SUT ETHILON 2-0 BLK PS-2

## (undated) DEVICE — DRAPE ABDOMINAL TIBURON 14X11

## (undated) DEVICE — TOWEL OR DISP STRL BLUE 4/PK

## (undated) DEVICE — GAUZE SPONGE 4X4 12PLY

## (undated) DEVICE — DRAIN CHAN RND HUBLS 8MM 24FR

## (undated) DEVICE — DRESSING TEGADERM IV 3.5 X 4.5

## (undated) DEVICE — DRESSING TRANS 4X4 TEGADERM

## (undated) DEVICE — BLADE 4IN EDGE INSULATED

## (undated) DEVICE — CANNULA REDUCER 12-8MM

## (undated) DEVICE — SOL ELECTROLUBE ANTI-STIC

## (undated) DEVICE — SUT SILK 0 SH 30IN BLK BR

## (undated) DEVICE — SPONGE GAUZE 16PLY 4X4

## (undated) DEVICE — DRESSING TELFA N ADH 3X8

## (undated) DEVICE — TAPE CURAD SILK ADH 3INX10YD

## (undated) DEVICE — ELECTRODE REM PLYHSV RETURN 9

## (undated) DEVICE — GLOVE BIOGEL PI MICRO SZ 7.5

## (undated) DEVICE — SET TRI-LUMEN FILTERED TUBE

## (undated) DEVICE — STAPLER ECHELON FLEX GST 45MM

## (undated) DEVICE — CATH THORACIC 24FR ST

## (undated) DEVICE — DRESSING SURGICAL 1X3

## (undated) DEVICE — COVER LIGHT HANDLE

## (undated) DEVICE — DRAPE ARM DAVINCI XI

## (undated) DEVICE — CONTAINER SPECIMEN STRL 4OZ

## (undated) DEVICE — SYR ONLY LUER LOCK 20CC

## (undated) DEVICE — DRESSING TEGADERM 2 3/8 X 2.75

## (undated) DEVICE — STAPLER GIA HANDLE STD

## (undated) DEVICE — SUT MONOCYRL 4-0 PS2 UND

## (undated) DEVICE — TRAY MINOR GEN SURG

## (undated) DEVICE — DRESSING AQUACEL AG ADV 3.5X6

## (undated) DEVICE — TRAY MINOR GEN SURG OMC

## (undated) DEVICE — SEAL UNIVERSAL 5MM-8MM XI

## (undated) DEVICE — NDL SPINAL 18GX3.5 SPINOCAN

## (undated) DEVICE — SOL WATER STRL IRR 1000ML

## (undated) DEVICE — CLOSURE SKIN STERI STRIP 1/2X4

## (undated) DEVICE — KIT VUETIP TROCAR SWAB

## (undated) DEVICE — DRAIN CHEST DRY SUCTION

## (undated) DEVICE — RELOAD SUREFORM 45 4.3 GRN 6R

## (undated) DEVICE — SEE MEDLINE ITEM 146420

## (undated) DEVICE — PORT AIRSEAL 12/120MM LPI

## (undated) DEVICE — DRAPE CORETEMP FLD WRM 56X62IN

## (undated) DEVICE — BLADE ELECTRO EDGE INSULATED

## (undated) DEVICE — RELOAD SUREFORM 45 3.5 BLU 6R

## (undated) DEVICE — DRAPE SCOPE PILLOW WARMER

## (undated) DEVICE — DRAPE INCISE IOBAN 2 23X17IN

## (undated) DEVICE — SCISSOR 5MMX35CM DIRECT DRIVE

## (undated) DEVICE — CANNULA SEAL 12MM

## (undated) DEVICE — SUT SILK 0 STRANDS 30IN BLK

## (undated) DEVICE — SUT 2-0 VICRYL / CT-1

## (undated) DEVICE — SUT VICRYL 3-0 27 SH

## (undated) DEVICE — SPONGE IV DRAIN 4X4 STERILE

## (undated) DEVICE — RELOAD ENDO GIA TRISTAPLE 45MM

## (undated) DEVICE — BAG INZII TISS RETRV 12/15MM

## (undated) DEVICE — RELOAD ECHELON ENDOPATH 45MM

## (undated) DEVICE — KIT ANTIFOG W/SPONG & FLUID

## (undated) DEVICE — TUBING SUC UNIV W/CONN 12FT

## (undated) DEVICE — SUT SILK 0 BLK BR FSL 18 IN